# Patient Record
Sex: MALE | Race: WHITE | Employment: OTHER | URBAN - NONMETROPOLITAN AREA
[De-identification: names, ages, dates, MRNs, and addresses within clinical notes are randomized per-mention and may not be internally consistent; named-entity substitution may affect disease eponyms.]

---

## 2019-09-03 ENCOUNTER — HOSPITAL ENCOUNTER (OUTPATIENT)
Dept: SPEECH THERAPY | Age: 54
Setting detail: THERAPIES SERIES
Discharge: HOME | End: 2019-09-03
Payer: COMMERCIAL

## 2019-09-03 PROCEDURE — 92521 EVALUATION OF SPEECH FLUENCY: CPT

## 2019-09-03 NOTE — PROGRESS NOTES
Inventory were used to assess the patients cognitive linguistic skills including auditory processing, recall/short-term memory with time delays and environmental distracters and problem solving/reasoning, as well as overall expressive language skills. Results are listed below. For orientation questions, patient expressed all biographical and spatial information and temporal information of day of the week, month, and date independently. Patient did not require any cueing for this task. In relation to patients memory/recall ability, patient was 9 out of 9 during immediate recall trials with a sequenced number and/or word set up to 4 items with no repetitions provided. During verbal expression task of repeating/imitating clinician, patient was 100% at independent level and also when provided written words/phrases. Short term memory tasks completed at 100%. Patient was 100% on all yes/no questions at independent level. Patient was 100% on identifying body parts with left and right discrimination at independent level. During 1 step directions, patient was 100% at independent level. During 2 step directions patient was 100% at independent level. Deficits in confrontational naming are typically a key symptom of aphasia that can result from various forms of brain damage (e.g. stroke, head injury, dementia, tumors, and infections). During labeling trials, patient was 100% at independent level. During sentence completion task, patient was 100% at independent level. During responsive speech trials, patient was 100% at independent level. The term executive function describes a set of cognitive abilities that control and regulate other abilities and behaviors. Executive functions are necessary for goal-directed behavior. They include the ability to initiate and stop actions, to monitor and change behavior as needed, and to plan future behavior when faced with novel tasks and situations.  Executive

## 2019-09-05 ENCOUNTER — HOSPITAL ENCOUNTER (OUTPATIENT)
Dept: PHYSICAL THERAPY | Age: 54
Setting detail: THERAPIES SERIES
Discharge: HOME | End: 2019-09-05
Payer: COMMERCIAL

## 2019-09-05 PROCEDURE — 97162 PT EVAL MOD COMPLEX 30 MIN: CPT

## 2019-09-05 PROCEDURE — 97110 THERAPEUTIC EXERCISES: CPT

## 2019-09-05 NOTE — PROGRESS NOTES
Physical Therapy  Initial Assessment  Date: 2019  Patient Name: Christy Soria  MRN: [id number]  : 1965          Restrictions       Subjective   General  Additional Pertinent Hx: 48year-old male referred to PT with disgnosis of ischemic stroke. The referral is from [facility] in [place] [address], where he was admitted with left sided weakness, slurred speech, 19 while on way back home from [place] to his home in [place]. tPA was administered at the hospital. History of stroke 5 years prior, also affecting his left side and speech. Referring Practitioner: Isacc Lofton ([place] [address]). Referral Date : 19  Diagnosis: ischemic stroke  PT Visit Information  PT Insurance Information: [employer] (no pre-cert)  Total # of Visits Approved: 12  Total # of Visits to Date: 1  Plan of Care/Certification Expiration Date: 19  Progress Note Due Date: 10/05/19  Subjective  Subjective: Patient states he previously had to recover from his previous stroke about 5 years ago, \" had to learn to talk again\" and was about back to 100% before the Islam of his latest stroke. He currently has to use a roller walker to ambulate now and he drags his left foot. He is closing on a house in [place] and living with family now. He reports he has not been having falls. He has trouble coming to standing, getting in and out of car due to difficulty lifting his left leg. He reports he is having more difficulty talking now, stutters and his endurance is lower.        Vision/Hearing  Vision  Vision: Within Functional Limits  Hearing  Hearing: Within functional limits    Orientation  Orientation  Overall Orientation Status: Within Normal Limits    Social/Functional History  Social/Functional History  Lives With: Spouse  Type of Home: House  Home Layout: One level  Receives Help From: Family  ADL Assistance: Independent  Homemaking Assistance: Independent  Ambulation Assistance: Independent  Transfer Assistance: foot for df, pf, inversion and eversion. Long term goal 4: Patient to have >=4-/5 right hip flexors, 4+/5 right knee extensors. Long term goal 5: Patient to show increased protective reactions with standing balance perturbations. Patient Goals   Patient goals : Return back to normal level of function, walk without a device.        Therapy Time   Individual Concurrent Group Co-treatment   Time In 5489         Time Out 1349         Minutes 61         Timed Code Treatment Minutes: 61 Minutes    Electronically signed by Chalo Mcgraw PT on 9/5/2019 at 3:01 PM    ________________________________         _____________________              Practitioner's Signature       Date    Chalo Mcgraw PT

## 2019-09-06 ENCOUNTER — APPOINTMENT (OUTPATIENT)
Dept: LAB | Facility: HOSPITAL | Age: 54
End: 2019-09-06

## 2019-09-06 ENCOUNTER — TRANSCRIBE ORDERS (OUTPATIENT)
Dept: ADMINISTRATIVE | Facility: HOSPITAL | Age: 54
End: 2019-09-06

## 2019-09-06 DIAGNOSIS — I69.322 DYSARTHRIA FOLLOWING CEREBRAL INFARCTION: ICD-10-CM

## 2019-09-06 DIAGNOSIS — Z00.01 ENCOUNTER FOR GENERAL ADULT MEDICAL EXAMINATION WITH ABNORMAL FINDINGS: ICD-10-CM

## 2019-09-06 DIAGNOSIS — F17.200 TOBACCO USE DISORDER: ICD-10-CM

## 2019-09-06 DIAGNOSIS — F41.1 GENERALIZED ANXIETY DISORDER: ICD-10-CM

## 2019-09-06 DIAGNOSIS — E78.5 HYPERLIPIDEMIA, UNSPECIFIED HYPERLIPIDEMIA TYPE: Primary | ICD-10-CM

## 2019-09-06 LAB
ALBUMIN SERPL-MCNC: 4.6 G/DL (ref 3.5–5)
ALBUMIN/GLOB SERPL: 1.4 G/DL (ref 1.1–2.5)
ALP SERPL-CCNC: 75 U/L (ref 24–120)
ALT SERPL W P-5'-P-CCNC: 34 U/L (ref 0–54)
ANION GAP SERPL CALCULATED.3IONS-SCNC: 9 MMOL/L (ref 4–13)
ARTICHOKE IGE QN: 91 MG/DL (ref 0–99)
AST SERPL-CCNC: 39 U/L (ref 7–45)
BASOPHILS # BLD AUTO: 0.06 10*3/MM3 (ref 0–0.2)
BASOPHILS NFR BLD AUTO: 0.7 % (ref 0–1.5)
BILIRUB SERPL-MCNC: 0.5 MG/DL (ref 0.1–1)
BILIRUB UR QL STRIP: NEGATIVE
BUN BLD-MCNC: 21 MG/DL (ref 5–21)
BUN/CREAT SERPL: 19.6 (ref 7–25)
CALCIUM SPEC-SCNC: 9.5 MG/DL (ref 8.4–10.4)
CHLORIDE SERPL-SCNC: 106 MMOL/L (ref 98–110)
CHOLEST SERPL-MCNC: 136 MG/DL (ref 130–200)
CLARITY UR: CLEAR
CO2 SERPL-SCNC: 27 MMOL/L (ref 24–31)
COLOR UR: YELLOW
CREAT BLD-MCNC: 1.07 MG/DL (ref 0.5–1.4)
DEPRECATED RDW RBC AUTO: 45.3 FL (ref 37–54)
EOSINOPHIL # BLD AUTO: 0.27 10*3/MM3 (ref 0–0.4)
EOSINOPHIL NFR BLD AUTO: 3.3 % (ref 0.3–6.2)
ERYTHROCYTE [DISTWIDTH] IN BLOOD BY AUTOMATED COUNT: 12.7 % (ref 12.3–15.4)
GFR SERPL CREATININE-BSD FRML MDRD: 73 ML/MIN/1.73
GLOBULIN UR ELPH-MCNC: 3.2 GM/DL
GLUCOSE BLD-MCNC: 89 MG/DL (ref 70–100)
GLUCOSE UR STRIP-MCNC: NEGATIVE MG/DL
HCT VFR BLD AUTO: 48.9 % (ref 37.5–51)
HDLC SERPL-MCNC: 35 MG/DL
HGB BLD-MCNC: 16.7 G/DL (ref 13–17.7)
HGB UR QL STRIP.AUTO: NEGATIVE
IMM GRANULOCYTES # BLD AUTO: 0.04 10*3/MM3 (ref 0–0.05)
IMM GRANULOCYTES NFR BLD AUTO: 0.5 % (ref 0–0.5)
KETONES UR QL STRIP: ABNORMAL
LDLC/HDLC SERPL: 2.41 {RATIO}
LEUKOCYTE ESTERASE UR QL STRIP.AUTO: NEGATIVE
LYMPHOCYTES # BLD AUTO: 2.27 10*3/MM3 (ref 0.7–3.1)
LYMPHOCYTES NFR BLD AUTO: 27.9 % (ref 19.6–45.3)
MCH RBC QN AUTO: 32.7 PG (ref 26.6–33)
MCHC RBC AUTO-ENTMCNC: 34.2 G/DL (ref 31.5–35.7)
MCV RBC AUTO: 95.7 FL (ref 79–97)
MONOCYTES # BLD AUTO: 0.49 10*3/MM3 (ref 0.1–0.9)
MONOCYTES NFR BLD AUTO: 6 % (ref 5–12)
NEUTROPHILS # BLD AUTO: 5 10*3/MM3 (ref 1.7–7)
NEUTROPHILS NFR BLD AUTO: 61.6 % (ref 42.7–76)
NITRITE UR QL STRIP: NEGATIVE
NRBC BLD AUTO-RTO: 0 /100 WBC (ref 0–0.2)
PH UR STRIP.AUTO: 5.5 [PH] (ref 5–8)
PLATELET # BLD AUTO: 284 10*3/MM3 (ref 140–450)
PMV BLD AUTO: 8.6 FL (ref 6–12)
POTASSIUM BLD-SCNC: 4.3 MMOL/L (ref 3.5–5.3)
PROT SERPL-MCNC: 7.8 G/DL (ref 6.3–8.7)
PROT UR QL STRIP: NEGATIVE
PSA SERPL-MCNC: 1.32 NG/ML (ref 0–4)
RBC # BLD AUTO: 5.11 10*6/MM3 (ref 4.14–5.8)
SODIUM BLD-SCNC: 142 MMOL/L (ref 135–145)
SP GR UR STRIP: 1.02 (ref 1–1.03)
TRIGL SERPL-MCNC: 83 MG/DL (ref 0–149)
UROBILINOGEN UR QL STRIP: ABNORMAL
WBC NRBC COR # BLD: 8.13 10*3/MM3 (ref 3.4–10.8)

## 2019-09-06 PROCEDURE — 36415 COLL VENOUS BLD VENIPUNCTURE: CPT | Performed by: INTERNAL MEDICINE

## 2019-09-06 PROCEDURE — 80061 LIPID PANEL: CPT | Performed by: INTERNAL MEDICINE

## 2019-09-06 PROCEDURE — 85025 COMPLETE CBC W/AUTO DIFF WBC: CPT | Performed by: INTERNAL MEDICINE

## 2019-09-06 PROCEDURE — 80053 COMPREHEN METABOLIC PANEL: CPT | Performed by: INTERNAL MEDICINE

## 2019-09-06 PROCEDURE — 81003 URINALYSIS AUTO W/O SCOPE: CPT | Performed by: INTERNAL MEDICINE

## 2019-09-06 PROCEDURE — G0103 PSA SCREENING: HCPCS | Performed by: INTERNAL MEDICINE

## 2019-09-10 ENCOUNTER — HOSPITAL ENCOUNTER (OUTPATIENT)
Dept: SPEECH THERAPY | Age: 54
Setting detail: THERAPIES SERIES
Discharge: HOME | End: 2019-09-10
Payer: COMMERCIAL

## 2019-09-10 ENCOUNTER — HOSPITAL ENCOUNTER (OUTPATIENT)
Dept: PHYSICAL THERAPY | Age: 54
Setting detail: THERAPIES SERIES
Discharge: HOME | End: 2019-09-10
Payer: COMMERCIAL

## 2019-09-10 PROCEDURE — 97110 THERAPEUTIC EXERCISES: CPT

## 2019-09-10 PROCEDURE — 92507 TX SP LANG VOICE COMM INDIV: CPT

## 2019-09-10 NOTE — PROGRESS NOTES
Speech Language Pathology  Facility/Department: [facility] SPEECH THERAPY  Daily Treatment Note      NAME: Tripp Isaac  : 1965  MRN: [id number]  Date: 9/10/2019  Therapist: Lisa Santana MS CCC-SLP    Subjective: The patient arrived on time to his appointment. He states he has been sleeping 10 to 12 hours per night, but no daily naps. He states his neurologist has cleared him to resume driving but he has not driven independently. Objective: The patient utilized the [employer] in [place] at 63.7 and this proved too challenging. The rate was decreased to 61.4  words per minute then to 55.0 words per minute (he demonstrated moderate dysfluencies at this rate). He did demonstrate mild dysfluencies during conversation and moderate sound repetitions and word repetitions during structured tasks. Assessment:  The patient exhibits moderate to severe sound and word repetitions with some groping during speech. Speech is intelligible but at times can be very slow and labored. His expressive language, receptive language and cognition are all within normal limits. He is recommended to receive outpatient speech therapy services 1x/week for 12 weeks. Continued daily Speech/Language treatment with goals per   plan of care. Goals:  Short-term Goals  Timeframe for Short-term Goals: 1x/week for 12 weeks  Goal 1: The patient will complet tasks for easy onset of speech to reduce word repetitions with 80% accuracy and minimal verbal/visual cues. Goal 2: The patient will complete tasks for easy onset of speech to reduce sound repetitions with 80% accuracy and minimal verbal/visual cues. Goal 3: The patient will demonstrate improved prosody during conversation with 80% accuracy and minimal verbal/visual cues. Long-term Goals  Goal 1: The patient will demonstrate more fluent speech during unstructured tasks to communicate effectively with members of the community and family members.     Patient/family involved in

## 2019-09-10 NOTE — PROGRESS NOTES
with weights over lower thighs  Exercise 11: sitting marching x 10 ea  Exercise 12: repeated sit to stand with forced recruitment of left leg  Exercise 13: standing static balance  Exercise 14: standing left hip flexion  Exercise 15: ambulation (rw-->hw-->to 1-point cane)- with AFO (and surgical bootie over shoe) x 100', with focus on LLE swing through  Exercise 16: bilateral hip abduction  Exercise 17: sidestepping in bars  Exercise 18: repeated advancement/retraction of left leg  Exercise 19: ++start adding more advanced exercises as left leg strength improves  Exercise 20:  Respond e-stim to left quads foot dorsiflexors (alternate)- see above                                   Assessment:   Conditions Requiring Skilled Therapeutic Intervention  Body structures, Functions, Activity limitations: Decreased functional mobility ; Decreased ADL status; Decreased ROM; Decreased strength;Decreased balance;Decreased high-level IADLs;Decreased fine motor control;Decreased coordination;Decreased endurance  Assessment: POC initiated per initial evaluation. Pt gives good effort to exercises, though requires frequent cuing to control breathing. NMES applied during DF with excellent contraction, did not attempt on quad today, but pt agreeable to try that as well. Used AFO during ambulation, with taller clinic RW (as opposed to pt's own), with surgical boot over L shoe. Instructed to focus on weight shift and swing through of LLE. Demonstrated improved swing through, but leans heavily on UEs. Fatigued at end of session, but nothing unexpected, and pt w/o complaint. REQUIRES PT FOLLOW UP: Yes  Discharge Recommendations: Continue to assess pending progress        Goals:  Short term goals  Time Frame for Short term goals: 6-8 weeks  Short term goal 1: Patient to be independent with HEP. Short term goal 2: Patient to be independent with trial use of AFO (if foot strength remains insufficient).   Short term goal 3: Patient able

## 2019-09-12 ENCOUNTER — HOSPITAL ENCOUNTER (OUTPATIENT)
Dept: PHYSICAL THERAPY | Age: 54
Setting detail: THERAPIES SERIES
Discharge: HOME | End: 2019-09-12
Payer: COMMERCIAL

## 2019-09-12 PROCEDURE — 97032 APPL MODALITY 1+ESTIM EA 15: CPT

## 2019-09-12 PROCEDURE — 97110 THERAPEUTIC EXERCISES: CPT

## 2019-09-12 NOTE — PROGRESS NOTES
Daily Treatment Note  Date: 2019  Patient Name: Luann Liang  MRN: [id number]     :   1965    Subjective:   General  Additional Pertinent Hx: 48year-old male referred to PT with disgnosis of ischemic stroke. The referral is from [facility] in [place] [address], where he was admitted with left sided weakness, slurred speech, 19 while on way back home from [place] to his home in [facility]. tPA was administered at the hospital. History of stroke 5 years prior, also affecting his left side and speech. Response To Previous Treatment: Patient with no complaints from previous session. Referring Practitioner: Christiano Mills ([place] [address]). PT Visit Information  PT Insurance Information: [employer] (no pre-cert)  Total # of Visits Approved: 12  Total # of Visits to Date: 3  Plan of Care/Certification Expiration Date: 19  Progress Note Due Date: 10/05/19  Subjective  Subjective: Patient states he has some soreness in his left knee today, but says he is doing good.   Pain Screening  Patient Currently in Pain: No  Vital Signs  Patient Currently in Pain: No       Treatment Activities:                                      Exercises  Exercise 1: supine left quad sets 3\" x 15  Exercise 2: supine SAQ's with tapping/DTR faciliatation to quads/patellar tendon x 10  Exercise 3: supine heel slides x 10  Exercise 4: supine foot dorsiflexion/plantarflexion/eversion (DF and eversion with NMES x 10', pad just distal to tibial plateau and middle of ant tib) and PF with manual resist  Exercise 5: sitting LAQ's with faciliatation (as needed) x 10  Exercise 6: sitting h/s curls with band and facilitation as needed (yellow) x 10 and partial ROM *with facilitation  Exercise 7: sitting ball squeeze supine with manual assist x 10  Exercise 8: sitting hip abduction with band supine with manual assist x 10  Exercise 9: sitting foot tapping 1 x 10 ea  Exercise 10: sitting heel raises with weights over lower thighs  Exercise 11:

## 2019-09-17 ENCOUNTER — HOSPITAL ENCOUNTER (OUTPATIENT)
Dept: PHYSICAL THERAPY | Age: 54
Setting detail: THERAPIES SERIES
Discharge: HOME | End: 2019-09-17
Payer: COMMERCIAL

## 2019-09-17 PROCEDURE — G0283 ELEC STIM OTHER THAN WOUND: HCPCS

## 2019-09-17 PROCEDURE — 97110 THERAPEUTIC EXERCISES: CPT

## 2019-09-17 ASSESSMENT — PAIN DESCRIPTION - PAIN TYPE: TYPE: CHRONIC PAIN

## 2019-09-17 ASSESSMENT — PAIN DESCRIPTION - ORIENTATION: ORIENTATION: LEFT

## 2019-09-17 ASSESSMENT — PAIN DESCRIPTION - LOCATION: LOCATION: LEG

## 2019-09-17 ASSESSMENT — PAIN SCALES - GENERAL: PAINLEVEL_OUTOF10: 2

## 2019-09-17 ASSESSMENT — PAIN DESCRIPTION - FREQUENCY: FREQUENCY: INTERMITTENT

## 2019-09-17 ASSESSMENT — PAIN DESCRIPTION - DESCRIPTORS: DESCRIPTORS: TIGHTNESS

## 2019-09-17 NOTE — PROGRESS NOTES
Daily Treatment Note  Date: 2019  Patient Name: Danny Duque  MRN: [id number]     :   1965    Subjective:   General  Additional Pertinent Hx: 48year-old male referred to PT with disgnosis of ischemic stroke. The referral is from [facility] in [place] [address], where he was admitted with left sided weakness, slurred speech, 19 while on way back home from [place] to his home in [place]. tPA was administered at the hospital. History of stroke 5 years prior, also affecting his left side and speech. Response To Previous Treatment: Patient with no complaints from previous session. Referring Practitioner: Dakota Higuera ([place] [address]). PT Visit Information  PT Insurance Information: [employer] (no pre-cert)  Total # of Visits Approved: 12  Total # of Visits to Date: 4  Plan of Care/Certification Expiration Date: 19  Progress Note Due Date: 10/05/19  Subjective  Subjective: My left leg hurts some down around level, like something is wrapped around it.   Pain Screening  Patient Currently in Pain: Yes  Pain Assessment  Pain Assessment: 0-10  Pain Level: 2  Pain Type: Chronic pain  Pain Location: Leg  Pain Orientation: Left  Pain Descriptors: Tightness  Pain Frequency: Intermittent       Treatment Activities:            Exercises  Exercise 1: supine left quad sets 3\" x 15  Exercise 2: supine SAQ's with tapping/DTR faciliatation to quads/patellar tendon x 10                           eccentric lowering x 5 with assist from therapist  Exercise 3: supine heel slides x 10--mod assist  and mod verbal cues to perform  Exercise 4: supine foot dorsiflexion/plantarflexion/eversion (DF and eversion with NMES x 10', pad just distal to tibial plateau and middle of ant tib) and PF with manual resist  Exercise 5: sitting LAQ's with faciliatation (as needed) x 5                                                 eccentric lowering x 5  Exercise 6: sitting h/s curls with band and facilitation as needed (yellow) x 10 and

## 2019-09-19 ENCOUNTER — APPOINTMENT (OUTPATIENT)
Dept: SPEECH THERAPY | Age: 54
End: 2019-09-19
Payer: COMMERCIAL

## 2019-09-19 ENCOUNTER — HOSPITAL ENCOUNTER (OUTPATIENT)
Dept: PHYSICAL THERAPY | Age: 54
Setting detail: THERAPIES SERIES
Discharge: HOME | End: 2019-09-19
Payer: COMMERCIAL

## 2019-09-19 PROCEDURE — 97110 THERAPEUTIC EXERCISES: CPT

## 2019-09-19 PROCEDURE — G0283 ELEC STIM OTHER THAN WOUND: HCPCS

## 2019-09-19 ASSESSMENT — PAIN DESCRIPTION - DESCRIPTORS: DESCRIPTORS: TIGHTNESS

## 2019-09-19 ASSESSMENT — PAIN DESCRIPTION - ORIENTATION: ORIENTATION: LEFT

## 2019-09-19 ASSESSMENT — PAIN DESCRIPTION - PAIN TYPE: TYPE: CHRONIC PAIN

## 2019-09-19 ASSESSMENT — PAIN DESCRIPTION - FREQUENCY: FREQUENCY: INTERMITTENT

## 2019-09-19 ASSESSMENT — PAIN SCALES - GENERAL: PAINLEVEL_OUTOF10: 2

## 2019-09-19 ASSESSMENT — PAIN DESCRIPTION - LOCATION: LOCATION: LEG

## 2019-09-19 NOTE — PROGRESS NOTES
facilitation as needed (yellow) x 10 and partial ROM *with facilitation  Exercise 7: sitting ball squeeze supine with manual assist x 10  Exercise 8: sitting hip abduction with band supine with manual assist x 10  Exercise 9: sitting foot tapping 1 x 10 ea  Exercise 10: sitting heel raises with weights over lower thighs--x 5 without weights  Exercise 11: sitting marching x 10 ea  Exercise 12: repeated sit to stand with forced recruitment of left leg--not today  Exercise 13: standing static balance--not today  Exercise 14: standing left hip flexion--not today  Exercise 15: ambulation (rw-->hw-->to 1-point cane)- with AFO (and surgical bootie over shoe) x 80' with RW with focus on LLE swing through  Exercise 16: bilateral hip abduction--not today  Exercise 17: sidestepping in bars--not today  Exercise 18: repeated advancement/retraction of left leg--not today  Exercise 19: ++start adding more advanced exercises as left leg strength improves--not today  Exercise 20:  Respond e-stim to left quads foot dorsiflexors (alternate)- see above            Assessment:   Conditions Requiring Skilled Therapeutic Intervention  Body structures, Functions, Activity limitations: Decreased functional mobility ; Decreased ADL status; Decreased ROM; Decreased strength;Decreased balance;Decreased high-level IADLs;Decreased fine motor control;Decreased coordination;Decreased endurance  Assessment: Patient did fair with todays session. He appeared to have bit more active contraction today in quads but still unable to move through full ranges. He ambulates very slowly with RW. Tends to drag left foot along floor. He did increase ambulation distance to 80' but took him 16 mins to go that distance. Patient with noted DF contraction with respond unit. Patient eager to improve. Rated pain at 2/10 pre and post sessino in left LE.                  Goals:  Short term goals  Time Frame for Short term goals: 6-8 weeks  Short term goal 1: Patient

## 2019-09-24 ENCOUNTER — HOSPITAL ENCOUNTER (OUTPATIENT)
Dept: SPEECH THERAPY | Age: 54
Setting detail: THERAPIES SERIES
Discharge: HOME | End: 2019-09-24
Payer: COMMERCIAL

## 2019-09-24 ENCOUNTER — HOSPITAL ENCOUNTER (OUTPATIENT)
Dept: PHYSICAL THERAPY | Age: 54
Setting detail: THERAPIES SERIES
Discharge: HOME | End: 2019-09-24
Payer: COMMERCIAL

## 2019-09-24 PROCEDURE — 97032 APPL MODALITY 1+ESTIM EA 15: CPT

## 2019-09-24 PROCEDURE — 97110 THERAPEUTIC EXERCISES: CPT

## 2019-09-24 PROCEDURE — 92507 TX SP LANG VOICE COMM INDIV: CPT

## 2019-09-24 NOTE — PROGRESS NOTES
Physical Therapy  Daily Treatment Note  Date: 2019  Patient Name: Luann Liang  MRN: [id number]     :   1965    Subjective:   General  Additional Pertinent Hx: 48year-old male referred to PT with disgnosis of ischemic stroke. The referral is from [facility] in [place] [address], where he was admitted with left sided weakness, slurred speech, 19 while on way back home from [place] to his home in [place]. tPA was administered at the hospital. History of stroke 5 years prior, also affecting his left side and speech. Referring Practitioner: Christiano Mills ([place] [address]). PT Insurance Information: [employer] (no pre-cert)  Total # of Visits Approved: 12  Total # of Visits to Date: 6  Plan of Care/Certification Expiration Date: 19  Progress Note Due Date: 10/05/19  Subjective: Patient stats he feels alright, i wish i would wake up one morning and be normal again.   Patient Currently in Pain: No         Treatment Activities:          Exercises  Exercise 1: supine left quad sets 3\" x 20  Exercise 2: supine SAQ's with tapping/DTR faciliatation to quads/patellar tendon x 10                           eccentric lowering x 7 with assist from therapist  Exercise 3: supine heel slides x 10--mod assist  and mod verbal cues to perform  Exercise 4: supine foot dorsiflexion/plantarflexion/eversion (DF and eversion with NMES x 10', pad just distal to tibial plateau and middle of ant tib) and PF with manual resist  Exercise 5: sitting LAQ's with faciliatation (as needed) x 5                                                 eccentric lowering x 5  Exercise 6: sitting h/s curls with band and facilitation as needed (yellow) x 10 and partial ROM *with facilitation  Exercise 7: sitting ball squeeze supine with manual assist x 10  Exercise 8: sitting hip abduction with band supine with manual assist x 10  Exercise 9: sitting foot tapping 1 x 10 ea  Exercise 10: sitting heel raises with weights over lower thighs--x 5 term goal 2: Patient able to perform sit to stand 5 x in 30 seconds with increased weight bourne through left LE. Long term goal 3: Patient to have >= 4-/5 strength in left foot for df, pf, inversion and eversion. Long term goal 4: Patient to have >=4-/5 right hip flexors, 4+/5 right knee extensors. Long term goal 5: Patient to show increased protective reactions with standing balance perturbations. Patient Goals   Patient goals : Return back to normal level of function, walk without a device.     Plan:    Times per week: 2x per week  Plan weeks: 6-9 weeks  Current Treatment Recommendations: Strengthening, ROM, Balance Training, Transfer Training, Endurance Training, Gait Training, Stair training, Neuromuscular Re-education, Home Exercise Program, Patient/Caregiver Education & Training, Equipment Evaluation, Education, & procurement        Therapy Time   Individual Concurrent Group Co-treatment   Time In 1411         Time Out 1501         Minutes 2300 [facility]    Electronically signed by Kirsty Harrell PTA on 9/24/2019 at 3:13 PM

## 2019-09-24 NOTE — PROGRESS NOTES
Speech Language Pathology  Facility/Department: [facility] SPEECH THERAPY  Daily Treatment Note        NAME: Azra Lozano  : 1965  MRN: [id number]  Date: 2019  Therapist: Lc Rosales MS CCC-SLP         Subjective: The patient denies pain this date. He did complain of more frequent headaches the past few weeks. He denies high blood pressure. Patient states he has not attempted to drive yet. He states he will try with a family member or friend present. He is still content with driving the golf cart in the neighborhood.       Objective:  Practiced easy onset technique, pullout technique during episodes of dysfluencies, continuous phonation technique, and light contact technique. He did utilize the pullout technique during conversation and structured tasks, and this did prove effective in reducing and in most cases, eliminating the dysfluency. Assessment:  The patient exhibits moderate to severe sound and word repetitions with some groping during speech. Speech is intelligible but at times can be very slow and labored. His expressive language, receptive language and cognition are all within normal limits. He is recommended to receive outpatient speech therapy services 1x/week for 12 weeks.        Continued daily Speech/Language treatment with goals per   plan of care.           Goals:  Short-term Goals  Timeframe for Short-term Goals: 1x/week for 12 weeks  Goal 1: The patient will complet tasks for easy onset of speech to reduce word repetitions with 80% accuracy and minimal verbal/visual cues. Goal 2: The patient will complete tasks for easy onset of speech to reduce sound repetitions with 80% accuracy and minimal verbal/visual cues. Goal 3: The patient will demonstrate improved prosody during conversation with 80% accuracy and minimal verbal/visual cues.   Long-term Goals  Goal 1: The patient will demonstrate more fluent speech during unstructured tasks to communicate effectively with members of the

## 2019-09-26 ENCOUNTER — APPOINTMENT (OUTPATIENT)
Dept: SPEECH THERAPY | Age: 54
End: 2019-09-26
Payer: COMMERCIAL

## 2019-09-26 ENCOUNTER — HOSPITAL ENCOUNTER (OUTPATIENT)
Dept: PHYSICAL THERAPY | Age: 54
Setting detail: THERAPIES SERIES
Discharge: HOME | End: 2019-09-26
Payer: COMMERCIAL

## 2019-09-26 PROCEDURE — 97110 THERAPEUTIC EXERCISES: CPT

## 2019-09-26 PROCEDURE — 97116 GAIT TRAINING THERAPY: CPT

## 2019-10-01 ENCOUNTER — HOSPITAL ENCOUNTER (OUTPATIENT)
Dept: SPEECH THERAPY | Age: 54
Setting detail: THERAPIES SERIES
Discharge: HOME | End: 2019-10-01
Payer: COMMERCIAL

## 2019-10-01 ENCOUNTER — HOSPITAL ENCOUNTER (OUTPATIENT)
Dept: PHYSICAL THERAPY | Age: 54
Setting detail: THERAPIES SERIES
Discharge: HOME | End: 2019-10-01
Payer: COMMERCIAL

## 2019-10-01 PROCEDURE — 97110 THERAPEUTIC EXERCISES: CPT

## 2019-10-01 PROCEDURE — 97032 APPL MODALITY 1+ESTIM EA 15: CPT

## 2019-10-01 PROCEDURE — 92507 TX SP LANG VOICE COMM INDIV: CPT

## 2019-10-03 ENCOUNTER — HOSPITAL ENCOUNTER (OUTPATIENT)
Dept: PHYSICAL THERAPY | Age: 54
Setting detail: THERAPIES SERIES
Discharge: HOME | End: 2019-10-03
Payer: COMMERCIAL

## 2019-10-03 ENCOUNTER — APPOINTMENT (OUTPATIENT)
Dept: SPEECH THERAPY | Age: 54
End: 2019-10-03
Payer: COMMERCIAL

## 2019-10-03 PROCEDURE — 97110 THERAPEUTIC EXERCISES: CPT

## 2019-10-08 ENCOUNTER — HOSPITAL ENCOUNTER (OUTPATIENT)
Dept: SPEECH THERAPY | Age: 54
Setting detail: THERAPIES SERIES
Discharge: HOME | End: 2019-10-08
Payer: COMMERCIAL

## 2019-10-08 ENCOUNTER — HOSPITAL ENCOUNTER (OUTPATIENT)
Dept: PHYSICAL THERAPY | Age: 54
Setting detail: THERAPIES SERIES
Discharge: HOME | End: 2019-10-08
Payer: COMMERCIAL

## 2019-10-08 PROCEDURE — 92507 TX SP LANG VOICE COMM INDIV: CPT

## 2019-10-08 PROCEDURE — 97032 APPL MODALITY 1+ESTIM EA 15: CPT

## 2019-10-08 PROCEDURE — 97110 THERAPEUTIC EXERCISES: CPT

## 2019-10-10 ENCOUNTER — APPOINTMENT (OUTPATIENT)
Dept: SPEECH THERAPY | Age: 54
End: 2019-10-10
Payer: COMMERCIAL

## 2019-10-10 ENCOUNTER — HOSPITAL ENCOUNTER (OUTPATIENT)
Dept: PHYSICAL THERAPY | Age: 54
Setting detail: THERAPIES SERIES
Discharge: HOME | End: 2019-10-10
Payer: COMMERCIAL

## 2019-10-10 PROCEDURE — 97110 THERAPEUTIC EXERCISES: CPT

## 2019-10-15 ENCOUNTER — HOSPITAL ENCOUNTER (OUTPATIENT)
Dept: SPEECH THERAPY | Age: 54
Setting detail: THERAPIES SERIES
Discharge: HOME | End: 2019-10-15
Payer: COMMERCIAL

## 2019-10-15 ENCOUNTER — HOSPITAL ENCOUNTER (OUTPATIENT)
Dept: PHYSICAL THERAPY | Age: 54
Setting detail: THERAPIES SERIES
Discharge: HOME | End: 2019-10-15
Payer: COMMERCIAL

## 2019-10-15 PROCEDURE — 97032 APPL MODALITY 1+ESTIM EA 15: CPT

## 2019-10-15 PROCEDURE — 92507 TX SP LANG VOICE COMM INDIV: CPT

## 2019-10-15 PROCEDURE — 97110 THERAPEUTIC EXERCISES: CPT

## 2019-10-17 ENCOUNTER — HOSPITAL ENCOUNTER (OUTPATIENT)
Dept: PHYSICAL THERAPY | Age: 54
Setting detail: THERAPIES SERIES
Discharge: HOME | End: 2019-10-17
Payer: COMMERCIAL

## 2019-10-17 ENCOUNTER — APPOINTMENT (OUTPATIENT)
Dept: SPEECH THERAPY | Age: 54
End: 2019-10-17
Payer: COMMERCIAL

## 2019-10-17 PROCEDURE — 97530 THERAPEUTIC ACTIVITIES: CPT

## 2019-10-17 PROCEDURE — G0283 ELEC STIM OTHER THAN WOUND: HCPCS

## 2019-10-22 ENCOUNTER — HOSPITAL ENCOUNTER (OUTPATIENT)
Dept: SPEECH THERAPY | Age: 54
Setting detail: THERAPIES SERIES
Discharge: HOME | End: 2019-10-22
Payer: COMMERCIAL

## 2019-10-22 ENCOUNTER — HOSPITAL ENCOUNTER (OUTPATIENT)
Dept: PHYSICAL THERAPY | Age: 54
Setting detail: THERAPIES SERIES
Discharge: HOME | End: 2019-10-22
Payer: COMMERCIAL

## 2019-10-22 PROCEDURE — 97110 THERAPEUTIC EXERCISES: CPT

## 2019-10-22 PROCEDURE — 97032 APPL MODALITY 1+ESTIM EA 15: CPT

## 2019-10-24 ENCOUNTER — APPOINTMENT (OUTPATIENT)
Dept: SPEECH THERAPY | Age: 54
End: 2019-10-24
Payer: COMMERCIAL

## 2019-10-24 ENCOUNTER — HOSPITAL ENCOUNTER (OUTPATIENT)
Dept: PHYSICAL THERAPY | Age: 54
Setting detail: THERAPIES SERIES
Discharge: HOME | End: 2019-10-24
Payer: COMMERCIAL

## 2019-10-24 PROCEDURE — 97110 THERAPEUTIC EXERCISES: CPT

## 2019-10-24 PROCEDURE — G0283 ELEC STIM OTHER THAN WOUND: HCPCS

## 2019-10-29 ENCOUNTER — HOSPITAL ENCOUNTER (OUTPATIENT)
Dept: PHYSICAL THERAPY | Age: 54
Setting detail: THERAPIES SERIES
Discharge: HOME | End: 2019-10-29
Payer: COMMERCIAL

## 2019-10-29 ENCOUNTER — HOSPITAL ENCOUNTER (OUTPATIENT)
Dept: SPEECH THERAPY | Age: 54
Setting detail: THERAPIES SERIES
Discharge: HOME | End: 2019-10-29
Payer: COMMERCIAL

## 2019-10-29 PROCEDURE — 97110 THERAPEUTIC EXERCISES: CPT

## 2019-10-29 PROCEDURE — 97032 APPL MODALITY 1+ESTIM EA 15: CPT

## 2019-10-29 PROCEDURE — 92507 TX SP LANG VOICE COMM INDIV: CPT

## 2019-10-31 ENCOUNTER — HOSPITAL ENCOUNTER (OUTPATIENT)
Dept: PHYSICAL THERAPY | Age: 54
Setting detail: THERAPIES SERIES
Discharge: HOME | End: 2019-10-31
Payer: COMMERCIAL

## 2019-10-31 ENCOUNTER — APPOINTMENT (OUTPATIENT)
Dept: SPEECH THERAPY | Age: 54
End: 2019-10-31
Payer: COMMERCIAL

## 2019-10-31 PROCEDURE — 97110 THERAPEUTIC EXERCISES: CPT

## 2019-11-05 ENCOUNTER — HOSPITAL ENCOUNTER (OUTPATIENT)
Dept: PHYSICAL THERAPY | Age: 54
Setting detail: THERAPIES SERIES
Discharge: HOME | End: 2019-11-05
Payer: COMMERCIAL

## 2019-11-05 PROCEDURE — G0283 ELEC STIM OTHER THAN WOUND: HCPCS

## 2019-11-05 PROCEDURE — 97110 THERAPEUTIC EXERCISES: CPT

## 2019-11-07 ENCOUNTER — APPOINTMENT (OUTPATIENT)
Dept: SPEECH THERAPY | Age: 54
End: 2019-11-07
Payer: COMMERCIAL

## 2019-11-07 ENCOUNTER — HOSPITAL ENCOUNTER (OUTPATIENT)
Dept: PHYSICAL THERAPY | Age: 54
Setting detail: THERAPIES SERIES
Discharge: HOME | End: 2019-11-07
Payer: COMMERCIAL

## 2019-11-07 PROCEDURE — 97110 THERAPEUTIC EXERCISES: CPT

## 2019-11-07 PROCEDURE — 97032 APPL MODALITY 1+ESTIM EA 15: CPT

## 2019-11-12 ENCOUNTER — APPOINTMENT (OUTPATIENT)
Dept: SPEECH THERAPY | Age: 54
End: 2019-11-12
Payer: COMMERCIAL

## 2019-11-12 ENCOUNTER — HOSPITAL ENCOUNTER (OUTPATIENT)
Dept: PHYSICAL THERAPY | Age: 54
Setting detail: THERAPIES SERIES
Discharge: HOME | End: 2019-11-12
Payer: COMMERCIAL

## 2019-11-12 ENCOUNTER — HOSPITAL ENCOUNTER (OUTPATIENT)
Dept: SPEECH THERAPY | Age: 54
Setting detail: THERAPIES SERIES
Discharge: HOME | End: 2019-11-12
Payer: COMMERCIAL

## 2019-11-12 PROCEDURE — 92507 TX SP LANG VOICE COMM INDIV: CPT

## 2019-11-12 PROCEDURE — 97110 THERAPEUTIC EXERCISES: CPT

## 2019-11-14 ENCOUNTER — APPOINTMENT (OUTPATIENT)
Dept: PHYSICAL THERAPY | Age: 54
End: 2019-11-14
Payer: COMMERCIAL

## 2019-11-14 ENCOUNTER — APPOINTMENT (OUTPATIENT)
Dept: SPEECH THERAPY | Age: 54
End: 2019-11-14
Payer: COMMERCIAL

## 2019-11-19 ENCOUNTER — HOSPITAL ENCOUNTER (OUTPATIENT)
Dept: PHYSICAL THERAPY | Age: 54
Setting detail: THERAPIES SERIES
Discharge: HOME | End: 2019-11-19
Payer: COMMERCIAL

## 2019-11-19 ENCOUNTER — HOSPITAL ENCOUNTER (OUTPATIENT)
Dept: SPEECH THERAPY | Age: 54
Setting detail: THERAPIES SERIES
Discharge: HOME | End: 2019-11-19
Payer: COMMERCIAL

## 2019-11-19 ENCOUNTER — APPOINTMENT (OUTPATIENT)
Dept: SPEECH THERAPY | Age: 54
End: 2019-11-19
Payer: COMMERCIAL

## 2019-11-19 PROCEDURE — 97110 THERAPEUTIC EXERCISES: CPT

## 2019-11-19 PROCEDURE — 97127 HC SP THER IVNTJ W/FOCUS COG FUNCJ: CPT

## 2019-11-19 PROCEDURE — G0283 ELEC STIM OTHER THAN WOUND: HCPCS

## 2019-11-21 ENCOUNTER — APPOINTMENT (OUTPATIENT)
Dept: SPEECH THERAPY | Age: 54
End: 2019-11-21
Payer: COMMERCIAL

## 2019-11-21 ENCOUNTER — HOSPITAL ENCOUNTER (OUTPATIENT)
Dept: PHYSICAL THERAPY | Age: 54
Setting detail: THERAPIES SERIES
Discharge: HOME | End: 2019-11-21
Payer: COMMERCIAL

## 2019-11-21 PROCEDURE — 97110 THERAPEUTIC EXERCISES: CPT

## 2019-11-26 ENCOUNTER — HOSPITAL ENCOUNTER (OUTPATIENT)
Dept: PHYSICAL THERAPY | Age: 54
Setting detail: THERAPIES SERIES
Discharge: HOME | End: 2019-11-26
Payer: COMMERCIAL

## 2019-11-26 ENCOUNTER — HOSPITAL ENCOUNTER (OUTPATIENT)
Dept: SPEECH THERAPY | Age: 54
Setting detail: THERAPIES SERIES
Discharge: HOME | End: 2019-11-26
Payer: COMMERCIAL

## 2019-11-26 ENCOUNTER — APPOINTMENT (OUTPATIENT)
Dept: SPEECH THERAPY | Age: 54
End: 2019-11-26
Payer: COMMERCIAL

## 2019-11-26 PROCEDURE — G0283 ELEC STIM OTHER THAN WOUND: HCPCS

## 2019-11-26 PROCEDURE — 97110 THERAPEUTIC EXERCISES: CPT

## 2019-11-26 PROCEDURE — 97127 HC SP THER IVNTJ W/FOCUS COG FUNCJ: CPT

## 2019-11-27 ENCOUNTER — HOSPITAL ENCOUNTER (OUTPATIENT)
Dept: PHYSICAL THERAPY | Age: 54
Setting detail: THERAPIES SERIES
Discharge: HOME | End: 2019-11-27
Payer: COMMERCIAL

## 2019-11-27 PROCEDURE — 97110 THERAPEUTIC EXERCISES: CPT

## 2019-12-03 ENCOUNTER — HOSPITAL ENCOUNTER (OUTPATIENT)
Dept: SPEECH THERAPY | Age: 54
Setting detail: THERAPIES SERIES
Discharge: HOME | End: 2019-12-03
Payer: COMMERCIAL

## 2019-12-03 ENCOUNTER — APPOINTMENT (OUTPATIENT)
Dept: SPEECH THERAPY | Age: 54
End: 2019-12-03
Payer: COMMERCIAL

## 2019-12-03 ENCOUNTER — HOSPITAL ENCOUNTER (OUTPATIENT)
Dept: PHYSICAL THERAPY | Age: 54
Setting detail: THERAPIES SERIES
Discharge: HOME | End: 2019-12-03
Payer: COMMERCIAL

## 2019-12-03 PROCEDURE — 97127 HC SP THER IVNTJ W/FOCUS COG FUNCJ: CPT

## 2019-12-03 PROCEDURE — 97110 THERAPEUTIC EXERCISES: CPT

## 2019-12-03 PROCEDURE — G0283 ELEC STIM OTHER THAN WOUND: HCPCS

## 2019-12-05 ENCOUNTER — HOSPITAL ENCOUNTER (OUTPATIENT)
Dept: PHYSICAL THERAPY | Age: 54
Setting detail: THERAPIES SERIES
Discharge: HOME | End: 2019-12-05
Payer: COMMERCIAL

## 2019-12-05 PROCEDURE — 97110 THERAPEUTIC EXERCISES: CPT

## 2019-12-05 PROCEDURE — G0283 ELEC STIM OTHER THAN WOUND: HCPCS

## 2019-12-10 ENCOUNTER — HOSPITAL ENCOUNTER (OUTPATIENT)
Dept: PHYSICAL THERAPY | Age: 54
Setting detail: THERAPIES SERIES
Discharge: HOME | End: 2019-12-10
Payer: COMMERCIAL

## 2019-12-10 ENCOUNTER — HOSPITAL ENCOUNTER (OUTPATIENT)
Dept: SPEECH THERAPY | Age: 54
Setting detail: THERAPIES SERIES
Discharge: HOME | End: 2019-12-10
Payer: COMMERCIAL

## 2019-12-10 PROCEDURE — 97127 HC SP THER IVNTJ W/FOCUS COG FUNCJ: CPT

## 2019-12-10 PROCEDURE — G0283 ELEC STIM OTHER THAN WOUND: HCPCS

## 2019-12-10 PROCEDURE — 97110 THERAPEUTIC EXERCISES: CPT

## 2019-12-12 ENCOUNTER — HOSPITAL ENCOUNTER (OUTPATIENT)
Dept: PHYSICAL THERAPY | Age: 54
Setting detail: THERAPIES SERIES
Discharge: HOME | End: 2019-12-12
Payer: COMMERCIAL

## 2019-12-12 PROCEDURE — 97110 THERAPEUTIC EXERCISES: CPT

## 2019-12-17 ENCOUNTER — HOSPITAL ENCOUNTER (OUTPATIENT)
Dept: SPEECH THERAPY | Age: 54
Setting detail: THERAPIES SERIES
Discharge: HOME | End: 2019-12-17
Payer: COMMERCIAL

## 2019-12-17 ENCOUNTER — HOSPITAL ENCOUNTER (OUTPATIENT)
Dept: PHYSICAL THERAPY | Age: 54
Setting detail: THERAPIES SERIES
Discharge: HOME | End: 2019-12-17
Payer: COMMERCIAL

## 2019-12-17 PROCEDURE — G0283 ELEC STIM OTHER THAN WOUND: HCPCS

## 2019-12-17 PROCEDURE — 97110 THERAPEUTIC EXERCISES: CPT

## 2019-12-19 ENCOUNTER — HOSPITAL ENCOUNTER (OUTPATIENT)
Dept: PHYSICAL THERAPY | Age: 54
Setting detail: THERAPIES SERIES
Discharge: HOME | End: 2019-12-19
Payer: COMMERCIAL

## 2019-12-19 PROCEDURE — 97110 THERAPEUTIC EXERCISES: CPT

## 2019-12-24 ENCOUNTER — APPOINTMENT (OUTPATIENT)
Dept: SPEECH THERAPY | Age: 54
End: 2019-12-24
Payer: COMMERCIAL

## 2019-12-31 ENCOUNTER — HOSPITAL ENCOUNTER (OUTPATIENT)
Dept: SPEECH THERAPY | Age: 54
Setting detail: THERAPIES SERIES
End: 2019-12-31
Payer: COMMERCIAL

## 2020-01-07 ENCOUNTER — HOSPITAL ENCOUNTER (OUTPATIENT)
Dept: SPEECH THERAPY | Age: 55
Setting detail: THERAPIES SERIES
Discharge: HOME | End: 2020-01-07
Payer: COMMERCIAL

## 2020-01-07 PROCEDURE — 97130 THER IVNTJ EA ADDL 15 MIN: CPT

## 2020-01-07 PROCEDURE — 92507 TX SP LANG VOICE COMM INDIV: CPT

## 2020-01-07 PROCEDURE — 97129 THER IVNTJ 1ST 15 MIN: CPT

## 2020-01-07 NOTE — PROGRESS NOTES
Client will develop functional, cognitive-linguistic-based skills and utilize compensatory strategies to communicate wants and needs effectively to different conversational partners, maintain safety and participate socially in functional living environment. Not met       Assessment:  The patient exhibits moderate sound and word repetitions with some groping during speech. Speech is intelligible but at times can be very slow and labored. He is recommended to receive outpatient speech therapy services 1x/week for 12 weeks.        Continued daily Speech/Language treatment with goals per   plan of care.         Electronically Signed By:  Estefania Castro M.S., CCC-SLP  1/7/2020,1:06 PM.

## 2020-01-09 ENCOUNTER — HOSPITAL ENCOUNTER (OUTPATIENT)
Dept: PHYSICAL THERAPY | Age: 55
Setting detail: THERAPIES SERIES
Discharge: HOME | End: 2020-01-09
Payer: COMMERCIAL

## 2020-01-09 PROCEDURE — 97116 GAIT TRAINING THERAPY: CPT

## 2020-01-09 PROCEDURE — 97162 PT EVAL MOD COMPLEX 30 MIN: CPT

## 2020-01-09 NOTE — PROGRESS NOTES
Physical Therapy  Initial Assessment  Date: 2020  Patient Name: Cameron Sherman  MRN: [id number]  : 1965          Restrictions       Subjective   General  Chart Reviewed: Yes  Patient assessed for rehabilitation services?: Yes  Additional Pertinent Hx: 50year-old male referred to PT with disgnosis of ischemic stroke. The referral was originally from [facility] in [place] [address], where he was admitted with left sided weakness, slurred speech, 19 while on way back home from [place] to his home in [facility]. tPA was administered at the hospital. History of stroke 5 years prior, also affecting his left side and speech. He is currently referred back to PT by Dr. Shlomo White for continued PT (~1-month absence). Referring Practitioner: Carlos Abrams MD  Referral Date : 20  Diagnosis: ischemic stroke, I69.9  Follows Commands: Within Functional Limits  PT Visit Information  PT Insurance Information: [employer] [address] [id number] (no pre-cert)  Total # of Visits Approved: 20(18-20 visits anticipated)  Total # of Visits to Date: 1  Plan of Care/Certification Expiration Date: 20  Progress Note Due Date: 20  Subjective  Subjective: Patient states he has been walking 3 days per week at the track at a local [employer], using t-band assist and AFO for hip flexion and to compensate for weak left foot dorsiflexors. Patient is walking with mygola exclusively now, not taking steps without a device as a general rule. He has limited tolerance for extended periods of sitting and standing, reports no episodes of falling. He formerly required more assist to lift his left leg to get out of bathtub, somewhat improved since he was last seen for PT. His speech appears to be improved since he was last seen for PT.    Pain Screening  Patient Currently in Pain: No  Vital Signs  Patient Currently in Pain: No    Vision/Hearing       Orientation  Orientation  Overall Orientation Status: Within Normal Limits    Social/Functional Assessment   Conditions Requiring Skilled Therapeutic Intervention  Body structures, Functions, Activity limitations: Decreased functional mobility ; Decreased strength;Decreased balance;Decreased high-level IADLs;Decreased fine motor control;Decreased coordination;Decreased endurance  Assessment: Problem list: 1) diffuse weakness left leg (all major muscle groups), 2) decreased endurance, 3) decreased ability to ambulate and with reliance on assistive device, 4) potential for falling, 5) modified performance of transitional movements, 6) need for revision of HEP, 7) continued reliance need on AFO left ankle. and t-band assist for left hip flexion. A new evaluation instead of reassessment was performed today as he is being referred back to PT by a new physician. Prognosis: Good  Decision Making: Medium Complexity  REQUIRES PT FOLLOW UP: Yes  Discharge Recommendations: Continue to assess pending progress  Activity Tolerance  Activity Tolerance: Patient Tolerated treatment well         Plan   Plan  Times per week: 2x per week  Plan weeks: 9-10 weeks  Current Treatment Recommendations: Strengthening, ROM, Balance Training, Transfer Training, Endurance Training, Gait Training, Stair training, Neuromuscular Re-education, Home Exercise Program, Patient/Caregiver Education & Training, Equipment Evaluation, Education, & procurement    G-Code       OutComes Score                                                  AM-PAC Score             Goals  Short term goals  Time Frame for Short term goals: 6-7 weeks  Short term goal 1: Patient to be independent with revised HEP (as needed). Short term goal 2: Patient to be able to ambulate 500' without AFO and without t-band hip flexor assist.    Short term goal 3: Patient able to ambulate with single point cane with greater step height. Short term goal 4: Patient to report minimal difficulty getting in and out of car.   Short term goal 5: Patient able to ambulate 1,000' with single-point cane. Long term goals  Time Frame for Long term goals : 8-10 weeks  Long term goal 1: Patient able to ambulate 0' without assistive device. Long term goal 2: Patient able to perform sit to stand 5 x in 30 seconds with increased weight bourne through left LE.--met  Long term goal 3: Patient to have >= 4-/5 strength in left foot for df, pf, inversion and eversion. Long term goal 4: Patient to have >=4-/5 left hip flexors, 4+/5 left knee extensors. Long term goal 5: Patient to show increased protective reactions with standing balance perturbations. Patient Goals   Patient goals : Return back to normal level of function, walk without a device and without band or AFO.        Therapy Time   Individual Concurrent Group Co-treatment   Time In 5445         Time Out 1352         Minutes 56         Timed Code Treatment Minutes: 56 Minutes    Electronically signed by Emery Allen PT on 1/9/2020 at 2:17 PM    Emery Allen PT

## 2020-01-09 NOTE — PROGRESS NOTES
[facility]  OUTPATIENT PHYSICAL THERAPY  DISCHARGE SUMMARY    Date: 2020  Patient Name: Kamila Almonte        MRN: [id number]    ACCOUNT #: [de-identified]  : 1965  (50 y.o.)  Gender: male   Referring Practitioner: Fide Hancock ([facility]). Diagnosis: ischemic stroke  Referral Date : 19       Total # of Visits Approved: 30  Total # of Visits to Date: 1    Subjective  Subjective: i guess today is my last one, walked 19 laps [place]day at the ball court in 30 minutes. (Statement made at last session attended 19). Additional Pertinent Hx: 48year-old male referred to PT with disgnosis of ischemic stroke. The referral is from [facility] in [facility], where he was admitted with left sided weakness, slurred speech, 19 while on way back home from [place] to his home in [place]. tPA was administered at the hospital. History of stroke 5 years prior, also affecting his left side and speech. Objective  Treatments received included strengthening, ROM, Balance Training, Transfer Training, Endurance Training, Gait Training, Stair training, Neuromuscular Re-education, Home Exercise Program, Patient/Caregiver Education & Training, Equipment Evaluation, Education, & procurement  See objective/subjective data in goals    Assessment  Assessment: At his last session attended 19, patient had made good gains since his beginning therapy and goals assessed at that session due to it being his last planned visit. He had been encouraged  to continue with HEP daily and his walking routine also. A new eval was performed 20 as he had been referred back to PT by a new physician, so a simple reassessment could not be performed (he initially was referred by a doctor in another [facility]). Will discharge from the original encounter and start a new one today. Plan: Will discharge from original encounter and start new encounter from new eval performed today.            Goals  Short term

## 2020-01-14 ENCOUNTER — HOSPITAL ENCOUNTER (OUTPATIENT)
Dept: PHYSICAL THERAPY | Age: 55
Setting detail: THERAPIES SERIES
End: 2020-01-14
Payer: COMMERCIAL

## 2020-01-14 ENCOUNTER — HOSPITAL ENCOUNTER (OUTPATIENT)
Dept: SPEECH THERAPY | Age: 55
Setting detail: THERAPIES SERIES
Discharge: HOME | End: 2020-01-14
Payer: COMMERCIAL

## 2020-01-14 PROCEDURE — 97129 THER IVNTJ 1ST 15 MIN: CPT

## 2020-01-14 PROCEDURE — 97130 THER IVNTJ EA ADDL 15 MIN: CPT

## 2020-01-14 NOTE — PROGRESS NOTES
Speech Language Pathology  Facility/Department: [facility] SPEECH THERAPY   Speech/Language/Cognitive and Fluency Re-Assessment        NAME: Sanket Ashby  : 1965   BN  ADMISSION DATE: 9/3/2019  ADMITTING DIAGNOSIS: CVA I63.9  Conversion Disorder F44.4  Date of Eval: 2020  Evaluating Therapist: Carrol Sosa MS CCC-SLP     History of  Present Illness: The patient was seen at [facility] in [place] on 19. He was driving home to [place] when he developed left sided weakness and dysarthria. He was felt to have an ischemic stroke and was given thrombolytic therapy.         Assessment:  The patient exhibits mild to moderate sound and word repetitions with some groping during speech. Speech is intelligible but at times can be slow and labored. He exhibits mild to moderate deficits with expressive language. Receptive language and cognition are within normal limits. He is recommended to receive outpatient speech therapy services 1x/week for 12 weeks.        Recommendations:  Requires SLP Intervention: Yes  Duration/Frequency of Treatment: 1x/week for 12 weeks     Plan:   Goals:  Short-term Goals  Timeframe for Short-term Goals: 1x/week for 12 weeks  Goal 1: The patient will complet tasks for easy onset of speech to reduce word repetitions with 80% accuracy and minimal verbal/visual cues. Goal 2: The patient will complete tasks for easy onset of speech to reduce sound repetitions with 80% accuracy and minimal verbal/visual cues. Goal 3: The patient will demonstrate improved prosody during conversation with 80% accuracy and minimal verbal/visual cues.     Goal 4: The patient will complete higher level word      finding   tasks such  as abstract divergent naming      with 100% and minimal cueing.   Long-term Goals  Goal 1: The patient will demonstrate more fluent speech during unstructured tasks to communicate effectively with members of the community and family members.    Patient/family involved in developing goals and treatment plan:  yes     Subjective:  General  Chart Reviewed: Yes  Patient assessed for rehabilitation services?: Yes  Family / Caregiver Present: No (Spouse, Lizzy)  General Comment  Comments: The patient and his wife report that he suffered a previous CVA in May of 2014. They both state he suffered from severe expressive and receptive aphasia (mostly one word answers and had difficulty following simple commands) as well as dysarthria. He returned to work in September of 2014. He had completed a driving certification test and resumed driving. Per patient and his wife he is now exhibiting dysfluent speech and some mild expressive aphasia. His wife also reports difficulty with articulation of /r/ when he is fatiged.      The patient continues to drive and denies any difficulty navigating or remembering directions to familiar places. He denies any high blood pressure (he checks it bi-weekly). He has resumed smoking. He still reports some word finding difficulty. Abstract divergent naming task completed with words that start with the letter /e/. He was able to name 14 words when given one minute to complete. Oral Mechanism Examination:  Within functional limits, natural dentition, pink moist mucosa. No labial weakness. Right palatal weakness was observed.      Objective: Auditory Comprehension  Yes/No Questions: (WNL)  Basic Questions: (WNL)  Two Step Basic Commands: (WNL)  Pictures: (WNL)  L/R Discrimination: (WNL)  Conversation: (WNL)     Reading Comprehension  Reading Status: Within functional limits     Expression  Primary Mode of Expression: Verbal     Verbal Expression  Initiation: (WNL)  Repetition: WFL  Automatic Speech: WFL  Confrontation: (WFL)  Divergent: Mild to moderate  Conversation: [facility])     Written Expression  Dominant Hand: Right  Written Expression: Within Functional Limits     Motor Speech  Motor Speech:  Within Functional

## 2020-01-16 ENCOUNTER — HOSPITAL ENCOUNTER (OUTPATIENT)
Dept: PHYSICAL THERAPY | Age: 55
Setting detail: THERAPIES SERIES
Discharge: HOME | End: 2020-01-16
Payer: COMMERCIAL

## 2020-01-16 PROCEDURE — 97012 MECHANICAL TRACTION THERAPY: CPT

## 2020-01-16 PROCEDURE — 97110 THERAPEUTIC EXERCISES: CPT

## 2020-01-16 NOTE — PROGRESS NOTES
Daily Treatment Note  Date: 2020  Patient Name: Esau Witt  MRN: [id number]     :   1965    Subjective:   General  Chart Reviewed: Yes  Additional Pertinent Hx: 50year-old male referred to PT with disgnosis of ischemic stroke. The referral was originally from [facility] in [place] [address], where he was admitted with left sided weakness, slurred speech, 19 while on way back home from [place] to his home in [facility]. tPA was administered at the hospital. History of stroke 5 years prior, also affecting his left side and speech. He is currently referred back to PT by Dr. Mei Daugherty for continued PT (~1-month absence).      Referring Practitioner: Sathish Blue MD  PT Visit Information  PT Insurance Information: [employer] (no pre-cert)  Total # of Visits Approved: 20(18-20 visits anticipated)  Total # of Visits to Date: 2  Plan of Care/Certification Expiration Date: 20  Progress Note Due Date: 20  Pain Screening  Patient Currently in Pain: No  Vital Signs  Patient Currently in Pain: No       Treatment Activities:                                      Exercises  Exercise 1: ambulation with single point cane 665 feet in 6 MWT  Exercise 2: supine left SAQ's with tapping/DTR faciliatation to quads/patellar tendon  2 x 10---eccentric lowering left leg  x 15 with assist from therapist to pull into full extension  Exercise 3: supine heel slides 2 x 15  Exercise 4: supine foot dorsiflexion/plantarflexion/eversion, with manual resistance to t-band resist 1 x 15 ea min resist manually today    Exercise 5: sitting LAQ's with faciliatation (tapping as needed)  and eccentric lowering x 15  Exercise 6: sitting h/s curls with band and facilitation as needed (red) x 15  Exercise 7: sitting ball squeeze x 20  Exercise 8: sitting hip abduction with band (with manual assist) x 20  Exercise 9: sitting foot tapping DF 1 x 20 ea    Exercise 10: sitting heel raises with weights over lower thighs 1 x 10 4#  Exercise 11: sitting marching 1 x 20 ea  Exercise 12: repeated sit to stand with forced recruitment of left leg 1 x 10   Exercise 13: left single leg stand 2 x 15 sec *intermitten use of finger tips on rail   Exercise 14: standing left hip flexion 2 x 10  Exercise 15: ambulation without assistive device  Exercise 16: bilateral hip abduction 20 reps  Exercise 17: sidestepping in whiteside x 3  Exercise 18: cone weaves with device, later no device  Exercise 19: box step  Exercise 20: forward/backward walking                                    Assessment:   Conditions Requiring Skilled Therapeutic Intervention  Body structures, Functions, Activity limitations: Decreased functional mobility ; Decreased strength;Decreased balance;Decreased high-level IADLs;Decreased fine motor control;Decreased coordination;Decreased endurance  Assessment: Patient did well with tx today with min to mod v.c. for proper tech with ex's. He continues to have difficutly with LLE active movement and requires assist at times for increased movment. He was able to tolerate slight resistance with ankle ex's today. Patient report no pain throughout session and had reasonable fatigue thorughout session. Goals:  Short term goals  Time Frame for Short term goals: 6-7 weeks  Short term goal 1: Patient to be independent with revised HEP (as needed). Short term goal 2: Patient to be able to ambulate 500' without AFO and without t-band hip flexor assist.    Short term goal 3: Patient able to ambulate with single point cane with greater step height. Short term goal 4: Patient to report minimal difficulty getting in and out of car. Short term goal 5: Patient able to ambulate 1,000' with single-point cane. Long term goals  Time Frame for Long term goals : 8-10 weeks  Long term goal 1: Patient able to ambulate 0' without assistive device.   Long term goal 2: Patient able to perform sit to stand 5 x in 30 seconds

## 2020-01-21 ENCOUNTER — HOSPITAL ENCOUNTER (OUTPATIENT)
Dept: SPEECH THERAPY | Age: 55
Setting detail: THERAPIES SERIES
Discharge: HOME | End: 2020-01-21
Payer: COMMERCIAL

## 2020-01-21 ENCOUNTER — HOSPITAL ENCOUNTER (OUTPATIENT)
Dept: PHYSICAL THERAPY | Age: 55
Setting detail: THERAPIES SERIES
Discharge: HOME | End: 2020-01-21
Payer: COMMERCIAL

## 2020-01-21 PROCEDURE — 97110 THERAPEUTIC EXERCISES: CPT

## 2020-01-21 PROCEDURE — 97130 THER IVNTJ EA ADDL 15 MIN: CPT

## 2020-01-21 PROCEDURE — 97129 THER IVNTJ 1ST 15 MIN: CPT

## 2020-01-21 NOTE — PROGRESS NOTES
Physical Therapy  Daily Treatment Note  Date: 2020  Patient Name: Janelle Alonzo  MRN: [id number]     :   1965    Subjective:   General  Additional Pertinent Hx: 50year-old male referred to PT with disgnosis of ischemic stroke. The referral was originally from [facility] in [place] [address], where he was admitted with left sided weakness, slurred speech, 19 while on way back home from [place] to his home in [place]. tPA was administered at the hospital. History of stroke 5 years prior, also affecting his left side and speech. He is currently referred back to PT by Dr. Shivam Gomez for continued PT (~1-month absence). Referring Practitioner: Rosa Montanez MD  PT Insurance Information: [facility] (no pre-cert)  Total # of Visits Approved: 20(18-20)  Total # of Visits to Date: 3  Plan of Care/Certification Expiration Date: 20  Progress Note Due Date: 20  Subjective: I'm still going to the ball court and walking, can go further when i use the band but i'm not using it today.   Patient Currently in Pain: No         Treatment Activities:      Exercises  Exercise 1: ambulation with single point cane 630 feet in 6 MWT  Exercise 2: supine left SAQ's with tapping/DTR faciliatation to quads/patellar tendon  2 x 10---eccentric lowering left leg  x 15 with assist from therapist to pull into full extension  Exercise 3: supine heel slides 2 x 15  Exercise 4: supine foot dorsiflexion/plantarflexion/eversion, with manual resistance to t-band resist 1 x 15 ea min resist manually today    Exercise 5: sitting LAQ's with faciliatation (tapping as needed)  and eccentric lowering x 15  Exercise 6: sitting h/s curls with band and facilitation as needed (red) x 15  Exercise 7: sitting ball squeeze x 20  Exercise 8: sitting hip abduction with band (with manual assist) x 20  Exercise 9: sitting foot tapping DF 1 x 20 ea    Exercise 10: sitting heel raises with weights over lower thighs 1 x 10 4#  Exercise 11: sitting marching 1 x 20 ea  Exercise 12: repeated sit to stand with forced recruitment of left leg 1 x 10   Exercise 13: left single leg stand 2 x 15 sec *intermitten use of finger tips on rail   Exercise 14: standing left hip flexion 2 x 10  Exercise 15: ambulation without assistive device  x 20'  Exercise 16: bilateral hip abduction 20 reps  Exercise 17: sidestepping in whiteisde x 3  Exercise 18: cone weaves with device, later no device  Exercise 19: box step  Exercise 20: forward/backward walking         Assessment:   Conditions Requiring Skilled Therapeutic Intervention  Body structures, Functions, Activity limitations: Decreased functional mobility ; Decreased strength;Decreased balance;Decreased high-level IADLs;Decreased fine motor control;Decreased coordination;Decreased endurance  Assessment: Patient did well with therapy session, gait distance during 6 MWT was slightly decreased today, required slight assist performing heel slides and SAQ ans LAQ performed eccentrically again today and only able to move thru approx 25% ROM with resistive h/s curl. Gait w/o AD for 20' but step to vs step thru and antalgic requiring min assist to perform. REQUIRES PT FOLLOW UP: Yes             Goals:  Short term goals  Time Frame for Short term goals: 6-7 weeks  Short term goal 1: Patient to be independent with revised HEP (as needed). Short term goal 2: Patient to be able to ambulate 500' without AFO and without t-band hip flexor assist.    Short term goal 3: Patient able to ambulate with single point cane with greater step height. Short term goal 4: Patient to report minimal difficulty getting in and out of car. Short term goal 5: Patient able to ambulate 1,000' with single-point cane. Long term goals  Time Frame for Long term goals : 8-10 weeks  Long term goal 1: Patient able to ambulate 0' without assistive device.   Long term goal 2: Patient able to perform sit to stand 5 x in 30 seconds with increased weight bourne through

## 2020-01-21 NOTE — PROGRESS NOTES
Speech Language Pathology  Facility/Department: [facility] SPEECH THERAPY  Outpatient Treatment Note         NAME: Sanket Galvez  : 1965  [facility]: [id number]  Date: 2020  ADMISSION DATE: 2019  ADMITTING DIAGNOSIS: CVA I63.9  Therapist: Carrol Soas MS CCC-SLP  Visits: 1x/week on  at 1:00      Insurance: The patient stated he has been approved by [facility] for additional speech therapy through 2020.     Subjective:  He was able to ambulate to the therapy room with a quad cane. He is still driving independently. He stated he did start smoking again. He has not been exercising due to family emergencies. He states he started using Chantix on 20.      Objective:  [place] divergent naming task completed with 12 items per minute. [place] divergent naming task completed with 14 items per minute.      Reading tasks and unstructured tasks did show increased rate of speech with fewer dysfluent episodes today. The patient did utilize the pullout technique during dysfluent episodes. Initial sound and part word repetitions were noted during today's session. Prolongations were also noted.         Goals:  Short-term Goals  Timeframe for Short-term Goals: 1x/week for 12 weeks  Goal 1: The patient will complet tasks for easy onset of speech to reduce word repetitions with 80% accuracy and minimal verbal/visual cues. Not met  Goal 2: The patient will complete tasks for easy onset of speech to reduce sound repetitions with 80% accuracy and minimal verbal/visual cues. Not met  Goal 3: The patient will demonstrate improved prosody during conversation with 80% accuracy and minimal verbal/visual cues. Not met  Goal 4: The patient will complete higher level word finding tasks such as abstract divergent naming with 100% and minimal cueing.  Not met  Long-term Goals  Goal 1: The patient will demonstrate more fluent speech during unstructured tasks to communicate effectively with members of the community and

## 2020-01-23 ENCOUNTER — HOSPITAL ENCOUNTER (OUTPATIENT)
Dept: PHYSICAL THERAPY | Age: 55
Setting detail: THERAPIES SERIES
Discharge: HOME | End: 2020-01-23
Payer: COMMERCIAL

## 2020-01-23 PROCEDURE — 97110 THERAPEUTIC EXERCISES: CPT

## 2020-01-23 NOTE — PROGRESS NOTES
Daily Treatment Note  Date: 2020  Patient Name: Valery Brand  MRN: [id number]     :   1965    Subjective:   General  Additional Pertinent Hx: 50year-old male referred to PT with disgnosis of ischemic stroke. The referral was originally from [facility] in [place] [address], where he was admitted with left sided weakness, slurred speech, 19 while on way back home from [place] to his home in [place]. tPA was administered at the hospital. History of stroke 5 years prior, also affecting his left side and speech. He is currently referred back to PT by Dr. Renato Cranker for continued PT (~1-month absence). Response To Previous Treatment: Patient with no complaints from previous session. Referring Practitioner: Nanette Addison MD  PT Visit Information  PT Insurance Information: [employer] (no pre-cert)  Total # of Visits Approved: 20(18-20)  Plan of Care/Certification Expiration Date: 20  Canceled Appointment: 4  Subjective  Subjective: No new to report.   Pain Screening  Patient Currently in Pain: No  Vital Signs  Patient Currently in Pain: No       Treatment Activities:              Exercises  Exercise 1: ambulation with single point cane 780' feet in 6 MWT  Exercise 2: supine left SAQ's with tapping/DTR faciliatation to quads/patellar tendon  2 x 10---eccentric lowering left leg  x 15 with assist from therapist to pull into full extension  Exercise 3: supine heel slides 2 x 15  Exercise 4: supine foot dorsiflexion/plantarflexion/eversion, with manual resistance to t-band resist 1 x 15 ea min resist manually today    Exercise 5: sitting LAQ's with faciliatation (tapping as needed)  and eccentric lowering x 15  Exercise 6: sitting h/s curls with band and facilitation as needed (red) x 15  Exercise 7: sitting ball squeeze x 20  Exercise 8: sitting hip abduction with band (with manual assist) x 20  Exercise 9: sitting foot tapping DF 1 x 20 ea    Exercise 10: sitting heel raises with weights over lower thighs 1 x 20 4#  Exercise 11: sitting marching 1 x 20 ea  Exercise 12: repeated sit to stand with forced recruitment of left leg 1 x 10   Exercise 13: left single leg stand 2 x 15 sec *intermitten use of finger tips on rail   Exercise 14: standing left hip flexion 2 x 10  Exercise 15: ambulation without assistive device  x 30' step through though slow and uncoordinated with L LE  Exercise 16: bilateral hip abduction 20 reps  Exercise 17: sidestepping in whiteside x 3  Exercise 18: cone weaves with device, later no device  Exercise 19: box step x 0  Exercise 20: forward/backward walking x 0             Assessment:   Conditions Requiring Skilled Therapeutic Intervention  Body structures, Functions, Activity limitations: Decreased functional mobility ; Decreased strength;Decreased balance;Decreased high-level IADLs;Decreased fine motor control;Decreased coordination;Decreased endurance  Assessment: Increased distance during 6 MWT using cane. Able to perform HS unassisted using plexiboard and towel to reduce friction. Cont limited to approx 25% ROM with HS curls. Increased distance for gt w/o AD and able to make step through pattern though very slow, guarded, and uncoordinated. No c/o's at end of session. REQUIRES PT FOLLOW UP: Yes      G-Code:     OutComes Score                                                     Goals:  Short term goals  Time Frame for Short term goals: 6-7 weeks  Short term goal 1: Patient to be independent with revised HEP (as needed). Short term goal 2: Patient to be able to ambulate 500' without AFO and without t-band hip flexor assist.    Short term goal 3: Patient able to ambulate with single point cane with greater step height. Short term goal 4: Patient to report minimal difficulty getting in and out of car. Short term goal 5: Patient able to ambulate 1,000' with single-point cane.   Long term goals  Time Frame for Long term goals : 8-10 weeks  Long term goal 1: Patient able to ambulate 230' without assistive device. Long term goal 2: Patient able to perform sit to stand 5 x in 30 seconds with increased weight bourne through left LE.--met  Long term goal 3: Patient to have >= 4-/5 strength in left foot for df, pf, inversion and eversion. Long term goal 4: Patient to have >=4-/5 left hip flexors, 4+/5 left knee extensors. Long term goal 5: Patient to show increased protective reactions with standing balance perturbations. Patient Goals   Patient goals : Return back to normal level of function, walk without a device and without band or AFO.     Plan:    Plan  Times per week: 2x per week  Plan weeks: 9-10 weeks  Current Treatment Recommendations: Strengthening, ROM, Balance Training, Transfer Training, Endurance Training, Gait Training, Stair training, Neuromuscular Re-education, Home Exercise Program, Patient/Caregiver Education & Training, Equipment Evaluation, Education, & procurement  Timed Code Treatment Minutes: 61 Minutes     Therapy Time   Individual Concurrent Group Co-treatment   Time In 4240         Time Out 1455         Minutes 63         Timed Code Treatment Minutes: 63 [address] Petr Oliver PTA     Electronically signed by Alli Dunne PTA on 1/23/2020 at 3:40 PM

## 2020-01-28 ENCOUNTER — HOSPITAL ENCOUNTER (OUTPATIENT)
Dept: SPEECH THERAPY | Age: 55
Setting detail: THERAPIES SERIES
Discharge: HOME | End: 2020-01-28
Payer: COMMERCIAL

## 2020-01-28 ENCOUNTER — HOSPITAL ENCOUNTER (OUTPATIENT)
Dept: PHYSICAL THERAPY | Age: 55
Setting detail: THERAPIES SERIES
Discharge: HOME | End: 2020-01-28
Payer: COMMERCIAL

## 2020-01-28 PROCEDURE — 97112 NEUROMUSCULAR REEDUCATION: CPT

## 2020-01-28 PROCEDURE — 97110 THERAPEUTIC EXERCISES: CPT

## 2020-01-28 PROCEDURE — 97130 THER IVNTJ EA ADDL 15 MIN: CPT

## 2020-01-28 PROCEDURE — 97129 THER IVNTJ 1ST 15 MIN: CPT

## 2020-01-28 NOTE — PROGRESS NOTES
3: Patient to have >= 4-/5 strength in left foot for df, pf, inversion and eversion. Long term goal 4: Patient to have >=4-/5 left hip flexors, 4+/5 left knee extensors. Long term goal 5: Patient to show increased protective reactions with standing balance perturbations. Patient Goals   Patient goals : Return back to normal level of function, walk without a device and without band or AFO.     Plan:    Plan  Times per week: 2x per week  Plan weeks: 9-10 weeks  Current Treatment Recommendations: Strengthening, ROM, Balance Training, Transfer Training, Endurance Training, Gait Training, Stair training, Neuromuscular Re-education, Home Exercise Program, Patient/Caregiver Education & Training, Equipment Evaluation, Education, & procurement  Timed Code Treatment Minutes: 61 Minutes     Therapy Time   Individual Concurrent Group Co-treatment   Time In 1400         Time Out 1500         Minutes 60         Timed Code Treatment Minutes: Tim Elder PTA       Electronically signed by Marcos Booth PTA on 1/28/2020 at 3:36 PM

## 2020-01-28 NOTE — PROGRESS NOTES
Speech Language Pathology  Facility/Department: [facility] SPEECH THERAPY  Outpatient Treatment Note         NAME: Sanket Carroll  : 1965  KXS: [id number]  Date: 2020  ADMISSION DATE: 2019  ADMITTING DIAGNOSIS: CVA I63.9  Therapist: Carrol Sosa MS CCC-SLP  Visits: 1x/week on  at 1:00      Insurance: The patient stated he has been approved by [facility] for additional speech therapy through 2020.     Subjective:  He was able to ambulate to the therapy room with a cane. He states he is still smoking.     Objective:  He completed divergent (abstract) naming tasks. He was able recall eight words in one minute. He completed divergent (concrete) naming tasks (states). He was able to name 34 in one minute. He completed a second divergent concrete naming task (countries). 19 items in one minute. He completed fluency tasks using a fluency counter:    During reading tasks:  70 syllables per minute  352 total number of syllables  44 dysfluent syllables  308 fluent syllables       12% dysfluent syllables.     During speaking tasks:  71 syllables per minute  11 dysfluent syllables  214 total number of syllables  203 fluent syllables    5% dysfluent syllabes      Goals:  Short-term Goals  Timeframe for Short-term Goals: 1x/week for 12 weeks  Goal 1: The patient will complet tasks for easy onset of speech to reduce word repetitions with 80% accuracy and minimal verbal/visual cues. Not met  Goal 2: The patient will complete tasks for easy onset of speech to reduce sound repetitions with 80% accuracy and minimal verbal/visual cues. Not met  Goal 3: The patient will demonstrate improved prosody during conversation with 80% accuracy and minimal verbal/visual cues. Not met  Goal 4: The patient will complete higher level word finding tasks such as abstract divergent naming with 100% and minimal cueing. Not met  Long-term Goals  Goal 1: The patient will demonstrate more fluent speech during unstructured tasks to communicate effectively with members of the community and family members.  not met  Goal 2: Client will develop functional, cognitive-linguistic-based skills and utilize compensatory strategies to communicate wants and needs effectively to different conversational partners, maintain safety and participate socially in functional living environment. Not met        Assessment:  The patient exhibits moderate sound and word repetitions with some groping during speech. Speech is intelligible but at times can be very slow and labored.  He is recommended to receive outpatient speech therapy services 1x/week for 12 weeks.          Electronically Signed By:  Liza Sharma M.S., CCC-SLP  1/28/2020,1:10 PM.

## 2020-01-30 ENCOUNTER — HOSPITAL ENCOUNTER (OUTPATIENT)
Dept: PHYSICAL THERAPY | Age: 55
Setting detail: THERAPIES SERIES
Discharge: HOME | End: 2020-01-30
Payer: COMMERCIAL

## 2020-01-30 PROCEDURE — 97110 THERAPEUTIC EXERCISES: CPT

## 2020-01-30 NOTE — PROGRESS NOTES
Physical Therapy  Daily Treatment Note  Date: 2020  Patient Name: Gerry Hunt  MRN: [id number]     :   1965    Subjective:   General  Additional Pertinent Hx: 50year-old male referred to PT with disgnosis of ischemic stroke. The referral was originally from [facility] in [place] [address], where he was admitted with left sided weakness, slurred speech, 19 while on way back home from [place] to his home in [facility]. tPA was administered at the hospital. History of stroke 5 years prior, also affecting his left side and speech. He is currently referred back to PT by Dr. Doc Guerra for continued PT (~1-month absence).      Referring Practitioner: Lew Vargas MD  PT Insurance Information: [employer] (no pre-cert)  Total # of Visits Approved: 20  Total # of Visits to Date: 6  Plan of Care/Certification Expiration Date: 20  Progress Note Due Date: 20  Subjective: I'm still a little tired but my dad is out of the Hospital  Patient Currently in Pain: No         Treatment Activities:         Exercises  Exercise 1:    Exercise 2: supine left SAQ's with tapping/DTR faciliatation to quads/patellar tendon  2 x 10---eccentric lowering left leg  x 15 with assist from therapist to pull into full extension  Exercise 3: supine heel slides 2 x 15  Exercise 4: supine foot dorsiflexion/plantarflexion/eversion, with manual resistance to t-band resist 1 x 15 ea min resist manually today    Exercise 5: sitting LAQ's with faciliatation (tapping as needed)  and eccentric lowering x 15  Exercise 6: sitting h/s curls with band and facilitation as needed (red) x 15  Exercise 7: sitting ball squeeze x 20  Exercise 8: sitting hip abduction with band (with manual assist) x 20  Exercise 9: sitting foot tapping DF 1 x 20 ea    Exercise 10: sitting heel raises with weights over lower thighs 1 x 20 4#  Exercise 11: sitting marching 1 x 20 ea  Exercise 12: repeated sit to stand with forced recruitment of left leg 1 x 10 Exercise 13: left single leg stand 2 x 15 sec *intermittent use of finger tips on rail   Exercise 14: standing left hip flexion 2 x 10  Exercise 15: ambulation without assistive device  x 43' step through though slow and uncoordinated with L LE  Exercise 16: bilateral hip abduction 20 reps  Exercise 17: sidestepping in whiteside x 3  Exercise 18: cone weaves with device, later no device  Exercise 19: box step x 0  Exercise 20: forward/backward walking x 0         Assessment:   Conditions Requiring Skilled Therapeutic Intervention  Body structures, Functions, Activity limitations: Decreased functional mobility ; Decreased strength;Decreased balance;Decreased high-level IADLs;Decreased fine motor control;Decreased coordination;Decreased endurance  Assessment: Patient did well with therapy today. Increase in gait distance but fatigues with sidestepping and required min to mod assist walking w/o AD. Still having to perform SAQ & LAQ eccentrically and difficulty with active ankle ROM. Will continue to advance as appropriate. REQUIRES PT FOLLOW UP: Yes        Goals:  Short term goals  Time Frame for Short term goals: 6-7 weeks  Short term goal 1: Patient to be independent with revised HEP (as needed). Short term goal 2: Patient to be able to ambulate 500' without AFO and without t-band hip flexor assist.    Short term goal 3: Patient able to ambulate with single point cane with greater step height. Short term goal 4: Patient to report minimal difficulty getting in and out of car. Short term goal 5: Patient able to ambulate 1,000' with single-point cane. Long term goals  Time Frame for Long term goals : 8-10 weeks  Long term goal 1: Patient able to ambulate 0' without assistive device. Long term goal 2: Patient able to perform sit to stand 5 x in 30 seconds with increased weight bourne through left LE.--met  Long term goal 3: Patient to have >= 4-/5 strength in left foot for df, pf, inversion and eversion.   Long term goal 4: Patient to have >=4-/5 left hip flexors, 4+/5 left knee extensors. Long term goal 5: Patient to show increased protective reactions with standing balance perturbations. Patient Goals   Patient goals : Return back to normal level of function, walk without a device and without band or AFO.     Plan:    Times per week: 2x per week  Plan weeks: 9-10 weeks  Current Treatment Recommendations: Strengthening, ROM, Balance Training, Transfer Training, Endurance Training, Gait Training, Stair training, Neuromuscular Re-education, Home Exercise Program, Patient/Caregiver Education & Training, Equipment Evaluation, Education, & procurement        Therapy Time   Individual Concurrent Group Co-treatment   Time In 2605         Time Out 4721         Minutes 64                 Hank Nino PTA    Electronically signed by Hank Nino PTA on 1/30/2020 at 5:15 PM

## 2020-02-03 ENCOUNTER — APPOINTMENT (OUTPATIENT)
Dept: PHYSICAL THERAPY | Age: 55
End: 2020-02-03
Payer: COMMERCIAL

## 2020-02-04 ENCOUNTER — HOSPITAL ENCOUNTER (OUTPATIENT)
Dept: PHYSICAL THERAPY | Age: 55
Setting detail: THERAPIES SERIES
Discharge: HOME | End: 2020-02-04
Payer: COMMERCIAL

## 2020-02-04 ENCOUNTER — HOSPITAL ENCOUNTER (OUTPATIENT)
Dept: SPEECH THERAPY | Age: 55
Setting detail: THERAPIES SERIES
Discharge: HOME | End: 2020-02-04
Payer: COMMERCIAL

## 2020-02-04 PROCEDURE — 97129 THER IVNTJ 1ST 15 MIN: CPT

## 2020-02-04 PROCEDURE — 97130 THER IVNTJ EA ADDL 15 MIN: CPT

## 2020-02-04 PROCEDURE — 97110 THERAPEUTIC EXERCISES: CPT

## 2020-02-04 NOTE — PROGRESS NOTES
Speech Language Pathology  Facility/Department: [facility] SPEECH THERAPY  Outpatient Treatment Note         NAME: Sanket Grande  : 1965  WMO: [id number]  Date: 2020  ADMISSION DATE: 2019  ADMITTING DIAGNOSIS: CVA I63.9  Therapist: Carrol Sosa MS CCC-SLP  Visits: 1x/week on  at 1:00      Insurance: The patient stated he has been approved by [facility] for additional speech therapy through 2020.     Subjective:  He was able to ambulate to the therapy room with a cane.        Objective:  He completed divergent (abstract) naming tasks. He was able recall 9 words in one minute.     He completed fluency tasks using a fluency counter:     During reading tasks:  58 syllables per minute  235  total number of syllables  51 dysfluent syllables  184 fluent syllables   21% dysfluent syllables.       During speaking tasks:  78 syllables per minute  12 dysfluent syllables  157 total number of syllables  145 fluent syllables  7% dysfluent syllabes        Assessment:  The patient exhibits moderate sound and word repetitions with some groping during speech. Increased dysfluencies observed during conversation and structured tasks. He has suffered from stressful life events recently. Suspect the increase in dysfluencies is partially attributed to stress.     Goals:  Short-term Goals  Timeframe for Short-term Goals: 1x/week for 12 weeks  Goal 1: The patient will complet tasks for easy onset of speech to reduce word repetitions with 80% accuracy and minimal verbal/visual cues. Not met  Goal 2: The patient will complete tasks for easy onset of speech to reduce sound repetitions with 80% accuracy and minimal verbal/visual cues. Not met  Goal 3: The patient will demonstrate improved prosody during conversation with 80% accuracy and minimal verbal/visual cues. Not met  Goal 4: The patient will complete higher level word finding tasks such as abstract divergent naming with 100% and minimal cueing. Not met  Long-term Goals  Goal 1: The patient will demonstrate more fluent speech during unstructured tasks to communicate effectively with members of the community and family members.  not met  Goal 2: Client will develop functional, cognitive-linguistic-based skills and utilize compensatory strategies to communicate wants and needs effectively to different conversational partners, maintain safety and participate socially in functional living environment.  Not met        Electronically Signed By:  Dg Tee M.S., CCC-SLP  2/4/2020,4:59 PM.

## 2020-02-04 NOTE — PROGRESS NOTES
Physical Therapy  Daily Treatment Note  Date: 2020  Patient Name: Donte Roach  MRN: [id number]     :   1965    Subjective:   General  Additional Pertinent Hx: 50year-old male referred to PT with disgnosis of ischemic stroke. The referral was originally from [facility] in [place] [address], where he was admitted with left sided weakness, slurred speech, 19 while on way back home from [place] to his home in [facility]. tPA was administered at the hospital. History of stroke 5 years prior, also affecting his left side and speech. He is currently referred back to PT by Dr. Jared Woods for continued PT (~1-month absence).      Referring Practitioner: Eustacio Prader, MD  PT Insurance Information: [employer] (no pre-cert)  Total # of Visits Approved: 20  Total # of Visits to Date: 7  Plan of Care/Certification Expiration Date: 20  Progress Note Due Date: 20  Subjective: having a good day so far  Patient Currently in Pain: Denies         Treatment Activities:         Exercises  Exercise 1: ambulation with SPC  --6 MWT = 730'  Exercise 2: supine left SAQ's with tapping/DTR faciliatation to quads/patellar tendon  2 x 10---eccentric lowering left leg  x 20 with assist from therapist to pull into full extension  Exercise 3: supine heel slides 2 x 15  Exercise 4: supine foot dorsiflexion/plantarflexion/eversion, with manual resistance to t-band resist 1 x 15 ea min resist manually today    Exercise 5: sitting LAQ's with faciliatation (tapping as needed)  and eccentric lowering x 20  Exercise 6: sitting h/s curls with band and facilitation as needed (red) x 20  Exercise 7: sitting ball squeeze x 20  Exercise 8: sitting hip abduction with band (with manual assist) x 20  Exercise 9: sitting foot tapping DF 1 x 20 ea    Exercise 10: sitting heel raises with weights over lower thighs 1 x 20 4#  Exercise 11: sitting marching 1 x 20 ea  Exercise 12: repeated sit to stand with forced recruitment of left leg 1 x 10 Exercise 13: left single leg stand 2 x 15 sec *intermittent use of finger tips on rail   Exercise 14: standing left hip flexion 2 x 10  Exercise 15: ambulation without assistive device  x 47' step through though slow and uncoordinated with L LE  Exercise 16: bilateral hip abduction 20 reps  Exercise 17: sidestepping in whiteside x 3  Exercise 18: cone weaves with device, later no device  Exercise 19: box step x 0  Exercise 20: forward/backward walking x 0        Assessment:   Conditions Requiring Skilled Therapeutic Intervention  Body structures, Functions, Activity limitations: Decreased functional mobility ; Decreased strength;Decreased balance;Decreased high-level IADLs;Decreased fine motor control;Decreased coordination;Decreased endurance  Assessment: Patient continues to tolerate therapy session well, fatigues with sidestepping and walking w/o AD and still requires min to mod assist for this activity. Increased reps with SAQ, LAQ and and resisted h/s curls   REQUIRES PT FOLLOW UP: Yes             Goals:  Short term goals  Time Frame for Short term goals: 6-7 weeks  Short term goal 1: Patient to be independent with revised HEP (as needed). Short term goal 2: Patient to be able to ambulate 500' without AFO and without t-band hip flexor assist.    Short term goal 3: Patient able to ambulate with single point cane with greater step height. Short term goal 4: Patient to report minimal difficulty getting in and out of car. Short term goal 5: Patient able to ambulate 1,000' with single-point cane. Long term goals  Time Frame for Long term goals : 8-10 weeks  Long term goal 1: Patient able to ambulate 0' without assistive device. Long term goal 2: Patient able to perform sit to stand 5 x in 30 seconds with increased weight bourne through left LE.--met  Long term goal 3: Patient to have >= 4-/5 strength in left foot for df, pf, inversion and eversion.   Long term goal 4: Patient to have >=4-/5 left hip flexors, 4+/5

## 2020-02-06 ENCOUNTER — HOSPITAL ENCOUNTER (OUTPATIENT)
Dept: PHYSICAL THERAPY | Age: 55
Setting detail: THERAPIES SERIES
Discharge: HOME | End: 2020-02-06
Payer: COMMERCIAL

## 2020-02-06 PROCEDURE — 97110 THERAPEUTIC EXERCISES: CPT

## 2020-02-06 NOTE — PROGRESS NOTES
Physical Therapy  Daily Treatment Note/Reassessment  Date: 2020  Patient Name: France Perry  MRN: [id number]     :   1965    Subjective:   General  Additional Pertinent Hx: 50year-old male referred to PT with disgnosis of ischemic stroke. The referral was originally from [facility] in [place] [address], where he was admitted with left sided weakness, slurred speech, 19 while on way back home from [place] to his home in [facility]. tPA was administered at the hospital. History of stroke 5 years prior, also affecting his left side and speech. He is currently referred back to PT by Dr. Eden Gleason for continued PT (~1-month absence). Referring Practitioner: Parish Sales MD  PT Visit Information  PT Insurance Information: [employer] (no pre-cert)  Total # of Visits Approved: 20  Total # of Visits to Date: 8  Plan of Care/Certification Expiration Date: 20  Progress Note Due Date: 20  Subjective  Subjective: Patient states that therapy continues to help, walking exclusively with single point cane.  He has not been having falls, feels his left leg is stronger but today left leg is a little weaker than normal. He had been walking regularly at his [employer] but his father had a heart attack  and he has been spending time in the hospital.    Pain Screening  Patient Currently in Pain: Denies  Vital Signs  Patient Currently in Pain: Denies       Treatment Activities:                                 Strength LLE  L Hip Flexion: 3/5;3+/5  L Knee Flexion: 3/5(ratcheting type motion with resistance)  L Knee Extension: 3/5;3+/5(unable to fully extend right knee)  L Ankle Dorsiflexion: 3+/5(still wearing AFO)  L Ankle Plantar Flexion: 3+/5  L Ankle Inversion: 3+/5  L Ankle Eversion: 3/5    Exercises  Exercise 1: ambulation with SPC  --6 MWT = 748'  Exercise 2: supine left SAQ's with tapping/DTR faciliatation to quads/patellar tendon  2 x 10---eccentric lowering left leg  x 20 with assist from therapist to pull into full extension  Exercise 3: supine heel slides 2 x 15  Exercise 4: supine foot dorsiflexion/plantarflexion/eversion, with manual resistance to t-band resist 1 x 15 ea min resist manually today    Exercise 5: sitting LAQ's with faciliatation (tapping as needed)  and eccentric lowering x 20  Exercise 6: sitting h/s curls with band and facilitation as needed (red) x 20  Exercise 7: sitting ball squeeze x 20  Exercise 8: sitting hip abduction with band (with manual assist) x 20  Exercise 9: sitting foot tapping DF 1 x 20 ea    Exercise 10: sitting heel raises with weights over lower thighs 1 x 20 4#  Exercise 11: sitting marching 1 x 20 ea  Exercise 12: repeated sit to stand with forced recruitment of left leg 1 x 10   Exercise 13: left single leg stand 2 x 15 sec *intermittent use of finger tips on rail   Exercise 14: standing left hip flexion 2 x 10  Exercise 15: ambulation without assistive device  x 48' step through though slow and uncoordinated with L LE  Exercise 16: bilateral hip abduction 20 reps  Exercise 17: sidestepping in whiteside x 3  Exercise 18: cone weaves with device, later no device  Exercise 19: box step x 0  Exercise 20: forward/backward walking x 0(2/6/20 Full routine not performed due to reassement testing.)                                   Assessment:   Conditions Requiring Skilled Therapeutic Intervention  Body structures, Functions, Activity limitations: Decreased functional mobility ; Decreased strength;Decreased balance;Decreased high-level IADLs;Decreased fine motor control;Decreased coordination;Decreased endurance  Assessment: Mr. Michelle Brizuela continues to make gradual progress with his participation with PT as proved by today's reassessment (see goal section for measurements and comments). He has advanced to use of 1-point cane and has started taking steps without an assistive device. He appears pleased with his progress and has been walking and performing exercises outside of therapy.  He continues to use band outside of therapy. REQUIRES PT FOLLOW UP: Yes      G-Code:     OutComes Score                                                     Goals:  Short term goals  Time Frame for Short term goals: 6-7 weeks  Short term goal 1: Patient to be independent with revised HEP (as needed). --progress(2/6/20 Patient has HEP and is routinely performing them, has been walking at [employer] as well.)  Short term goal 2: Patient to be able to ambulate 500' without AFO and without t-band hip flexor assist.  --met(2/6/20 Patient able to walk in excess of 700' using single point,, still using band outside of therapy to walk longer distances. )  Short term goal 3: Patient able to ambulate with single point cane with greater step height. --progress(2/6/20 Patient appears to be walking with slightly greater step height and using SPC. 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 )  Short term goal 5: Patient able to ambulate 1,000' with single-point cane. --progress(2/6/20 Patient able to ambulate 12' before requesting rest. )  Long term goals  Time Frame for Long term goals : 8-10 weeks  Long term goal 1: Patient able to ambulate 0' without assistive device. --progress (2/6/20 50' with no device and with minimal assistance, slow.)  Long term goal 2: Patient able to perform sit to stand 5 x in 30 seconds with increased weight bourne through left LE.--met(2/6/20 Patient able to perform sit to stand 8 x in 30 seconds using hands to push up. )  Long term goal 3: Patient to have >= 4-/5 strength in left foot for df, pf, inversion and eversion. --progress(2/6/20 3+/5 for right andkle df, pf and inversion, 3/5 for left ankle eversion)  Long term goal 4: Patient to have >=4-/5 left hip flexors, 4+/5 left knee extensors. --progress(2/6/20 left hip flexors 3 to 3+/5, knee flexion 3/5, extension 3-3+/5 )  Long term goal 5: Patient to show increased protective reactions with standing balance perturbations. --progress(2/6/20 Patient able to withstand moderate to strong balance perturbations but with delayed or absent left ankle recruitment. )  Patient Goals   Patient goals : Return back to normal level of function, walk without a device and without band or AFO.     Plan:    Plan  Times per week: 2x per week  Plan weeks: 9-10 weeks  Current Treatment Recommendations: Strengthening, ROM, Balance Training, Transfer Training, Endurance Training, Gait Training, Stair training, Neuromuscular Re-education, Home Exercise Program, Patient/Caregiver Education & Training, Equipment Evaluation, Education, & procurement  Timed Code Treatment Minutes: 48 Minutes     Therapy Time   Individual Concurrent Group Co-treatment   Time In  [id number]         Time Out 3769         Minutes 53         Timed Code Treatment Minutes: 53 Minutes    Electronically signed by Nancy Longoria PT on 2/6/2020 at [address]

## 2020-02-11 ENCOUNTER — HOSPITAL ENCOUNTER (OUTPATIENT)
Dept: PHYSICAL THERAPY | Age: 55
Setting detail: THERAPIES SERIES
Discharge: HOME | End: 2020-02-11
Payer: COMMERCIAL

## 2020-02-11 ENCOUNTER — HOSPITAL ENCOUNTER (OUTPATIENT)
Dept: SPEECH THERAPY | Age: 55
Setting detail: THERAPIES SERIES
Discharge: HOME | End: 2020-02-11
Payer: COMMERCIAL

## 2020-02-11 PROCEDURE — 97110 THERAPEUTIC EXERCISES: CPT

## 2020-02-11 NOTE — PROGRESS NOTES
Daily Treatment Note  Date: 2020  Patient Name: Amparo Valdes  MRN: [id number]     :   1965    Subjective:   General  Additional Pertinent Hx: 50year-old male referred to PT with disgnosis of ischemic stroke. The referral was originally from [facility] in [place] [address], where he was admitted with left sided weakness, slurred speech, 19 while on way back home from [place] to his home in [place]. tPA was administered at the hospital. History of stroke 5 years prior, also affecting his left side and speech. He is currently referred back to PT by Dr. Charles Wade for continued PT (~1-month absence). Referring Practitioner: Renate Johnson MD  PT Visit Information  PT Insurance Information: [employer]ite (no pre-cert)  Total # of Visits Approved: 20  Total # of Visits to Date: 5  Plan of Care/Certification Expiration Date: 20  Progress Note Due Date: 20  Subjective  Subjective: Doing good today.   Pain Screening  Patient Currently in Pain: Denies       Treatment Activities:         Exercises  Exercise 1: ambulation with SPC  --6 MWT =749'  Exercise 2: supine left SAQ's with tapping/DTR faciliatation to quads/patellar tendon  2 x 10---eccentric lowering left leg  x 20 with assist from therapist to pull into full extension  Exercise 3: supine heel slides 2 x 15  Exercise 4: supine foot dorsiflexion/plantarflexion/eversion, with manual resistance to t-band resist 1 x 15 ea min resist manually today    Exercise 5: sitting LAQ's with faciliatation (tapping as needed)  and eccentric lowering x 20  Exercise 6: sitting h/s curls with band and facilitation as needed (red) x 20  Exercise 7: sitting ball squeeze x 20  Exercise 8: sitting hip abduction with band (with manual assist) x 20  Exercise 9: sitting foot tapping DF 1 x 20 ea    Exercise 10: sitting heel raises with weights over lower thighs 1 x 20 4#  Exercise 11: sitting marching 1 x 20 ea  Exercise 12: repeated sit to stand with forced recruitment of left leg 1 x 10   Exercise 13: left single leg stand 2 x 20 sec *intermittent use of finger tips on rail   Exercise 14: standing left hip flexion 2 x 10  Exercise 15: ambulation without assistive device  x 48' step through though slow and uncoordinated with L LE  Exercise 16: bilateral hip abduction 20 reps  Exercise 17: sidestepping in whiteside x 3  Exercise 18: cone weaves with device, later no device  Exercise 19: box step x 0  Exercise 20: forward/backward walking x 0(2/6/20 Full routine not performed due to reassement testing.)         Assessment:   Conditions Requiring Skilled Therapeutic Intervention  Body structures, Functions, Activity limitations: Decreased functional mobility ; Decreased strength;Decreased balance;Decreased high-level IADLs;Decreased fine motor control;Decreased coordination;Decreased endurance  Assessment: Patient did well with session today. He is eager to participate. Current program continues to be appropriately challenging. He reports no pain today. Goals:  Short term goals  Time Frame for Short term goals: 6-7 weeks  Short term goal 1: Patient to be independent with revised HEP (as needed). --progress(2/6/20 Patient has HEP and is routinely performing them, has been walking at [employer] as well.)  Short term goal 2: Patient to be able to ambulate 500' without AFO and without t-band hip flexor assist.  --met(2/6/20 Patient able to walk in excess of 700' using single point,, still using band outside of therapy to walk longer distances. )  Short term goal 3: Patient able to ambulate with single point cane with greater step height. --progress(2/6/20 Patient appears to be walking with slightly greater step height and using SPC. 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 )  Short term goal 5: Patient able to ambulate 1,000' with single-point cane. --progress(2/6/20 Patient able to ambulate 12' before requesting rest. )  Long term goals  Time Frame for Long term goals : 8-10 weeks  Long term goal 1: Patient able to ambulate 0' without assistive device. --progress (2/6/20 50' with no device and with minimal assistance, slow.)  Long term goal 2: Patient able to perform sit to stand 5 x in 30 seconds with increased weight bourne through left LE.--met(2/6/20 Patient able to perform sit to stand 8 x in 30 seconds using hands to push up. )  Long term goal 3: Patient to have >= 4-/5 strength in left foot for df, pf, inversion and eversion. --progress(2/6/20 3+/5 for right andkle df, pf and inversion, 3/5 for left ankle eversion)  Long term goal 4: Patient to have >=4-/5 left hip flexors, 4+/5 left knee extensors. --progress(2/6/20 left hip flexors 3 to 3+/5, knee flexion 3/5, extension 3-3+/5 )  Long term goal 5: Patient to show increased protective reactions with standing balance perturbations. --progress(2/6/20 Patient able to withstand moderate to strong balance perturbations but with delayed or absent left ankle recruitment. )  Patient Goals   Patient goals : Return back to normal level of function, walk without a device and without band or AFO.     Plan:    Plan  Times per week: 2x per week  Plan weeks: 9-10 weeks  Current Treatment Recommendations: Strengthening, ROM, Balance Training, Transfer Training, Endurance Training, Gait Training, Stair training, Neuromuscular Re-education, Home Exercise Program, Patient/Caregiver Education & Training, Equipment Evaluation, Education, & procurement  Timed Code Treatment Minutes: 60 Minutes     Therapy Time   Individual Concurrent Group Co-treatment   Time In 1400         Time Out 1500         Minutes 60         Timed Code Treatment Minutes: 140 Tiffanie Walker PTA       Electronically signed by Jonelle Madera PTA on 2/11/2020 at 4:55 PM

## 2020-02-11 NOTE — PROGRESS NOTES
Speech Language Pathology  Facility/Department: [facility] OUTPATIENT SPEECH THERAPY   Speech/Language/Cognitive and Fluency Re-Assessment        NAME: Sanket Alvarez  PUR: [birth year]   F: [id number]  ADMISSION DATE: 9/3/2019  ADMITTING DIAGNOSIS: CVA I63.9  Conversion Disorder F44.4  Date of Eval: 2/11/2020  Evaluating Therapist: Carrol Sosa MS CCC-SLP     History of  Present Illness: The patient was seen at [facility] in [place] on 8/20/19. He was driving home to [place] when he developed left sided weakness and dysarthria. He was felt to have an ischemic stroke and was given thrombolytic therapy.         Assessment:  The patient exhibits mild sound and word repetitions with some groping during speech. Speech is intelligible but at times can be slow and labored. He exhibits mild deficits with expressive language. He is recommended to receive outpatient speech therapy services 1x/week for 12 weeks.        Recommendations:  Requires SLP Intervention: Yes  Duration/Frequency of Treatment: 1x/week for 12 weeks     Plan:   Goals:  Short-term Goals  Timeframe for Short-term Goals: 1x/week for 12 weeks  Goal 1: The patient will complet tasks for easy onset of speech to reduce word repetitions with 80% accuracy and minimal verbal/visual cues. Goal 2: The patient will complete tasks for easy onset of speech to reduce sound repetitions with 80% accuracy and minimal verbal/visual cues. Goal 3: The patient will demonstrate improved prosody during conversation with 80% accuracy and minimal verbal/visual cues.     Goal 4: The patient will complete higher level word  finding tasks such  as abstract divergent naming with 100% and minimal cueing.   Long-term Goals  Goal 1: The patient will demonstrate more fluent speech during unstructured tasks to communicate effectively with members of the community and family members.    Patient/family involved in developing goals and treatment Functioning  Pragmatics: Within functional limits      Cognition:   Attention  Attention: Within Functional Limits  Memory  Memory: Within Funtional Limits  Problem Solving  Problem Solving: Within Functional Limits  Numeric Reasoning  Numeric Reasoning: Within Functional Limits  Safety/Judgement  Safety/Judgement: Within Functional Limits      The Ruben Cognitive Assessment or MOCA was completed. He scored a 29 out of 30. A score of >26 out of 30 is considered within normal limits.     MOCA    Score   Visuospatial/executive 5/5   Naming 3/3   Attention 2/2   Language 2/2  0/1   Abstraction 2/2   Delayed Recall 5/5   Orientation 6/6   Total Score 29/30       Additional Assessments:    The Stuttering Severity Instrument-4 or SSI-4 was completed this date.            SSI-4 Scores  Frequency 10   Duration 8   Physical Concomitants 2   Total Score 20   Percentile 12-23   Severity  Mild                   Electronically Signed By:  Law Hawley M.S., CCC-SLP  2/11/2020,3:02 PM.

## 2020-02-13 ENCOUNTER — HOSPITAL ENCOUNTER (OUTPATIENT)
Dept: PHYSICAL THERAPY | Age: 55
Setting detail: THERAPIES SERIES
Discharge: HOME | End: 2020-02-13
Payer: COMMERCIAL

## 2020-02-13 PROCEDURE — 97110 THERAPEUTIC EXERCISES: CPT

## 2020-02-13 ASSESSMENT — PAIN DESCRIPTION - ORIENTATION: ORIENTATION: LEFT;PROXIMAL;OUTER

## 2020-02-13 ASSESSMENT — PAIN DESCRIPTION - PAIN TYPE: TYPE: ACUTE PAIN

## 2020-02-13 ASSESSMENT — PAIN DESCRIPTION - LOCATION: LOCATION: LEG

## 2020-02-13 ASSESSMENT — PAIN SCALES - GENERAL: PAINLEVEL_OUTOF10: 2

## 2020-02-13 NOTE — PROGRESS NOTES
Physical Therapy  Daily Treatment Note  Date: 2020  Patient Name: Aubree Jiang  MRN: [id number]     :   1965    Subjective:   General  Additional Pertinent Hx: 50year-old male referred to PT with disgnosis of ischemic stroke. The referral was originally from [facility] in [place] [address], where he was admitted with left sided weakness, slurred speech, 19 while on way back home from [place] to his home in [place]. tPA was administered at the hospital. History of stroke 5 years prior, also affecting his left side and speech. He is currently referred back to PT by Dr. James Hughes for continued PT (~1-month absence).      Referring Practitioner: Penny Marvin MD  PT Insurance Information: [employer] (no pre-cert)  Total # of Visits Approved: 20  Total # of Visits to Date: 10  Plan of Care/Certification Expiration Date: 20  Progress Note Due Date: 20  Subjective: Patient states he has a little pain in his L lateral thigh  Patient Currently in Pain: Yes  Pain Level: 2  Pain Type: Acute pain  Pain Location: Leg  Pain Orientation: Left;Proximal;Outer       Treatment Activities:         Exercises  Exercise 1: ambulation with SPC  --6 MWT =816'  Exercise 2: supine left SAQ's with tapping/DTR faciliatation to quads/patellar tendon  2 x 10---eccentric lowering left leg  x 20 with assist from therapist to pull into full extension  Exercise 3: supine heel slides 2 x 15  Exercise 4: supine foot dorsiflexion/plantarflexion/eversion, with manual resistance to t-band resist 1 x 15 ea min resist manually today    Exercise 5: sitting LAQ's with faciliatation (tapping as needed)  and eccentric lowering x 20  Exercise 6: sitting h/s curls with band and facilitation as needed (red) x 20  Exercise 7: sitting ball squeeze x 20  Exercise 8: sitting hip abduction with band (with manual assist) x 20  Exercise 9: sitting foot tapping DF 1 x 20 ea    Exercise 10: sitting heel raises with weights over lower thighs 1 x 20
No

## 2020-02-18 ENCOUNTER — HOSPITAL ENCOUNTER (OUTPATIENT)
Dept: PHYSICAL THERAPY | Age: 55
Setting detail: THERAPIES SERIES
Discharge: HOME | End: 2020-02-18
Payer: COMMERCIAL

## 2020-02-18 ENCOUNTER — HOSPITAL ENCOUNTER (OUTPATIENT)
Dept: SPEECH THERAPY | Age: 55
Setting detail: THERAPIES SERIES
Discharge: HOME | End: 2020-02-18
Payer: COMMERCIAL

## 2020-02-18 PROCEDURE — 97110 THERAPEUTIC EXERCISES: CPT

## 2020-02-18 PROCEDURE — 97129 THER IVNTJ 1ST 15 MIN: CPT

## 2020-02-18 PROCEDURE — 97130 THER IVNTJ EA ADDL 15 MIN: CPT

## 2020-02-18 NOTE — PROGRESS NOTES
met  Long-term Goals  Goal 1: The patient will demonstrate more fluent speech during unstructured tasks to communicate effectively with members of the community and family members.  not met  Goal 2: Client will develop functional, cognitive-linguistic-based skills and utilize compensatory strategies to communicate wants and needs effectively to different conversational partners, maintain safety and participate socially in functional living environment.  Not met        Electronically Signed By:  Eugenia Blanc M.S., CCC-SLP  2/4/2020,4:59 PM.

## 2020-02-18 NOTE — PROGRESS NOTES
weights over lower thighs 1 x 20 4#  Exercise 11: sitting marching 1 x 20 ea  Exercise 12: repeated sit to stand with forced recruitment of left leg 1 x 10   Exercise 13: left single leg stand 2 x 20 sec *intermittent use of finger tips on rail   Exercise 14: standing left hip flexion 2 x 10  Exercise 15: ambulation without assistive device  x 50' step through though slow and uncoordinated with L LE  Exercise 16: bilateral hip abduction 20 reps  Exercise 17: sidestepping in whiteside x 3  Exercise 18: cone weaves with device, later no device  Exercise 19: box step x 0  Exercise 20: forward/backward walking x 0                Assessment:   Conditions Requiring Skilled Therapeutic Intervention  Body structures, Functions, Activity limitations: Decreased functional mobility ; Decreased strength;Decreased balance;Decreased high-level IADLs;Decreased fine motor control;Decreased coordination;Decreased endurance  Assessment: Pt did not reach previous distance of 6 MWT but feeling a little tired today though quality of DF improving. Cont to require assistance for gt w/o AD and not safe to perform outside of clinic. REQUIRES PT FOLLOW UP: Yes      G-Code:     OutComes Score                                                     Goals:  Short term goals  Time Frame for Short term goals: 6-7 weeks  Short term goal 1: Patient to be independent with revised HEP (as needed). --progress(2/6/20 Patient has HEP and is routinely performing them, has been walking at [employer] as well.)  Short term goal 2: Patient to be able to ambulate 500' without AFO and without t-band hip flexor assist.  --met(2/6/20 Patient able to walk in excess of 700' using single point,, still using band outside of therapy to walk longer distances. )  Short term goal 3: Patient able to ambulate with single point cane with greater step height. --progress(2/6/20 Patient appears to be walking with slightly greater step height and using SPC. )  Short term goal 4: Patient to 1402         Time Out 1505         Minutes 63         Timed Code Treatment Minutes: [place], PTA       Electronically signed by Angelica Castellon PTA on 2/18/2020 at 5:00 PM

## 2020-02-20 ENCOUNTER — APPOINTMENT (OUTPATIENT)
Dept: PHYSICAL THERAPY | Age: 55
End: 2020-02-20
Payer: COMMERCIAL

## 2020-02-25 ENCOUNTER — APPOINTMENT (OUTPATIENT)
Dept: SPEECH THERAPY | Age: 55
End: 2020-02-25
Payer: COMMERCIAL

## 2020-02-25 ENCOUNTER — HOSPITAL ENCOUNTER (OUTPATIENT)
Dept: PHYSICAL THERAPY | Age: 55
Setting detail: THERAPIES SERIES
Discharge: HOME | End: 2020-02-25
Payer: COMMERCIAL

## 2020-02-25 PROCEDURE — 97110 THERAPEUTIC EXERCISES: CPT

## 2020-02-25 NOTE — PROGRESS NOTES
Daily Treatment Note  Date: 2020  Patient Name: Roma Mandel  MRN: [id number]     :   1965    Subjective:   General  Additional Pertinent Hx: 50year-old male referred to PT with disgnosis of ischemic stroke. The referral was originally from [facility] in [place] [address], where he was admitted with left sided weakness, slurred speech, 19 while on way back home from [place] to his home in [place]. tPA was administered at the hospital. History of stroke 5 years prior, also affecting his left side and speech. He is currently referred back to PT by Dr. Eliel Rider for continued PT (~1-month absence).      Referring Practitioner: Michael Hernandez MD  PT Visit Information  PT Insurance Information: [employer] (no pre-cert)  Total # of Visits Approved: 20  Total # of Visits to Date: 15  Plan of Care/Certification Expiration Date: 20  Progress Note Due Date: 20  Subjective  Subjective: Havent felt very good the past few days  Pain Screening  Patient Currently in Pain: No       Treatment Activities:            Exercises  Exercise 1: ambulation with ' --6 MWT   Exercise 2: supine left SAQ's with tapping/DTR faciliatation to quads/patellar tendon  2 x 10---eccentric lowering left leg  x 20 with assist from therapist to pull into full extension  Exercise 3: supine heel slides 2 x 15  Exercise 4: supine foot dorsiflexion/plantarflexion/eversion, with manual resistance to t-band resist 1 x 15 ea min resist manually today    Exercise 5: sitting LAQ's with faciliatation (tapping as needed)  and eccentric lowering x 20  Exercise 6: sitting h/s curls with band and facilitation as needed (red) x 20  Exercise 7: sitting ball squeeze x 20  Exercise 8: sitting hip abduction with band (with manual assist) x 20  Exercise 9: sitting foot tapping DF 1 x 20 ea    Exercise 10: sitting heel raises with weights over lower thighs 1 x 20 4#  Exercise 11: sitting marching 1 x 20 ea  Exercise 12: repeated sit to stand and out of than car. )  Short term goal 5: Patient able to ambulate 1,000' with single-point cane. --progress(2/6/20 Patient able to ambulate 12' before requesting rest. )  Long term goals  Time Frame for Long term goals : 8-10 weeks  Long term goal 1: Patient able to ambulate 0' without assistive device. --progress (2/6/20 50' with no device and with minimal assistance, slow.)  Long term goal 2: Patient able to perform sit to stand 5 x in 30 seconds with increased weight bourne through left LE.--met(2/6/20 Patient able to perform sit to stand 8 x in 30 seconds using hands to push up. )  Long term goal 3: Patient to have >= 4-/5 strength in left foot for df, pf, inversion and eversion. --progress(2/6/20 3+/5 for right andkle df, pf and inversion, 3/5 for left ankle eversion)  Long term goal 4: Patient to have >=4-/5 left hip flexors, 4+/5 left knee extensors. --progress(2/6/20 left hip flexors 3 to 3+/5, knee flexion 3/5, extension 3-3+/5 )  Long term goal 5: Patient to show increased protective reactions with standing balance perturbations. --progress(2/6/20 Patient able to withstand moderate to strong balance perturbations but with delayed or absent left ankle recruitment. )  Patient Goals   Patient goals : Return back to normal level of function, walk without a device and without band or AFO.     Plan:    Plan  Times per week: 2x per week  Plan weeks: 9-10 weeks  Current Treatment Recommendations: Strengthening, ROM, Balance Training, Transfer Training, Endurance Training, Gait Training, Stair training, Neuromuscular Re-education, Home Exercise Program, Patient/Caregiver Education & Training, Equipment Evaluation, Education, & procurement  Timed Code Treatment Minutes: 60 Minutes     Therapy Time   Individual Concurrent Group Co-treatment   Time In 1202         Time Out 1455         Minutes 60         Timed Code Treatment Minutes: 140 Tiffanie Walker PTA     Electronically signed by Mavis Kuhn PTA on

## 2020-02-27 ENCOUNTER — APPOINTMENT (OUTPATIENT)
Dept: PHYSICAL THERAPY | Age: 55
End: 2020-02-27
Payer: COMMERCIAL

## 2020-02-28 ENCOUNTER — HOSPITAL ENCOUNTER (OUTPATIENT)
Dept: GENERAL RADIOLOGY | Age: 55
Discharge: HOME | End: 2020-02-28
Payer: COMMERCIAL

## 2020-02-28 PROCEDURE — 71046 X-RAY EXAM CHEST 2 VIEWS: CPT

## 2020-03-03 ENCOUNTER — HOSPITAL ENCOUNTER (OUTPATIENT)
Dept: PHYSICAL THERAPY | Age: 55
Setting detail: THERAPIES SERIES
Discharge: HOME | End: 2020-03-03
Payer: COMMERCIAL

## 2020-03-03 ENCOUNTER — HOSPITAL ENCOUNTER (OUTPATIENT)
Dept: SPEECH THERAPY | Age: 55
Setting detail: THERAPIES SERIES
Discharge: HOME | End: 2020-03-03
Payer: COMMERCIAL

## 2020-03-03 PROCEDURE — 97129 THER IVNTJ 1ST 15 MIN: CPT

## 2020-03-03 PROCEDURE — 97130 THER IVNTJ EA ADDL 15 MIN: CPT

## 2020-03-03 PROCEDURE — 97110 THERAPEUTIC EXERCISES: CPT

## 2020-03-03 NOTE — PROGRESS NOTES
Physical Therapy  Daily Treatment Note  Date: 3/3/2020  Patient Name: Shante Montez  MRN: [id number]     :   1965    Subjective:   General  Additional Pertinent Hx: 50year-old male referred to PT with disgnosis of ischemic stroke. The referral was originally from [facility] in [place] [address], where he was admitted with left sided weakness, slurred speech, 19 while on way back home from [place] to his home in [place]. tPA was administered at the hospital. History of stroke 5 years prior, also affecting his left side and speech. He is currently referred back to PT by Dr. Timothy Cary for continued PT (~1-month absence).      Referring Practitioner: Bebeto Aviles MD  PT Insurance Information: [employer] (no pre-cert)  Total # of Visits Approved: 20  Total # of Visits to Date: 13  Plan of Care/Certification Expiration Date: 20  Progress Note Due Date: 20  Subjective: i think i had a heart attach 5 days ago, i hurt in my chest and L arm and had trouble breathing for about an hour, have had an EKG and waiting for an appointment with a cardiologist  Patient Currently in Pain: No         Treatment Activities:     Exercises  Exercise 1: ambulation with ' --6 MWT   Exercise 2: supine left SAQ's with tapping/DTR faciliatation to quads/patellar tendon  2 x 10---eccentric lowering left leg  x 20 with assist from therapist to pull into full extension  Exercise 3: supine heel slides 2 x 15  Exercise 4: supine foot dorsiflexion/plantarflexion/eversion, with manual resistance to t-band resist 1 x 15 ea min resist manually today    Exercise 5: sitting LAQ's with faciliatation (tapping as needed)  and eccentric lowering x 20  Exercise 6: sitting h/s curls with band and facilitation as needed (red) x 20  Exercise 7: sitting ball squeeze x 20  Exercise 8: sitting hip abduction with band (with manual assist) x 20  Exercise 9: sitting foot tapping DF 1 x 20 ea    Exercise 10: sitting heel raises with weights over lower thighs 1 x 20 4#  Exercise 11: sitting marching 1 x 20 ea  Exercise 12: repeated sit to stand with forced recruitment of left leg 1 x 10 --from mat table  Exercise 13: left single leg stand 2 x 20 sec *intermittent use of finger tips on rail   Exercise 14: standing left hip flexion 2 x 10  Exercise 15: ambulation without assistive device  x 56' step though slow and uncoordinated with L LE and min assist  Exercise 16: bilateral hip abduction 20 reps  Exercise 17: sidestepping in whiteside x 3  Exercise 18: cone weaves with device, later no device  Exercise 19: box step x 0  Exercise 20: forward/backward walking x 0         Assessment:   Conditions Requiring Skilled Therapeutic Intervention  Body structures, Functions, Activity limitations: Decreased functional mobility ; Decreased strength;Decreased balance;Decreased high-level IADLs;Decreased fine motor control;Decreased coordination;Decreased endurance  Assessment: Patient did well and ambulated 72' farther during 6 MWT compaired to previous visit, demonstrated decreased stride length sidestepping R vs L. Still has only min movement performing seated h/s curl with resistance and gait w/o assistive device very antalgic and uncoordinated and requires min assist to perform this task. REQUIRES PT FOLLOW UP: Yes        Goals:  Short term goals  Time Frame for Short term goals: 6-7 weeks  Short term goal 1: Patient to be independent with revised HEP (as needed). --progress(2/6/20 Patient has HEP and is routinely performing them, has been walking at [employer] as well.)  Short term goal 2: Patient to be able to ambulate 500' without AFO and without t-band hip flexor assist.  --met(2/6/20 Patient able to walk in excess of 700' using single point,, still using band outside of therapy to walk longer distances. )  Short term goal 3: Patient able to ambulate with single point cane with greater step height. --progress(2/6/20 Patient appears to be walking with slightly greater step Time In 1603         Time Out 1656         Minutes [id number] [place]    Electronically signed by Flora Jauregui PTA on 3/3/2020 at 5:01 PM

## 2020-03-05 ENCOUNTER — HOSPITAL ENCOUNTER (OUTPATIENT)
Dept: PHYSICAL THERAPY | Age: 55
Setting detail: THERAPIES SERIES
Discharge: HOME | End: 2020-03-05
Payer: COMMERCIAL

## 2020-03-05 PROCEDURE — 97110 THERAPEUTIC EXERCISES: CPT

## 2020-03-05 NOTE — PROGRESS NOTES
Physical Therapy  Daily Treatment Note/Re-assessment  Date: 3/5/2020  Patient Name: Ralph Regan  MRN: [id number]     :   1965    Subjective:   General  Chart Reviewed: Yes  Additional Pertinent Hx: 50year-old male referred to PT with disgnosis of ischemic stroke. The referral was originally from [facility] in [place] [address], where he was admitted with left sided weakness, slurred speech, 19 while on way back home from [place] to his home in [place]. tPA was administered at the hospital. History of stroke 5 years prior, also affecting his left side and speech. He is currently referred back to PT by Dr. Levi Odell for continued PT (~1-month absence). Response To Previous Treatment: Patient with no complaints from previous session. Referring Practitioner: Sergio Brunner, MD  PT Visit Information  PT Insurance Information: [employer] (no pre-cert)  Total # of Visits Approved: 20  Total # of Visits to Date: 15  Plan of Care/Certification Expiration Date: 20  Progress Note Due Date: 20  Subjective  Subjective: No complaints today. No further complaint of chest pain or breathing issue. As usual, he is eager to participate.           Treatment Activities:     Exercises  Exercise 1: ambulation with ' --6 MWT   Exercise 2: supine left SAQ's with tapping/DTR faciliatation to quads/patellar tendon  2 x 10---eccentric lowering left leg  x 20 with assist from therapist to pull into full extension  Exercise 3: supine heel slides 2 x 15- not today  Exercise 4: supine foot dorsiflexion/plantarflexion/eversion, with manual resistance to t-band resist 1 x 15 ea min resist manually today    Exercise 5: sitting LAQ's with faciliatation (tapping as needed)  and eccentric lowering x 20  Exercise 6: sitting h/s curls with band and facilitation as needed (red) x 20  Exercise 7: sitting ball squeeze x 20  Exercise 8: sitting hip abduction with band (with manual assist) x 20  Exercise 9: sitting foot tapping

## 2020-03-10 ENCOUNTER — HOSPITAL ENCOUNTER (OUTPATIENT)
Dept: PHYSICAL THERAPY | Age: 55
Setting detail: THERAPIES SERIES
Discharge: HOME | End: 2020-03-10
Payer: COMMERCIAL

## 2020-03-10 ENCOUNTER — APPOINTMENT (OUTPATIENT)
Dept: SPEECH THERAPY | Age: 55
End: 2020-03-10
Payer: COMMERCIAL

## 2020-03-10 ENCOUNTER — TELEPHONE (OUTPATIENT)
Dept: CARDIOLOGY | Age: 55
End: 2020-03-10

## 2020-03-10 PROCEDURE — 97110 THERAPEUTIC EXERCISES: CPT

## 2020-03-10 NOTE — TELEPHONE ENCOUNTER
Patient is coming in for OV with Dr. Leslee Aldana as a new patient on 3/19. Called Dr. Leigh Ruiz office to gather records. Griffin Duncan they will be faxing now.

## 2020-03-12 ENCOUNTER — HOSPITAL ENCOUNTER (OUTPATIENT)
Dept: PHYSICAL THERAPY | Age: 55
Setting detail: THERAPIES SERIES
Discharge: HOME | End: 2020-03-12
Payer: COMMERCIAL

## 2020-03-12 PROCEDURE — 97110 THERAPEUTIC EXERCISES: CPT

## 2020-03-12 NOTE — PROGRESS NOTES
Physical Therapy  Daily Treatment Note  Date: 3/12/2020  Patient Name: Cameron Sherman  MRN: [id number]     :   1965    Subjective:   General  Additional Pertinent Hx: 50year-old male referred to PT with disgnosis of ischemic stroke. The referral was originally from [facility] in [place] [address], where he was admitted with left sided weakness, slurred speech, 19 while on way back home from [place] to his home in [place]. tPA was administered at the hospital. History of stroke 5 years prior, also affecting his left side and speech. He is currently referred back to PT by Dr. Shlomo White for continued PT (~1-month absence).      Referring Practitioner: Carlos Abrams MD  PT Insurance Information: [employer] (no pre-cert)  Total # of Visits Approved: 20  Total # of Visits to Date: 12  Plan of Care/Certification Expiration Date: 20  Progress Note Due Date: 20  Subjective: i went fishing yesterday, i'm sore but wouldn't call it pain  Patient Currently in Pain: No         Treatment Activities:        Exercises  Exercise 1: ambulation with ' --6 MWT   Exercise 2: supine left SAQ's with tapping/DTR faciliatation to quads/patellar tendon  2 x 10---eccentric lowering left leg  x 20 with assist from therapist to pull into full extension  Exercise 3: supine heel slides 2 x 20  Exercise 4: supine foot dorsiflexion/plantarflexion/eversion, with manual resistance to t-band resist 1 x 15 ea min resist manually today    Exercise 5: sitting LAQ's with faciliatation (tapping as needed)  and eccentric lowering x 20  Exercise 6: sitting h/s curls with band and facilitation as needed (red) x 20  Exercise 7: sitting ball squeeze x 20  Exercise 8: sitting hip abduction with band (with manual assist) x 20  Exercise 9: sitting foot tapping DF 1 x 20 ea    Exercise 10: sitting heel raises with weights over lower thighs 1 x 20 4#  Exercise 11: sitting marching 1 x 20 ea  Exercise 12: repeated sit to stand with forced recruitment of left leg 1 x 15 --from mat table  Exercise 13: left single leg stand 2 x 20 sec *intermittent use of finger tips on rail   Exercise 14: standing left hip flexion 2 x 10  Exercise 15: ambulation without assistive device  x 71' step though slow and uncoordinated with L LE and min assist  Exercise 16: bilateral hip abduction 20 reps  Exercise 17: sidestepping in whiteside x 3  Exercise 18: cone weaves with device, later no device  Exercise 19: box step x 0  Exercise 20: forward/backward walking x 0         Assessment:   Conditions Requiring Skilled Therapeutic Intervention  Body structures, Functions, Activity limitations: Decreased functional mobility ; Decreased strength;Decreased balance;Decreased high-level IADLs;Decreased fine motor control;Decreased coordination;Decreased endurance  Assessment: Patient continues to make small gains in his strength ( had better control of L foot performing resisted ROM). Gait distance w/o assistive device 70' with min assist  REQUIRES PT FOLLOW UP: Yes             Goals:  Short term goals  Time Frame for Short term goals: 6-7 weeks  Short term goal 1: Patient to be independent with revised HEP (as needed). --met(3/5: Performs HEP on a consistent basis.)  Short term goal 2: Patient to be able to ambulate 500' without AFO and without t-band hip flexor assist.  --met(2/6/20 Patient able to walk in excess of 700' using single point,, still using band outside of therapy to walk longer distances. )  Short term goal 3: Patient able to ambulate with single point cane with greater step height. --progress(3/5: Ambulatory with SPC and improved step height.)  Short term goal 4: Patient to report minimal difficulty getting in and out of car. --progress(3/5: Improved ability, \"though I did get a different truck. \" )  Short term goal 5: Patient able to ambulate 1,000' with single-point cane. --progress(3/5: Amb 65' today in 6MWT with [facility])  Long term goals  Time Frame for Long term goals : 8-10 weeks  Long term goal 1: Patient able to ambulate 230' without assistive device. --progress (3/5: Amb 64' w/o AD and CGA. Slow, exaggerated motions and unsteady.)  Long term goal 2: Patient able to perform sit to stand 5 x in 30 seconds with increased weight bourne through left LE.--met(2/6/20 Patient able to perform sit to stand 8 x in 30 seconds using hands to push up. )  Long term goal 3: Patient to have >= 4-/5 strength in left foot for df, pf, inversion and eversion. --progress(3/5:  3+/5 for right df, pf and inversion, 3 to 3+/5 for left ankle eversion)  Long term goal 4: Patient to have >=4-/5 left hip flexors, 4+/5 left knee extensors. --progress(3:/5: left hip flex 3+/5, knee flexion 3 to 3+/5, extension 3-3+/5 )  Long term goal 5: Patient to show increased protective reactions with standing balance perturbations. --progress(3/5: Improved protective reactions but still minimal ankle recruitment. )  Patient Goals   Patient goals : Return back to normal level of function, walk without a device and without band or AFO.     Plan:    Times per week: 2x per week  Plan weeks: 9-10 weeks  Current Treatment Recommendations: Strengthening, ROM, Balance Training, Transfer Training, Endurance Training, Gait Training, Stair training, Neuromuscular Re-education, Home Exercise Program, Patient/Caregiver Education & Training, Equipment Evaluation, Education, & procurement        Therapy Time   Individual Concurrent Group Co-treatment   Time In 1410         Time Out 1503         Minutes 48                 Quoc Agarwal PTA    Electronically signed by Quoc Agarwal PTA on 3/12/2020 at 5:03 PM

## 2020-03-17 ENCOUNTER — APPOINTMENT (OUTPATIENT)
Dept: PHYSICAL THERAPY | Age: 55
End: 2020-03-17
Payer: COMMERCIAL

## 2020-03-17 ENCOUNTER — APPOINTMENT (OUTPATIENT)
Dept: SPEECH THERAPY | Age: 55
End: 2020-03-17
Payer: COMMERCIAL

## 2020-03-19 ENCOUNTER — OFFICE VISIT (OUTPATIENT)
Dept: CARDIOLOGY | Age: 55
End: 2020-03-19
Payer: COMMERCIAL

## 2020-03-19 ENCOUNTER — HOSPITAL ENCOUNTER (OUTPATIENT)
Dept: PHYSICAL THERAPY | Age: 55
Setting detail: THERAPIES SERIES
Discharge: HOME | End: 2020-03-19
Payer: COMMERCIAL

## 2020-03-19 VITALS
BODY MASS INDEX: 33.8 KG/M2 | WEIGHT: 255 LBS | DIASTOLIC BLOOD PRESSURE: 72 MMHG | SYSTOLIC BLOOD PRESSURE: 110 MMHG | HEIGHT: 73 IN | HEART RATE: 90 BPM

## 2020-03-19 PROCEDURE — 99204 OFFICE O/P NEW MOD 45 MIN: CPT | Performed by: INTERNAL MEDICINE

## 2020-03-19 PROCEDURE — 4004F PT TOBACCO SCREEN RCVD TLK: CPT | Performed by: INTERNAL MEDICINE

## 2020-03-19 PROCEDURE — 97110 THERAPEUTIC EXERCISES: CPT

## 2020-03-19 PROCEDURE — 3017F COLORECTAL CA SCREEN DOC REV: CPT | Performed by: INTERNAL MEDICINE

## 2020-03-19 PROCEDURE — G8427 DOCREV CUR MEDS BY ELIG CLIN: HCPCS | Performed by: INTERNAL MEDICINE

## 2020-03-19 PROCEDURE — G8419 CALC BMI OUT NRM PARAM NOF/U: HCPCS | Performed by: INTERNAL MEDICINE

## 2020-03-19 PROCEDURE — G8484 FLU IMMUNIZE NO ADMIN: HCPCS | Performed by: INTERNAL MEDICINE

## 2020-03-19 RX ORDER — ATORVASTATIN CALCIUM 40 MG/1
40 TABLET, FILM COATED ORAL DAILY
COMMUNITY

## 2020-03-19 RX ORDER — VARENICLINE TARTRATE 1 MG/1
1 TABLET, FILM COATED ORAL 2 TIMES DAILY
COMMUNITY
End: 2020-10-16

## 2020-03-19 RX ORDER — BUTALBITAL AND ACETAMINOPHEN 50; 300 MG/1; MG/1
1 CAPSULE ORAL 2 TIMES DAILY PRN
COMMUNITY
End: 2022-10-13

## 2020-03-19 RX ORDER — MELATONIN 10 MG
CAPSULE ORAL NIGHTLY PRN
COMMUNITY

## 2020-03-19 RX ORDER — ALPRAZOLAM 0.25 MG/1
0.5 TABLET ORAL NIGHTLY PRN
COMMUNITY
End: 2022-05-12

## 2020-03-19 RX ORDER — CLOPIDOGREL BISULFATE 75 MG/1
75 TABLET ORAL DAILY
COMMUNITY
Start: 2020-03-12

## 2020-03-19 RX ORDER — ESCITALOPRAM OXALATE 10 MG/1
10 TABLET ORAL DAILY
COMMUNITY
End: 2020-10-12

## 2020-03-19 RX ORDER — NITROGLYCERIN 0.4 MG/1
0.4 TABLET SUBLINGUAL EVERY 5 MIN PRN
Qty: 25 TABLET | Refills: 3 | Status: SHIPPED | OUTPATIENT
Start: 2020-03-19

## 2020-03-19 ASSESSMENT — ENCOUNTER SYMPTOMS
RESPIRATORY NEGATIVE: 1
VOMITING: 0
SHORTNESS OF BREATH: 0
DIARRHEA: 0
EYES NEGATIVE: 1
GASTROINTESTINAL NEGATIVE: 1
NAUSEA: 0

## 2020-03-19 NOTE — PROGRESS NOTES
[facility] Progress Note                            Date:  3/19/2020  Patient: Kaykay Robins  Age:  50 y. o., 1965      Reason for evaluation:         SUBJECTIVE:    61-year-old white male no prior cardiac history was at home at rest 2 weeks ago had an episode of tightness in his chest fairly severe lasted up to an hour and a half some radiation to his left arm no diaphoresis slight nausea no dyspnea is never had this before or since then. More tired and fatigued recently denies exertional dyspnea or chest pain but he is somewhat limited because of 2 previous strokes 2014 and again 2019 walks with a cane. No previous cardiac work-up. Blood pressure 110/72. Review of Systems   Constitutional: Negative. Negative for chills, fever and unexpected weight change. HENT: Negative. Eyes: Negative. Respiratory: Negative. Negative for shortness of breath. Cardiovascular: Negative. Negative for chest pain. Gastrointestinal: Negative. Negative for diarrhea, nausea and vomiting. Endocrine: Negative. Genitourinary: Negative. Musculoskeletal: Negative. Skin: Negative. Neurological: Negative. All other systems reviewed and are negative. OBJECTIVE:     /72   Pulse 90   Ht 6' 1\" (1.854 m)   Wt 255 lb (115.7 kg)   BMI 33.64 kg/m²     Labs:   CBC: No results for input(s): WBC, HGB, HCT, PLT in the last 72 hours. BMP:No results for input(s): NA, K, CO2, BUN, CREATININE, LABGLOM, GLUCOSE in the last 72 hours. BNP: No results for input(s): BNP in the last 72 hours. PT/INR: No results for input(s): PROTIME, INR in the last 72 hours. APTT:No results for input(s): APTT in the last 72 hours. CARDIAC ENZYMES:No results for input(s): CKTOTAL, CKMB, CKMBINDEX, TROPONINI in the last 72 hours. FASTING LIPID PANEL:No results found for: HDL, LDLDIRECT, LDLCALC, TRIG  LIVER PROFILE:No results for input(s): AST, ALT, LABALBU in the last 72 hours.         Past Medical History:   Diagnosis Date    Anxiety     Diverticulitis     Pneumonia     Stroke (cerebrum) ([facility] [address]      Past Surgical History:   Procedure Laterality Date    BACK SURGERY  2013    SMALL INTESTINE SURGERY      TONSILLECTOMY       Family History   Problem Relation Age of Onset    Cancer Mother     Lung Cancer Mother     Kidney Cancer Mother     Brain Cancer Mother     Heart Attack Father      Allergies   Allergen Reactions    Penicillins Anaphylaxis     Current Outpatient Medications   Medication Sig Dispense Refill    Melatonin 10 MG CAPS Take by mouth      Acetaminophen (TYLENOL 8 HOUR PO) Take 500 mg by mouth as needed      varenicline (CHANTIX) 1 MG tablet Take 1 mg by mouth 2 times daily      atorvastatin (LIPITOR) 40 MG tablet Take 40 mg by mouth daily      escitalopram (LEXAPRO) 10 MG tablet Take 10 mg by mouth daily      Butalbital-Acetaminophen  MG CAPS Take 1 tablet by mouth 2 times daily      ALPRAZolam (XANAX) 0.25 MG tablet Take 0.25 mg by mouth nightly as needed for Sleep.  nitroGLYCERIN (NITROSTAT) 0.4 MG SL tablet Place 1 tablet under the tongue every 5 minutes as needed for Chest pain up to max of 3 total doses. If no relief after 1 dose, call 911. 25 tablet 3    clopidogrel (PLAVIX) 75 MG tablet daily       No current facility-administered medications for this visit.       Social History     Socioeconomic History    Marital status:      Spouse name: Not on file    Number of children: Not on file    Years of education: Not on file    Highest education level: Not on file   Occupational History    Not on file   Social Needs    Financial resource strain: Not on file    Food insecurity     Worry: Not on file     Inability: Not on file    Transportation needs     Medical: Not on file     Non-medical: Not on file   Tobacco Use    Smoking status: Current Every Day Smoker     Packs/day: 1.00     Years: 40.00     Pack years: 40.00     Types: Cigarettes    Smokeless tobacco: Never Used   Substance and Sexual Activity    Alcohol use: Yes    Drug use: Never    Sexual activity: Yes   Lifestyle    Physical activity     Days per week: Not on file     Minutes per session: Not on file    Stress: Not on file   Relationships    Social connections     Talks on phone: Not on file     Gets together: Not on file     Attends [employer] service: Not on file     Active member of club or organization: Not on file     Attends meetings of clubs or organizations: Not on file     Relationship status: Not on file    Intimate partner violence     Fear of current or ex partner: Not on file     Emotionally abused: Not on file     Physically abused: Not on file     Forced sexual activity: Not on file   Other Topics Concern    Not on file   Social History Narrative    Not on file       Physical Examination:  /72   Pulse 90   Ht 6' 1\" (1.854 m)   Wt 255 lb (115.7 kg)   BMI 33.64 kg/m²   Physical Exam  Vitals signs reviewed. Constitutional:       Appearance: Normal appearance. He is well-developed. HENT:      Head: Normocephalic and atraumatic. Nose: Nose normal.      Mouth/Throat:      Mouth: Mucous membranes are moist.      Pharynx: Oropharynx is clear. Eyes:      General: No scleral icterus. Extraocular Movements: Extraocular movements intact. Pupils: Pupils are equal, round, and reactive to light. Neck:      Musculoskeletal: Normal range of motion and neck supple. No neck rigidity or muscular tenderness. Vascular: No carotid bruit or JVD. Cardiovascular:      Rate and Rhythm: Normal rate and regular rhythm. Heart sounds: Normal heart sounds. No murmur. No friction rub. No gallop. Pulmonary:      Effort: Pulmonary effort is normal. No respiratory distress. Breath sounds: Normal breath sounds. No stridor. No wheezing, rhonchi or rales. Chest:      Chest wall: No tenderness. Abdominal:      General: Abdomen is flat.  Bowel sounds are normal. There is no distension. Palpations: Abdomen is soft. There is no mass. Tenderness: There is no abdominal tenderness. There is no right CVA tenderness, left CVA tenderness, guarding or rebound. Hernia: No hernia is present. Musculoskeletal:         General: No swelling, tenderness, deformity or signs of injury. Right lower leg: No edema. Left lower leg: No edema. Lymphadenopathy:      Cervical: No cervical adenopathy. Skin:     General: Skin is warm and dry. Neurological:      General: No focal deficit present. Mental Status: He is alert and oriented to person, place, and time. Mental status is at baseline. Cranial Nerves: No cranial nerve deficit. Sensory: No sensory deficit. Motor: No weakness. Coordination: Coordination normal.      Deep Tendon Reflexes: Reflexes normal.   Psychiatric:         Mood and Affect: Mood normal.         Behavior: Behavior normal.         Thought Content: Thought content normal.         Judgment: Judgment normal.             ASSESSMENT:     Diagnosis Orders   1. Unstable angina pectoris (HCC)  ECHO Complete 2D W Doppler W Color    NM MYOCARDIAL SPECT REST EXERCISE OR RX       PLAN:  Orders Placed This Encounter   Procedures    NM MYOCARDIAL SPECT REST EXERCISE OR RX    ECHO Complete 2D W Doppler W Color     Orders Placed This Encounter   Medications    nitroGLYCERIN (NITROSTAT) 0.4 MG SL tablet     Sig: Place 1 tablet under the tongue every 5 minutes as needed for Chest pain up to max of 3 total doses. If no relief after 1 dose, call 911. Dispense:  25 tablet     Refill:  3         1. Continue present medications  2. Recommend Nitrostat 0.4 mg sublingual.  3. Recommend aspirin 81 mg daily  4. Recommend echocardiogram  5. Recommend Lexiscan pharmacologic stress testing with myocardial perfusion imaging  6.  Recommend follow-up assessment thereafter further comments to follow    Return in about 4 weeks (around

## 2020-03-19 NOTE — PROGRESS NOTES
Daily Treatment Note  Date: 3/19/2020  Patient Name: Omer Burch  MRN: [id number]     :   1965    Subjective:   General  Additional Pertinent Hx: 50year-old male referred to PT with disgnosis of ischemic stroke. The referral was originally from [facility] in [place] [address], where he was admitted with left sided weakness, slurred speech, 19 while on way back home from [place] to his home in [place]. tPA was administered at the hospital. History of stroke 5 years prior, also affecting his left side and speech. He is currently referred back to PT by Dr. Ross Pino for continued PT (~1-month absence). Referring Practitioner: Maria Antonia Chirinos MD  PT Visit Information  PT Insurance Information: [employer] (no pre-cert)  Total # of Visits Approved: 20  Total # of Visits to Date:   Plan of Care/Certification Expiration Date: 20  Progress Note Due Date: 20  Subjective  Subjective: Patient states he is doing well today.     Pain Screening  Patient Currently in Pain: No  Vital Signs  Patient Currently in Pain: No       Treatment Activities:                                      Exercises  Exercise 1: ambulation with ' --6 MWT   Exercise 2: supine left SAQ's with tapping/DTR faciliatation to quads/patellar tendon  2 x 10---eccentric lowering left leg  x 20 with assist from therapist to pull into full extension  Exercise 3: supine heel slides 2 x 20  Exercise 4: supine foot dorsiflexion/plantarflexion/eversion, with manual resistance to t-band resist 1 x 15 ea min resist manually today    Exercise 5: sitting LAQ's with faciliatation (tapping as needed)  and eccentric lowering x 20  Exercise 6: sitting h/s curls with band and facilitation as needed (red) x 20  Exercise 7: sitting ball squeeze x 20  Exercise 8: sitting hip abduction with band (with manual assist) x 20  Exercise 9: sitting foot tapping DF 1 x 20 ea    Exercise 10: sitting heel raises with weights over lower thighs 1 x 20 4#  Exercise 11: sitting marching 1 x 20 ea  Exercise 12: repeated sit to stand with forced recruitment of left leg 1 x 15 --from mat table  Exercise 13: left single leg stand 2 x 20 sec *intermittent use of finger tips on rail   Exercise 14: standing left hip flexion 2 x 10  Exercise 15: ambulation without assistive device  x 71' step though slow and uncoordinated with L LE and min assist  Exercise 16: bilateral hip abduction 20 reps  Exercise 17: sidestepping in whiteside x 3  Exercise 18: cone weaves with device, later no device  Exercise 19: box step x 0  Exercise 20: forward/backward walking x 0                                   Assessment:   Conditions Requiring Skilled Therapeutic Intervention  Body structures, Functions, Activity limitations: Decreased functional mobility ; Decreased strength;Decreased balance;Decreased high-level IADLs;Decreased fine motor control;Decreased coordination;Decreased endurance  Assessment: Patient did well with tx today with min v.c. for proper tech with proper tech with ex's. Patient did have large steps causing decreased balance during ambulation without a.d., but with v.c. improved. Patient did well with tx with appropriate fatigue at end of treatment. Goals:  Short term goals  Time Frame for Short term goals: 6-7 weeks  Short term goal 1: Patient to be independent with revised HEP (as needed). --met(3/5: Performs HEP on a consistent basis.)  Short term goal 2: Patient to be able to ambulate 500' without AFO and without t-band hip flexor assist.  --met(2/6/20 Patient able to walk in excess of 700' using single point,, still using band outside of therapy to walk longer distances. )  Short term goal 3: Patient able to ambulate with single point cane with greater step height. --progress(3/5: Ambulatory with SPC and improved step height.)  Short term goal 4: Patient to report minimal difficulty getting in and out of car. --progress(3/5: Improved ability, \"though I did get a different truck. \" )  Short term goal 5: Patient able to ambulate 1,000' with single-point cane. --progress(3/5: Amb 65' today in 6MWT with [facility])  Long term goals  Time Frame for Long term goals : 8-10 weeks  Long term goal 1: Patient able to ambulate 0' without assistive device. --progress (3/5: Amb 64' w/o AD and CGA. Slow, exaggerated motions and unsteady.)  Long term goal 2: Patient able to perform sit to stand 5 x in 30 seconds with increased weight bourne through left LE.--met(2/6/20 Patient able to perform sit to stand 8 x in 30 seconds using hands to push up. )  Long term goal 3: Patient to have >= 4-/5 strength in left foot for df, pf, inversion and eversion. --progress(3/5:  3+/5 for right df, pf and inversion, 3 to 3+/5 for left ankle eversion)  Long term goal 4: Patient to have >=4-/5 left hip flexors, 4+/5 left knee extensors. --progress(3:/5: left hip flex 3+/5, knee flexion 3 to 3+/5, extension 3-3+/5 )  Long term goal 5: Patient to show increased protective reactions with standing balance perturbations. --progress(3/5: Improved protective reactions but still minimal ankle recruitment. )  Patient Goals   Patient goals : Return back to normal level of function, walk without a device and without band or AFO.     Plan:    Plan  Times per week: 2x per week  Plan weeks: 9-10 weeks  Current Treatment Recommendations: Strengthening, ROM, Balance Training, Transfer Training, Endurance Training, Gait Training, Stair training, Neuromuscular Re-education, Home Exercise Program, Patient/Caregiver Education & Training, Equipment Evaluation, Education, & procurement  Timed Code Treatment Minutes: 55 Minutes     Therapy Time   Individual Concurrent Group Co-treatment   Time In [phone]         Time Out 1440         Minutes 55         Timed Code Treatment Minutes: [address] [facility],[address], PT     Electronically signed by Corine Howe PT on 3/19/2020 at 3:58 PM

## 2020-03-24 ENCOUNTER — APPOINTMENT (OUTPATIENT)
Dept: PHYSICAL THERAPY | Age: 55
End: 2020-03-24
Payer: COMMERCIAL

## 2020-03-24 ENCOUNTER — APPOINTMENT (OUTPATIENT)
Dept: SPEECH THERAPY | Age: 55
End: 2020-03-24
Payer: COMMERCIAL

## 2020-03-31 ENCOUNTER — APPOINTMENT (OUTPATIENT)
Dept: SPEECH THERAPY | Age: 55
End: 2020-03-31
Payer: COMMERCIAL

## 2020-04-23 ENCOUNTER — OFFICE VISIT (OUTPATIENT)
Dept: CARDIOLOGY | Age: 55
End: 2020-04-23
Payer: COMMERCIAL

## 2020-04-23 VITALS
DIASTOLIC BLOOD PRESSURE: 70 MMHG | BODY MASS INDEX: 34.99 KG/M2 | HEIGHT: 73 IN | HEART RATE: 84 BPM | SYSTOLIC BLOOD PRESSURE: 98 MMHG | WEIGHT: 264 LBS

## 2020-04-23 PROBLEM — R07.89 OTHER CHEST PAIN: Status: ACTIVE | Noted: 2020-04-23

## 2020-04-23 PROCEDURE — 3017F COLORECTAL CA SCREEN DOC REV: CPT | Performed by: INTERNAL MEDICINE

## 2020-04-23 PROCEDURE — G8427 DOCREV CUR MEDS BY ELIG CLIN: HCPCS | Performed by: INTERNAL MEDICINE

## 2020-04-23 PROCEDURE — 99213 OFFICE O/P EST LOW 20 MIN: CPT | Performed by: INTERNAL MEDICINE

## 2020-04-23 PROCEDURE — 4004F PT TOBACCO SCREEN RCVD TLK: CPT | Performed by: INTERNAL MEDICINE

## 2020-04-23 PROCEDURE — G8417 CALC BMI ABV UP PARAM F/U: HCPCS | Performed by: INTERNAL MEDICINE

## 2020-04-23 ASSESSMENT — ENCOUNTER SYMPTOMS
VOMITING: 0
GASTROINTESTINAL NEGATIVE: 1
RESPIRATORY NEGATIVE: 1
SHORTNESS OF BREATH: 0
DIARRHEA: 0
EYES NEGATIVE: 1
NAUSEA: 0

## 2020-04-23 NOTE — PROGRESS NOTES
Past Surgical History:   Procedure Laterality Date    BACK SURGERY  2013    SMALL INTESTINE SURGERY      TONSILLECTOMY       Family History   Problem Relation Age of Onset    Cancer Mother     Lung Cancer Mother     Kidney Cancer Mother     Brain Cancer Mother     Heart Attack Father      Allergies   Allergen Reactions    Penicillins Anaphylaxis     Current Outpatient Medications   Medication Sig Dispense Refill    aspirin 81 MG tablet Take 81 mg by mouth daily      Melatonin 10 MG CAPS Take by mouth      clopidogrel (PLAVIX) 75 MG tablet daily      Acetaminophen (TYLENOL 8 HOUR PO) Take 500 mg by mouth as needed      varenicline (CHANTIX) 1 MG tablet Take 1 mg by mouth 2 times daily      atorvastatin (LIPITOR) 40 MG tablet Take 40 mg by mouth daily      escitalopram (LEXAPRO) 10 MG tablet Take 10 mg by mouth daily      Butalbital-Acetaminophen  MG CAPS Take 1 tablet by mouth 2 times daily      ALPRAZolam (XANAX) 0.25 MG tablet Take 0.25 mg by mouth nightly as needed for Sleep.  nitroGLYCERIN (NITROSTAT) 0.4 MG SL tablet Place 1 tablet under the tongue every 5 minutes as needed for Chest pain up to max of 3 total doses. If no relief after 1 dose, call 911. 25 tablet 3     No current facility-administered medications for this visit.       Social History     Socioeconomic History    Marital status:      Spouse name: Not on file    Number of children: Not on file    Years of education: Not on file    Highest education level: Not on file   Occupational History    Not on file   Social Needs    Financial resource strain: Not on file    Food insecurity     Worry: Not on file     Inability: Not on file    Transportation needs     Medical: Not on file     Non-medical: Not on file   Tobacco Use    Smoking status: Current Every Day Smoker     Packs/day: 1.00     Years: 40.00     Pack years: 40.00     Types: Cigarettes    Smokeless tobacco: Never Used   Substance and Sexual Activity    Alcohol use: Yes    Drug use: Never    Sexual activity: Yes   Lifestyle    Physical activity     Days per week: Not on file     Minutes per session: Not on file    Stress: Not on file   Relationships    Social connections     Talks on phone: Not on file     Gets together: Not on file     Attends Zoroastrianism service: Not on file     Active member of club or organization: Not on file     Attends meetings of clubs or organizations: Not on file     Relationship status: Not on file    Intimate partner violence     Fear of current or ex partner: Not on file     Emotionally abused: Not on file     Physically abused: Not on file     Forced sexual activity: Not on file   Other Topics Concern    Not on file   Social History Narrative    Not on file       Physical Examination:  BP 98/70   Pulse 84   Ht 6' 1\" (1.854 m)   Wt 264 lb (119.7 kg)   BMI 34.83 kg/m²   Physical Exam  Vitals signs reviewed. Constitutional:       Appearance: He is well-developed. Neck:      Vascular: No carotid bruit or JVD. Cardiovascular:      Rate and Rhythm: Normal rate and regular rhythm. Heart sounds: Normal heart sounds. No murmur. No friction rub. No gallop. Pulmonary:      Effort: Pulmonary effort is normal. No respiratory distress. Breath sounds: Normal breath sounds. No wheezing or rales. Abdominal:      General: There is no distension. Tenderness: There is no abdominal tenderness. Lymphadenopathy:      Cervical: No cervical adenopathy. Skin:     General: Skin is warm and dry. ASSESSMENT:     Diagnosis Orders   1. Other chest pain         PLAN:  No orders of the defined types were placed in this encounter. No orders of the defined types were placed in this encounter. 1. Continue present medications  2. Recommend follow-up assessment in 2 months    Return in about 2 months (around 6/23/2020) for return to Dr. Christofer Dunaway only.       Edd Oconnor MD 4/23/2020 9:25 AM CDT    [facility]      Thisdictation was generated by voice recognition computer software. Although all attempts are made to edit the dictation for accuracy, there may be errors in the transcription that are not intended.

## 2020-05-12 ENCOUNTER — HOSPITAL ENCOUNTER (OUTPATIENT)
Dept: SPEECH THERAPY | Age: 55
Setting detail: THERAPIES SERIES
Discharge: HOME | End: 2020-05-12
Payer: COMMERCIAL

## 2020-05-12 ENCOUNTER — HOSPITAL ENCOUNTER (OUTPATIENT)
Dept: PHYSICAL THERAPY | Age: 55
Setting detail: THERAPIES SERIES
Discharge: HOME | End: 2020-05-12
Payer: COMMERCIAL

## 2020-05-12 PROCEDURE — 97164 PT RE-EVAL EST PLAN CARE: CPT

## 2020-05-12 PROCEDURE — 97110 THERAPEUTIC EXERCISES: CPT

## 2020-05-12 PROCEDURE — 92521 EVALUATION OF SPEECH FLUENCY: CPT

## 2020-05-12 PROCEDURE — 97116 GAIT TRAINING THERAPY: CPT

## 2020-05-12 PROCEDURE — 96116 NUBHVL XM PHYS/QHP 1ST HR: CPT

## 2020-05-12 PROCEDURE — 97530 THERAPEUTIC ACTIVITIES: CPT

## 2020-05-12 NOTE — PROGRESS NOTES
recall learned and retained information through associative mechanisms. Memory is typically divided into three primary classifications:  Long Term, Short Term and Working Memory. In reference to the CLQT, memory was measured utilizing the following subtests: recall of personal demographics, story retelling, generative naming, and design memory. Patient obtained a WNL ranking with a quantitative score of 169 out of a possible 185. Executive functions is an umbrella term for cognitive processes that regulate, control, and manage other cognitive processes, such as planning, working memory, attention, problem solving, verbal reasoning, inhibition, mental flexibility, task switching, and initiation and monitoring of actions. Based on the results of the CLQT, the patient obtained a functional ranking based on the following subtasks:  Symbols trials, generative naming, mazes and design generation. Patient obtained a quantitative score of 33 out of a possible 40, which is considered to be WNL. Language refers to the system of communication utilizing arbitrary signals such as spoken words, gestures/body language and written symbols. These skills were measured utilizing the subtests personal facts/demographics, confrontation naming, story retelling, and generative naming. Patient obtained a quantitative score of 34 out of a possible 37, which is considered to be a WNL. Visuospatial skills refer to the ability or abilities to recognize, organize and manipulate visual information and then in turn interpret that information into meaningful patterns.  Examples of these skills include but are not limited to visual memory, reading, recognizing symbols/letters/words, etc.; following visual directions, following maps, etc.  When deficits occur in these areas, individuals may begin to experience difficulty with establishing a mental picture and/or decreased mental flexibility, difficulty in recalling spatial

## 2020-05-12 NOTE — PROGRESS NOTES
412 ft patient LLE began to fatigue and displayed foot drop); 5x sit to stand: 25/67 sec; COLBERT 40/56                Ambulation  Ambulation?: Yes  Ambulation 1  Surface: level tile  Device: Single point cane  Other Apparatus: AFO  Assistance: Independent  Quality of Gait: Patient ambulates with single point cane with lateral lean to the right mostly due to lack of hip and knee flexion during swing phase of gait  Gait Deviations: Decreased step height;Decreased arm swing  Distance: 640 feet 6MWT  Gait Deviations  Gait Deviations: Slow Chloe;Decreased step length  Balance  Posture: Good  Sitting - Dynamic: Good  Standing - Static: Good  Standing - Dynamic: Fair  Exercises  Exercise 1: ambulation with ' --6 MWT  (completed 640ft)  Exercise 2: supine left SAQ's with tapping/DTR faciliatation to quads/patellar tendon  2 x 10---eccentric lowering left leg  x 20 with assist from therapist to pull into full extension not today  Exercise 3: supine heel slides 2 x 20 not today  Exercise 4: supine foot dorsiflexion/plantarflexion/eversion, with manual resistance to t-band resist 1 x 15 ea min resist no today  Exercise 5: sitting LAQ's with faciliatation (tapping as needed)  and eccentric lowering x 20 not today  Exercise 6: sitting h/s curls with band and facilitation as needed (red) x 20 not today  Exercise 7: sitting ball squeeze x 20  Exercise 8: sitting hip abduction with band (with manual assist) x 20 not today  Exercise 9: sitting foot tapping DF 1 x 20 ea  not today  Exercise 10: standing heel raises with assistance on LLE x10   Exercise 11: sitting marching 1 x 20 ea  Exercise 12: repeated sit to stand with forced recruitment of left leg 1 x 15 --from mat table not today  Exercise 13: left single leg stand 2 x 20 sec *intermittent use of finger tips on rail not today  Exercise 14: standing left hip flexion 2 x 10  Exercise 15: ambulation without assistive device  x  not today  Exercise 16: bilateral hip without t-band hip flexor assist.  (3/12/20: patient has AFO on during ambulation)  Short term goal 3: Patient able to ambulate with single point cane with greater step height. --progress(5/12/20)  Short term goal 4: Patient to report minimal difficulty getting in and out of car. --progress(5/12/20: Continues to have difficulty getting in and out of a car , a truck is less difficult )  Short term goal 5: Patient able to ambulate 1,000' with single-point cane. --progress(5/12/20: Amb 640 today in 6MWT with SPC)  Long term goals  Time Frame for Long term goals : 4-6  Long term goal 1: Patient able to ambulate 0' without assistive device. --progress (5/12/20)  Long term goal 2: Patient able to perform sit to stand 5 x in 30 seconds with increased weight bourne through left LE.--met upgrade to complete in 20 seconds(5/12/20: completed in 25.67 sec)  Long term goal 3: Patient will improve COLBERT balance score to 50/56(5/12/20: 40/56)  Long term goal 4: Patient to have >=4-/5 left hip flexors, 4+/5 left knee extensors. to ascend steps safely without dragging his foot --progress(5/12/20 left hip flex 3+/5, knee flexion 3 to 3+/5, extension 3-3+/5 )  Long term goal 5: Patient to show increased protective reactions with standing balance perturbations. --progress(5/12/20 Improved protective reactions but still minimal ankle recruitment. )  Patient Goals   Patient goals : Return back to normal level of function, walk without a device and without band or AFO.        Therapy Time   Individual Concurrent Group Co-treatment   Time In 1400         Time Out 1454         Minutes 54         Timed Code Treatment Minutes: [address] [facility], [address] [facility]

## 2020-05-14 ENCOUNTER — HOSPITAL ENCOUNTER (OUTPATIENT)
Dept: PHYSICAL THERAPY | Age: 55
Setting detail: THERAPIES SERIES
Discharge: HOME | End: 2020-05-14
Payer: COMMERCIAL

## 2020-05-14 PROCEDURE — 97110 THERAPEUTIC EXERCISES: CPT

## 2020-05-14 ASSESSMENT — PAIN DESCRIPTION - DESCRIPTORS: DESCRIPTORS: TIGHTNESS

## 2020-05-14 ASSESSMENT — PAIN DESCRIPTION - PAIN TYPE: TYPE: ACUTE PAIN

## 2020-05-14 ASSESSMENT — PAIN SCALES - GENERAL: PAINLEVEL_OUTOF10: 3

## 2020-05-14 ASSESSMENT — PAIN DESCRIPTION - FREQUENCY: FREQUENCY: INTERMITTENT

## 2020-05-14 ASSESSMENT — PAIN DESCRIPTION - LOCATION: LOCATION: BACK

## 2020-05-19 ENCOUNTER — APPOINTMENT (OUTPATIENT)
Dept: SPEECH THERAPY | Age: 55
End: 2020-05-19
Payer: COMMERCIAL

## 2020-05-19 ENCOUNTER — HOSPITAL ENCOUNTER (OUTPATIENT)
Dept: PHYSICAL THERAPY | Age: 55
Setting detail: THERAPIES SERIES
Discharge: HOME | End: 2020-05-19
Payer: COMMERCIAL

## 2020-05-19 ENCOUNTER — APPOINTMENT (OUTPATIENT)
Dept: PHYSICAL THERAPY | Age: 55
End: 2020-05-19
Payer: COMMERCIAL

## 2020-05-19 ENCOUNTER — HOSPITAL ENCOUNTER (OUTPATIENT)
Dept: SPEECH THERAPY | Age: 55
Setting detail: THERAPIES SERIES
Discharge: HOME | End: 2020-05-19
Payer: COMMERCIAL

## 2020-05-19 PROCEDURE — 97110 THERAPEUTIC EXERCISES: CPT

## 2020-05-19 PROCEDURE — 97130 THER IVNTJ EA ADDL 15 MIN: CPT

## 2020-05-19 PROCEDURE — 97129 THER IVNTJ 1ST 15 MIN: CPT

## 2020-05-19 NOTE — PROGRESS NOTES
out of a car , a truck is less difficult )  Short term goal 5: Patient able to ambulate 1,000' with single-point cane. --progress(5/12/20: Amb 640 today in 6MWT with SPC)  Long term goals  Time Frame for Long term goals : 4-6  Long term goal 1: Patient able to ambulate [id number] [facility]' without assistive device. --progress (5/12/20)  Long term goal 2: Patient able to perform sit to stand 5 x in 30 seconds with increased weight bourne through left LE.--met upgrade to complete in 20 seconds(5/12/20: completed in 25.67 sec)  Long term goal 3: Patient will improve COLBERT balance score to 50/56(5/12/20: 40/56)  Long term goal 4: Patient to have >=4-/5 left hip flexors, 4+/5 left knee extensors. to ascend steps safely without dragging his foot --progress(5/12/20 left hip flex 3+/5, knee flexion 3 to 3+/5, extension 3-3+/5 )  Long term goal 5: Patient to show increased protective reactions with standing balance perturbations. --progress(5/12/20 Improved protective reactions but still minimal ankle recruitment. )  Patient Goals   Patient goals : Return back to normal level of function, walk without a device and without band or AFO.     Plan:    Plan  Times per week: 2x per week  Plan weeks: 4-6 weeks  Current Treatment Recommendations: Strengthening, ROM, Balance Training, Transfer Training, Endurance Training, Gait Training, Stair training, Neuromuscular Re-education, Home Exercise Program, Patient/Caregiver Education & Training, Equipment Evaluation, Education, & procurement  Timed Code Treatment Minutes: 62 Minutes     Therapy Time   Individual Concurrent Group Co-treatment   Time In 1009         Time Out 1106         Minutes 57         Timed Code Treatment Minutes: [id number] [facility] [address], PT    Electronically signed by Arjun Beth PT on 5/19/2020 at 11:40 AM

## 2020-05-19 NOTE — PROGRESS NOTES
(concrete and abstract) divergent naming tasks with minimal cues and 100% accuracy. Long-term Goals  Goal 1: The patient will demonstrate more fluent speech during unstructured tasks to communicate effectively with members of the community and family members. Goal 2: The patient will develop functional, cognitive-linguistic-based skills and utilize compensatory strategies to communicate wants and needs effectively to different conversational partners, maintain safety and participate socially in functional living environment   Patient/family involved in developing goals and treatment plan: yes           Plan:    Continued daily Speech/Language treatment with  goals per plan of care.       Electronically Signed By:  Javi Barrett  5/19/2020,9:31 AM.

## 2020-05-22 ENCOUNTER — APPOINTMENT (OUTPATIENT)
Dept: PHYSICAL THERAPY | Age: 55
End: 2020-05-22
Payer: COMMERCIAL

## 2020-05-26 ENCOUNTER — HOSPITAL ENCOUNTER (OUTPATIENT)
Dept: PHYSICAL THERAPY | Age: 55
Setting detail: THERAPIES SERIES
Discharge: HOME | End: 2020-05-26
Payer: COMMERCIAL

## 2020-05-26 ENCOUNTER — APPOINTMENT (OUTPATIENT)
Dept: SPEECH THERAPY | Age: 55
End: 2020-05-26
Payer: COMMERCIAL

## 2020-05-26 ENCOUNTER — HOSPITAL ENCOUNTER (OUTPATIENT)
Dept: SPEECH THERAPY | Age: 55
Setting detail: THERAPIES SERIES
Discharge: HOME | End: 2020-05-26
Payer: COMMERCIAL

## 2020-05-26 ENCOUNTER — APPOINTMENT (OUTPATIENT)
Dept: PHYSICAL THERAPY | Age: 55
End: 2020-05-26
Payer: COMMERCIAL

## 2020-05-26 PROCEDURE — 97110 THERAPEUTIC EXERCISES: CPT

## 2020-05-26 PROCEDURE — 97130 THER IVNTJ EA ADDL 15 MIN: CPT

## 2020-05-26 PROCEDURE — 97116 GAIT TRAINING THERAPY: CPT

## 2020-05-26 PROCEDURE — 97129 THER IVNTJ 1ST 15 MIN: CPT

## 2020-05-26 ASSESSMENT — PAIN SCALES - GENERAL: PAINLEVEL_OUTOF10: 1

## 2020-05-26 ASSESSMENT — PAIN DESCRIPTION - FREQUENCY: FREQUENCY: INTERMITTENT

## 2020-05-26 ASSESSMENT — PAIN DESCRIPTION - PAIN TYPE: TYPE: ACUTE PAIN

## 2020-05-26 ASSESSMENT — PAIN DESCRIPTION - DESCRIPTORS: DESCRIPTORS: ACHING

## 2020-05-26 ASSESSMENT — PAIN DESCRIPTION - ORIENTATION: ORIENTATION: LOWER

## 2020-05-26 ASSESSMENT — PAIN DESCRIPTION - LOCATION: LOCATION: BACK

## 2020-05-26 NOTE — PROGRESS NOTES
Speech Language Pathology  Facility/Department: [facility] SPEECH THERAPY  Daily Treatment Note       NAME: Lisa Phelps  : 1965  MRN: [id number]  Date: 2020  Evaluating Therapist: Robbie Underwood MS CCC-SLP  ADMITTING Lion BEAN.9  Visits: 1x/week on  at 1:00   Insurance:  11 visits out of 20 approved visits have been used so far this year  The patient stated he has been approved by [facility] for additional speech therapy through 2020. Subjective: The patient does report some decline in expressive language and dysfluencies due to the break in speech therapy visits due to Coronavirus. Objective:  Abstract divergent naming tasks were completed with 9 words. He was given one minute to complete the task. A concrete divergent naming task was completed and 18 words were produced. He was given one minute to complete the task. He was able to name an additional ten words when given an additional minute. During Speaking tasks  21 Syllable per minute  106 total number of syllables  12 dysfluent syllables  94 fluent syllables   11% percentage of dysfluent syllables      During reading tasks:  53 syllables per minute  213 total number of syllables  50 dysfluent syllables  163 fluent syllables   213 dysfluent syllables  23% of dysfluent syllables        Assessment:  The patient exhibits moderate dysfluencies characterized by sound and word repetitions with some groping during speech. Increased dysfluencies observed during conversation and especially with reading tasks. Some slowed processing and difficulty with divergent naming was observed. Recommend outpatient speech therapy services 1x/week for 12 weeks. Plan:   Goals:  Short-term Goals  Timeframe for Short-term Goals: 1x/week for 12 weeks  Goal 1: The patient will complete tasks for easy onset of speech to reduce word repetitions with 80% accuracy and minimal verbal/visual cues.   Goal 2: The patient will complete tasks for easy onset of speech to reduce sound repetitions with 80% accuracy and minimal verbal/visual cues. Goal 3: The patient will demonstrate improved prosody during conversation with 80% accuracy and minimal verbal/visual cues. Goal 4: The patient will complete tasks for executive function with minimal cues and 100% accuracy. Goal 5: The patient will complete (concrete and abstract) divergent naming tasks with minimal cues and 100% accuracy. Long-term Goals  Goal 1: The patient will demonstrate more fluent speech during unstructured tasks to communicate effectively with members of the community and family members.    Goal 2: The patient will develop functional, cognitive-linguistic-based skills and utilize compensatory strategies to communicate wants and needs effectively to different conversational partners, maintain safety and participate socially in functional living environment   Patient/family involved in developing goals and treatment plan: yes          Electronically Signed By:  Madelyn Martinez M.S. CCC-SLP  5/26/2020,9:09 AM.

## 2020-05-28 ENCOUNTER — APPOINTMENT (OUTPATIENT)
Dept: PHYSICAL THERAPY | Age: 55
End: 2020-05-28
Payer: COMMERCIAL

## 2020-05-29 ENCOUNTER — HOSPITAL ENCOUNTER (OUTPATIENT)
Dept: PHYSICAL THERAPY | Age: 55
Setting detail: THERAPIES SERIES
Discharge: HOME | End: 2020-05-29
Payer: COMMERCIAL

## 2020-05-29 PROCEDURE — 97110 THERAPEUTIC EXERCISES: CPT

## 2020-05-29 NOTE — PROGRESS NOTES
in 25.67 sec)  Long term goal 3: Patient will improve COLBERT balance score to 50/56(5/12/20: 40/56)  Long term goal 4: Patient to have >=4-/5 left hip flexors, 4+/5 left knee extensors. to ascend steps safely without dragging his foot --progress(5/12/20 left hip flex 3+/5, knee flexion 3 to 3+/5, extension 3-3+/5 )  Long term goal 5: Patient to show increased protective reactions with standing balance perturbations. --progress(5/12/20 Improved protective reactions but still minimal ankle recruitment. )  Patient Goals   Patient goals : Return back to normal level of function, walk without a device and without band or AFO.     Plan:    Plan  Times per week: 2x per week  Plan weeks: 4-6 weeks  Current Treatment Recommendations: Strengthening, ROM, Balance Training, Transfer Training, Endurance Training, Gait Training, Stair training, Neuromuscular Re-education, Home Exercise Program, Patient/Caregiver Education & Training, Equipment Evaluation, Education, & procurement  Timed Code Treatment Minutes: 61 Minutes     Therapy Time   Individual Concurrent Group Co-treatment   Time In 0930         Time Out 1029         Minutes 59         Timed Code Treatment Minutes: [address] [facility] [address], PT     Electronically signed by Sindy Amato PT on 5/29/2020 at 11:15 AM

## 2020-06-02 ENCOUNTER — APPOINTMENT (OUTPATIENT)
Dept: SPEECH THERAPY | Age: 55
End: 2020-06-02
Payer: COMMERCIAL

## 2020-06-02 ENCOUNTER — APPOINTMENT (OUTPATIENT)
Dept: PHYSICAL THERAPY | Age: 55
End: 2020-06-02
Payer: COMMERCIAL

## 2020-06-02 ENCOUNTER — HOSPITAL ENCOUNTER (OUTPATIENT)
Dept: SPEECH THERAPY | Age: 55
Setting detail: THERAPIES SERIES
Discharge: HOME | End: 2020-06-02
Payer: COMMERCIAL

## 2020-06-02 ENCOUNTER — HOSPITAL ENCOUNTER (OUTPATIENT)
Dept: PHYSICAL THERAPY | Age: 55
Setting detail: THERAPIES SERIES
Discharge: HOME | End: 2020-06-02
Payer: COMMERCIAL

## 2020-06-02 PROCEDURE — 97129 THER IVNTJ 1ST 15 MIN: CPT

## 2020-06-02 PROCEDURE — 97130 THER IVNTJ EA ADDL 15 MIN: CPT

## 2020-06-02 PROCEDURE — 97110 THERAPEUTIC EXERCISES: CPT

## 2020-06-04 ENCOUNTER — APPOINTMENT (OUTPATIENT)
Dept: PHYSICAL THERAPY | Age: 55
End: 2020-06-04
Payer: COMMERCIAL

## 2020-06-05 ENCOUNTER — APPOINTMENT (OUTPATIENT)
Dept: PHYSICAL THERAPY | Age: 55
End: 2020-06-05
Payer: COMMERCIAL

## 2020-06-09 ENCOUNTER — HOSPITAL ENCOUNTER (OUTPATIENT)
Dept: SPEECH THERAPY | Age: 55
Setting detail: THERAPIES SERIES
Discharge: HOME | End: 2020-06-09
Payer: COMMERCIAL

## 2020-06-09 ENCOUNTER — APPOINTMENT (OUTPATIENT)
Dept: PHYSICAL THERAPY | Age: 55
End: 2020-06-09
Payer: COMMERCIAL

## 2020-06-09 PROCEDURE — 97130 THER IVNTJ EA ADDL 15 MIN: CPT

## 2020-06-09 PROCEDURE — 97129 THER IVNTJ 1ST 15 MIN: CPT

## 2020-06-09 NOTE — PROGRESS NOTES
Speech Language Pathology  Facility/Department: Health system SPEECH THERAPY  Daily Treatment Note        NAME: Sanket Farnsworth  : 1965  QLO: [id number]  Date: 2020  Evaluating Therapist: Carrol Sosa MS CCC-SLP  ADMITTING DIAGNOSIS: CVA I63.9  Visits: 1x/week on  at 1:00   Insurance:  13 visits out of 20 approved visits have been used so far this year           Subjective: The patient does report some decline in expressive language and dysfluencies due to the break in speech therapy visits due to Coronavirus.        Objective:  [place] divergent naming tasks were completed. 8 items were named when given one minute. Abstract divergent naming tasks were completed. 9 items were named in one minute. Increased anomia was observed with this task again this date.       Tasks for dysfluencies were completed:      During Speaking tasks  34 Syllable per minute  138 total number of syllables  10 dysfluent syllables  128  fluent syllables    7% percentage of dysfluent syllables         During reading tasks:   53 syllables per minute  161 Total number of syllables  36 dysfluent syllables  125  fluent syllables    22% dysfluent syllables           Assessment:  The patient exhibits mild to moderate dysfluencies characterized by sound and word repetitions with some groping during speech. Increased dysfluencies observed during reading tasks. Some slowed processing and difficulty with divergent naming was observed. Recommend outpatient speech therapy services 1x/week for 12 weeks.        Plan:   Goals:  Short-term Goals  Timeframe for Short-term Goals: 1x/week for 12 weeks  Goal 1: The patient will complete tasks for easy onset of speech to reduce word repetitions with 80% accuracy and minimal verbal/visual cues. Not Met  Goal 2: The patient will complete tasks for easy onset of speech to reduce sound repetitions with 80% accuracy and minimal verbal/visual cues.  Not Met  Goal 3: The patient will demonstrate improved prosody

## 2020-06-10 ENCOUNTER — HOSPITAL ENCOUNTER (OUTPATIENT)
Dept: NON INVASIVE DIAGNOSTICS | Age: 55
Discharge: HOME | End: 2020-06-10
Payer: COMMERCIAL

## 2020-06-10 ENCOUNTER — HOSPITAL ENCOUNTER (OUTPATIENT)
Dept: NUCLEAR MEDICINE | Age: 55
Discharge: HOME | End: 2020-06-12
Payer: COMMERCIAL

## 2020-06-10 LAB
LV EF: 60 %
LVEF MODALITY: NORMAL

## 2020-06-10 PROCEDURE — 93017 CV STRESS TEST TRACING ONLY: CPT

## 2020-06-10 PROCEDURE — A9500 TC99M SESTAMIBI: HCPCS | Performed by: INTERNAL MEDICINE

## 2020-06-10 PROCEDURE — 93306 TTE W/DOPPLER COMPLETE: CPT

## 2020-06-10 PROCEDURE — 3430000000 HC RX DIAGNOSTIC RADIOPHARMACEUTICAL: Performed by: INTERNAL MEDICINE

## 2020-06-10 PROCEDURE — 6360000002 HC RX W HCPCS: Performed by: INTERNAL MEDICINE

## 2020-06-10 RX ADMIN — TETRAKIS(2-METHOXYISOBUTYLISOCYANIDE)COPPER(I) TETRAFLUOROBORATE 10 MILLICURIE: 1 INJECTION, POWDER, LYOPHILIZED, FOR SOLUTION INTRAVENOUS at 13:03

## 2020-06-10 RX ADMIN — REGADENOSON 0.4 MG: 0.08 INJECTION, SOLUTION INTRAVENOUS at 12:19

## 2020-06-10 RX ADMIN — TETRAKIS(2-METHOXYISOBUTYLISOCYANIDE)COPPER(I) TETRAFLUOROBORATE 30 MILLICURIE: 1 INJECTION, POWDER, LYOPHILIZED, FOR SOLUTION INTRAVENOUS at 13:04

## 2020-06-12 ENCOUNTER — APPOINTMENT (OUTPATIENT)
Dept: PHYSICAL THERAPY | Age: 55
End: 2020-06-12
Payer: COMMERCIAL

## 2020-06-16 ENCOUNTER — APPOINTMENT (OUTPATIENT)
Dept: SPEECH THERAPY | Age: 55
End: 2020-06-16
Payer: COMMERCIAL

## 2020-06-25 ENCOUNTER — OFFICE VISIT (OUTPATIENT)
Dept: CARDIOLOGY | Age: 55
End: 2020-06-25
Payer: COMMERCIAL

## 2020-06-25 VITALS
BODY MASS INDEX: 34.06 KG/M2 | WEIGHT: 257 LBS | SYSTOLIC BLOOD PRESSURE: 112 MMHG | DIASTOLIC BLOOD PRESSURE: 70 MMHG | HEIGHT: 73 IN | HEART RATE: 76 BPM

## 2020-06-25 PROCEDURE — 3017F COLORECTAL CA SCREEN DOC REV: CPT | Performed by: INTERNAL MEDICINE

## 2020-06-25 PROCEDURE — 99214 OFFICE O/P EST MOD 30 MIN: CPT | Performed by: INTERNAL MEDICINE

## 2020-06-25 PROCEDURE — 4004F PT TOBACCO SCREEN RCVD TLK: CPT | Performed by: INTERNAL MEDICINE

## 2020-06-25 PROCEDURE — G8417 CALC BMI ABV UP PARAM F/U: HCPCS | Performed by: INTERNAL MEDICINE

## 2020-06-25 PROCEDURE — G8427 DOCREV CUR MEDS BY ELIG CLIN: HCPCS | Performed by: INTERNAL MEDICINE

## 2020-06-25 ASSESSMENT — ENCOUNTER SYMPTOMS
NAUSEA: 0
EYES NEGATIVE: 1
DIARRHEA: 0
RESPIRATORY NEGATIVE: 1
GASTROINTESTINAL NEGATIVE: 1
VOMITING: 0
SHORTNESS OF BREATH: 0

## 2020-06-25 NOTE — PROGRESS NOTES
Problem Relation Age of Onset    Cancer Mother     Lung Cancer Mother     Kidney Cancer Mother     Brain Cancer Mother     Heart Attack Father      Allergies   Allergen Reactions    Penicillins Anaphylaxis     Current Outpatient Medications   Medication Sig Dispense Refill    aspirin 81 MG tablet Take 81 mg by mouth daily      Melatonin 10 MG CAPS Take by mouth      clopidogrel (PLAVIX) 75 MG tablet Take 75 mg by mouth daily       Acetaminophen (TYLENOL 8 HOUR PO) Take 500 mg by mouth as needed      varenicline (CHANTIX) 1 MG tablet Take 1 mg by mouth 2 times daily      atorvastatin (LIPITOR) 40 MG tablet Take 40 mg by mouth daily      escitalopram (LEXAPRO) 10 MG tablet Take 10 mg by mouth daily      Butalbital-Acetaminophen  MG CAPS Take 1 tablet by mouth 2 times daily      ALPRAZolam (XANAX) 0.25 MG tablet Take 0.25 mg by mouth nightly as needed for Sleep.  nitroGLYCERIN (NITROSTAT) 0.4 MG SL tablet Place 1 tablet under the tongue every 5 minutes as needed for Chest pain up to max of 3 total doses. If no relief after 1 dose, call 911. 25 tablet 3     No current facility-administered medications for this visit. Social History     Socioeconomic History    Marital status:      Spouse name: Not on file    Number of children: Not on file    Years of education: Not on file    Highest education level: Not on file   Occupational History    Not on file   Social Needs    Financial resource strain: Not on file    Food insecurity     Worry: Not on file     Inability: Not on file    Transportation needs     Medical: Not on file     Non-medical: Not on file   Tobacco Use    Smoking status: Current Some Day Smoker     Packs/day: 0.00     Years: 40.00     Pack years: 0.00     Types: Cigarettes    Smokeless tobacco: Never Used    Tobacco comment: Cutting down   Substance and Sexual Activity    Alcohol use:  Yes    Drug use: Never    Sexual activity: Yes   Lifestyle    Physical

## 2020-07-22 ENCOUNTER — TELEPHONE (OUTPATIENT)
Dept: CARDIOLOGY | Age: 55
End: 2020-07-22

## 2020-07-22 NOTE — TELEPHONE ENCOUNTER
Thank you. Do you need encounter open still or do you have it written down? Didn't want it to get missed.

## 2020-07-22 NOTE — TELEPHONE ENCOUNTER
Patient states that when he saw Dr. Jos Bell on 6/25/20 the results of his stress test weren't available. He was told they would get those and call him. He still has not heard back. Please follow up.

## 2020-10-12 ENCOUNTER — OFFICE VISIT (OUTPATIENT)
Dept: CARDIOLOGY | Age: 55
End: 2020-10-12
Payer: COMMERCIAL

## 2020-10-12 VITALS
DIASTOLIC BLOOD PRESSURE: 88 MMHG | BODY MASS INDEX: 32.74 KG/M2 | HEART RATE: 80 BPM | SYSTOLIC BLOOD PRESSURE: 134 MMHG | WEIGHT: 247 LBS | HEIGHT: 73 IN

## 2020-10-12 PROCEDURE — 4004F PT TOBACCO SCREEN RCVD TLK: CPT | Performed by: NURSE PRACTITIONER

## 2020-10-12 PROCEDURE — 3017F COLORECTAL CA SCREEN DOC REV: CPT | Performed by: NURSE PRACTITIONER

## 2020-10-12 PROCEDURE — G8484 FLU IMMUNIZE NO ADMIN: HCPCS | Performed by: NURSE PRACTITIONER

## 2020-10-12 PROCEDURE — G8417 CALC BMI ABV UP PARAM F/U: HCPCS | Performed by: NURSE PRACTITIONER

## 2020-10-12 PROCEDURE — 93000 ELECTROCARDIOGRAM COMPLETE: CPT | Performed by: NURSE PRACTITIONER

## 2020-10-12 PROCEDURE — 99213 OFFICE O/P EST LOW 20 MIN: CPT | Performed by: NURSE PRACTITIONER

## 2020-10-12 PROCEDURE — G8427 DOCREV CUR MEDS BY ELIG CLIN: HCPCS | Performed by: NURSE PRACTITIONER

## 2020-10-12 RX ORDER — ESCITALOPRAM OXALATE 20 MG/1
20 TABLET ORAL DAILY
COMMUNITY

## 2020-10-12 ASSESSMENT — ENCOUNTER SYMPTOMS
EYES NEGATIVE: 1
WHEEZING: 0
SORE THROAT: 0
CHEST TIGHTNESS: 0
COUGH: 0
SHORTNESS OF BREATH: 0
GASTROINTESTINAL NEGATIVE: 1

## 2020-10-12 NOTE — PROGRESS NOTES
[facility] Cardiology   Established Patient Office Visit   [facility]. [address] [facility]  [phone]        OFFICE VISIT:  10/12/2020    Kory Wild - : 1965    Reason For Visit:  Erick De Anda is a 50 y.o. male who is here for 3 Month Follow-Up (no cardiac symptoms)    1. Essential hypertension    2. Hyperlipidemia, unspecified hyperlipidemia type      Presents to clinic today for routine follow-up. Patient denies any complaints of chest pain, pressure or tightness. There is no shortness of breath, orthopnea or PND. Patient denies any lightheadedness, dizziness or syncope. DATA:    6/10/2020 Lexiscan. Summary impressions:    Normal ejection fraction normal perfusion study no evidence of    ischemia or infarction. Signed by Dr Radha Limon on 2020 4:26 PM        6/10/2020 2D echo     Summary   Normal left ventricular size and function. Mild concentric left ventricular hypertrophy. Left ventricular ejection fraction is estimated at 60%. Trace mitral regurg.      Signature      ----------------------------------------------------------------   Electronically signed by Jennifer Araujo MD(Interpreting   physician) on 2020 08:31 PM   ----------------------------------------------------------------         Subjective    Kory Wild is a 50 y.o. male with the following history as recorded in [employer]:    Patient Active Problem List    Diagnosis Date Noted    Other chest pain 2020     Priority: High     Current Outpatient Medications   Medication Sig Dispense Refill    escitalopram (LEXAPRO) 20 MG tablet Take 20 mg by mouth daily      aspirin 81 MG tablet Take 81 mg by mouth daily      Melatonin 10 MG CAPS Take by mouth      clopidogrel (PLAVIX) 75 MG tablet Take 75 mg by mouth daily       Acetaminophen (TYLENOL 8 HOUR PO) Take 500 mg by mouth as needed      atorvastatin (LIPITOR) 40 MG tablet Take 40 mg by mouth daily      Butalbital-Acetaminophen  MG CAPS Take 1 tablet by mouth 2 times daily      ALPRAZolam (XANAX) 0.25 MG tablet Take 0.5 mg by mouth nightly as needed for Sleep.  nitroGLYCERIN (NITROSTAT) 0.4 MG SL tablet Place 1 tablet under the tongue every 5 minutes as needed for Chest pain up to max of 3 total doses. If no relief after 1 dose, call 911. 25 tablet 3    varenicline (CHANTIX) 1 MG tablet Take 1 mg by mouth 2 times daily       No current facility-administered medications for this visit. Allergies: Penicillins  Past Medical History:   Diagnosis Date    Anxiety     Diverticulitis     Pneumonia     Stroke (cerebrum) ([facility] [address]      Past Surgical History:   Procedure Laterality Date    BACK SURGERY  2013    SMALL INTESTINE SURGERY      TONSILLECTOMY       Family History   Problem Relation Age of Onset    Cancer Mother     Lung Cancer Mother     Kidney Cancer Mother     Brain Cancer Mother     Heart Attack Father      Social History     Tobacco Use    Smoking status: Current Some Day Smoker     Packs/day: 0.00     Years: 40.00     Pack years: 0.00     Types: Cigarettes    Smokeless tobacco: Never Used    Tobacco comment: Cutting down   Substance Use Topics    Alcohol use: Yes          Review of Systems:    Review of Systems   Constitutional: Negative for activity change, chills, diaphoresis, fatigue and fever. HENT: Negative for congestion and sore throat. Eyes: Negative. Respiratory: Negative for cough, chest tightness, shortness of breath and wheezing. Cardiovascular: Negative for chest pain, palpitations and leg swelling. Gastrointestinal: Negative. Genitourinary: Negative. Musculoskeletal: Negative. Neurological: Negative for dizziness, syncope, light-headedness and headaches. Psychiatric/Behavioral: Negative for confusion. The patient is not nervous/anxious.          Objective:    /88   Pulse 80   Ht 6' 1\" (1.854 m)   Wt 247 lb (112 kg)   BMI 32.59 kg/m²     GENERAL - well developed and well nourished, conversant  HEENT -  PERRLA, Hearing appears normal, gentleman lids are normal.  External inspection of ears and nose appear normal.  NECK - no thyromegaly, no JVD, trachea is in the midline  CARDIOVASCULAR - PMI is in the mid line clavicular position, Normal S1 and S2 with no systolic murmur. No S3 or S4    PULMONARY - no respiratory distress. No wheezes or rales. Lungs are clear to ausculation, normal respiratory effort. ABDOMEN  - soft, non tender, no rebound  MUSCULOSKELETAL  - range of motion of the upper and lower extermites appears normal and equal and is without pain   EXTREMITIES - no significant edema   NEUROLOGIC - gait and station are normal  SKIN - turgor is normal, can warm and dry. PSYCHIATRIC - normal mood and affect, alert and orientated x 3,      ASSESSMENT:    ALL THE CARDIOLOGY PROBLEMS ARE LISTED ABOVE; HOWEVER, THE FOLLOWING SPECIFIC CARDIAC PROBLEMS / CONDITIONS WERE ADDRESSED AND TREATED DURING THE OFFICE VISIT TODAY:                                                                                            MEDICAL DECISION MAKING             Cardiac Specific Problem / Diagnosis  Discussion and Data Reviewed Diagnostic Procedures Ordered Management Options Selected           1. Hypertension  Stable   Review and summation of old records:    Blood pressure in the office today 134/88. O2 sat 96%. Patient states home blood pressure readings are running 131B systolic over 72I diastolic No Continue current medications:     Yes           2. Hyperlipidemia  Stable   Patient is on Lipitor 40 mg daily. Managed by primary care provider. No Continue current medications: Yes                                     Orders Placed This Encounter   Procedures    EKG 12 lead     Order Specific Question:   Reason for Exam?     Answer:   Chest pain     No orders of the defined types were placed in this encounter. Discussed with patient.     Return in about 1 year (around

## 2020-10-19 ENCOUNTER — OFFICE VISIT (OUTPATIENT)
Age: 55
End: 2020-10-19

## 2020-10-19 VITALS — OXYGEN SATURATION: 94 % | HEART RATE: 94 BPM | TEMPERATURE: 97.3 F

## 2020-10-19 PROCEDURE — 99999 PR OFFICE/OUTPT VISIT,PROCEDURE ONLY: CPT | Performed by: NURSE PRACTITIONER

## 2020-10-20 LAB — SARS-COV-2, NAA: NOT DETECTED

## 2020-10-21 ENCOUNTER — ANESTHESIA EVENT (OUTPATIENT)
Dept: OPERATING ROOM | Age: 55
End: 2020-10-21

## 2020-10-22 ENCOUNTER — HOSPITAL ENCOUNTER (OUTPATIENT)
Age: 55
Setting detail: OUTPATIENT SURGERY
Discharge: HOME | End: 2020-10-22
Attending: ORTHOPAEDIC SURGERY | Admitting: ORTHOPAEDIC SURGERY

## 2020-10-22 ENCOUNTER — ANESTHESIA (OUTPATIENT)
Dept: OPERATING ROOM | Age: 55
End: 2020-10-22

## 2020-10-22 VITALS
RESPIRATION RATE: 20 BRPM | HEART RATE: 76 BPM | SYSTOLIC BLOOD PRESSURE: 118 MMHG | WEIGHT: 245 LBS | BODY MASS INDEX: 32.47 KG/M2 | TEMPERATURE: 98.3 F | DIASTOLIC BLOOD PRESSURE: 79 MMHG | OXYGEN SATURATION: 95 % | HEIGHT: 73 IN

## 2020-10-22 VITALS — DIASTOLIC BLOOD PRESSURE: 77 MMHG | OXYGEN SATURATION: 95 % | SYSTOLIC BLOOD PRESSURE: 111 MMHG

## 2020-10-22 PROBLEM — G56.01 RIGHT CARPAL TUNNEL SYNDROME: Status: ACTIVE | Noted: 2020-10-22

## 2020-10-22 PROCEDURE — G8918 PT W/O PREOP ORDER IV AB PRO: HCPCS

## 2020-10-22 PROCEDURE — 64721 CARPAL TUNNEL SURGERY: CPT

## 2020-10-22 PROCEDURE — G8907 PT DOC NO EVENTS ON DISCHARG: HCPCS

## 2020-10-22 RX ORDER — SODIUM CHLORIDE, SODIUM LACTATE, POTASSIUM CHLORIDE, CALCIUM CHLORIDE 600; 310; 30; 20 MG/100ML; MG/100ML; MG/100ML; MG/100ML
INJECTION, SOLUTION INTRAVENOUS CONTINUOUS
Status: DISCONTINUED | OUTPATIENT
Start: 2020-10-22 | End: 2020-10-22 | Stop reason: HOSPADM

## 2020-10-22 RX ORDER — METOCLOPRAMIDE HYDROCHLORIDE 5 MG/ML
10 INJECTION INTRAMUSCULAR; INTRAVENOUS
Status: DISCONTINUED | OUTPATIENT
Start: 2020-10-22 | End: 2020-10-22 | Stop reason: HOSPADM

## 2020-10-22 RX ORDER — LIDOCAINE HYDROCHLORIDE AND EPINEPHRINE 10; 10 MG/ML; UG/ML
INJECTION, SOLUTION INFILTRATION; PERINEURAL PRN
Status: DISCONTINUED | OUTPATIENT
Start: 2020-10-22 | End: 2020-10-22 | Stop reason: ALTCHOICE

## 2020-10-22 RX ORDER — LIDOCAINE HYDROCHLORIDE 10 MG/ML
1 INJECTION, SOLUTION EPIDURAL; INFILTRATION; INTRACAUDAL; PERINEURAL
Status: DISCONTINUED | OUTPATIENT
Start: 2020-10-22 | End: 2020-10-22 | Stop reason: HOSPADM

## 2020-10-22 RX ORDER — PROPOFOL 10 MG/ML
INJECTION, EMULSION INTRAVENOUS PRN
Status: DISCONTINUED | OUTPATIENT
Start: 2020-10-22 | End: 2020-10-22 | Stop reason: SDUPTHER

## 2020-10-22 RX ORDER — ONDANSETRON 2 MG/ML
INJECTION INTRAMUSCULAR; INTRAVENOUS PRN
Status: DISCONTINUED | OUTPATIENT
Start: 2020-10-22 | End: 2020-10-22 | Stop reason: SDUPTHER

## 2020-10-22 RX ORDER — HYDRALAZINE HYDROCHLORIDE 20 MG/ML
5 INJECTION INTRAMUSCULAR; INTRAVENOUS EVERY 10 MIN PRN
Status: DISCONTINUED | OUTPATIENT
Start: 2020-10-22 | End: 2020-10-22 | Stop reason: HOSPADM

## 2020-10-22 RX ORDER — LABETALOL HYDROCHLORIDE 5 MG/ML
5 INJECTION, SOLUTION INTRAVENOUS EVERY 10 MIN PRN
Status: DISCONTINUED | OUTPATIENT
Start: 2020-10-22 | End: 2020-10-22 | Stop reason: HOSPADM

## 2020-10-22 RX ORDER — DIPHENHYDRAMINE HYDROCHLORIDE 50 MG/ML
12.5 INJECTION INTRAMUSCULAR; INTRAVENOUS
Status: DISCONTINUED | OUTPATIENT
Start: 2020-10-22 | End: 2020-10-22 | Stop reason: HOSPADM

## 2020-10-22 RX ORDER — ENALAPRILAT 2.5 MG/2ML
1.25 INJECTION INTRAVENOUS
Status: DISCONTINUED | OUTPATIENT
Start: 2020-10-22 | End: 2020-10-22 | Stop reason: HOSPADM

## 2020-10-22 RX ORDER — HYDROMORPHONE HCL 110MG/55ML
0.5 PATIENT CONTROLLED ANALGESIA SYRINGE INTRAVENOUS EVERY 5 MIN PRN
Status: DISCONTINUED | OUTPATIENT
Start: 2020-10-22 | End: 2020-10-22 | Stop reason: HOSPADM

## 2020-10-22 RX ORDER — MORPHINE SULFATE 10 MG/ML
2 INJECTION, SOLUTION INTRAMUSCULAR; INTRAVENOUS EVERY 5 MIN PRN
Status: DISCONTINUED | OUTPATIENT
Start: 2020-10-22 | End: 2020-10-22 | Stop reason: HOSPADM

## 2020-10-22 RX ORDER — OXYCODONE HYDROCHLORIDE AND ACETAMINOPHEN 5; 325 MG/1; MG/1
1 TABLET ORAL EVERY 6 HOURS PRN
Qty: 5 TABLET | Refills: 0 | Status: SHIPPED | OUTPATIENT
Start: 2020-10-22 | End: 2020-10-25

## 2020-10-22 RX ORDER — FENTANYL CITRATE 50 UG/ML
INJECTION, SOLUTION INTRAMUSCULAR; INTRAVENOUS PRN
Status: DISCONTINUED | OUTPATIENT
Start: 2020-10-22 | End: 2020-10-22 | Stop reason: SDUPTHER

## 2020-10-22 RX ORDER — MEPERIDINE HYDROCHLORIDE 25 MG/ML
12.5 INJECTION INTRAMUSCULAR; INTRAVENOUS; SUBCUTANEOUS EVERY 5 MIN PRN
Status: DISCONTINUED | OUTPATIENT
Start: 2020-10-22 | End: 2020-10-22 | Stop reason: HOSPADM

## 2020-10-22 RX ORDER — HYDROMORPHONE HCL 110MG/55ML
0.25 PATIENT CONTROLLED ANALGESIA SYRINGE INTRAVENOUS EVERY 5 MIN PRN
Status: DISCONTINUED | OUTPATIENT
Start: 2020-10-22 | End: 2020-10-22 | Stop reason: HOSPADM

## 2020-10-22 RX ORDER — MORPHINE SULFATE 10 MG/ML
4 INJECTION, SOLUTION INTRAMUSCULAR; INTRAVENOUS EVERY 5 MIN PRN
Status: DISCONTINUED | OUTPATIENT
Start: 2020-10-22 | End: 2020-10-22 | Stop reason: HOSPADM

## 2020-10-22 RX ORDER — LIDOCAINE HYDROCHLORIDE 10 MG/ML
INJECTION, SOLUTION INFILTRATION; PERINEURAL PRN
Status: DISCONTINUED | OUTPATIENT
Start: 2020-10-22 | End: 2020-10-22 | Stop reason: SDUPTHER

## 2020-10-22 RX ORDER — PROMETHAZINE HYDROCHLORIDE 25 MG/ML
6.25 INJECTION, SOLUTION INTRAMUSCULAR; INTRAVENOUS
Status: DISCONTINUED | OUTPATIENT
Start: 2020-10-22 | End: 2020-10-22 | Stop reason: HOSPADM

## 2020-10-22 RX ADMIN — ONDANSETRON 4 MG: 2 INJECTION INTRAMUSCULAR; INTRAVENOUS at 08:34

## 2020-10-22 RX ADMIN — LIDOCAINE HYDROCHLORIDE 50 MG: 10 INJECTION, SOLUTION INFILTRATION; PERINEURAL at 08:32

## 2020-10-22 RX ADMIN — FENTANYL CITRATE 100 MCG: 50 INJECTION, SOLUTION INTRAMUSCULAR; INTRAVENOUS at 08:32

## 2020-10-22 RX ADMIN — SODIUM CHLORIDE, SODIUM LACTATE, POTASSIUM CHLORIDE, CALCIUM CHLORIDE: 600; 310; 30; 20 INJECTION, SOLUTION INTRAVENOUS at 07:56

## 2020-10-22 RX ADMIN — PROPOFOL 100 MG: 10 INJECTION, EMULSION INTRAVENOUS at 08:32

## 2020-10-22 RX ADMIN — PROPOFOL 50 MG: 10 INJECTION, EMULSION INTRAVENOUS at 08:46

## 2020-10-22 RX ADMIN — SODIUM CHLORIDE, SODIUM LACTATE, POTASSIUM CHLORIDE, CALCIUM CHLORIDE: 600; 310; 30; 20 INJECTION, SOLUTION INTRAVENOUS at 08:28

## 2020-10-22 RX ADMIN — PROPOFOL 50 MG: 10 INJECTION, EMULSION INTRAVENOUS at 08:59

## 2020-10-22 NOTE — ANESTHESIA PRE PROCEDURE
Department of Anesthesiology  Preprocedure Note       Name:  France Allen   Age:  50 y.o.  :  1965                                          MRN:  [id number]         Date:  10/22/2020      Surgeon: Genaro Go):  Ria Wheeler MD    Procedure: Procedure(s):  RIGHT OPEN CARPAL TUNNEL RELEASE    Medications prior to admission:   Prior to Admission medications    Medication Sig Start Date End Date Taking? Authorizing Provider   escitalopram (LEXAPRO) 20 MG tablet Take 20 mg by mouth daily   Yes Historical Provider, MD   aspirin 81 MG tablet Take 81 mg by mouth daily   Yes Historical Provider, MD   clopidogrel (PLAVIX) 75 MG tablet Take 75 mg by mouth daily  3/12/20  Yes Historical Provider, MD   Melatonin 10 MG CAPS Take by mouth    Historical Provider, MD   Acetaminophen (TYLENOL 8 HOUR PO) Take 500 mg by mouth as needed    Historical Provider, MD   atorvastatin (LIPITOR) 40 MG tablet Take 40 mg by mouth daily    Historical Provider, MD   Butalbital-Acetaminophen  MG CAPS Take 1 tablet by mouth 2 times daily    Historical Provider, MD   ALPRAZolam (XANAX) 0.25 MG tablet Take 0.5 mg by mouth nightly as needed for Sleep. Historical Provider, MD   nitroGLYCERIN (NITROSTAT) 0.4 MG SL tablet Place 1 tablet under the tongue every 5 minutes as needed for Chest pain up to max of 3 total doses. If no relief after 1 dose, call 911. 3/19/20   Faiza Gusman MD       Current medications:    Current Facility-Administered Medications   Medication Dose Route Frequency Provider Last Rate Last Dose    lactated ringers infusion   Intravenous Continuous Bellaaide Oconnell APRN - CRNA        lidocaine PF 1 % injection 1 mL  1 mL Intradermal Once PRN SUZANNE Biswas CRNA           Allergies:     Allergies   Allergen Reactions    Penicillins Anaphylaxis       Problem List:    Patient Active Problem List   Diagnosis Code    Other chest pain R07.89       Past Medical History:        Diagnosis Date    Anxiety     Diverticulitis     Hx of blood clots     Pneumonia     Stroke (cerebrum) ([facility] [address]     2019 and 2014       Past Surgical History:        Procedure Laterality Date    BACK SURGERY  2013    SMALL INTESTINE SURGERY      TONSILLECTOMY         Social History:    Social History     Tobacco Use    Smoking status: Current Some Day Smoker     Packs/day: 1.00     Years: 40.00     Pack years: 40.00     Types: Cigarettes    Smokeless tobacco: Never Used    Tobacco comment: Cutting down   Substance Use Topics    Alcohol use: Yes                                Ready to quit: Not Answered  Counseling given: Not Answered  Comment: Cutting down      Vital Signs (Current):   Vitals:    10/16/20 1355   Weight: 245 lb (111.1 kg)   Height: 6' 1\" (1.854 m)                                              BP Readings from Last 3 Encounters:   10/12/20 134/88   06/25/20 112/70   04/23/20 98/70       NPO Status: Time of last liquid consumption: 2330                        Time of last solid consumption: 2330                        Date of last liquid consumption: 10/21/20                        Date of last solid food consumption: 10/21/20    BMI:   Wt Readings from Last 3 Encounters:   10/16/20 245 lb (111.1 kg)   10/12/20 247 lb (112 kg)   06/25/20 257 lb (116.6 kg)     Body mass index is 32.32 kg/m². CBC: No results found for: WBC, RBC, HGB, HCT, MCV, RDW, PLT    CMP: No results found for: NA, K, CL, CO2, BUN, CREATININE, GFRAA, AGRATIO, LABGLOM, GLUCOSE, PROT, CALCIUM, BILITOT, ALKPHOS, AST, ALT    POC Tests: No results for input(s): POCGLU, POCNA, POCK, POCCL, POCBUN, POCHEMO, POCHCT in the last 72 hours.     Coags: No results found for: PROTIME, INR, APTT    HCG (If Applicable): No results found for: PREGTESTUR, PREGSERUM, HCG, HCGQUANT     ABGs: No results found for: PHART, PO2ART, MTM2VBR, BKR7MSL, BEART, G9EUZNKQ     Type & Screen (If Applicable):  No results found for: LABABO, LABRH    Drug/Infectious Status (If Applicable):  No results found for: HIV, HEPCAB    COVID-19 Screening (If Applicable):   Lab Results   Component Value Date    COVID19 NOT DETECTED 10/19/2020         Anesthesia Evaluation  Patient summary reviewed no history of anesthetic complications:   Airway: Mallampati: II  TM distance: >3 FB   Neck ROM: full  Mouth opening: > = 3 FB Dental: normal exam         Pulmonary: breath sounds clear to auscultation  (+) pneumonia:                             Cardiovascular:Negative CV ROS  Exercise tolerance: good (>4 METS),                     Neuro/Psych:   (+) CVA:,             GI/Hepatic/Renal: Neg GI/Hepatic/Renal ROS            Endo/Other: Negative Endo/Other ROS                    Abdominal:           Vascular: negative vascular ROS. Anesthesia Plan      MAC     ASA 2       Induction: intravenous. MIPS: Prophylactic antiemetics administered. Anesthetic plan and risks discussed with patient.                       SUZANNE Begum - DENIS   10/22/2020

## 2020-10-22 NOTE — OP NOTE
flexion crease. The right upper extremity was then exsanguinated with an Esmarch and the tourniquet was inflated to 250 mmHg for less than 10 minutes. A longitudinal incision was made only through the skin. Soft tissue was dissected in line with the incision through the palmar fascia. The transverse carpal ligament was identified and beginning distally, while protecting the superficial radial arch of vessels, it was incised. In a similar fashion, the proximal aspect of the transverse carpal ligament was released with both sharp and blunt tip scissor release. Finally, a pair of blunt-tipped scissors was inserted with the points directed toward the superficial and ulnar aspect of the wrist to protect the palmar cutaneous branch of the median nerve and the distal forearm fascia was incised.       A complete release of the transverse carpal ligament was performed, verified visually and with palpation. There was no significant hypertrophic tenosynovium or soft tissue masses within the carpal tunnel. The median nerve was inspected and found to be intact. The incision was irrigated and the skin closed with simple interrupted 3-0 nylon sutures. A soft dressing was placed; the patient was awakened from anesthesia and transported to the recovery room in stable condition.       POSTOPERATIVE PLAN  Discharge home, return to clinic in 2 weeks for suture removal and activity as tolerated.

## 2020-10-22 NOTE — ANESTHESIA POSTPROCEDURE EVALUATION
Department of Anesthesiology  Postprocedure Note    Patient: Abril Hahn  MRN: [id number]  YOB: 1965  Date of evaluation: 10/22/2020  Time:  9:14 AM     Procedure Summary     Date:  10/22/20 Room / Location:  [facility] OR [address] [place] [address]    Anesthesia Start:  4652 Anesthesia Stop:  9149    Procedure:  RIGHT OPEN CARPAL TUNNEL RELEASE (Right Wrist) Diagnosis:  (G56.01)    Surgeon:  Jairo Ca MD Responsible Provider:  SUZANNE Mckeon CRNA    Anesthesia Type:  MAC ASA Status:  2          Anesthesia Type: MAC    Norma Phase I:      Norma Phase II:      Last vitals: Reviewed and per EMR flowsheets. Anesthesia Post Evaluation    Patient location during evaluation: PACU  Patient participation: complete - patient participated  Level of consciousness: awake and alert  Pain score: 0  Airway patency: patent  Nausea & Vomiting: no nausea and no vomiting  Complications: no  Cardiovascular status: hemodynamically stable and blood pressure returned to baseline  Respiratory status: acceptable and nasal cannula  Hydration status: stable  Comments: Vital Signs Stable. Exchanging well. PACU RN received care.

## 2020-11-29 ENCOUNTER — OFFICE VISIT (OUTPATIENT)
Age: 55
End: 2020-11-29

## 2020-11-29 VITALS — OXYGEN SATURATION: 95 % | TEMPERATURE: 98 F | HEART RATE: 105 BPM

## 2020-11-30 LAB — SARS-COV-2, PCR: NOT DETECTED

## 2020-12-02 ENCOUNTER — ANESTHESIA EVENT (OUTPATIENT)
Dept: OPERATING ROOM | Age: 55
End: 2020-12-02

## 2020-12-03 ENCOUNTER — ANESTHESIA (OUTPATIENT)
Dept: OPERATING ROOM | Age: 55
End: 2020-12-03

## 2020-12-03 ENCOUNTER — HOSPITAL ENCOUNTER (OUTPATIENT)
Age: 55
Setting detail: OUTPATIENT SURGERY
Discharge: HOME | End: 2020-12-03
Attending: ORTHOPAEDIC SURGERY | Admitting: ORTHOPAEDIC SURGERY

## 2020-12-03 VITALS
BODY MASS INDEX: 32.47 KG/M2 | WEIGHT: 245 LBS | HEART RATE: 85 BPM | TEMPERATURE: 98.8 F | DIASTOLIC BLOOD PRESSURE: 94 MMHG | OXYGEN SATURATION: 94 % | HEIGHT: 73 IN | RESPIRATION RATE: 20 BRPM | SYSTOLIC BLOOD PRESSURE: 142 MMHG

## 2020-12-03 VITALS — SYSTOLIC BLOOD PRESSURE: 123 MMHG | OXYGEN SATURATION: 96 % | DIASTOLIC BLOOD PRESSURE: 84 MMHG

## 2020-12-03 PROBLEM — G56.02 LEFT CARPAL TUNNEL SYNDROME: Status: ACTIVE | Noted: 2020-12-03

## 2020-12-03 PROCEDURE — G8916 PT W IV AB GIVEN ON TIME: HCPCS

## 2020-12-03 PROCEDURE — 64721 CARPAL TUNNEL SURGERY: CPT

## 2020-12-03 PROCEDURE — G8907 PT DOC NO EVENTS ON DISCHARG: HCPCS

## 2020-12-03 RX ORDER — OXYCODONE HYDROCHLORIDE AND ACETAMINOPHEN 5; 325 MG/1; MG/1
2 TABLET ORAL PRN
Status: COMPLETED | OUTPATIENT
Start: 2020-12-03 | End: 2020-12-03

## 2020-12-03 RX ORDER — SODIUM CHLORIDE, SODIUM LACTATE, POTASSIUM CHLORIDE, CALCIUM CHLORIDE 600; 310; 30; 20 MG/100ML; MG/100ML; MG/100ML; MG/100ML
INJECTION, SOLUTION INTRAVENOUS CONTINUOUS
Status: DISCONTINUED | OUTPATIENT
Start: 2020-12-03 | End: 2020-12-03 | Stop reason: HOSPADM

## 2020-12-03 RX ORDER — DIPHENHYDRAMINE HYDROCHLORIDE 50 MG/ML
12.5 INJECTION INTRAMUSCULAR; INTRAVENOUS
Status: DISCONTINUED | OUTPATIENT
Start: 2020-12-03 | End: 2020-12-03 | Stop reason: HOSPADM

## 2020-12-03 RX ORDER — MIDAZOLAM HYDROCHLORIDE 1 MG/ML
INJECTION INTRAMUSCULAR; INTRAVENOUS PRN
Status: DISCONTINUED | OUTPATIENT
Start: 2020-12-03 | End: 2020-12-03 | Stop reason: SDUPTHER

## 2020-12-03 RX ORDER — ONDANSETRON 2 MG/ML
4 INJECTION INTRAMUSCULAR; INTRAVENOUS
Status: DISCONTINUED | OUTPATIENT
Start: 2020-12-03 | End: 2020-12-03 | Stop reason: HOSPADM

## 2020-12-03 RX ORDER — FENTANYL CITRATE 50 UG/ML
50 INJECTION, SOLUTION INTRAMUSCULAR; INTRAVENOUS EVERY 5 MIN PRN
Status: DISCONTINUED | OUTPATIENT
Start: 2020-12-03 | End: 2020-12-03 | Stop reason: HOSPADM

## 2020-12-03 RX ORDER — HYDROMORPHONE HCL 110MG/55ML
0.5 PATIENT CONTROLLED ANALGESIA SYRINGE INTRAVENOUS EVERY 5 MIN PRN
Status: DISCONTINUED | OUTPATIENT
Start: 2020-12-03 | End: 2020-12-03 | Stop reason: HOSPADM

## 2020-12-03 RX ORDER — LIDOCAINE HYDROCHLORIDE AND EPINEPHRINE 10; 10 MG/ML; UG/ML
INJECTION, SOLUTION INFILTRATION; PERINEURAL PRN
Status: DISCONTINUED | OUTPATIENT
Start: 2020-12-03 | End: 2020-12-03 | Stop reason: ALTCHOICE

## 2020-12-03 RX ORDER — FENTANYL CITRATE 50 UG/ML
INJECTION, SOLUTION INTRAMUSCULAR; INTRAVENOUS PRN
Status: DISCONTINUED | OUTPATIENT
Start: 2020-12-03 | End: 2020-12-03 | Stop reason: SDUPTHER

## 2020-12-03 RX ORDER — LIDOCAINE HYDROCHLORIDE 10 MG/ML
INJECTION, SOLUTION INFILTRATION; PERINEURAL PRN
Status: DISCONTINUED | OUTPATIENT
Start: 2020-12-03 | End: 2020-12-03 | Stop reason: SDUPTHER

## 2020-12-03 RX ORDER — HYDRALAZINE HYDROCHLORIDE 20 MG/ML
5 INJECTION INTRAMUSCULAR; INTRAVENOUS EVERY 10 MIN PRN
Status: DISCONTINUED | OUTPATIENT
Start: 2020-12-03 | End: 2020-12-03 | Stop reason: HOSPADM

## 2020-12-03 RX ORDER — LABETALOL HYDROCHLORIDE 5 MG/ML
5 INJECTION, SOLUTION INTRAVENOUS EVERY 10 MIN PRN
Status: DISCONTINUED | OUTPATIENT
Start: 2020-12-03 | End: 2020-12-03 | Stop reason: HOSPADM

## 2020-12-03 RX ORDER — OXYCODONE HYDROCHLORIDE AND ACETAMINOPHEN 5; 325 MG/1; MG/1
1 TABLET ORAL PRN
Status: COMPLETED | OUTPATIENT
Start: 2020-12-03 | End: 2020-12-03

## 2020-12-03 RX ORDER — PROMETHAZINE HYDROCHLORIDE 25 MG/ML
12.5 INJECTION, SOLUTION INTRAMUSCULAR; INTRAVENOUS
Status: DISCONTINUED | OUTPATIENT
Start: 2020-12-03 | End: 2020-12-03 | Stop reason: HOSPADM

## 2020-12-03 RX ORDER — OXYCODONE HYDROCHLORIDE AND ACETAMINOPHEN 5; 325 MG/1; MG/1
1 TABLET ORAL EVERY 6 HOURS PRN
Qty: 5 TABLET | Refills: 0 | Status: SHIPPED | OUTPATIENT
Start: 2020-12-03 | End: 2020-12-07

## 2020-12-03 RX ORDER — HYDROMORPHONE HCL 110MG/55ML
0.25 PATIENT CONTROLLED ANALGESIA SYRINGE INTRAVENOUS EVERY 5 MIN PRN
Status: DISCONTINUED | OUTPATIENT
Start: 2020-12-03 | End: 2020-12-03 | Stop reason: HOSPADM

## 2020-12-03 RX ORDER — PROPOFOL 10 MG/ML
INJECTION, EMULSION INTRAVENOUS PRN
Status: DISCONTINUED | OUTPATIENT
Start: 2020-12-03 | End: 2020-12-03 | Stop reason: SDUPTHER

## 2020-12-03 RX ORDER — LIDOCAINE HYDROCHLORIDE 10 MG/ML
1 INJECTION, SOLUTION EPIDURAL; INFILTRATION; INTRACAUDAL; PERINEURAL
Status: DISCONTINUED | OUTPATIENT
Start: 2020-12-03 | End: 2020-12-03 | Stop reason: HOSPADM

## 2020-12-03 RX ORDER — MEPERIDINE HYDROCHLORIDE 25 MG/ML
12.5 INJECTION INTRAMUSCULAR; INTRAVENOUS; SUBCUTANEOUS EVERY 5 MIN PRN
Status: DISCONTINUED | OUTPATIENT
Start: 2020-12-03 | End: 2020-12-03 | Stop reason: HOSPADM

## 2020-12-03 RX ADMIN — OXYCODONE HYDROCHLORIDE AND ACETAMINOPHEN 2 TABLET: 5; 325 TABLET ORAL at 10:23

## 2020-12-03 RX ADMIN — MIDAZOLAM HYDROCHLORIDE 1 MG: 1 INJECTION INTRAMUSCULAR; INTRAVENOUS at 09:27

## 2020-12-03 RX ADMIN — LIDOCAINE HYDROCHLORIDE 30 MG: 10 INJECTION, SOLUTION INFILTRATION; PERINEURAL at 09:29

## 2020-12-03 RX ADMIN — FENTANYL CITRATE 50 MCG: 50 INJECTION, SOLUTION INTRAMUSCULAR; INTRAVENOUS at 09:27

## 2020-12-03 RX ADMIN — PROPOFOL 300 MG: 10 INJECTION, EMULSION INTRAVENOUS at 09:29

## 2020-12-03 RX ADMIN — SODIUM CHLORIDE, SODIUM LACTATE, POTASSIUM CHLORIDE, CALCIUM CHLORIDE: 600; 310; 30; 20 INJECTION, SOLUTION INTRAVENOUS at 08:58

## 2020-12-03 ASSESSMENT — PAIN SCALES - GENERAL: PAINLEVEL_OUTOF10: 7

## 2020-12-03 NOTE — ANESTHESIA PRE PROCEDURE
hours.    Coags: No results found for: PROTIME, INR, APTT    HCG (If Applicable): No results found for: PREGTESTUR, PREGSERUM, HCG, HCGQUANT     ABGs: No results found for: PHART, PO2ART, LGR7QWA, AFQ6KDQ, BEART, T1RTEHVF     Type & Screen (If Applicable):  No results found for: LABABO, LABRH    Drug/Infectious Status (If Applicable):  No results found for: HIV, HEPCAB    COVID-19 Screening (If Applicable):   Lab Results   Component Value Date    COVID19 Not Detected 11/29/2020         Anesthesia Evaluation  Patient summary reviewed and Nursing notes reviewed  Airway: Mallampati: II  TM distance: >3 FB   Neck ROM: full  Mouth opening: > = 3 FB Dental: normal exam         Pulmonary:normal exam    (+) pneumonia: no interval change,                             Cardiovascular:Negative CV ROS                      Neuro/Psych:   (+) CVA:, neuromuscular disease:,             GI/Hepatic/Renal: Neg GI/Hepatic/Renal ROS            Endo/Other: Negative Endo/Other ROS                    Abdominal:           Vascular: negative vascular ROS. Anesthesia Plan      MAC     ASA 2       Induction: intravenous. Anesthetic plan and risks discussed with patient. Plan discussed with CRNA.                   SUZANNE Ndiaye - DENIS   12/3/2020

## 2020-12-03 NOTE — OP NOTE
Patient Name: Subha De Leon  : 1965  MRN: [id number]      DATE of SURGERY: 12/3/2020    SURGEON: Kalani Lofton MD    ASSISTANT: NONE    PREOPERATIVE DIAGNOSIS    Left carpal tunnel syndrome.       POSTOPERATIVE DIAGNOSIS    Left carpal tunnel syndrome.       PROCEDURE PERFORMED    Left carpal tunnel release.       IMPLANTS  None.       ANESTHESIA    MAC with local     OPERATIVE INDICATIONS    The patient is a 50 y.o. male who presented to my clinic with complaints of numbness and tingling in the hand with clinical exam and neurodiagnostic evidence of bilateral carpal tunnel syndrome.  He is undergone a prior right sided carpal tunnel release approximately 2 weeks ago and has done well. Similarly on the left, the patient wished to proceed with surgery, understanding the risks, benefits, and alternatives.  Conservative treatment failed to improve symptoms.  The risks include, but are not limited to, that of anesthesia, bleeding, infection, pain, damage to the motor and sensory branch of the median nerve, chronic pillar pain, incomplete resolution of symptoms.       ESTIMATED BLOOD LOSS    Less than 5 mL.       SPECIMENS    None.       DRAINS  None.       COMPLICATIONS    None.       PROCEDURE IN DETAIL    The patient was seen in the preoperative holding room and the informed consent form was reviewed with the patient. The site of surgery was marked with the patients agreement. After being transferred to the operating room, a timeout was performed identifying the correct patient as well as the operative site. The tourniquet was then placed on the brachium of the operative extremity. A sterile prep and drape was performed. Monitored anesthesia care was induced.     A skin marker was used to delineate a 1.5 cm longitudinal incision in line with the radial aspect of the fourth ray beginning proximal to Garcia's cardinal line and ending distal to the wrist flexion crease.   Prior to tourniquet inflation, approximately 6 cc of 1% lidocaine with epinephrine were injected in line with the incision and at the wrist flexion crease. The left upper extremity was then exsanguinated with an Esmarch and the tourniquet was inflated to 250 mmHg for less than 10 minutes. A longitudinal incision was made only through the skin. Soft tissue was dissected in line with the incision through the palmar fascia. The transverse carpal ligament was identified and beginning distally, while protecting the superficial radial arch of vessels, it was incised. In a similar fashion, the proximal aspect of the transverse carpal ligament was released with both sharp and blunt tip scissor release. Finally, a pair of blunt-tipped scissors was inserted with the points directed toward the superficial and ulnar aspect of the wrist to protect the palmar cutaneous branch of the median nerve and the distal forearm fascia was incised.       A complete release of the transverse carpal ligament was performed, verified visually and with palpation. There was no significant hypertrophic tenosynovium or soft tissue masses within the carpal tunnel. The median nerve was inspected and found to be intact. The incision was irrigated and the skin closed with simple interrupted 4-0 nylon sutures. A soft dressing was placed; the patient was awakened from anesthesia and transported to the recovery room in stable condition.       POSTOPERATIVE PLAN  Discharge home, return to clinic in 2 weeks for suture removal and activity as tolerated.

## 2020-12-03 NOTE — ANESTHESIA POSTPROCEDURE EVALUATION
Department of Anesthesiology  Postprocedure Note    Patient: Merlin Destine  MRN: [id number]  YOB: 1965  Date of evaluation: 12/3/2020  Time:  9:31 AM     Procedure Summary     Date:  12/03/20 Room / Location:  [facility] OR [address] [place] [address]    Anesthesia Start:  4617 Anesthesia Stop:      Procedure:  LEFT OPEN CARPAL TUNNEL RELEASE (Left Wrist) Diagnosis:  (G56.02)    Surgeon:  Precious Moreira MD Responsible Provider: SUZANNE Desouza CRNA    Anesthesia Type:  MAC ASA Status:  2          Anesthesia Type: MAC    Norma Phase I:      Norma Phase II:      Last vitals: Reviewed and per EMR flowsheets.        Anesthesia Post Evaluation    Patient location during evaluation: bedside  Patient participation: complete - patient participated  Level of consciousness: awake  Airway patency: patent  Nausea & Vomiting: no nausea and no vomiting  Complications: no  Cardiovascular status: blood pressure returned to baseline  Respiratory status: acceptable  Hydration status: euvolemic

## 2020-12-04 PROCEDURE — 64721 CARPAL TUNNEL SURGERY: CPT

## 2021-02-04 ENCOUNTER — HOSPITAL ENCOUNTER (OUTPATIENT)
Dept: GENERAL RADIOLOGY | Age: 56
Discharge: HOME | End: 2021-02-04
Payer: COMMERCIAL

## 2021-02-04 ENCOUNTER — HOSPITAL ENCOUNTER (OUTPATIENT)
Dept: LAB | Age: 56
Discharge: HOME | End: 2021-02-04
Payer: COMMERCIAL

## 2021-02-04 DIAGNOSIS — M54.50 LOW BACK PAIN, UNSPECIFIED BACK PAIN LATERALITY, UNSPECIFIED CHRONICITY, UNSPECIFIED WHETHER SCIATICA PRESENT: ICD-10-CM

## 2021-02-04 LAB
T4 FREE: 1.08 NG/DL (ref 0.93–1.7)
TSH SERPL DL<=0.05 MIU/L-ACNC: 2.28 UIU/ML (ref 0.27–4.2)

## 2021-02-04 PROCEDURE — 84439 ASSAY OF FREE THYROXINE: CPT

## 2021-02-04 PROCEDURE — 84443 ASSAY THYROID STIM HORMONE: CPT

## 2021-02-04 PROCEDURE — 72100 X-RAY EXAM L-S SPINE 2/3 VWS: CPT

## 2021-02-04 PROCEDURE — 36415 COLL VENOUS BLD VENIPUNCTURE: CPT

## 2021-02-17 ENCOUNTER — APPOINTMENT (OUTPATIENT)
Dept: PHYSICAL THERAPY | Age: 56
End: 2021-02-17
Payer: COMMERCIAL

## 2021-02-18 ENCOUNTER — HOSPITAL ENCOUNTER (OUTPATIENT)
Dept: PHYSICAL THERAPY | Age: 56
Setting detail: THERAPIES SERIES
Discharge: HOME | End: 2021-02-18
Payer: COMMERCIAL

## 2021-02-18 PROCEDURE — 97162 PT EVAL MOD COMPLEX 30 MIN: CPT

## 2021-02-18 ASSESSMENT — PAIN DESCRIPTION - LOCATION: LOCATION: BACK;BUTTOCKS;LEG

## 2021-02-18 ASSESSMENT — PAIN DESCRIPTION - ORIENTATION: ORIENTATION: LEFT;POSTERIOR

## 2021-02-18 NOTE — PROGRESS NOTES
Physical Therapy  Initial Assessment  Date: 2021  Patient Name: Robyn Ramsey  MRN: [id number]  : 1965          Restrictions       Subjective   General  Chart Reviewed: Yes  Patient assessed for rehabilitation services?: Yes  Additional Pertinent Hx: 49year old male referred to PT with diagnosis of low back pain. He had been seen in the past at our facility for rehab following a stroke. Recent x-ray showede mild retrolisthesis of L4 on L5 which may represent subluxation at the level of the facets. Referring Practitioner: Rolando Elmore  Referral Date : 21  Other (Comment): low back pain (M54.5)  PT Visit Information  Total # of Visits Approved: 1(12 visits anticipated for this diagnoses)  Total # of Visits to Date: 12  Progress Note Due Date: 21  Subjective  Subjective: Patient states he has had back pain on and off for a number of years. He had 2013 surgery for his low back (discectomy) and was managing. About 3 weeks ago when he arose from a chair and  \"couldn't hardly move. \" He has been issued Percocet and muscle relaxors by his doctor's office and the Flexoril has been helping. His pain is mainly in his left leg, originally with radiation unto his thigh but currently refers pain into his calf intermittently. He is usually better when sitting, but can't sit for a long time nor can he stand for long periods of time. He is only intermittently being awakened at night due to back pain. He still requires use of cane and his left side is his weaker side. He is not having numbness and tingling.   Pain Screening  Patient Currently in Pain: Yes  Pain Assessment  Pain Assessment: 0-10  Pain Level: 5  Pain Location: Back;Buttocks;Leg  Pain Orientation: Left;Posterior  Pain Descriptors: Dull  Pain Frequency: Continuous(continuous but variable in intensity)  Vital Signs  Patient Currently in Pain: Yes    Vision/Hearing  Vision  Vision: Within Functional Limits  Hearing  Hearing: Within functional limits    Orientation  Orientation  Overall Orientation Status: Within Normal Limits    Social/Functional History  Social/Functional History  Lives With: Spouse  Type of Home: House  Home Layout: One level  ADL Assistance: Independent  Homemaking Assistance: Independent  Ambulation Assistance: Independent  Transfer Assistance: Independent  Active : Yes  Mode of Transportation: Truck  Type of occupation:  for [employer],  by trade   Leisure & Hobbies: fishing, likes to do yardwork    Objective     Observation/Palpation  Posture: Good  Palpation: Increased tenderness to palpation of left lower back lumbar paraspinals on left side, no tenderness in left flank or sacrum. Observation: Patient comes to stand and sits pushing up with arms and less weight through left side. He continues to stand with some lean to his right side with left knee partially flexed, able to shift to left side briefly. Stands with lowered right shoulder, left pelvic obliquity, shift of trunk to right side. Spine  Lumbar: flexion: 45 deg   ,extension: 25 degrees    ,sidebending to left:20 deg   ,sidebending to right: 20 deg   ,rotation to left:  WNL   ,rotation to right: WNL    Strength RLE  Strength RLE: WNL  Strength LLE  Strength LLE: WFL(2/18/21 history of stroke affecting left side, muscle strength not changed from when he was last seen)     Additional Measures  Special Tests: negative slump test right leg positive on left side, negative SLR test bilaterally. 52% impairment on the Oswestry Disability.   Sensation  Overall Sensation Status: WNL                                          Assessment   Conditions Requiring Skilled Therapeutic Intervention  Assessment: Problem list: 1) history of chronic LBP with new occurrance of acute onset LBP, 2) intermittent left leg pain radiation into left LE along L4-L5 and L5-S1 pattern,3) decreased tolerance for extended standing and walking, 4)  mild retrolisthesis L4 on L5, 5) decreased lumbar ROM, 6) need for instruction in HEP. Decision Making: Medium Complexity  REQUIRES PT FOLLOW UP: Yes  Discharge Recommendations: Continue to assess pending progress  Activity Tolerance  Activity Tolerance: Patient limited by pain         Plan   Plan  Times per week: 2x per week  Plan weeks: 6 weeks  Current Treatment Recommendations: Strengthening, ROM, Patient/Caregiver Education & Training, Modalities, Positioning, Pain Management, Manual Therapy - Joint Manipulation, Manual Therapy - Soft Tissue Mobilization, Home Exercise Program    G-Code       OutComes Score                                                  AM-PAC Score             Goals  Short term goals  Time Frame for Short term goals: 3-4 weeks  Short term goal 1: Patient to be independent with HEP. Short term goal 2: Patient to report minimal to no radicular pain in left LE. Short term goal 3: Patient to report being able to stand long enough to go grocery shopping or  line. Long term goals  Time Frame for Long term goals : 4- weeks  Long term goal 1: Patient able to stand with equal weight between sides. Long term goal 2: Patient to have >= 30 degrees lumbar extension. Long term goal 3: Patient to score <=20% impairment on the Oswestry Disability Questionnaire. Patient Goals   Patient goals : Decreased low back and left leg pain.        Therapy Time   Individual Concurrent Group Co-treatment   Time In 2445         Time Out 1353         Minutes 58         Timed Code Treatment Minutes: 62 Minutes        _____________________________________________          ________________________________                  Referring Practitioner's Signature            Date  Electronically signed by Jordana Hawley PT on 2/18/2021 at 2:09 PM  Jordana Hawley PT

## 2021-02-19 ENCOUNTER — HOSPITAL ENCOUNTER (OUTPATIENT)
Dept: PHYSICAL THERAPY | Age: 56
Setting detail: THERAPIES SERIES
Discharge: HOME | End: 2021-02-19
Payer: COMMERCIAL

## 2021-02-19 PROCEDURE — 97110 THERAPEUTIC EXERCISES: CPT

## 2021-02-19 ASSESSMENT — PAIN DESCRIPTION - LOCATION: LOCATION: BACK;BUTTOCKS;LEG

## 2021-02-19 NOTE — PROGRESS NOTES
Physical Therapy  Daily Treatment Note  Date: 2021  Patient Name: Hiren Diaz  MRN: [id number]     :   1965    Subjective:   General  Chart Reviewed: Yes  Additional Pertinent Hx: 49year old male referred to PT with diagnosis of low back pain. He had been seen in the past at our facility for rehab following a stroke. Recent x-ray showede mild retrolisthesis of L4 on L5 which may represent subluxation at the level of the facets. Referring Practitioner: Soila Pardo  PT Visit Information  PT Insurance Information: [facility] (20 visits per year, no precert)  Total # of Visits Approved: 12(12 visits anticipated for this diagnoses)  Total # of Visits to Date: 1  Plan of Care/Certification Expiration Date: 21  Progress Note Due Date: 21  Subjective  Subjective: Patient reports no change since last seen yesterday for initial eval. Pain level about the same, 5/10.   Pain Screening  Patient Currently in Pain: Yes  Pain Assessment  Pain Location: Back;Buttocks;Leg  Vital Signs  Patient Currently in Pain: Yes       Treatment Activities:                                      Exercises  Exercise 1: supine lower trunk rotation stretch to right--4 reps, 15\" hold  Exercise 2: supine left quadratus stretch to right--4 reps, 15'' hold  Exercise 3: bilateral SKTC stretch  Exercise 4: axial traction to right leg--4 reps, 10\" hold  Exercise 5: bilateral contract/relax hamstring stretch--4 reps, 10\" hold  Exercise 6: isometric resisted left hip extension from end range SKTC--5 reps, 5\" hold  Exercise 7: pelvic tilt with towel under sacrum--20 reps  Exercise 8: supine abdominal series hold pelvic tilt: arms, legs, arms/legs--10 reps each  Exercise 9: standing sidebending stretch to right--10 reps, 5\" hold  Exercise 10: standing back rotation to right--10 reps, 5\" hold  Exercise 11: standing left hip hike--10 reps  Exercise 12: bilateral hip abduction and extension--10/10 (some trouble with left hip extension)  Exercise 13: partial squats--10 reps  Exercise 14: sitting left hip retraction  Exercise 15: sitting forward reach stretch to right shoe  Exercise 16: standing and marching--1\"  Exercise 17: Paloff press with band--red band, 15 reps each  Exercise 18: ball roll up wall--not today  Exercise 19: wall slides with ball--10 reps, green gym ball  Exercise 20: PRN e-stim/heat to low back--not today                                   Assessment:   Conditions Requiring Skilled Therapeutic Intervention  Assessment: Patient appeared to tolerate his first PT session well with some modifications to standing exercises due to past history of stroke and left hemiparesis. His left hamstrings in particular appear tight compared to right side. He reported pain level remained at about a 5/10 but his low back and left leg felt more limber. Decision Making: Medium Complexity  REQUIRES PT FOLLOW UP: Yes  Discharge Recommendations: Continue to assess pending progress      G-Code:     OutComes Score                                                     Goals:  Short term goals  Time Frame for Short term goals: 3-4 weeks  Short term goal 1: Patient to be independent with HEP. Short term goal 2: Patient to report minimal to no radicular pain in left LE. Short term goal 3: Patient to report being able to stand long enough to go grocery shopping or  line. Long term goals  Time Frame for Long term goals : 4- weeks  Long term goal 1: Patient able to stand with equal weight between sides. Long term goal 2: Patient to have >= 30 degrees lumbar extension. Long term goal 3: Patient to score <=20% impairment on the Oswestry Disability Questionnaire. Patient Goals   Patient goals : Decreased low back and left leg pain.     Plan:    Plan  Times per week: 2x per week  Plan weeks: 6 weeks  Current Treatment Recommendations: Strengthening, ROM, Patient/Caregiver Education & Training, Modalities, Positioning, Pain Management, Manual Therapy - Joint Manipulation, Manual Therapy - Soft Tissue Mobilization, Home Exercise Program  Timed Code Treatment Minutes: 48 Minutes     Therapy Time   Individual Concurrent Group Co-treatment   Time In 0910         Time Out 6216         Minutes 53         Timed Code Treatment Minutes: 53 Minutes     Electronically signed by Poornima Harper PT on 2/19/2021 at 10:54 AM  Poornima Harper PT

## 2021-02-22 ENCOUNTER — APPOINTMENT (OUTPATIENT)
Dept: MRI IMAGING | Age: 56
End: 2021-02-22
Payer: COMMERCIAL

## 2021-02-22 ENCOUNTER — HOSPITAL ENCOUNTER (OUTPATIENT)
Age: 56
Setting detail: OBSERVATION
Discharge: HOME | End: 2021-02-23
Attending: EMERGENCY MEDICINE | Admitting: HOSPITALIST
Payer: COMMERCIAL

## 2021-02-22 ENCOUNTER — APPOINTMENT (OUTPATIENT)
Dept: CT IMAGING | Age: 56
End: 2021-02-22
Payer: COMMERCIAL

## 2021-02-22 ENCOUNTER — APPOINTMENT (OUTPATIENT)
Dept: GENERAL RADIOLOGY | Age: 56
End: 2021-02-22
Payer: COMMERCIAL

## 2021-02-22 DIAGNOSIS — F10.929 ACUTE ALCOHOLIC INTOXICATION WITH COMPLICATION (HCC): ICD-10-CM

## 2021-02-22 DIAGNOSIS — E87.1 HYPONATREMIA: ICD-10-CM

## 2021-02-22 DIAGNOSIS — Z72.0 TOBACCO ABUSE: ICD-10-CM

## 2021-02-22 DIAGNOSIS — R53.1 ACUTE LEFT-SIDED WEAKNESS: Primary | ICD-10-CM

## 2021-02-22 PROBLEM — R29.90 STROKE-LIKE SYMPTOMS: Status: ACTIVE | Noted: 2021-02-22

## 2021-02-22 PROBLEM — I69.30 CHRONIC CEREBROVASCULAR ACCIDENT (CVA): Status: ACTIVE | Noted: 2021-02-22

## 2021-02-22 PROBLEM — Z91.199 PERSONAL HISTORY OF NONCOMPLIANCE WITH MEDICAL TREATMENT AND REGIMEN: Status: ACTIVE | Noted: 2021-02-22

## 2021-02-22 PROBLEM — F10.939 ALCOHOL USE WITH WITHDRAWAL (HCC): Status: ACTIVE | Noted: 2021-02-22

## 2021-02-22 PROBLEM — Z71.6 TOBACCO ABUSE COUNSELING: Status: ACTIVE | Noted: 2021-02-22

## 2021-02-22 PROBLEM — Z86.73 CHRONIC CEREBROVASCULAR ACCIDENT (CVA): Status: ACTIVE | Noted: 2021-02-22

## 2021-02-22 PROBLEM — I63.9 WEAKNESS DUE TO ACUTE CEREBROVASCULAR ACCIDENT (CVA) (HCC): Status: ACTIVE | Noted: 2021-02-22

## 2021-02-22 PROBLEM — F17.219 CIGARETTE NICOTINE DEPENDENCE WITH NICOTINE-INDUCED DISORDER: Status: ACTIVE | Noted: 2021-02-22

## 2021-02-22 LAB
ACETAMINOPHEN LEVEL: <15 UG/ML
ALBUMIN SERPL-MCNC: 4.4 G/DL (ref 3.5–5.2)
ALP BLD-CCNC: 84 U/L (ref 40–130)
ALT SERPL-CCNC: 47 U/L (ref 5–41)
AMPHETAMINE SCREEN, URINE: NEGATIVE
ANION GAP SERPL CALCULATED.3IONS-SCNC: 17 MMOL/L (ref 7–19)
APTT: 23.9 SEC (ref 26–36.2)
AST SERPL-CCNC: 34 U/L (ref 5–40)
BARBITURATE SCREEN URINE: NEGATIVE
BASOPHILS ABSOLUTE: 0.1 K/UL (ref 0–0.2)
BASOPHILS RELATIVE PERCENT: 0.5 % (ref 0–1)
BENZODIAZEPINE SCREEN, URINE: POSITIVE
BILIRUB SERPL-MCNC: 0.6 MG/DL (ref 0.2–1.2)
BILIRUBIN URINE: NEGATIVE
BLOOD, URINE: NEGATIVE
BUN BLDV-MCNC: 11 MG/DL (ref 6–20)
CALCIUM SERPL-MCNC: 8.4 MG/DL (ref 8.6–10)
CANNABINOID SCREEN URINE: NEGATIVE
CHLORIDE BLD-SCNC: 93 MMOL/L (ref 98–111)
CHP ED QC CHECK: YES
CLARITY: CLEAR
CO2: 20 MMOL/L (ref 22–29)
COCAINE METABOLITE SCREEN URINE: NEGATIVE
COLOR: YELLOW
CREAT SERPL-MCNC: 0.8 MG/DL (ref 0.5–1.2)
EOSINOPHILS ABSOLUTE: 0.1 K/UL (ref 0–0.6)
EOSINOPHILS RELATIVE PERCENT: 0.6 % (ref 0–5)
ETHANOL: 137 MG/DL (ref 0–0.08)
ETHANOL: <10 MG/DL (ref 0–0.08)
FOLATE: 7.1 NG/ML (ref 4.5–32.2)
GFR AFRICAN AMERICAN: >59
GFR NON-AFRICAN AMERICAN: >60
GLUCOSE BLD-MCNC: 113 MG/DL (ref 74–109)
GLUCOSE BLD-MCNC: 125 MG/DL
GLUCOSE BLD-MCNC: 125 MG/DL (ref 70–99)
GLUCOSE URINE: NEGATIVE MG/DL
HCT VFR BLD CALC: 44.1 % (ref 42–52)
HEMOGLOBIN: 15.2 G/DL (ref 14–18)
IMMATURE GRANULOCYTES #: 0.1 K/UL
INR BLD: 0.98 (ref 0.88–1.18)
KETONES, URINE: ABNORMAL MG/DL
LEUKOCYTE ESTERASE, URINE: NEGATIVE
LV EF: 63 %
LVEF MODALITY: NORMAL
LYMPHOCYTES ABSOLUTE: 3.1 K/UL (ref 1.1–4.5)
LYMPHOCYTES RELATIVE PERCENT: 28.3 % (ref 20–40)
Lab: ABNORMAL
MCH RBC QN AUTO: 34.1 PG (ref 27–31)
MCHC RBC AUTO-ENTMCNC: 34.5 G/DL (ref 33–37)
MCV RBC AUTO: 98.9 FL (ref 80–94)
MONOCYTES ABSOLUTE: 0.5 K/UL (ref 0–0.9)
MONOCYTES RELATIVE PERCENT: 4.6 % (ref 0–10)
NEUTROPHILS ABSOLUTE: 7.1 K/UL (ref 1.5–7.5)
NEUTROPHILS RELATIVE PERCENT: 65.5 % (ref 50–65)
NITRITE, URINE: NEGATIVE
OPIATE SCREEN URINE: NEGATIVE
PDW BLD-RTO: 12.1 % (ref 11.5–14.5)
PERFORMED ON: ABNORMAL
PH UA: 5 (ref 5–8)
PLATELET # BLD: 230 K/UL (ref 130–400)
PMV BLD AUTO: 8.3 FL (ref 9.4–12.4)
POTASSIUM REFLEX MAGNESIUM: 3.6 MMOL/L (ref 3.5–5)
PROTEIN UA: ABNORMAL MG/DL
PROTHROMBIN TIME: 12.9 SEC (ref 12–14.6)
RBC # BLD: 4.46 M/UL (ref 4.7–6.1)
SALICYLATE, SERUM: <3 MG/DL (ref 3–10)
SARS-COV-2, NAAT: NOT DETECTED
SODIUM BLD-SCNC: 130 MMOL/L (ref 136–145)
SPECIFIC GRAVITY UA: 1.03 (ref 1–1.03)
TOTAL PROTEIN: 7.1 G/DL (ref 6.6–8.7)
TROPONIN: <0.01 NG/ML (ref 0–0.03)
TSH REFLEX FT4: 2.73 UIU/ML (ref 0.35–5.5)
UROBILINOGEN, URINE: 0.2 E.U./DL
VITAMIN B-12: 434 PG/ML (ref 211–946)
WBC # BLD: 10.8 K/UL (ref 4.8–10.8)

## 2021-02-22 PROCEDURE — 2580000003 HC RX 258: Performed by: HOSPITALIST

## 2021-02-22 PROCEDURE — 97530 THERAPEUTIC ACTIVITIES: CPT

## 2021-02-22 PROCEDURE — 93306 TTE W/DOPPLER COMPLETE: CPT

## 2021-02-22 PROCEDURE — G0378 HOSPITAL OBSERVATION PER HR: HCPCS

## 2021-02-22 PROCEDURE — 70450 CT HEAD/BRAIN W/O DYE: CPT

## 2021-02-22 PROCEDURE — 82607 VITAMIN B-12: CPT

## 2021-02-22 PROCEDURE — 6360000004 HC RX CONTRAST MEDICATION: Performed by: HOSPITALIST

## 2021-02-22 PROCEDURE — 1210000000 HC MED SURG R&B

## 2021-02-22 PROCEDURE — 82746 ASSAY OF FOLIC ACID SERUM: CPT

## 2021-02-22 PROCEDURE — 84443 ASSAY THYROID STIM HORMONE: CPT

## 2021-02-22 PROCEDURE — 6370000000 HC RX 637 (ALT 250 FOR IP): Performed by: HOSPITALIST

## 2021-02-22 PROCEDURE — 82077 ASSAY SPEC XCP UR&BREATH IA: CPT

## 2021-02-22 PROCEDURE — 93005 ELECTROCARDIOGRAM TRACING: CPT | Performed by: EMERGENCY MEDICINE

## 2021-02-22 PROCEDURE — 80179 DRUG ASSAY SALICYLATE: CPT

## 2021-02-22 PROCEDURE — 85730 THROMBOPLASTIN TIME PARTIAL: CPT

## 2021-02-22 PROCEDURE — 96372 THER/PROPH/DIAG INJ SC/IM: CPT

## 2021-02-22 PROCEDURE — 87635 SARS-COV-2 COVID-19 AMP PRB: CPT

## 2021-02-22 PROCEDURE — 93880 EXTRACRANIAL BILAT STUDY: CPT

## 2021-02-22 PROCEDURE — 6360000004 HC RX CONTRAST MEDICATION: Performed by: EMERGENCY MEDICINE

## 2021-02-22 PROCEDURE — 6370000000 HC RX 637 (ALT 250 FOR IP): Performed by: EMERGENCY MEDICINE

## 2021-02-22 PROCEDURE — 85610 PROTHROMBIN TIME: CPT

## 2021-02-22 PROCEDURE — 6360000002 HC RX W HCPCS: Performed by: HOSPITALIST

## 2021-02-22 PROCEDURE — 97116 GAIT TRAINING THERAPY: CPT

## 2021-02-22 PROCEDURE — 81003 URINALYSIS AUTO W/O SCOPE: CPT

## 2021-02-22 PROCEDURE — 97161 PT EVAL LOW COMPLEX 20 MIN: CPT

## 2021-02-22 PROCEDURE — 84484 ASSAY OF TROPONIN QUANT: CPT

## 2021-02-22 PROCEDURE — 92522 EVALUATE SPEECH PRODUCTION: CPT

## 2021-02-22 PROCEDURE — 80143 DRUG ASSAY ACETAMINOPHEN: CPT

## 2021-02-22 PROCEDURE — 70498 CT ANGIOGRAPHY NECK: CPT

## 2021-02-22 PROCEDURE — 82947 ASSAY GLUCOSE BLOOD QUANT: CPT

## 2021-02-22 PROCEDURE — 97165 OT EVAL LOW COMPLEX 30 MIN: CPT

## 2021-02-22 PROCEDURE — 99285 EMERGENCY DEPT VISIT HI MDM: CPT

## 2021-02-22 PROCEDURE — 80053 COMPREHEN METABOLIC PANEL: CPT

## 2021-02-22 PROCEDURE — 36415 COLL VENOUS BLD VENIPUNCTURE: CPT

## 2021-02-22 PROCEDURE — 92610 EVALUATE SWALLOWING FUNCTION: CPT

## 2021-02-22 PROCEDURE — 96374 THER/PROPH/DIAG INJ IV PUSH: CPT

## 2021-02-22 PROCEDURE — A9577 INJ MULTIHANCE: HCPCS | Performed by: HOSPITALIST

## 2021-02-22 PROCEDURE — 6360000002 HC RX W HCPCS: Performed by: EMERGENCY MEDICINE

## 2021-02-22 PROCEDURE — 99223 1ST HOSP IP/OBS HIGH 75: CPT | Performed by: PSYCHIATRY & NEUROLOGY

## 2021-02-22 PROCEDURE — 2580000003 HC RX 258: Performed by: EMERGENCY MEDICINE

## 2021-02-22 PROCEDURE — 70553 MRI BRAIN STEM W/O & W/DYE: CPT

## 2021-02-22 PROCEDURE — 85025 COMPLETE CBC W/AUTO DIFF WBC: CPT

## 2021-02-22 PROCEDURE — 71045 X-RAY EXAM CHEST 1 VIEW: CPT

## 2021-02-22 PROCEDURE — 80307 DRUG TEST PRSMV CHEM ANLYZR: CPT

## 2021-02-22 PROCEDURE — 70496 CT ANGIOGRAPHY HEAD: CPT

## 2021-02-22 RX ORDER — CYCLOBENZAPRINE HCL 5 MG
5 TABLET ORAL 3 TIMES DAILY PRN
Status: DISCONTINUED | OUTPATIENT
Start: 2021-02-22 | End: 2021-02-23 | Stop reason: HOSPADM

## 2021-02-22 RX ORDER — SENNOSIDES 8.6 MG
650 CAPSULE ORAL EVERY 8 HOURS SCHEDULED
Status: DISCONTINUED | OUTPATIENT
Start: 2021-02-22 | End: 2021-02-23 | Stop reason: HOSPADM

## 2021-02-22 RX ORDER — ESCITALOPRAM OXALATE 10 MG/1
20 TABLET ORAL DAILY
Status: DISCONTINUED | OUTPATIENT
Start: 2021-02-22 | End: 2021-02-23 | Stop reason: HOSPADM

## 2021-02-22 RX ORDER — ALPRAZOLAM 0.5 MG/1
0.5 TABLET ORAL NIGHTLY PRN
Status: DISCONTINUED | OUTPATIENT
Start: 2021-02-22 | End: 2021-02-23 | Stop reason: HOSPADM

## 2021-02-22 RX ORDER — CYCLOBENZAPRINE HCL 5 MG
5 TABLET ORAL 3 TIMES DAILY PRN
COMMUNITY
End: 2021-10-12

## 2021-02-22 RX ORDER — PROMETHAZINE HYDROCHLORIDE 12.5 MG/1
12.5 TABLET ORAL EVERY 6 HOURS PRN
Status: DISCONTINUED | OUTPATIENT
Start: 2021-02-22 | End: 2021-02-23 | Stop reason: HOSPADM

## 2021-02-22 RX ORDER — NITROGLYCERIN 0.4 MG/1
0.4 TABLET SUBLINGUAL EVERY 5 MIN PRN
Status: DISCONTINUED | OUTPATIENT
Start: 2021-02-22 | End: 2021-02-23 | Stop reason: HOSPADM

## 2021-02-22 RX ORDER — SODIUM CHLORIDE 0.9 % (FLUSH) 0.9 %
10 SYRINGE (ML) INJECTION PRN
Status: DISCONTINUED | OUTPATIENT
Start: 2021-02-22 | End: 2021-02-23 | Stop reason: HOSPADM

## 2021-02-22 RX ORDER — POLYETHYLENE GLYCOL 3350 17 G/17G
17 POWDER, FOR SOLUTION ORAL DAILY PRN
Status: DISCONTINUED | OUTPATIENT
Start: 2021-02-22 | End: 2021-02-23 | Stop reason: HOSPADM

## 2021-02-22 RX ORDER — MELATONIN 10 MG
10 CAPSULE ORAL NIGHTLY PRN
Status: DISCONTINUED | OUTPATIENT
Start: 2021-02-22 | End: 2021-02-23 | Stop reason: HOSPADM

## 2021-02-22 RX ORDER — ACETAMINOPHEN 650 MG/1
650 SUPPOSITORY RECTAL EVERY 4 HOURS PRN
Status: DISCONTINUED | OUTPATIENT
Start: 2021-02-22 | End: 2021-02-23 | Stop reason: HOSPADM

## 2021-02-22 RX ORDER — ASPIRIN 81 MG/1
81 TABLET, CHEWABLE ORAL ONCE
Status: COMPLETED | OUTPATIENT
Start: 2021-02-22 | End: 2021-02-22

## 2021-02-22 RX ORDER — ASPIRIN 81 MG/1
81 TABLET ORAL DAILY
Status: DISCONTINUED | OUTPATIENT
Start: 2021-02-22 | End: 2021-02-23 | Stop reason: HOSPADM

## 2021-02-22 RX ORDER — CLOPIDOGREL BISULFATE 75 MG/1
75 TABLET ORAL DAILY
Status: DISCONTINUED | OUTPATIENT
Start: 2021-02-22 | End: 2021-02-23 | Stop reason: HOSPADM

## 2021-02-22 RX ORDER — NICOTINE 21 MG/24HR
1 PATCH, TRANSDERMAL 24 HOURS TRANSDERMAL DAILY
Status: DISCONTINUED | OUTPATIENT
Start: 2021-02-22 | End: 2021-02-23 | Stop reason: HOSPADM

## 2021-02-22 RX ORDER — SODIUM CHLORIDE 0.9 % (FLUSH) 0.9 %
10 SYRINGE (ML) INJECTION EVERY 12 HOURS SCHEDULED
Status: DISCONTINUED | OUTPATIENT
Start: 2021-02-22 | End: 2021-02-23 | Stop reason: HOSPADM

## 2021-02-22 RX ORDER — BUTALBITAL AND ACETAMINOPHEN 50; 300 MG/1; MG/1
1 CAPSULE ORAL 2 TIMES DAILY
Status: DISCONTINUED | OUTPATIENT
Start: 2021-02-22 | End: 2021-02-23 | Stop reason: HOSPADM

## 2021-02-22 RX ORDER — OXYCODONE AND ACETAMINOPHEN 7.5; 325 MG/1; MG/1
1 TABLET ORAL EVERY 4 HOURS PRN
COMMUNITY
End: 2021-06-17 | Stop reason: ALTCHOICE

## 2021-02-22 RX ORDER — THIAMINE HYDROCHLORIDE 100 MG/ML
100 INJECTION, SOLUTION INTRAMUSCULAR; INTRAVENOUS ONCE
Status: COMPLETED | OUTPATIENT
Start: 2021-02-22 | End: 2021-02-22

## 2021-02-22 RX ORDER — ACETAMINOPHEN 325 MG/1
650 TABLET ORAL EVERY 4 HOURS PRN
Status: DISCONTINUED | OUTPATIENT
Start: 2021-02-22 | End: 2021-02-23 | Stop reason: HOSPADM

## 2021-02-22 RX ORDER — AMLODIPINE BESYLATE 5 MG/1
5 TABLET ORAL DAILY
COMMUNITY
End: 2022-05-12

## 2021-02-22 RX ORDER — 0.9 % SODIUM CHLORIDE 0.9 %
1000 INTRAVENOUS SOLUTION INTRAVENOUS ONCE
Status: COMPLETED | OUTPATIENT
Start: 2021-02-22 | End: 2021-02-22

## 2021-02-22 RX ORDER — CLOPIDOGREL BISULFATE 75 MG/1
75 TABLET ORAL ONCE
Status: COMPLETED | OUTPATIENT
Start: 2021-02-22 | End: 2021-02-22

## 2021-02-22 RX ORDER — ONDANSETRON 2 MG/ML
4 INJECTION INTRAMUSCULAR; INTRAVENOUS EVERY 6 HOURS PRN
Status: DISCONTINUED | OUTPATIENT
Start: 2021-02-22 | End: 2021-02-23 | Stop reason: HOSPADM

## 2021-02-22 RX ORDER — ATORVASTATIN CALCIUM 40 MG/1
40 TABLET, FILM COATED ORAL DAILY
Status: DISCONTINUED | OUTPATIENT
Start: 2021-02-22 | End: 2021-02-23 | Stop reason: HOSPADM

## 2021-02-22 RX ADMIN — CYCLOBENZAPRINE HYDROCHLORIDE 5 MG: 5 TABLET, FILM COATED ORAL at 11:10

## 2021-02-22 RX ADMIN — SODIUM CHLORIDE 1000 ML: 9 INJECTION, SOLUTION INTRAVENOUS at 06:33

## 2021-02-22 RX ADMIN — GADOBENATE DIMEGLUMINE 20 ML: 529 INJECTION, SOLUTION INTRAVENOUS at 12:15

## 2021-02-22 RX ADMIN — ATORVASTATIN CALCIUM 40 MG: 40 TABLET, FILM COATED ORAL at 11:10

## 2021-02-22 RX ADMIN — CYCLOBENZAPRINE HYDROCHLORIDE 5 MG: 5 TABLET, FILM COATED ORAL at 21:02

## 2021-02-22 RX ADMIN — Medication 10 MG: at 21:00

## 2021-02-22 RX ADMIN — Medication 10 ML: at 11:13

## 2021-02-22 RX ADMIN — THIAMINE HYDROCHLORIDE 100 MG: 100 INJECTION, SOLUTION INTRAMUSCULAR; INTRAVENOUS at 07:25

## 2021-02-22 RX ADMIN — ESCITALOPRAM OXALATE 20 MG: 10 TABLET ORAL at 11:10

## 2021-02-22 RX ADMIN — ENOXAPARIN SODIUM 40 MG: 100 INJECTION SUBCUTANEOUS at 11:09

## 2021-02-22 RX ADMIN — IOPAMIDOL 90 ML: 755 INJECTION, SOLUTION INTRAVENOUS at 06:42

## 2021-02-22 RX ADMIN — Medication 10 ML: at 11:14

## 2021-02-22 RX ADMIN — CLOPIDOGREL BISULFATE 75 MG: 75 TABLET ORAL at 08:00

## 2021-02-22 RX ADMIN — ALPRAZOLAM 0.5 MG: 0.5 TABLET ORAL at 21:00

## 2021-02-22 RX ADMIN — ASPIRIN 81 MG: 81 TABLET, CHEWABLE ORAL at 08:00

## 2021-02-22 RX ADMIN — ACETAMINOPHEN 650 MG: 650 TABLET, FILM COATED, EXTENDED RELEASE ORAL at 16:19

## 2021-02-22 RX ADMIN — ACETAMINOPHEN 650 MG: 650 TABLET, FILM COATED, EXTENDED RELEASE ORAL at 21:00

## 2021-02-22 ASSESSMENT — ENCOUNTER SYMPTOMS
ABDOMINAL DISTENTION: 0
ABDOMINAL PAIN: 0
SHORTNESS OF BREATH: 0
VOMITING: 0
COUGH: 0
TROUBLE SWALLOWING: 0
PHOTOPHOBIA: 0
BACK PAIN: 0

## 2021-02-22 ASSESSMENT — PAIN DESCRIPTION - PAIN TYPE: TYPE: CHRONIC PAIN

## 2021-02-22 ASSESSMENT — PAIN SCALES - GENERAL
PAINLEVEL_OUTOF10: 5
PAINLEVEL_OUTOF10: 5

## 2021-02-22 ASSESSMENT — PAIN DESCRIPTION - ONSET: ONSET: ON-GOING

## 2021-02-22 NOTE — PROGRESS NOTES
Compensatory Swallowing Strategies: Upright as possible for all oral intake;Small bites/sips;Eat/Feed slowly; Alternate solids and liquids; Remain upright for 30-45 minutes after meals     Treatment/Goals  Timeframe for Short-term Goals: 1x/day for 3 days   Goal 1: Patient will tolerate regular solid consistency and thin liquids with min S/S penetration/aspiration during PO intake. Goal 2: Patient staff will follow swallow safety recommendations to decrease risk of penetration/aspiration during PO intake. Goal 3: Monitor speech production. General  Chart Reviewed: Yes  Behavior/Cognition: Alert; Cooperative  O2 Device: None (Room air)  Communication Observation: (Assessed patient's speech production. Patient exhibits clear speech with functional intelligibility for unfamiliar listeners measured at 100% in utterances with background noise present. Patient did present with moderate whole word and phrase repetitions during verbalizations with no secondary behaviors noted.)  Follows Directions: Simple   Dentition: Adequate  Patient Positioning: Upright in chair  Consistencies Administered: Ice chips;Dysphagia Pureed (Dysphagia I); Regular solid; Thin - cup      Oral Motor Examination   Labial ROM: (Decreased, bilaterally, during labial retraction trials with bilateral facial twitches noted.)  Labial Strength: (Adequate during labial compression trials.)  Labial Coordination: (Slow, visibly effortful volitional movements were noted.)  Lingual ROM: (Adequate during lingual extension trials with full point achieved; decreased during lingual elevation trials without use of accessory jaw movement; decreased movements noted bilaterally.)  Lingual Strength: (Moderate fasciculations were noted during lingual extension trials.)  Lingual Coordination: (Slowed, visibly effortful volitional movements were noted.)     Assessed patient's swallowing function with the following observations noted:     Oral Phase Oral Phase - Comment: Oral transit of ice chip trials, presented by SLP, primarily measured 1-2 seconds in length. Oral transit of puree consistency trials, presented by SLP, primarily measured 1-2 seconds in length and no oral cavity residue was noted post swallows. Patient exhibited adequate oral prep of regular solid consistency trials presented by SLP. Oral transit of regular solid consistency primarily measured 1-2 seconds in length and min oral cavity residue was observed post swallows; residue cleared from the mouth with additional dry swallows. Oral transit of thin H2O trials, presented independently via cup, primarily measured 1-2 seconds in length. Pharyngeal Phase.)  Laryngeal Elevation: (Patient exhibited sluggish laryngeal elevation for swallow airway protection.)  Pharyngeal Phase - Comment: No outward S/S penetration/aspiration was noted with any ice chip trial, puree consistency trial, regular solid consistency trial, or thin H2O trial presented during assessment this date. At this time, would recommend regular solid consistency and thin liquids. Self-feed. Will continue to follow.     Electronically signed by ESTRELLA Freitas on 2/22/2021 at 11:36 AM

## 2021-02-22 NOTE — ED PROVIDER NOTES
[facility]  eMERGENCY dEPARTMENT eNCOUnter      Pt Name: Linda Rodriguez  MRN: [id number]  [place] 1965  Date of evaluation: 2/22/2021  Provider: Ashely Puentes MD    [address] [place] [address]       Chief Complaint   Patient presents with    Neurologic Problem     L sided facial numbness, facial droop, and L sided weakness         HISTORY OF PRESENT ILLNESS   (Location/Symptom, Timing/Onset,Context/Setting, Quality, Duration, Modifying Factors, Severity)  Note limiting factors. Linda Rodriguez is a 49 y.o. male who presents to the emergency department with L sided weakness. Went to bed normal at 11pm. States woke up 1 hr pta and has symptoms. Wake up stroke. No vomiting. No trauma. Very mild R frontal headache. Pt uses cane, 2 prior cvas? On plavix and asa. Pt is anxious stuttering speech. L sided exam.    Work up and stroke alert started by Dr Haydee Aj. I arrived and then started my shift. No fever. 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 2019 ? tpa at [facility]. Neg mri and work up otherwise went back to normal in 2014        The history is provided by the patient. NursingNotes were reviewed. REVIEW OF SYSTEMS    (2-9 systems for level 4, 10 or more for level 5)     Review of Systems   Constitutional: Negative for fever. HENT: Negative for trouble swallowing. Eyes: Negative for photophobia and visual disturbance. Respiratory: Negative for cough and shortness of breath. Cardiovascular: Negative for chest pain. Gastrointestinal: Negative for abdominal distention, abdominal pain and vomiting. Musculoskeletal: Negative for back pain, neck pain and neck stiffness. Neurological: Positive for facial asymmetry, speech difficulty, weakness, numbness and headaches. Negative for seizures. Psychiatric/Behavioral: Negative for confusion. The patient is nervous/anxious. A complete review of systems was performed and is negative except as noted above in the HPI. PAST MEDICAL HISTORY     Past Medical History:   Diagnosis Date    Anxiety     Diverticulitis     Hx of blood clots     Pneumonia     Stroke (cerebrum) ([facility] [address]     2019 and 2014         SURGICAL HISTORY       Past Surgical History:   Procedure Laterality Date    BACK SURGERY  2013    CARPAL TUNNEL RELEASE Right 10/22/2020    RIGHT OPEN CARPAL TUNNEL RELEASE performed by Roseanna Guzman MD at [address] Left 12/3/2020    LEFT OPEN CARPAL TUNNEL RELEASE performed by Roseanna Guzman MD at [facility]       Previous Medications    ACETAMINOPHEN (TYLENOL 8 HOUR PO)    Take 500 mg by mouth as needed    ALPRAZOLAM (XANAX) 0.25 MG TABLET    Take 0.5 mg by mouth nightly as needed for Sleep. ASPIRIN 81 MG TABLET    Take 81 mg by mouth daily    ATORVASTATIN (LIPITOR) 40 MG TABLET    Take 40 mg by mouth daily    BUTALBITAL-ACETAMINOPHEN  MG CAPS    Take 1 tablet by mouth 2 times daily    CLOPIDOGREL (PLAVIX) 75 MG TABLET    Take 75 mg by mouth daily     CYCLOBENZAPRINE (FLEXERIL) 5 MG TABLET    Take 5 mg by mouth 3 times daily as needed for Muscle spasms    ESCITALOPRAM (LEXAPRO) 20 MG TABLET    Take 20 mg by mouth daily    MELATONIN 10 MG CAPS    Take by mouth    NITROGLYCERIN (NITROSTAT) 0.4 MG SL TABLET    Place 1 tablet under the tongue every 5 minutes as needed for Chest pain up to max of 3 total doses. If no relief after 1 dose, call 911.        ALLERGIES     Penicillins    FAMILY HISTORY       Family History   Problem Relation Age of Onset    Cancer Mother     Lung Cancer Mother     Kidney Cancer Mother     Brain Cancer Mother     Heart Attack Father           SOCIAL HISTORY       Social History     Socioeconomic History    Marital status:      Spouse name: None    Number of children: None    Years of education: None    Highest education level: None   Occupational History    None Social Needs    Financial resource strain: None    Food insecurity     Worry: None     Inability: None    Transportation needs     Medical: None     Non-medical: None   Tobacco Use    Smoking status: Current Some Day Smoker     Packs/day: 1.00     Years: 40.00     Pack years: 40.00     Types: Cigarettes    Smokeless tobacco: Never Used    Tobacco comment: Cutting down   Substance and Sexual Activity    Alcohol use: Yes    Drug use: Never    Sexual activity: Yes   Lifestyle    Physical activity     Days per week: None     Minutes per session: None    Stress: None   Relationships    Social connections     Talks on phone: None     Gets together: None     Attends Church service: None     Active member of club or organization: None     Attends meetings of clubs or organizations: None     Relationship status: None    Intimate partner violence     Fear of current or ex partner: None     Emotionally abused: None     Physically abused: None     Forced sexual activity: None   Other Topics Concern    None   Social History Narrative    None       SCREENINGS   NIH Stroke Scale  Interval: Baseline  LOC Questions (1b. ):  Answers both correctly  LOC Commands (1c. ): Performs both tasks correctly  Best Gaze (2. ): Normal  Visual (3. ): No visual loss  Facial Palsy (4. ): (!) Partial paralysis  Motor Arm, Left (5a. ): Some effort against gravity(pt states that he does have some residual weakness from x2 previous CVA)  Motor Arm, Right (5b. ): No drift  Motor Leg, Left (6a. ): Some effort against gravity(pt states that he does have some residual weakness from x2 previous CVA)  Motor Leg, Right (6b. ): No drift  Limb Ataxia (7. ): (!) Present in two limbs  Sensory (8. ): (!) Mild to Moderate  Best Language (9. ): Mild to moderate aphasia  Dysarthria (10. ): Mild to moderate dysarthria  Extinction and Inattention (11): (!) Visual, tactile, auditory, spatial, or personal inattentionGlasFlorence Community Healthcare Coma Scale Eye Opening: Spontaneous  Best Verbal Response: Oriented  Best Motor Response: Obeys commands  [place] Coma Scale Score: 15        PHYSICAL EXAM    (up to 7 for level 4, 8 or more for level 5)     ED Triage Vitals [02/22/21 0608]   BP Temp Temp src Pulse Resp SpO2 Height Weight   -- -- -- -- 18 -- 6' 1\" (1.854 m) 249 lb 14.4 oz (113.4 kg)       Physical Exam  Vitals signs and nursing note reviewed. Constitutional:       General: He is not in acute distress. Appearance: Normal appearance. He is obese. He is not ill-appearing, toxic-appearing or diaphoretic. Comments: Laying in bed supine   HENT:      Head: Normocephalic and atraumatic. Eyes:      Extraocular Movements: Extraocular movements intact. Pupils: Pupils are equal, round, and reactive to light. Neck:      Musculoskeletal: Normal range of motion and neck supple. Cardiovascular:      Rate and Rhythm: Normal rate and regular rhythm. Pulses: Normal pulses. Pulmonary:      Effort: Pulmonary effort is normal.      Breath sounds: Normal breath sounds. Abdominal:      General: Abdomen is flat. There is no distension. Palpations: Abdomen is soft. Tenderness: There is no abdominal tenderness. Musculoskeletal: Normal range of motion. General: No swelling or tenderness. Skin:     General: Skin is warm and dry. Neurological:      Mental Status: He is alert. GCS: GCS eye subscore is 4. GCS verbal subscore is 5. GCS motor subscore is 6. Cranial Nerves: Facial asymmetry present. Sensory: Sensory deficit present. Motor: Weakness present.       Comments: Stuttering speech at times    Able to speak though    Weakness on L won't lift L arm and then L leg when lifted off bed he slams it seems forcefully onto bed    L face some times with weakness others seems to be symmetric     Complains of L sided numbness compared to R whole body   Psychiatric: Attention and Perception: Attention and perception normal.         Mood and Affect: Mood is anxious. Behavior: Behavior is cooperative. Thought Content: Thought content normal.         Cognition and Memory: Cognition normal.         Judgment: Judgment normal.         DIAGNOSTIC RESULTS     EKG: All EKG's are interpreted by the Emergency Department Physician who either signs or Co-signs this chart in the absence of a cardiologist.    EKG sinus rate 90 no acute ischemia. Some artifact. QTC 4 3 3 ms.  ms. RADIOLOGY:   Non-plain film images such as CT, Ultrasound and MRI are read by the radiologist. Laura Linear images are visualized and preliminarily interpreted by the emergency physician with the below findings:        Interpretation per the Radiologist below, if available at the time of this note:    CTA NECK W CONTRAST   Final Result   A normal CT angiography of the neck. Signed by Dr Job Acevedo on 2/22/2021 7:12 AM      CTA HEAD W CONTRAST   Final Result   Normal intracranial circulation with posterior circulation   variations as mentioned above. Signed by Dr Job Acevedo on 2/22/2021 7:06 AM      CT HEAD WO CONTRAST   Final Result   Negative unenhanced CT scan of the head. Above results were called to ER immediately.    Signed by Dr Job Acevedo on 2/22/2021 6:44 AM      XR CHEST PORTABLE    (Results Pending)         ED BEDSIDE ULTRASOUND:   Performed by ED Physician - none    LABS:  Labs Reviewed   CBC WITH AUTO DIFFERENTIAL - Abnormal; Notable for the following components:       Result Value    RBC 4.46 (*)     MCV 98.9 (*)     MCH 34.1 (*)     MPV 8.3 (*)     Neutrophils % 65.5 (*)     All other components within normal limits   COMPREHENSIVE METABOLIC PANEL W/ REFLEX TO MG FOR LOW K - Abnormal; Notable for the following components:    Sodium 130 (*)     Chloride 93 (*)     CO2 20 (*)     Glucose 113 (*)     Calcium 8.4 (*)     ALT 47 (*) All other components within normal limits   APTT - Abnormal; Notable for the following components:    aPTT 23.9 (*)     All other components within normal limits   URINE DRUG SCREEN - Abnormal; Notable for the following components:    Benzodiazepine Screen, Urine Positive (*)     All other components within normal limits   POCT GLUCOSE - Abnormal; Notable for the following components:    POC Glucose 125 (*)     All other components within normal limits   POCT GLUCOSE - Normal   COVID-19, RAPID   TROPONIN   PROTIME-INR   ETHANOL   SALICYLATE LEVEL   ACETAMINOPHEN LEVEL   URINE RT REFLEX TO CULTURE       All other labs were within normal range or not returned as of this dictation.     EMERGENCY DEPARTMENT COURSE and DIFFERENTIALDIAGNOSIS/MDM:   Vitals:    Vitals:    02/22/21 2273 02/22/21 0610 02/22/21 0615 02/22/21 0642   BP:   (!) 139/96 123/82   Pulse:   88 88   Resp: 18  14 18   Temp:   97.6 °F (36.4 °C)    SpO2:   91% 95%   Weight: 249 lb 14.4 oz (113.4 kg) 249 lb 14.4 oz (113.4 kg)     Height: 6' 1\" (1.854 m)          MDM  Number of Diagnoses or Management Options  Acute alcoholic intoxication with complication ([facility] [address]): new and requires workup  Acute left-sided weakness: new and requires workup  Hyponatremia: new and requires workup  Tobacco abuse: new and requires workup  Diagnosis management comments:     643  Pt head non con neg  Spoke with radiologist    Pt arrives outside window for tpa with wake up possible stroke    stuttering speech L sided exam findings, somewhat fluctuating on exam    On asa plavix already    cts done      ctas neg as well    Neuro consulted and discussed with dr Quintero Anis    Continue asa and plavix      etoh is elevated     + benzo screen    Pt can't walk will need admit likely mri as well    No lvo and outside window for tpa      Dr wilson to admit     8:15 AM  Pt is using both hands with his cell phone        Amount and/or Complexity of Data Reviewed  Clinical lab tests: reviewed Tests in the radiology section of CPT®: reviewed  Discuss the patient with other providers: yes  Independent visualization of images, tracings, or specimens: yes    Risk of Complications, Morbidity, and/or Mortality  Presenting problems: high    Patient Progress  Patient progress: stable        CONSULTS:  IP CONSULT TO PHARMACY  PHARMACY TO CHANGE BASE FLUIDS  IP CONSULT TO NEUROLOGY    PROCEDURES:  Unless otherwise notedbelow, none     Procedures    FINAL IMPRESSION     1. Acute left-sided weakness    2. Acute alcoholic intoxication with complication ([address]    3. Tobacco abuse    4. Hyponatremia          DISPOSITION/PLAN   DISPOSITION  Admit       PATIENT REFERRED TO:  No follow-up provider specified.     DISCHARGE MEDICATIONS:  New Prescriptions    No medications on file          (Please note that portions of this note were completed with a voice recognition program.  Efforts were made to edit the dictations butoccasionally words are mis-transcribed.)    Isaiah Galvin MD (electronically signed)  AttendingEmergency Physician         Isaiah Galvin MD  02/22/21 Bernie Soler MD  02/22/21 Bernie Soler MD  02/22/21 3539

## 2021-02-22 NOTE — CONSULTS
[id number] [facility] Neurology Consult  ? ? Patient: Cary Aschoff  MR#: [id number]  Account Number: [de-identified]   Room: 9658/191-48   YOB: 1965  Date of Progress Note: 2/22/2021  Time of Note 11:01 AM  Attending Physician: Joesph Wiley MD  Consulting Physician: Myranda Mueller M.D.  ?  ? CHIEF COMPLAINT: Left-sided weakness and numbness  ? HISTORY OF PRESENT ILLNESS:   This 49 y.o. male who presents with left-sided weakness and numbness. Went to bed normal late last night and woke up this morning with some numbness of his left face and weakness of his arm and leg. Says this is his third stroke. His last one was 2019. Takes aspirin and Plavix. CT of the head along with CTA of the head and neck were unremarkable. Says he uses a cane due to some chronic left-sided weakness. Otherwise denies any focal neurological difficulties. REVIEW OF SYSTEMS:  Constitutional  No fever or chills. No diaphoresis or significant fatigue. HENT  No tinnitus or significant hearing loss. Eyes  no sudden vision change or eye pain  Respiratory  no significant shortness of breath or cough  Cardiovascular  no chest pain No palpitations or significant leg swelling  Gastrointestinal  no abdominal swelling or pain. Genitourinary  No difficulty urinating, dysuria  Musculoskeletal  no back pain or myalgia. Skin  no color change or rash  Neurologic  No seizures. No confusion  Hematologic  no easy bruising or excessive bleeding. Psychiatric  no severe anxiety or nervousness. All other review of systems are negative. ?   Past Medical History:      Diagnosis Date    Anxiety     Diverticulitis     Hx of blood clots     Hypertension     Pneumonia     Stroke (cerebrum) ([facility] [address]     2019 and 2014     Past Surgical History:      Procedure Laterality Date    BACK SURGERY  2013    CARPAL TUNNEL RELEASE Right 10/22/2020    RIGHT OPEN CARPAL TUNNEL RELEASE performed by Zheng Sandoval MD at [facility]  CARPAL TUNNEL RELEASE Left 12/3/2020    LEFT OPEN CARPAL TUNNEL RELEASE performed by Bernice Jain MD at [address] [place] [address]      TONSILLECTOMY       Medications in Hospital:     Current Facility-Administered Medications:     melatonin capsule 10 mg, 10 mg, Oral, Nightly PRN, Fred Bedoya MD    clopidogrel (PLAVIX) tablet 75 mg, 75 mg, Oral, Daily, Fred Bedoya MD    acetaminophen (TYLENOL) extended release tablet 650 mg, 650 mg, Oral, 3 times per day, Fred Bedoya MD    atorvastatin (LIPITOR) tablet 40 mg, 40 mg, Oral, Daily, Fred Bedoya MD    Butalbital-Acetaminophen  MG CAPS 1 tablet, 1 tablet, Oral, BID, Fred Bedoya MD    ALPRAZolam (XANAX) tablet 0.5 mg, 0.5 mg, Oral, Nightly PRN, Fred Bedoya MD    nitroGLYCERIN (NITROSTAT) SL tablet 0.4 mg, 0.4 mg, Sublingual, Q5 Min PRN, Fred Bedoya MD    aspirin EC tablet 81 mg, 81 mg, Oral, Daily, Fred Bedoya MD    escitalopram (LEXAPRO) tablet 20 mg, 20 mg, Oral, Daily, Fred Bedoya MD    cyclobenzaprine (FLEXERIL) tablet 5 mg, 5 mg, Oral, TID PRN, Fred Bedoya MD    sodium chloride flush 0.9 % injection 10 mL, 10 mL, Intravenous, 2 times per day, Fred Bedoya MD    sodium chloride flush 0.9 % injection 10 mL, 10 mL, Intravenous, PRN, Fred Bedoya MD    acetaminophen (TYLENOL) tablet 650 mg, 650 mg, Oral, Q4H PRN **OR** acetaminophen (TYLENOL) suppository 650 mg, 650 mg, Rectal, Q4H PRN, Fred Bedoya MD    promethazine (PHENERGAN) tablet 12.5 mg, 12.5 mg, Oral, Q6H PRN **OR** ondansetron (ZOFRAN) injection 4 mg, 4 mg, Intravenous, Q6H PRN, Fred Bedoya MD    polyethylene glycol (GLYCOLAX) packet 17 g, 17 g, Oral, Daily PRN, Fred Bedoya MD    enoxaparin (LOVENOX) injection 40 mg, 40 mg, Subcutaneous, Daily, Fred Bedoya MD    nicotine (NICODERM CQ) 21 MG/24HR 1 patch, 1 patch, Transdermal, Daily, Fred Bedoya MD   sodium chloride flush 0.9 % injection 10 mL, 10 mL, Intravenous, 2 times per day, Fred Bedoya MD    sodium chloride flush 0.9 % injection 10 mL, 10 mL, Intravenous, PRN, Fred Bedoya MD  Allergies: Penicillins  Social History:   TOBACCO:  reports that he has been smoking cigarettes. He has a 40.00 pack-year smoking history. He has never used smokeless tobacco.  ETOH:  reports current alcohol use of about 7.0 standard drinks of alcohol per week. Family History:       Problem Relation Age of Onset    Cancer Mother     Lung Cancer Mother     Kidney Cancer Mother     Brain Cancer Mother     Heart Attack Father      ? ? Physical Exam:    Vitals: /70   Pulse 85   Temp 98.1 °F (36.7 °C)   Resp 17   Ht 6' 1\" (1.854 m)   Wt 245 lb (111.1 kg)   SpO2 92%   BMI 32.32 kg/m²   Constitutional  well developed, well nourished. Eyes  conjunctiva normal. Pupils react to light  Ear, nose, throat -hearing intact to finger rub No scars, masses, or lesions over external nose or ears, no atrophy of tongue  Neck-symmetric, no masses noted, no jugular vein distension  Respiration- chest wall appears symmetric, good expansion,   normal effort without use of accessory muscles  Musculoskeletal  no significant wasting of muscles noted, no bony deformities  Extremities-no clubbing, cyanosis or edema  Skin  warm, dry, and intact. No rash, erythema, or pallor. Psychiatric  mood, affect, and behavior appear normal.   Neurological exam  Awake, alert, fluent oriented x 3 appropriate affect  Attention and concentration appear appropriate  Recent and remote memory appears unremarkable  Speech is inconsistent.   Sometimes he has stuttering and sometimes a speech impediment and at other times completely normal.  No clear issues with language of fund of knowledge  Cranial Nerve Exam   CN II- Visual fields grossly unremarkable  CN III, IV,VI-EOMI, No nystagmus, conjugate eye movements, no ptosis CN V-sensation intact to LT over face  CN VII-questionable left facial droop. Appears to be embellished  CN VIII-Hearing intact to voice  CN IX and X- Palate elevates in midline  CN XI-good shoulder shrug  CN XII-Tongue midline with no fasciculations or fibrillations  Motor Exam  Giveaway weakness bilaterally. Questionable mild left-sided weakness given the conditions. Sensory Exam  Sensation intact to light touch and temperature upper and lower extremities bilaterally  Reflexes   Absent throughout  Tremors- no tremors in hands or head noted  Gait  Not tested  Coordination  Finger to nose-unremarkable on the right. Some slowness on the left  ? ?  CBC:   Recent Labs     02/22/21 0610   WBC 10.8   HGB 15.2        BMP:   Recent Labs     02/22/21 0610 02/22/21 0655   *  --    K 3.6  --    CL 93*  --    CO2 20*  --    BUN 11  --    CREATININE 0.8  --    GLUCOSE 113* 125     Hepatic:   Recent Labs     02/22/21 0610   AST 34   ALT 47*   BILITOT 0.6   ALKPHOS 84     Lipids: No results for input(s): CHOL, HDL in the last 72 hours. Invalid input(s): LDLCALCU  INR:   Recent Labs     02/22/21 0610   INR 0.98     ?  ? Assessment and Plan   1. Left-sided weakness and numbness. Outside of the window for TPA. No large vessel occlusion noted on CTA. Patient has definite embellishment on exam.  He had this in the ED. Somatoform disorder is a possibility as well. Proceed with MRI of the head. Continue PT/OT/SLP. Continue aspirin/Plavix. Case discussed with ED physician.   ?  ?      Leonard Uriarte MD  Board Certified in Neurology

## 2021-02-22 NOTE — H&P
Inability: None    Transportation needs     Medical: None     Non-medical: None   Tobacco Use    Smoking status: Current Some Day Smoker     Packs/day: 1.00     Years: 40.00     Pack years: 40.00     Types: Cigarettes    Smokeless tobacco: Never Used    Tobacco comment: Cutting down   Substance and Sexual Activity    Alcohol use: Yes    Drug use: Never    Sexual activity: Yes   Lifestyle    Physical activity     Days per week: None     Minutes per session: None    Stress: None   Relationships    Social connections     Talks on phone: None     Gets together: None     Attends Quaker service: None     Active member of club or organization: None     Attends meetings of clubs or organizations: None     Relationship status: None    Intimate partner violence     Fear of current or ex partner: None     Emotionally abused: None     Physically abused: None     Forced sexual activity: None   Other Topics Concern    None   Social History Narrative    None        FAMILY HISTORY:  Family History   Problem Relation Age of Onset    Cancer Mother    Elizabeth Simple Mother     Kidney Cancer Mother     Brain Cancer Mother     Heart Attack Father          ALLERGIES:  Allergies   Allergen Reactions    Penicillins Anaphylaxis        PRIOR TO ADMISSION MEDS:  Medications Prior to Admission: cyclobenzaprine (FLEXERIL) 5 MG tablet, Take 5 mg by mouth 3 times daily as needed for Muscle spasms  escitalopram (LEXAPRO) 20 MG tablet, Take 20 mg by mouth daily  aspirin 81 MG tablet, Take 81 mg by mouth daily  Melatonin 10 MG CAPS, Take by mouth  clopidogrel (PLAVIX) 75 MG tablet, Take 75 mg by mouth daily   Acetaminophen (TYLENOL 8 HOUR PO), Take 500 mg by mouth as needed  atorvastatin (LIPITOR) 40 MG tablet, Take 40 mg by mouth daily  Butalbital-Acetaminophen  MG CAPS, Take 1 tablet by mouth 2 times daily  ALPRAZolam (XANAX) 0.25 MG tablet, Take 0.5 mg by mouth nightly as needed for Sleep. nitroGLYCERIN (NITROSTAT) 0.4 MG SL tablet, Place 1 tablet under the tongue every 5 minutes as needed for Chest pain up to max of 3 total doses. If no relief after 1 dose, call 911. REVIEW OF SYSTEMS:  Constitutional:  No fevers, chills, nausea, vomiting, + tiredness & fatigue   Head:  No head injury, facial trauma   Eyes:  No acute visual changes, exudate, trauma   Ears:  No acute hearing loss, earaches   Nose: No nasal discharge, epistaxis   Neck: No new hoarseness, voice change, or new masses   Lungs:   No hemoptysis, pleurisy   Heart:  No chest pressure with exertion, palpitations,    Abdomen:   No new masses, no bright red blood per rectum   Extremities: + difficulty ambulating due to left sided weakness, no new lesions   Skin: No new changes in skin color, no rashes or lesions   Neurologic: No new motor or sensory changes       PHYSICAL EXAM:  /82   Pulse 88   Temp 97.6 °F (36.4 °C)   Resp 18   Ht 6' 1\" (1.854 m)   Wt 249 lb 14.4 oz (113.4 kg)   SpO2 95%   BMI 32.97 kg/m²   No intake/output data recorded.       PHYSICAL EXAMINATION:    Vital Signs: Please see the chart   CLARISA:  Awake, alert, oriented x 3, patient appears tired and fatigued   Head/Eyes:  Normocephalic, atraumatic, EOMI and PERRLA bilaterally   ENT: Moist mucous membranes, nasal passages clear   Neck: Supple, full range of motion, no carotid bruit, trachea midline   Respiratory:   Bilateral decreased air entry in both lung fields, mild B/L crackles, symmetric expansion of chest   Cardiovascular:  Regular rate and rhythm, S1+S2+0, no murmurs/rubs   Urology: No bilateral CVA tenderness, no suprapubic tenderness   Abdomen:   Soft, non-tender, bowel sounds +ve, no organomegaly   Muscle/Joints: Moves all, decreased range of motion, no muscle spasms   Extremities: No clubbing, no cyanosis, no calf tenderness, no edema   Pulses: 2+ bilaterally, symmetrical   Skin: Warm, dry, no pallor/cyanosis/jaundice, no rashes/lesions Neurologic: Awake, alert, oriented x 3, cranial nerves II-XII intact, + left upper and lower leg weakness, sensory system intact   Psychiatric: Normal mood, non-suicidal         LABORATORY DATA:    CBC:  Recent Labs     02/22/21 0610   WBC 10.8   HGB 15.2   HCT 44.1        BMP:  Recent Labs     02/22/21 0610   *   K 3.6   CL 93*   CO2 20*   BUN 11   CREATININE 0.8   CALCIUM 8.4*     Recent Labs     02/22/21 0610   AST 34   ALT 47*   BILITOT 0.6   ALKPHOS 84     Coag Panel:   Recent Labs     02/22/21 0610   INR 0.98   PROTIME 12.9   APTT 23.9*     Cardiac Enzymes:   Recent Labs     02/22/21 0610   TROPONINI <0.01     ABGs:No results found for: PHART, PO2ART, XAJ9VUO  Urinalysis:  Lab Results   Component Value Date    NITRU Negative 02/22/2021    BLOODU Negative 02/22/2021    SPECGRAV 1.027 02/22/2021    GLUCOSEU Negative 02/22/2021     A1C: No results for input(s): LABA1C in the last 72 hours. ABG:No results for input(s): PHART, UTX6JLV, PO2ART, YKO1UXF, BEART, HGBAE, B6AHUTGH, CARBOXHGBART in the last 72 hours. EKG:   Please see chart      IMAGING:  Ct Head Wo Contrast    Result Date: 2/22/2021  Negative unenhanced CT scan of the head. Above results were called to ER immediately. Signed by Dr Issac Branham on 2/22/2021 6:44 AM    Xr Chest Portable    Result Date: 2/22/2021  An ill-defined opacity in the left lower lung laterally which may represent an infiltrate, an area of atelectasis are somewhat the developing abnormality? . This needs to be further evaluated by a follow-up examination or CT scan of the chest. The above report was prioritized to ER. Signed by Dr Issac Branham on 2/22/2021 7:37 AM    Cta Neck W Contrast    Result Date: 2/22/2021  A normal CT angiography of the neck.  Signed by Dr Issac Branham on 2/22/2021 7:12 AM    Cta Head W Contrast    Result Date: 2/22/2021 Normal intracranial circulation with posterior circulation variations as mentioned above. Signed by Dr Savanna Baltazar on 2/22/2021 7:06 AM        Assessment and Plan:    Principal Problem:    Stroke-like symptoms  Active Problems:    Chronic cerebrovascular accident (CVA)    Weakness due to acute cerebrovascular accident (CVA) ([address]    Alcohol use with withdrawal    Tobacco abuse counseling    Cigarette nicotine dependence with nicotine-induced disorder    Personal history of noncompliance with medical treatment and regimen  Resolved Problems:    * No resolved hospital problems. *       Admit patient to medical unit under observation status  Keep patient NPO except meds  ASA given after CT Scan Head ruled out intracranial hemorrhage  Continuous cardiac telemetry monitoring  Monitor cardiac enzymes ×3  2-D echo ordered to evaluate cardiac structure and function  Check TSH and vitamin B12 levels  Bilateral carotid artery duplex ordered  MRI/MRA of the brain without and without contrast ordered   Neurovascular checks ordered every 4 hours as per TIA/stroke protocol  Continue with aspirin and Plavix that the patient takes on a regular basis  PT/ST/OT evaluation ordered as per TIA/stroke protocol  Neurology consult in a.m. for evaluation and further treatment recommendations  DC planning once cleared by neurology  Strictly advised patient to cut down and quit alcohol use  EtOH level was 137 this morning  Patient started on CIWA protocol for alcohol withdrawal  Repeat alcohol level at 6 PM today      Dependence on nicotine from cigarettes          Tobacco abuse counseling  Patient smokes cigarettes on a chronic basis. Strictly advised patient to cut down on or quit smoking. Nicotine patch offered. ~5 minutes spent on tobacco cessation counseling with the patient. Websites - http://smokefree. gov & [employer]. com   °Quitlines - 1-800-QUIT-NOW (3-824.972.9836)   °Docitt - Sensegon Muriel °Text Messages - SmokefreeTXT (send the word QUIT to 08373)       Personal history of noncompliance with medical treatment and regimen  STRICTLY advised patient to be compliant with meds and medical recommendations  Advised patient to get established with a PCP upon discharge, take care of meds, diet and bring patient's self and life back on track    Repeat labs in a.m. Electrolyte replacement as per protocol. Patient will be monitored very closely on the floor. Further recommendations as per the hospital course. Patient's management will be taken over by our [facility] Hospitalist Team in am.    Patient  is on DVT prophylaxis  Current medications reviewed  Lab work reviewed  Radiology/Chest x-ray films reviewed  Discussed with the nurse and addressed all questions/concerns  Discussed with Patient and/or Family at the bedside in detail . .. they verbalize understanding and agree with the management plan. Attestation:  A minimum of two midnights of inpatient hospital care is anticipated to be required based on the patient's clinical condition which requires intensive medical therapy. At this time the patient is not clinically optimized for safe discharge. George Alanis MD  9:03 AM 2/22/2021      DISCLAIMER: This note was created with electronic voice recognition which does have occasional errors. If you have any questions regarding the content within the note please do not hesitate to contact me. .. Thanks.

## 2021-02-22 NOTE — PROGRESS NOTES
Physical Therapy  Name: Lico Machado  MRN:  [id number]  Date of service:  2/22/2021 02/22/21 1508   General   Missed reason Patient declined   Subjective   Subjective Attempt: Pt declines back to bed at this time, says he is comfortable in the chair and wants to stay up.          Electronically signed by Unique Dykes PTA on 2/22/2021 at 3:08 PM

## 2021-02-22 NOTE — PROGRESS NOTES
Family / Caregiver Present: No  Diagnosis: Stroke-like symptoms  Follows Commands: Within Functional Limits  Subjective  Subjective: Pt just arrived to room upon PT arrival. Has history of outpatient PT and willing to work w therapy.   Pain Screening  Patient Currently in Pain: Yes  Pain Assessment  Pain Assessment: 0-10  Pain Level: 5  Patient's Stated Pain Goal: No pain  Pain Type: Chronic pain  Pain Location: Back  Pain Onset: On-going  Functional Pain Assessment: Prevents or interferes some active activities and ADLs  Non-Pharmaceutical Pain Intervention(s): Repositioned  Response to Pain Intervention: Patient Satisfied  Vital Signs  Patient Currently in Pain: Yes  Pre Treatment Pain Screening  Intervention List: Patient able to continue with treatment    Orientation  Orientation  Overall Orientation Status: Within Normal Limits  Social/Functional History  Social/Functional History  Lives With: Spouse  Type of Home: House  Home Layout: One level  Home Access: Stairs to enter without rails  Entrance Stairs - Number of Steps: 1  Home Equipment: Rolling walker, [employer] Help From: Family  Ambulation Assistance: Independent  Transfer Assistance: Independent  Active : Yes  Cognition   Cognition  Overall Cognitive Status: WFL    Objective          AROM RLE (degrees)  RLE AROM: WFL  PROM LLE (degrees)  LLE PROM: WFL  AROM LLE (degrees)  LLE General AROM: Decreased due to weakness  Strength RLE  Strength RLE: WFL  Strength LLE  Comment: Grossly 3-/5     Sensation  Overall Sensation Status: Impaired  Light Touch: Partial deficits in the LLE  Bed mobility  Rolling to Left: Modified independent  Supine to Sit: Modified independent  Transfers  Sit to Stand: Minimal Assistance;Contact guard assistance  Stand to sit: Contact guard assistance  Comment: Pt had cane from home to use for transfers  Ambulation  Ambulation?: Yes  WB Status: WBAT  More Ambulation?: Yes  Ambulation 1  Surface: level tile Device: Single point cane  Assistance: Contact guard assistance;2 Person assistance  Gait Deviations: Slow Chloe; Increased RACHEL; Decreased step length;Decreased step height  Distance: 10ft  Comments: Difficulty progressing LLE, demonstrates ankle inversion w WB     Balance  Posture: Good  Sitting - Static: Good  Sitting - Dynamic: Good  Standing - Static: Fair;+  Standing - Dynamic: Fair;+        Plan   Plan  Times per week: 5-7x  Times per day: Daily  Plan weeks: 2 weeks  Current Treatment Recommendations: Strengthening, ROM, Functional Mobility Training, Balance Training, Transfer Training, Gait Training, Neuromuscular Re-education  Plan Comment: Amb w RW  Safety Devices  Type of devices: Call light within reach, Chair alarm in place, Gait belt, Left in chair    G-Code       OutComes Score                                                  AM-PAC Score             Goals  Short term goals  Time Frame for Short term goals: 2 weeks  Short term goal 1: STS, SPV  Short term goal 2: Amb 150ft w SBA/SPV w appropriate AD       Therapy Time   Individual Concurrent Group Co-treatment   Time In           Time Out           Minutes                   Jj Limon       Electronically signed by Jj Limon on 2/22/2021 at 10:49 AM

## 2021-02-22 NOTE — PROGRESS NOTES
Occupational Therapy   Occupational Therapy Initial Assessment  Date: 2021   Patient Name: Raul Valencia  MRN: [id number]     : 1965    Date of Service: 2021    Discharge Recommendations:          Assessment   Performance deficits / Impairments: Decreased functional mobility ; Decreased ADL status; Decreased strength;Decreased ROM; Decreased balance  Assessment: Will progress as tolerated  Treatment Diagnosis: stroke-like symtpoms  Prognosis: Good  Decision Making: Low Complexity  REQUIRES OT FOLLOW UP: Yes  Activity Tolerance  Activity Tolerance: Patient Tolerated treatment well           Patient Diagnosis(es): The primary encounter diagnosis was Acute left-sided weakness. Diagnoses of Acute alcoholic intoxication with complication ([facility] [address] Tobacco abuse, and Hyponatremia were also pertinent to this visit. has a past medical history of Anxiety, Diverticulitis, Hx of blood clots, Hypertension, Pneumonia, and Stroke (cerebrum) ([facility] [address] has a past surgical history that includes Tonsillectomy; back surgery (); Small intestine surgery; Carpal tunnel release (Right, 10/22/2020); Carpal tunnel release (Left, 12/3/2020); and Colonoscopy.     Treatment Diagnosis: stroke-like symtpoms      Restrictions  Restrictions/Precautions  Restrictions/Precautions: Fall Risk    Subjective   General  Chart Reviewed: Yes  Patient assessed for rehabilitation services?: Yes  Family / Caregiver Present: No  Diagnosis: Stroke-like symptoms  Patient Currently in Pain: Yes  Pain Assessment  Pain Assessment: 0-10  Pain Level: 5  Patient's Stated Pain Goal: No pain  Pain Type: Chronic pain  Pain Location: Back  Pain Onset: On-going  Functional Pain Assessment: Prevents or interferes some active activities and ADLs  Non-Pharmaceutical Pain Intervention(s): Repositioned  Response to Pain Intervention: Patient Satisfied  Vital Signs  Temp: 98.1 °F (36.7 °C)  Temp Source: Temporal  Pulse: 85  Heart Rate Source: Monitor  Resp: 17 BP: 130/70  Patient Position: Supine  Level of Consciousness: Alert (0)  MEWS Score: 1  Patient Currently in Pain: Yes  Height and Weight  Height: 6' 1\" (185.4 cm)  Weight: 245 lb (111.1 kg)  Weight Method: Stated  BSA (Calculated - sq m): 2.39 sq meters  BMI (Calculated): 32.4  Oxygen Therapy  SpO2: 92 %  O2 Device: None (Room air)  Social/Functional History  Social/Functional History  Lives With: Spouse  Type of Home: House  Home Layout: One level  Home Access: Stairs to enter without rails  Entrance Stairs - Number of Steps: 1  Home Equipment: Rolling walker, Dalbert Minks Help From: Family  Ambulation Assistance: Independent  Transfer Assistance: Independent  Active : Yes       Objective   Vision: Within Functional Limits  Hearing: Within functional limits    Orientation  Overall Orientation Status: Within Normal Limits        ADL  Feeding: Setup  Grooming: Supervision  UE Bathing: Minimal assistance  LE Bathing: Minimal assistance  UE Dressing: Minimal assistance  LE Dressing: Minimal assistance  Toileting: Minimal assistance  Additional Comments: Has AROM in LUE but uses predominantly RUE for ADLs           Transfers  Stand Step Transfers: Minimal assistance;Contact guard assistance  Sit to stand: Minimal assistance  Transfer Comments: With cane, might try to use a RW next time     Cognition  Overall Cognitive Status: WFL        Sensation  Overall Sensation Status: Impaired  Light Touch: Partial deficits in the LLE        LUE AROM (degrees)  LUE AROM : Exceptions  LUE General AROM: 30 deg shld flex against gravity, 90 deg AAROM in shld flex  RUE AROM (degrees)  RUE AROM : WNL  LUE Strength  Gross LUE Strength: Exceptions to [facility] Shoulder Flex: 3-/5  L Shoulder ABduction: 3-/5  L Elbow Flex: 3+/5  L Elbow Ext: 3+/5  L Hand General: 3+/5  LUE Strength Comment: Fair  strength  RUE Strength  Gross RUE Strength:  WNL                   Plan   Plan  Times per week: 3-5x/week  Times per day: Daily G-Code     OutComes Score                                                  AM-PAC Score             Goals  Short term goals  Time Frame for Short term goals: 1 week  Short term goal 1: Upgrade MMS in L elbow to 4-/5 to assist with ADLs  Short term goal 2: Demo. 60 deg AROM L shld elevation  Short term goal 3: Supervision with AD for toilet tfers  Short term goal 4: Supervision with LB dsg.   Long term goals  Long term goal 1: Return to PLOF       Therapy Time   Individual Concurrent Group Co-treatment   Time In           Time Out           Minutes                   Adams Salas, OT

## 2021-02-22 NOTE — PROGRESS NOTES
Pharmacy Consult      Sánchez Kam is a 49 y.o. male for whom pharmacy has been consulted to review this patient's medication profile to evaluate IV medications and change all base solutions to 0.9% sodium chloride if possible based on compatibility and product availability. Patient Active Problem List   Diagnosis    Other chest pain    Right carpal tunnel syndrome    Left carpal tunnel syndrome       Allergies:  Penicillins     No results for input(s): CREATININE in the last 72 hours. Ht/Wt:   Ht Readings from Last 1 Encounters:   02/22/21 6' 1\" (1.854 m)        Wt Readings from Last 1 Encounters:   02/22/21 249 lb 14.4 oz (113.4 kg)         CrCl cannot be calculated (No successful lab value found. ). Assessment/Plan:    Currently only a 0.9% sodium chloride bolus is ordered. No IV medications ordered at this time. Thank you for the consult. Will continue to follow.     Electronically signed by Sol Blunt [facility] on 2/22/2021 at 6:27 AM

## 2021-02-23 VITALS
HEIGHT: 73 IN | WEIGHT: 245 LBS | OXYGEN SATURATION: 93 % | HEART RATE: 94 BPM | DIASTOLIC BLOOD PRESSURE: 82 MMHG | SYSTOLIC BLOOD PRESSURE: 123 MMHG | RESPIRATION RATE: 17 BRPM | BODY MASS INDEX: 32.47 KG/M2 | TEMPERATURE: 98.2 F

## 2021-02-23 PROBLEM — E87.1 HYPONATREMIA: Status: RESOLVED | Noted: 2021-02-23 | Resolved: 2021-02-23

## 2021-02-23 PROBLEM — E87.1 HYPONATREMIA: Status: ACTIVE | Noted: 2021-02-23

## 2021-02-23 LAB
ANION GAP SERPL CALCULATED.3IONS-SCNC: 9 MMOL/L (ref 7–19)
BASOPHILS ABSOLUTE: 0 K/UL (ref 0–0.2)
BASOPHILS RELATIVE PERCENT: 0.6 % (ref 0–1)
BUN BLDV-MCNC: 11 MG/DL (ref 6–20)
CALCIUM SERPL-MCNC: 8.8 MG/DL (ref 8.6–10)
CHLORIDE BLD-SCNC: 100 MMOL/L (ref 98–111)
CHOLESTEROL, TOTAL: 105 MG/DL (ref 160–199)
CO2: 29 MMOL/L (ref 22–29)
CREAT SERPL-MCNC: 0.9 MG/DL (ref 0.5–1.2)
EKG P AXIS: 52 DEGREES
EKG P-R INTERVAL: 158 MS
EKG Q-T INTERVAL: 354 MS
EKG QRS DURATION: 98 MS
EKG QTC CALCULATION (BAZETT): 406 MS
EKG T AXIS: 54 DEGREES
EOSINOPHILS ABSOLUTE: 0.1 K/UL (ref 0–0.6)
EOSINOPHILS RELATIVE PERCENT: 2.6 % (ref 0–5)
GFR AFRICAN AMERICAN: >59
GFR NON-AFRICAN AMERICAN: >60
GLUCOSE BLD-MCNC: 73 MG/DL (ref 74–109)
HCT VFR BLD CALC: 45.7 % (ref 42–52)
HDLC SERPL-MCNC: 35 MG/DL (ref 55–121)
HEMOGLOBIN: 15.5 G/DL (ref 14–18)
IMMATURE GRANULOCYTES #: 0 K/UL
LDL CHOLESTEROL CALCULATED: 49 MG/DL
LYMPHOCYTES ABSOLUTE: 1.9 K/UL (ref 1.1–4.5)
LYMPHOCYTES RELATIVE PERCENT: 37.7 % (ref 20–40)
MAGNESIUM: 2.5 MG/DL (ref 1.6–2.6)
MCH RBC QN AUTO: 34.1 PG (ref 27–31)
MCHC RBC AUTO-ENTMCNC: 33.9 G/DL (ref 33–37)
MCV RBC AUTO: 100.4 FL (ref 80–94)
MONOCYTES ABSOLUTE: 0.5 K/UL (ref 0–0.9)
MONOCYTES RELATIVE PERCENT: 10.2 % (ref 0–10)
NEUTROPHILS ABSOLUTE: 2.4 K/UL (ref 1.5–7.5)
NEUTROPHILS RELATIVE PERCENT: 48.7 % (ref 50–65)
PDW BLD-RTO: 12.3 % (ref 11.5–14.5)
PHOSPHORUS: 3 MG/DL (ref 2.5–4.5)
PLATELET # BLD: 204 K/UL (ref 130–400)
PMV BLD AUTO: 8.4 FL (ref 9.4–12.4)
POTASSIUM SERPL-SCNC: 4.4 MMOL/L (ref 3.5–5)
RBC # BLD: 4.55 M/UL (ref 4.7–6.1)
SODIUM BLD-SCNC: 138 MMOL/L (ref 136–145)
TRIGL SERPL-MCNC: 106 MG/DL (ref 0–149)
WBC # BLD: 4.9 K/UL (ref 4.8–10.8)

## 2021-02-23 PROCEDURE — 6370000000 HC RX 637 (ALT 250 FOR IP): Performed by: HOSPITALIST

## 2021-02-23 PROCEDURE — G0378 HOSPITAL OBSERVATION PER HR: HCPCS

## 2021-02-23 PROCEDURE — 80048 BASIC METABOLIC PNL TOTAL CA: CPT

## 2021-02-23 PROCEDURE — 83735 ASSAY OF MAGNESIUM: CPT

## 2021-02-23 PROCEDURE — 84100 ASSAY OF PHOSPHORUS: CPT

## 2021-02-23 PROCEDURE — 99232 SBSQ HOSP IP/OBS MODERATE 35: CPT | Performed by: PSYCHIATRY & NEUROLOGY

## 2021-02-23 PROCEDURE — 36415 COLL VENOUS BLD VENIPUNCTURE: CPT

## 2021-02-23 PROCEDURE — 80061 LIPID PANEL: CPT

## 2021-02-23 PROCEDURE — 93010 ELECTROCARDIOGRAM REPORT: CPT | Performed by: INTERNAL MEDICINE

## 2021-02-23 PROCEDURE — 2580000003 HC RX 258: Performed by: HOSPITALIST

## 2021-02-23 PROCEDURE — 97116 GAIT TRAINING THERAPY: CPT

## 2021-02-23 PROCEDURE — 6360000002 HC RX W HCPCS: Performed by: HOSPITALIST

## 2021-02-23 PROCEDURE — 96372 THER/PROPH/DIAG INJ SC/IM: CPT

## 2021-02-23 PROCEDURE — 85025 COMPLETE CBC W/AUTO DIFF WBC: CPT

## 2021-02-23 RX ADMIN — CYCLOBENZAPRINE HYDROCHLORIDE 5 MG: 5 TABLET, FILM COATED ORAL at 15:18

## 2021-02-23 RX ADMIN — ENOXAPARIN SODIUM 40 MG: 100 INJECTION SUBCUTANEOUS at 08:08

## 2021-02-23 RX ADMIN — ACETAMINOPHEN 650 MG: 650 TABLET, FILM COATED, EXTENDED RELEASE ORAL at 15:15

## 2021-02-23 RX ADMIN — Medication 10 ML: at 08:08

## 2021-02-23 RX ADMIN — CYCLOBENZAPRINE HYDROCHLORIDE 5 MG: 5 TABLET, FILM COATED ORAL at 08:08

## 2021-02-23 RX ADMIN — ATORVASTATIN CALCIUM 40 MG: 40 TABLET, FILM COATED ORAL at 08:08

## 2021-02-23 RX ADMIN — CLOPIDOGREL BISULFATE 75 MG: 75 TABLET ORAL at 08:08

## 2021-02-23 RX ADMIN — ASPIRIN 81 MG: 81 TABLET, COATED ORAL at 08:08

## 2021-02-23 RX ADMIN — ESCITALOPRAM OXALATE 20 MG: 10 TABLET ORAL at 08:08

## 2021-02-23 RX ADMIN — ACETAMINOPHEN 650 MG: 650 TABLET, FILM COATED, EXTENDED RELEASE ORAL at 06:22

## 2021-02-23 ASSESSMENT — PAIN SCALES - GENERAL
PAINLEVEL_OUTOF10: 6
PAINLEVEL_OUTOF10: 6

## 2021-02-23 NOTE — DISCHARGE INSTR - DIET
? Good nutrition is important when healing from an illness, injury, or surgery. Follow any nutrition recommendations given to you during your hospital stay. ? If you were given an oral nutrition supplement while in the hospital, continue to take this supplement at home. You can take it with meals, in-between meals, and/or before bedtime. These supplements can be purchased at most local grocery stores, pharmacies, and chain [employer]-stores. ? If you have any questions about your diet or nutrition, call the hospital and ask for the dietitian.     General Diet

## 2021-02-23 NOTE — CARE COORDINATION
JOAN met with Pt and attending in the room to discuss dc planning options. Pt requested referral to be sent to 8th floor rehab. JOAN explained if Pt is accepted then they would have to work on getting [facility] insurance to approve it. If Pt does not qualify then he prefers to go home. He does not feel like he will need home health and prefers outpatient for any type of therapy. 8th floor referral has been entered in the system.    Electronically signed by Kory Núñez on 2/23/2021 at 10:50 AM

## 2021-02-23 NOTE — PROGRESS NOTES
Physical Therapy  Name: Meka Browne  MRN:  [id number]  Date of service:  2/23/2021 02/23/21 1549   Restrictions/Precautions   Restrictions/Precautions Fall Risk   General   Chart Reviewed Yes   Subjective   Subjective Pt in bed, agrees to walk with therapy. Pain Screening   Patient Currently in Pain Denies   Intervention List Patient able to continue with treatment   Bed Mobility   Supine to Sit Stand by assistance   Sit to Supine Stand by assistance   Transfers   Sit to Stand Minimal Assistance   Stand to sit Contact guard assistance   Ambulation   Ambulation? Yes   WB Status WBAT   Ambulation 1   Surface level tile   Device Rolling Walker   Assistance Minimal assistance;Contact guard assistance   Quality of Gait L foot drag, poor  on rwx with L hand but more steady than with SPC   Gait Deviations Slow Chloe; Increased RACHEL; Decreased step length;Decreased step height   Distance 45'   Comments Difficulty progressing LLE, L foot drag present   Short term goals   Time Frame for Short term goals 2 weeks   Short term goal 1 STS, SPV   Short term goal 2 Amb 150ft w SBA/SPV w appropriate AD   Conditions Requiring Skilled Therapeutic Intervention   Body structures, Functions, Activity limitations Decreased functional mobility ; Decreased ADL status; Decreased strength;Decreased ROM; Decreased balance;Decreased sensation   Assessment Pt cont to exhibit weakness in LLE and LUE, able to increase amb distance but does cont to have L foot drag and decreased  in L hand on rwx. Pt returned to supine, positioned for comfort with all needs in reach.    Activity Tolerance   Activity Tolerance Patient Tolerated treatment well   Safety Devices   Type of devices Bed alarm in place;Call light within reach;Gait belt;Left in bed         Electronically signed by Nancy Oakes PTA on 2/23/2021 at 3:56 PM

## 2021-02-23 NOTE — DISCHARGE SUMMARY
Patient is feeling better today. Still complaining of some weakness of the left leg which is his baseline from his old stroke. He had complete work-up done from neurology which came back to be negative. Patient has been cleared from neurology standpoint to be discharged. Patient requesting inpatient rehab referral which I gave and patient was refused as he does not meet the medical necessity for admission to inpatient rehab facility. He is being discharged home with home health care today to that he can have physical therapy at home. His sodium levels have improved from 130 - 138 overnight. Patient changed to observation status as per utilization management review recommendations. 2/22/2021  HISTORY OF PRESENT ILLNESS:  Kenneth Jimenez is an 49 y.o. male. He is a very unfortunate male with previous history of TIA, CVA, smoking and alcohol use. He has a history of TIA in 2013, and stroke in 2014 and 2019 with left-sided weakness for which he requires cane for ambulation. He went to bed at 11 PM, woke up at 1 AM and he was complaining of worsening weakness in the left lower leg. He had 7 glasses of wine before he went to bed and he smokes a pack and a half on a regular basis. He came to the ER for evaluation, work-up was done which confirmed left arm and leg weakness.   Given his prior history of strokes and TIAs, he had work-up done and is being admitted for medical management, neurological evaluation and TIA/stroke work-up.       OBJECTIVE:  /82   Pulse 94   Temp 98.2 °F (36.8 °C)   Resp 17   Ht 6' 1\" (1.854 m)   Wt 245 lb (111.1 kg)   SpO2 93%   BMI 32.32 kg/m²       Heart: RRR   Lungs: Bilateral fair air entry   Abdomen: Soft, non-tender   Extremities: No edema   Neurologic: Alert and oriented   Skin: Warm and dry          Laboratory Data:  Recent Labs     02/22/21  0610 02/23/21  0406   WBC 10.8 4.9   HGB 15.2 15.5    204     Recent Labs     02/22/21  0610 02/22/21  0655 02/23/21 0406   *  --  138   K 3.6  --  4.4   CL 93*  --  100   CO2 20*  --  29   BUN 11  --  11   CREATININE 0.8  --  0.9   GLUCOSE 113* 125 73*     Recent Labs     02/22/21  0610   AST 34   ALT 47*   BILITOT 0.6   ALKPHOS 84     Troponin T:   Recent Labs     02/22/21  0610 02/22/21  1150 02/22/21  1751   TROPONINI <0.01 <0.01 <0.01     Pro-BNP: No results for input(s): BNP in the last 72 hours. INR:   Recent Labs     02/22/21  0610   INR 0.98     UA:  Recent Labs     02/22/21  0640   COLORU YELLOW   PHUR 5.0   CLARITYU Clear   SPECGRAV 1.027   LEUKOCYTESUR Negative   UROBILINOGEN 0.2   BILIRUBINUR Negative   BLOODU Negative   GLUCOSEU Negative     A1C: No results for input(s): LABA1C in the last 72 hours. ABG:No results for input(s): PHART, BUY3DSU, PO2ART, ZQZ3DOE, BEART, HGBAE, B3KOJSIF, CARBOXHGBART in the last 72 hours. Impressions of imaging performed in 48 hours before discharge:    Ct Head Wo Contrast    Result Date: 2/22/2021  Negative unenhanced CT scan of the head. Above results were called to ER immediately. Signed by Dr Savanna Baltazar on 2/22/2021 6:44 AM    Xr Chest Portable    Result Date: 2/22/2021  An ill-defined opacity in the left lower lung laterally which may represent an infiltrate, an area of atelectasis are somewhat the developing abnormality? . This needs to be further evaluated by a follow-up examination or CT scan of the chest. The above report was prioritized to ER. Signed by Dr Savanna Baltazar on 2/22/2021 7:37 AM    Cta Neck W Contrast    Result Date: 2/22/2021  A normal CT angiography of the neck. Signed by Dr Savanna Baltazar on 2/22/2021 7:12 AM    Cta Head W Contrast    Result Date: 2/22/2021  Normal intracranial circulation with posterior circulation variations as mentioned above.  Signed by Dr Savanna Baltazar on 2/22/2021 7:06 AM    Mri Brain With And Without Contrast    Result Date: 2/22/2021 Impression: 1. No acute intracranial process. In particular, there is no evidence of acute ischemia. Signed by Dr Barbara Blue on 2/22/2021 1:50 PM        Peyton Larios   Home Medication Instructions ZOP:[id number]    Printed on:02/23/21 1720   Medication Information                      Acetaminophen (TYLENOL 8 HOUR PO)  Take 500 mg by mouth as needed             ALPRAZolam (XANAX) 0.25 MG tablet  Take 0.5 mg by mouth nightly as needed for Sleep. amLODIPine (NORVASC) 5 MG tablet  Take 5 mg by mouth daily             aspirin 81 MG tablet  Take 81 mg by mouth daily             atorvastatin (LIPITOR) 40 MG tablet  Take 40 mg by mouth daily             Butalbital-Acetaminophen  MG CAPS  Take 1 tablet by mouth 2 times daily as needed              clopidogrel (PLAVIX) 75 MG tablet  Take 75 mg by mouth daily              cyclobenzaprine (FLEXERIL) 5 MG tablet  Take 5 mg by mouth 3 times daily as needed for Muscle spasms             escitalopram (LEXAPRO) 20 MG tablet  Take 20 mg by mouth daily             Melatonin 10 MG CAPS  Take by mouth nightly as needed              nitroGLYCERIN (NITROSTAT) 0.4 MG SL tablet  Place 1 tablet under the tongue every 5 minutes as needed for Chest pain up to max of 3 total doses. If no relief after 1 dose, call 911. oxyCODONE-acetaminophen (PERCOCET) 7.5-325 MG per tablet  Take 1 tablet by mouth every 4 hours as needed for Pain. ISSUES TO BE ADDRESSED AT HOSPITAL FOLLOW-UP VISIT:    Advised patient to follow-up closely with PCP upon discharge for management of chronic medical issues  Please see the detailed discharge directions delivered from earlier today! Condition on Discharge: gradually improving  [facility]    Recommended Follow Up:  Gisselle Hutchinson MD  [address] [facility] [address] [facility]  [phone]      Follow up as needed.     Venkat De La Vega MD  [address] [facility] [address] [place] [address]  [phone]    In 1 week  Hospital follow-up in 1 week. Followup Appointments Scheduled at Time of Discharge:  Future Appointments   Date Time Provider Arjun Renee   10/12/2021  1:15 PM SUZANNE Hodge NP [id number]        Discharge Instructions:   Please see the discharge paperwork. Patient was seen at bedside today, and the examination shows improvement since yesterday. Detailed discharge directions delivered to the patient by myself and our nursing staff, who verbalizes understanding and is very happy and satisfied with the plan. Patient has been advised to continue all medications as prescribed and advised, and f/u with PCP within 1 week. Patient is stable from medical standpoint to be discharged. Total time spent during patient evaluation and assessment, discussion with the nurse/family, addressing discharge medications/scripts and coordination of care for safe discharge was in excess of 35 minutes.       Signed Electronically:    Inocencia Payton MD  5:20 PM 2/23/2021

## 2021-02-23 NOTE — CARE COORDINATION
[facility]  Notification of [facility]mission Decision      [] Patient has been accepted for admit to [facility] on :       Please write discharge orders and summary prior to discharge. [] Patient acceptance to Rehab pending the following :    [] Eval in progress       [x] Patient determined to be ineligible for services at [facility] because : No qualifying diagnosis for Rehab, as work-up for CVA was negative. We recommend you consider: HH or OP        Thank you for your referral, we appreciate you.     Electronically Signed by Rodrigo Quinones Admissions Coordinator 2/23/2021 12:52 PM

## 2021-02-23 NOTE — PLAN OF CARE
Problem: Falls - Risk of:  Goal: Will remain free from falls  Description: Will remain free from falls  2/23/2021 0348 by Lise Olson RN  Outcome: Ongoing  2/22/2021 1723 by Aliya Velazquez RN  Outcome: Ongoing  Goal: Absence of physical injury  Description: Absence of physical injury  2/23/2021 0348 by Lise Olson RN  Outcome: Ongoing  2/22/2021 1723 by Aliya Velazquez RN  Outcome: Ongoing     Problem: Pain:  Goal: Pain level will decrease  Description: Pain level will decrease  2/23/2021 0348 by Lise Olson RN  Outcome: Ongoing  2/22/2021 1723 by Aliya Velazquez RN  Outcome: Ongoing  Goal: Control of acute pain  Description: Control of acute pain  2/23/2021 0348 by Lise Olson RN  Outcome: Ongoing  2/22/2021 1723 by Aliya Velazquez RN  Outcome: Ongoing  Goal: Control of chronic pain  Description: Control of chronic pain  2/23/2021 0348 by Lise Olson RN  Outcome: Ongoing  2/22/2021 1723 by Aliya Velazquez RN  Outcome: Ongoing

## 2021-02-23 NOTE — PROGRESS NOTES
Physical Therapy  Name: Hiren Diaz  MRN:  [id number]  Date of service:  2/23/2021 02/23/21 1146   Restrictions/Precautions   Restrictions/Precautions Fall Risk   General   Chart Reviewed Yes   Subjective   Subjective Pt in bed, agrees to walk with therapy but declines sitting up in the chair at this time. Pain Screening   Patient Currently in Pain Denies   Intervention List Patient able to continue with treatment   Bed Mobility   Supine to Sit Contact guard assistance   Sit to Supine Contact guard assistance   Transfers   Sit to Stand Minimal Assistance;Contact guard assistance   Stand to sit Contact guard assistance   Ambulation   Ambulation? Yes   WB Status WBAT   Ambulation 1   Surface level tile   Device Rolling Walker   Assistance Minimal assistance;Contact guard assistance   Quality of Gait L foot drag, poor  on rwx with L hand but more steady than with SPC   Gait Deviations Slow Chloe; Increased RACHEL; Decreased step length;Decreased step height   Distance 30'   Comments Difficulty progressing LLE, L foot drag present   Short term goals   Time Frame for Short term goals 2 weeks   Short term goal 1 STS, SPV   Short term goal 2 Amb 150ft w SBA/SPV w appropriate AD   Conditions Requiring Skilled Therapeutic Intervention   Body structures, Functions, Activity limitations Decreased functional mobility ; Decreased ADL status; Decreased strength;Decreased ROM; Decreased balance;Decreased sensation   Assessment Pt shows improvement in amb with rwx vs SPC, but does cont to exhibit severe mobility deficits with LLE. Pt would be a good candidate for inpatient rehab services to further address mobility deficits.    Activity Tolerance   Activity Tolerance Patient Tolerated treatment well   Safety Devices   Type of devices Bed alarm in place;Call light within reach;Gait belt;Left in bed         Electronically signed by Marisela Mcginnis PTA on 2/23/2021 at 11:51 AM

## 2021-02-23 NOTE — PROGRESS NOTES
Physician Progress Note      PATIENT:               Yenny Quesada  CSN #:                  [id number]  :                       1965  ADMIT DATE:       2021 6:04 AM  DISCH DATE:  RESPONDING  PROVIDER #:        ALEXSANDER LITTLEJOHN MD          QUERY TEXT:    Patient admitted with CVA, noted to have sodium level of 130. If possible,   please document in progress notes and discharge summary if you are evaluating   and/or treating any of the following: The medical record reflects the following:  Risk Factors: Alcohol Abuse, CVA  Clinical Indicators: NA: 130, ER provider documented Hyponatremia at the time   of admisison  Treatment: Serial BMPS    Thank you in advance,    Mallika Rooney RN-BSN, [facility]  Clinical   [facility]  [address]  [place], [place]  Options provided:  -- Hyponatremia  -- Hyponatremia not clinically significant  -- Other - I will add my own diagnosis  -- Disagree - Not applicable / Not valid  -- Disagree - Clinically unable to determine / Unknown  -- Refer to Clinical Documentation Reviewer    PROVIDER RESPONSE TEXT:    This patient has Hyponatremia.     Query created by: Isabel Palomino on 2021 4:07 PM      Electronically signed by:  Guilherme Dunn MD 2021 4:36 PM

## 2021-02-23 NOTE — PROGRESS NOTES
There is no diffusion signal abnormality to suggest acute infarct. There is no intra-axial or extra-axial hemorrhage. No visualized mass   lesion or pathologic enhancement. Normal size and configuration of the   ventricular system for patient age. The posterior fossa structures are   unremarkable. The pituitary gland and sella are unremarkable. The   major intracranial flow voids are preserved. The orbits are unremarkable. Minimal chronic mucosal thickening in the   paranasal sinuses. The mastoids are clear. Marrow signal in the   calvarium and skull base appears normal.       Impression   Impression:    1. No acute intracranial process. In particular, there is no evidence   of acute ischemia. Signed by Dr Nikhil Castellon on 2/22/2021 1:50 PM      TTE procedure:ECHO NO CONTRAST WITH DOP/COLR. Study Location: Echo Lab  Technical Quality: Adequate visualization     Patient Status: Inpatient     Contrast Medium: Bubble Study.     HR: 87 bpm     Indications:Fatigue/weakness and CVA.      Conclusions      Summary   LV is normal in size with normal systolic function. LV ejection fraction   estimated at 60-65%. Normal diastolic function. RV appears normal in size with normal systolic function. Normal left atrial size. Normal right atrial size. Aortic valve is trileaflet with normal leaflet mobility. No significant   stenosis or regurgitation noted. Mitral valve appears structurally normal with normal leaflet mobility. Trace mitral regurgitation. No stenosis. No significant tricuspid regurgitation noted. No significant pericardial effusion. Bubble study fails to demonstrate any significant right to left shunting.       RECORD REVIEW: Previous medical records, medications were reviewed at today's visit    IMPRESSION: Left-sided weakness and numbness. Outside of the window for TPA. MRI films reviewed. No acute nor chronic ischemia noted. No large vessel occlusion noted on CTA. Patient has definite embellishment on exam.  He had this in the ED. 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 Continue aspirin/Plavix. Skyler Bahena with me to d/c home when ok with pcp.

## 2021-02-24 ENCOUNTER — HOSPITAL ENCOUNTER (OUTPATIENT)
Dept: PHYSICAL THERAPY | Age: 56
Setting detail: THERAPIES SERIES
End: 2021-02-24
Payer: COMMERCIAL

## 2021-02-24 NOTE — CARE COORDINATION
HH referral received. Per chart, patient declined home health. No further HH needs. . Electronically signed by Varghese Varghese on 2/24/21 at 8:38 AM CST

## 2021-02-24 NOTE — PROGRESS NOTES
Reviewed discharge instructions, follow-up appointments, and medications with patient. Patient verbalized understanding and is agreeable to discharge. Patient declined Home Health at this time. He states his wife is at home to help him. Patient discharged home via private vehicle.

## 2021-02-26 ENCOUNTER — APPOINTMENT (OUTPATIENT)
Dept: PHYSICAL THERAPY | Age: 56
End: 2021-02-26
Payer: COMMERCIAL

## 2021-03-03 ENCOUNTER — HOSPITAL ENCOUNTER (OUTPATIENT)
Dept: PHYSICAL THERAPY | Age: 56
Setting detail: THERAPIES SERIES
Discharge: HOME | End: 2021-03-03
Payer: COMMERCIAL

## 2021-03-03 PROCEDURE — 97110 THERAPEUTIC EXERCISES: CPT

## 2021-03-03 PROCEDURE — 97162 PT EVAL MOD COMPLEX 30 MIN: CPT

## 2021-03-03 ASSESSMENT — PAIN DESCRIPTION - ORIENTATION: ORIENTATION: LEFT

## 2021-03-03 NOTE — PROGRESS NOTES
Physical Therapy  Initial Assessment  Date: 3/3/2021  Patient Name: Katie Gutierrez  MRN: [id number]  : 1965          Restrictions       Subjective   General  Additional Pertinent Hx: 49year old male recently referred to PT with diagnosis of low back pain, currently referred back to PT with diagnosis of left sided hemiplegia and dysarthria. He had been seen in the past at our facility for rehab following a past stroke. Referring Practitioner: Keara Ray MD  Referral Date : 21  Diagnosis: hemiplegia, unspecified affecting left nondominant side (G81.94)  Follows Commands: Within Functional Limits  PT Visit Information  PT Insurance Information: [facility] (20 visits per year, no precert)  Total # of Visits Approved: 18(20 PT visits per year, 2 already used this year)  Total # of Visits to Date: 1  Plan of Care/Certification Expiration Date: 21  Progress Note Due Date: 21  Subjective  Subjective: Patient reports he experienced an apparent extension of his stroke and left-sided hemiplegia about a week ago, got up to go to bathroom and \"didn't feel right\", went to hospital and he was noteably weak on his left side, \"felt like it did like the stroke before,\" His left arm has improved, but his speech is broken up and his left leg is dragging as it had in the past. He has been using his quad cane to walk, but has been leaning heavily and relying on support from his right side. He is having trouble with his speech, stumbling his words. He states he is still having back pain, for which we had been seeing him for a couple of visits prior to this newest development.  He states he has not had a fall at home, but not walking as much as he did in the recent past.  Pain Screening  Patient Currently in Pain: Yes  Pain Assessment  Pain Assessment: 0-10  Pain Level: 4  Pain Location: Back  Pain Orientation: Left  Pain Descriptors: Aching;Discomfort  Pain Frequency: Continuous  Vital Signs Patient Currently in Pain: Yes    Vision/Hearing  Vision  Vision: Within Functional Limits  Hearing  Hearing: Within functional limits    Orientation  Orientation  Overall Orientation Status: Within Normal Limits    Social/Functional History  Social/Functional History  Lives With: Spouse  Type of Home: House  Home Layout: One level  ADL Assistance: Independent  Homemaking Assistance: Independent  Ambulation Assistance: Independent  Transfer Assistance: Independent  Active : Yes  Mode of Transportation: Truck  Type of occupation:  for [employer],  by [employer]   Leisure & Hobbies: fishing, likes to do yardwork    Objective     Observation/Palpation  Observation: Patient comes to standing with difficulty, almost entirely using right leg. He stands with shift to right side, limited weight bearing through left leg, some flexion of left knee. He is unable or unwilling to try and stand on his left leg and can stand independently but with the aforementioned avoidance of left side weight bearing. His speech appears more labored than what I recall, with him frequently stuttering and having some apparant expression difficulties. Strength RLE  Strength RLE: WNL  Strength LLE  L Hip Flexion: 3-/5;2+/5  L Knee Flexion: 3-/5;2+/5  L Knee Extension: 3-/5;2+/5  L Ankle Dorsiflexion: 2-/5  L Ankle Plantar Flexion: 2/5  L Ankle Inversion: 3-/5  L Ankle Eversion: 1+/5  Strength RUE  Strength RUE: WNL  Strength LUE  Strength LUE: WNL  Comment: Assessment of left arm strength deferred to planned OT referral (a request to Dr. Zhang Spine office was made at today's evaluation). Additional Measures  Special Tests: sit to stand 4 reps in 30 seconds  Other: Decreased protective reaction with standing balance perturbations.   Sensation  Overall Sensation Status: WFL  Bed mobility  Rolling to Left: Modified independent  Rolling to Right: Modified independent  Supine to Sit: Modified independent Sit to Supine: Modified independent  Transfers  Sit to Stand: Modified independent  Stand to sit: Modified independent  Bed to Chair: Modified independent  Stand Pivot Transfers: Independent       Ambulation 1  Device: Small Alfred Rixford  Assistance: Supervision  Quality of Gait: Ambulates with drag of left foot, slow advancement of left leg, unable to elevate hip when cued. Gait Deviations: Slow Chloe;Decreased step length;Decreased step height;Shuffles  Distance: 80'                 Left Hand Strength -  (lbs)  Handle Setting 3: 38#, 38#  Right Hand Strength -  (lbs)  Handle Setting 3: 106#, 100#    Assessment   Conditions Requiring Skilled Therapeutic Intervention  Body structures, Functions, Activity limitations: Decreased functional mobility ; Decreased ADL status; Decreased ROM; Decreased strength;Decreased balance;Decreased high-level IADLs;Decreased fine motor control;Decreased coordination;Decreased endurance; Increased pain  Assessment: Problem list: 1) diffuse weakness throughout left leg (all major muscle groups), 2) decreased endurance, 3) decreased ability to ambulate and with reliance on assistive device, 4) potential for falling, 5) decreased ability to perform transitional movements, 6) need for instruction in HEP, 7) potential need for resumption of AFO left ankle, 8) persistent low back pain, 9) decreased left arm and hand strength. Prognosis: Good;Fair  Decision Making: Medium Complexity  REQUIRES PT FOLLOW UP: Yes  Discharge Recommendations: Continue to assess pending progress  Activity Tolerance  Activity Tolerance: Patient Tolerated treatment well         Plan   Plan  Times per week: 2x per week  Plan weeks: 6-9 weeks  Specific instructions for Next Treatment: Request made by the office to seek OT eval to address weakness and limited use of left arm. He will have a consult for speech therapy 3/16/21. Current Treatment Recommendations: Strengthening, ROM, Balance Training, Transfer Training, Endurance Training, Gait Training, Stair training, Neuromuscular Re-education, Home Exercise Program, Patient/Caregiver Education & Training, Equipment Evaluation, Education, & procurement    G-Code       OutComes Score                                                  AM-PAC Score             Goals  Short term goals  Time Frame for Short term goals: 6-8 weeks  Short term goal 1: Patient to be independent with HEP. Short term goal 2: Patient to be independent with resumption of use of AFO (if foot strength remains insufficient). Short term goal 3: Patient able to ambulate with roller walker with less drag of left foot. Short term goal 4: Patient to report greater ease getting in and out of vehicle. Long term goals  Time Frame for Long term goals : 9-12 weeks  Long term goal 1: Patient able to ambulate with quad cane or single point cane 500'. Long term goal 2: Patient able to perform sit to stand 8 x in 30 seconds with increased weight bourne through left LE. Long term goal 3: Patient to have >= 4-/5 strength in left foot for df, pf, inversion and eversion. Long term goal 4: Patient to have >=4-/5 left hip flexors, 4+/5 left knee extensors. Long term goal 5: Patient to show increased protective reactions with standing balance perturbations. (11/27: Able to accept mod perturbations in all directions, but continued delay in LLE reaction, as well as generally erratic motions.)  Patient Goals   Patient goals : Return back to normal level of function, walk without a device or 1-point cane.        Therapy Time   Individual Concurrent Group Co-treatment   Time In 1402         Time Out 1455         Minutes 53         Timed Code Treatment Minutes: 53 Minutes    Electronically signed by Gelacio Kent PT on 3/3/2021 at 3:47 PM    Gelacio Kent PT

## 2021-03-05 ENCOUNTER — HOSPITAL ENCOUNTER (OUTPATIENT)
Dept: OCCUPATIONAL THERAPY | Age: 56
Setting detail: THERAPIES SERIES
Discharge: HOME | End: 2021-03-05
Payer: COMMERCIAL

## 2021-03-05 ENCOUNTER — HOSPITAL ENCOUNTER (OUTPATIENT)
Dept: PHYSICAL THERAPY | Age: 56
Setting detail: THERAPIES SERIES
Discharge: HOME | End: 2021-03-05
Payer: COMMERCIAL

## 2021-03-05 PROCEDURE — 97032 APPL MODALITY 1+ESTIM EA 15: CPT

## 2021-03-05 PROCEDURE — 97166 OT EVAL MOD COMPLEX 45 MIN: CPT

## 2021-03-05 PROCEDURE — 97530 THERAPEUTIC ACTIVITIES: CPT

## 2021-03-05 PROCEDURE — 97110 THERAPEUTIC EXERCISES: CPT

## 2021-03-05 ASSESSMENT — PAIN SCALES - GENERAL: PAINLEVEL_OUTOF10: 4

## 2021-03-05 ASSESSMENT — PAIN DESCRIPTION - ONSET: ONSET: ON-GOING

## 2021-03-05 ASSESSMENT — PAIN DESCRIPTION - LOCATION: LOCATION: BACK

## 2021-03-05 ASSESSMENT — 9 HOLE PEG TEST: TESTTIME_SECONDS: 20

## 2021-03-05 NOTE — PROGRESS NOTES
Occupational Therapy Initial Assessment  Date:  3/5/2021    Patient Name: Jyoti Morocho  MRN: [id number]     :  1965     Treatment Diagnosis: M62.81, R27.8, M25.61    Subjective   General  Chart Reviewed: Yes  Patient assessed for rehabilitation services?: Yes  Additional Pertinent Hx: Patient has had multiple TIA and CVAs. His most recent CVA was 2 weeks ago from 3/8/21. He went to get up and use the restroom and felt his face felt \"funny\" and when he looked in the mirror he noticed it drooping. He drove himself to the ER. He reports the MRI did not show signs of a CVA and they did not administer injection, but he had another stroke by his presentation of symptoms. Family / Caregiver Present: No  Referring Practitioner: Ros Corona MD  Diagnosis: hemiplegia, nondominant, left side G81.94  OT Visit Information  Onset Date: 21  OT Insurance Information: [facility]  Total # of Visits Approved: 20  Total # of Visits to Date: 1  Certification Period Expiration Date: 21  Progress Note Due Date: 21  Subjective  Subjective: \"I try to make the best of the situation. I will come in her and give my 100%. \"  General Comment  Comments: Patient pleasant and cooperative. Pt ready to participate in skilled OT services. He presents with increasing strength in LUE compared to when he was in the hospital. He reports in the hospital it was flaccid. Pain Assessment  Pain Assessment: 0-10  Pain Level: 4  Pain Type: Chronic pain  Pain Location: Back  Pain Descriptors: Discomfort;Aching  Pain Frequency: Continuous  Pain Onset: On-going  Functional Pain Assessment: Prevents or interferes some active activities and ADLs  Non-Pharmaceutical Pain Intervention(s): Therapeutic presence; Therapeutic touch  Response to Pain Intervention: Patient Satisfied  Multiple Pain Sites: No  Vital Signs  Patient Currently in Pain: Yes     Home Living  Social/Functional History  Lives With: Spouse  Type of Home: House  Home Layout: One level  ADL Assistance: Independent  Homemaking Assistance: Independent  Ambulation Assistance: Independent  Transfer Assistance: Independent  Active : Yes  Mode of Transportation: Truck  Type of occupation: 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 Leisure & Hobbies: He enjoys fishing and working around the yard. Additional Comments: He is pursuing diasability and has since Dec 2019. Objective   Observation/Palpation  Posture: Good  Observation: Patient enters clinic with quad cane and dragging/shuffling LLE. He initially present with stuttering and noticeable speech deficits, but with continuing conversation (especially topics he enjoys) he begins articulating more clearly with less difficulty. Edema: No edema noted in the LUE this date. ADL  Feeding: Independent  Grooming: Independent  UE Bathing: Independent  LE Bathing: Independent  UE Dressing: Independent  LE Dressing: Independent  Toileting: Independent  Additional Comments: Pt reports increased weakness with non-dominant effected LUE throughout ADLs requiring increased compensation from RUE.      Sensation  Overall Sensation Status: WFL     Exercise Program:  Other exercises  Other exercises?: Yes  Other exercises 1: Supine BUE dowel bar shoulder flexion 3 sets x5 reps  Other exercises 2: Supine BUE dowel shoulder abduction 3 sets x5 reps  Other exercises 3: Sidelying gravity eliminated AAROM LUE shoulder flexion 3 sets x5 reps  Other exercises 4: LUE external rotation with 1# (elbow at 90 degrees at side) 3 sets x10 reps  Other exercises 5: LUE scaption to 30 degrees with 1# 3 sets x10 reps  Other exercises 6: BUE chair pushups 3 sets x5 reps  Other exercises 7: LUE tricep pulldowns with yellow or red theraband on top of doorway 3 sets x5-10 reps  Other exercises 8: Green theraputty exercises  Other exercises 9: Velcro board keys 1 & 2  Other exercises 10: LUE golf ball manipulation/rolling  Other exercises 11: Clothespins activity with emphasis on isolating 3 jaw maría, 2 point pinch, and lateral pinch      LUE PROM (degrees)  LUE PROM: WFL  LUE AROM (degrees)  LUE AROM : Exceptions  L Shoulder Flexion 0-180: 0-60  L Shoulder Extension 0-45: WFL  L Shoulder ABduction 0-180: 0-68  L Shoulder Int Rotation  0-70: WFL  L Shoulder Ext Rotation  0-90: WFL  L Elbow Flexion 0-145: 0-95  L Elbow Extension 145-0: WFL  L Forearm Pron 0-90: WFL  L Forearm Supination  0-90: WFL  L Wrist Flexion 0-80: WFL  L Wrist Extension 0-70: WFL  L Wrist Radial Deviation 0-20: WFL  L Wrist Ulnar Deviation 0-45: WFL  Left Hand PROM (degrees)  Left Hand PROM: WFL  Left Hand AROM (degrees)  Left Hand AROM: Exceptions  Left Hand General AROM: 90% of full fist  L Thumb Opposition: Unable to perform thumb opposition to 5th or 4th digit  RUE PROM (degrees)  RUE PROM: WN  RUE AROM (degrees)  RUE AROM : WNL  Right Hand PROM (degrees)  Right Hand PROM: WNL  Right Hand AROM (degrees)  Right Hand AROM: WNL  LUE Strength  Gross LUE Strength: Exceptions to [facility] Shoulder Flex: 2-/5  L Shoulder Ext: 3+/5  L Shoulder ABduction: 2-/5  L Shoulder ADduction: 4-/5  L Shoulder Int Rotation: 4-/5  L Shoulder Ext Rotation: 3+/5  L Elbow Flex: 3/5  L Elbow Ext: 3/5  L Wrist Flex: 3+/5  L Wrist Ext: 3/5  L Hand General: 3+/5  LUE Strength Comment: Pt unable to perform shoulder flexion/adbuction AROM in gravity eliminated.   RUE Strength  Gross RUE Strength: WFL     Hand Dominance  Hand Dominance: Right  Left Hand Strength -  (lbs)  Handle Setting 3: 36#, 30#, 35#  Left Hand Strength - Pinch (lbs)  Lateral: 9#, 10#, 10#  Tip: 10#, 7#, 9#  Palmar 3 point: 8#, 8#, 7#  LUE Edema - Circumference (cm)  LUE Edema Present?: No  Right Hand Strength -  (lbs)  Handle Setting 3: 107#, 110#, 111#  Right Hand Strength - Pinch (lbs)  Lateral: 28#, 26#, 29#  Tip: 15#, 15#, 16#  Palmar 3 point: 26#, 21#, 22#  RUE Edema - Circumference (cm)  RUE Edema Present?: No  Fine Motor Skills  Left 9 Hole Peg Test Time (secs): 27.93  Right 9 Hole Peg Test Time (secs): 20  Fine Motor Comment: RUE hand in 50th percentile for his age group, LUE hand in 10th percentile for his age group  Hand Assessment Comment  Hand Assessment Comment: Patient presents with deficits in coordination, dexterity, fine motor, manipulation, and proprioception in LUE. Assessment   Assessment  Performance deficits / Impairments: Decreased coordination;Decreased ROM; Decreased strength;Decreased fine motor control;Decreased high-level IADLs  Assessment: Patient presents with deficits on decreased coordination, dexterity, manipulation, ROM, strength, endurance, and proprioception of LUE, specifically in the shoulder and hand. Patient would greatly benefit from skilled OT services to address functional deficits for patient to increase LUE utilization during daily routines. Pt is very motivated and wants to remit to PLOF. Treatment Diagnosis: M62.81, R27.8, M25.61  Prognosis: Good  Decision Making: Medium Complexity  History: Patient has had a hx of TIA and CVAs. This is his 3rd CVA. He is an active smoker and reports he's trying to decrease his amount each day. He has not worked in 2-3 years since his CVAs. Assistance / Modification: Pt requires assistance throughout eval for LUE shoulder repositioning for assessments. REQUIRES OT FOLLOW UP: Yes  Treatment Initiated : Pt evaluated and assessed this date. Pt very appropriate for skilled OT services. Initiated LUE shoulder, forearm, and hand strengthening exercises this date. Green theraputty HEP given with pt demoing and verbalizing good carryover.   Discharge Recommendations: Continue to assess pending progress       Plan   Plan  Times per week: x2  Times per day: Daily  Plan weeks: 6  Specific instructions for Next Treatment: NMES/FES, dowel exercises in supine, AAROM LUE shoulder exercises in gravity eliminated, strengthen rotator cuff musculature with 1#  Current Treatment Recommendations: Strengthening, ROM, Manual Therapy:  STM, Neuromuscular Re-education, Modalities (comment), Self-Care / ADL  Plan Comment: Further assess NMES and & FES modalities to increased strengthening and functional ROM for LUE. OutComes Score      QuickDASH Total Score: 23 (03/05/21 1126)       QuickDASH Disability/Symptom Score : 27.27 % (03/05/21 1126)    Goals  Short term goals  Time Frame for Short term goals: 3 weeks  Short term goal 1: Patient to improve AROM shoulder flexion to >100 degrees. Short term goal 2: Patient to increase MMT score of elbow flexion/extension to 3+/5 for yardwork. Short term goal 3: Patient be able to open various light-moderate resistance containers with LUE hand. Short term goal 4: Patient to increase LUE  strength to 50# for fishing. Short term goal 5: Patient to be independent with home exercise program.  Long term goals  Time Frame for Long term goals : 6 weeks  Long term goal 1: Patient to improve AROM shoulder flexion to functional ROM of >130 degrees. Long term goal 2: Patient to increase MMT score of elbow flexion/extension to 4/5 for yardwork. Long term goal 3: Patient be able to open various heavy resistance containers with LUE hand. Long term goal 4: Patient to increase LUE  strength to 80# for fishing. Long term goal 5: Patient to improve LUE 9 hole peg test score to 50th percentile for age group. Patient Goals   Patient goals : \"I want to get my left arm stronger. I can't even open a jar anymore. \"       Therapy Time   Individual Concurrent Group Co-treatment   Time In 0902         Time Out 1005         Minutes 63         Timed Code Treatment Minutes: 63 Stella Copeland 95, OT Electronically signed by CLYDE Alvarez, OTR/L on 3/5/2021 at 11:28 AM

## 2021-03-08 ENCOUNTER — HOSPITAL ENCOUNTER (OUTPATIENT)
Dept: PHYSICAL THERAPY | Age: 56
Setting detail: THERAPIES SERIES
Discharge: HOME | End: 2021-03-08
Payer: COMMERCIAL

## 2021-03-08 ENCOUNTER — HOSPITAL ENCOUNTER (OUTPATIENT)
Dept: OCCUPATIONAL THERAPY | Age: 56
Setting detail: THERAPIES SERIES
Discharge: HOME | End: 2021-03-08
Payer: COMMERCIAL

## 2021-03-08 PROCEDURE — 97110 THERAPEUTIC EXERCISES: CPT

## 2021-03-08 PROCEDURE — 97530 THERAPEUTIC ACTIVITIES: CPT

## 2021-03-08 PROCEDURE — 97032 APPL MODALITY 1+ESTIM EA 15: CPT

## 2021-03-08 ASSESSMENT — PAIN DESCRIPTION - LOCATION: LOCATION: BACK

## 2021-03-08 ASSESSMENT — PAIN DESCRIPTION - ORIENTATION: ORIENTATION: LOWER

## 2021-03-08 ASSESSMENT — PAIN DESCRIPTION - DESCRIPTORS: DESCRIPTORS: ACHING

## 2021-03-08 ASSESSMENT — PAIN DESCRIPTION - FREQUENCY: FREQUENCY: CONTINUOUS

## 2021-03-08 ASSESSMENT — PAIN SCALES - GENERAL: PAINLEVEL_OUTOF10: 4

## 2021-03-08 ASSESSMENT — PAIN DESCRIPTION - ONSET: ONSET: ON-GOING

## 2021-03-08 NOTE — PROGRESS NOTES
Physical Therapy  Daily Treatment Note  Date: 3/8/2021  Patient Name: Bryant Arriaga  MRN: [id number]     :   1965    Subjective:   General  Additional Pertinent Hx: 49year old male recently referred to PT with diagnosis of low back pain, currently referred back to PT with diagnosis of left sided hemiplegia and dysarthria. He had been seen in the past at our facility for rehab following a past stroke. Referring Practitioner: Pierre Springer MD  PT Insurance Information: [facility] (20 visits per year, no precert)  Total # of Visits Approved: 18  Total # of Visits to Date: 3  Plan of Care/Certification Expiration Date: 21  Progress Note Due Date: 21  Subjective: I found my  AFO and the blue band, my back is bothering me more than anything.   Patient Currently in Pain: Yes  Pain Level: 4  Pain Type: Chronic pain  Pain Location: Back  Pain Orientation: Lower  Pain Descriptors: Aching       Treatment Activities:          Exercises  Exercise 1: RECOMMEND AMB FIRST**          ambulation up to 6 minutes with SBQC and blue t-band hip/knee flexor assist (shoe to anterior hip, attached to belt)  642'  in 6 MWT  Exercise 2: supine SAQ's with tapping/DTR faciliatation to quads/patellar tendon x 10 --this -------eccentric lowering x 15 with assist from therapist  Exercise 3: supine heel slides x 15--min to assist  and also resisted hip/knee ext  Exercise 4: supine lower trunk rotation stretch to right--4 reps, 15 seconds each  Exercise 5: sitting LAQ's with faciliatation (as needed)  and eccentric lowering x 15  Exercise 6: sitting h/s curls with band and facilitation as needed (yellow) x 10 and partial ROM *with facilitation  Exercise 7: sitting ball squeeze supine with manual assist x 15  Exercise 8: sitting hip abduction with band (with blocking of right leg to recruit left)  yellow  x 15  Exercise 9: sitting foot tapping DF 1 x 20 ea  Exercise 10: sitting heel raises with weights over lower thighs  5#  x 20 ea  Exercise 11: sitting marching x 10 ea  Exercise 12: repeated sit to stand with forced recruitment of left leg (higher surface to lower)  x 10  Exercise 13: partial squats--10 reps  Exercise 14: standing left hip flexion x 15 with AA  Exercise 15: standing left hip hike  x 10  Exercise 16: standing and marching--1'  Exercise 17: sidestepping in bars x 3 dwn/back   Exercise 18: supine left quad sets 3\" x 20--**  Exercise 19: ambulation in parallel bars  (down & back) using only right hand and with t-band flexion assist  x 2  Exercise 20: supine foot dorsiflexion/plantarflexion/eversion (DF and eversion with NMES x 10', pad just distal to tibial plateau and middle of ant tib) and PF with manual resist         Assessment:   Conditions Requiring Skilled Therapeutic Intervention  Body structures, Functions, Activity limitations: Decreased functional mobility ; Decreased ADL status; Decreased ROM; Decreased strength;Decreased balance;Decreased high-level IADLs;Decreased fine motor control;Decreased coordination;Decreased endurance; Increased pain  Assessment: Patient walked today with AFO and blue band assisting with hip flexion and DF and gait distance increased by 218' compaired to previous visit w/o these for assist, fwd walking in // bars using RUE to simulate cane and quad sets were added to program today so entire rouitne was performed this visit. Achieved good combination motion of DF/EVR using NMES. REQUIRES PT FOLLOW UP: Yes             Goals:  Short term goals  Time Frame for Short term goals: 6-8 weeks  Short term goal 1: Patient to be independent with HEP. Short term goal 2: Patient to be independent with resumption of use of AFO (if foot strength remains insufficient). Short term goal 3: Patient able to ambulate with roller walker with less drag of left foot. Short term goal 4: Patient to report greater ease getting in and out of vehicle.   Long term goals  Time Frame for Long term goals : 9-12 weeks  Long

## 2021-03-10 ENCOUNTER — HOSPITAL ENCOUNTER (OUTPATIENT)
Dept: OCCUPATIONAL THERAPY | Age: 56
Setting detail: THERAPIES SERIES
Discharge: HOME | End: 2021-03-10
Payer: COMMERCIAL

## 2021-03-10 ENCOUNTER — HOSPITAL ENCOUNTER (OUTPATIENT)
Dept: PHYSICAL THERAPY | Age: 56
Setting detail: THERAPIES SERIES
Discharge: HOME | End: 2021-03-10
Payer: COMMERCIAL

## 2021-03-10 PROCEDURE — 97110 THERAPEUTIC EXERCISES: CPT

## 2021-03-10 PROCEDURE — 97112 NEUROMUSCULAR REEDUCATION: CPT

## 2021-03-10 PROCEDURE — 97032 APPL MODALITY 1+ESTIM EA 15: CPT

## 2021-03-10 ASSESSMENT — PAIN DESCRIPTION - DESCRIPTORS: DESCRIPTORS: ACHING

## 2021-03-10 ASSESSMENT — PAIN DESCRIPTION - LOCATION
LOCATION: BACK
LOCATION: BACK

## 2021-03-10 ASSESSMENT — PAIN DESCRIPTION - PAIN TYPE: TYPE: CHRONIC PAIN

## 2021-03-10 ASSESSMENT — PAIN - FUNCTIONAL ASSESSMENT: PAIN_FUNCTIONAL_ASSESSMENT: PREVENTS OR INTERFERES SOME ACTIVE ACTIVITIES AND ADLS

## 2021-03-10 NOTE — PROGRESS NOTES
Daily Treatment Note  Date: 3/10/2021  Patient Name: Palmira Watson  :  1965  MRN: [id number]       Subjective   General  Chart Reviewed: Yes  Patient assessed for rehabilitation services?: Yes  Additional Pertinent Hx: Patient has had multiple TIA and CVAs. His most recent CVA was 2 weeks ago from 3/8/21. He went to get up and use the restroom and felt his face felt \"funny\" and when he looked in the mirror he noticed it drooping. He drove himself to the ER. He reports the MRI did not show signs of a CVA and they did not administer injection, but he had another stroke by his presentation of symptoms. Family / Caregiver Present: No  Referring Practitioner: Tabatha Fabian MD  Diagnosis: hemiplegia, nondominant, left side G81.94  OT Visit Information  Onset Date: 21  OT Insurance Information: [facility]  Total # of Visits Approved: 20  Total # of Visits to Date: 3  Certification Period Expiration Date: 21  Progress Note Due Date: 21  Subjective  Subjective: \"Oh PT really worked me over. I'm tired today. \"  General Comment  Comments: Patient reports he will still work hard through therapy despite being tired. Instructed patient to rest as needed and to breath throughout exercises. Pre Treatment Pain Screening  Intervention List: Patient able to continue with treatment  Comments / Details: Patient does not c/o of LUE at end of session. Pain Assessment  Pain Level: 4  Pain Type: Chronic pain  Pain Location: Back  Pain Orientation: Lower  Pain Descriptors: Aching  Pain Frequency: Continuous  Pain Onset: On-going  Functional Pain Assessment: Prevents or interferes some active activities and ADLs  Non-Pharmaceutical Pain Intervention(s): Therapeutic presence; Therapeutic touch  Response to Pain Intervention: Patient Satisfied  Multiple Pain Sites: No  Vital Signs  Patient Currently in Pain: Yes     Treatment Activities:    Other exercises  Other exercises?: Yes  Other exercises 1: Supine BUE dowel bar shoulder including scapular musculature and rotator cuff. Plan   Plan  Times per week: x2  Times per day: Daily  Plan weeks: 6  Specific instructions for Next Treatment: NMES/FES, dowel exercises in supine, AAROM LUE shoulder exercises in gravity eliminated, strengthen rotator cuff musculature with 2# and wall isometric exercises. Current Treatment Recommendations: Strengthening, ROM, Manual Therapy:  STM, Neuromuscular Re-education, Modalities (comment), Self-Care / ADL       Goals  Short term goals  Time Frame for Short term goals: 3 weeks  Short term goal 1: Patient to improve AROM shoulder flexion to >100 degrees. Short term goal 2: Patient to increase MMT score of elbow flexion/extension to 3+/5 for yardwork. Short term goal 3: Patient be able to open various light-moderate resistance containers with LUE hand. Short term goal 4: Patient to increase LUE  strength to 50# for fishing. Short term goal 5: Patient to be independent with home exercise program.  Long term goals  Time Frame for Long term goals : 6 weeks  Long term goal 1: Patient to improve AROM shoulder flexion to functional ROM of >130 degrees. Long term goal 2: Patient to increase MMT score of elbow flexion/extension to 4/5 for yardwork. Long term goal 3: Patient be able to open various heavy resistance containers with LUE hand. Long term goal 4: Patient to increase LUE  strength to 80# for fishing. Long term goal 5: Patient to improve LUE 9 hole peg test score to 50th percentile for age group. Patient Goals   Patient goals : \"I want to get my left arm stronger. I can't even open a jar anymore. \"    Therapy Time   Individual Concurrent Group Co-treatment   Time In 1400         Time Out 1501         Minutes 61         Timed Code Treatment Minutes: 2020 [facility],  Electronically signed by VIANEY Covarrubias on 3/10/2021 at 4:20 PM

## 2021-03-10 NOTE — PROGRESS NOTES
sitting marching x 10 ea  Exercise 12: repeated sit to stand with forced recruitment of left leg (higher surface to lower)  x 10  Exercise 13: partial squats--10 reps  Exercise 14: standing left hip flexion x 15 with AA  Exercise 15: standing left hip hike  x 10  Exercise 16: standing and marching--1'  Exercise 17: sidestepping in bars x 3 dwn/back   Exercise 18: ambulation in parallel bars  (down & back) using only right hand and with t-band flexion assist  x 2  Exercise 19: supine left quad sets 3\" x 20--**  Exercise 20: supine foot dorsiflexion/plantarflexion/eversion (DF and eversion with NMES x 10', pad just distal to tibial plateau and middle of ant tib) and PF with manual resist        Assessment:   Conditions Requiring Skilled Therapeutic Intervention  Body structures, Functions, Activity limitations: Decreased functional mobility ; Decreased ADL status; Decreased ROM; Decreased strength;Decreased balance;Decreased high-level IADLs;Decreased fine motor control;Decreased coordination;Decreased endurance; Increased pain  Assessment: Patient had more difficulty being able to clear his L foot today while walking and performingseated and standing hip flexion. Able to obtain a good combination of ankle DF and eversion while on NMES and had increased difficulty with heel slide as well. Will continue to advance his routine as appropriate. REQUIRES PT FOLLOW UP: Yes             Goals:  Short term goals  Time Frame for Short term goals: 6-8 weeks  Short term goal 1: Patient to be independent with HEP. Short term goal 2: Patient to be independent with resumption of use of AFO (if foot strength remains insufficient). Short term goal 3: Patient able to ambulate with roller walker with less drag of left foot. Short term goal 4: Patient to report greater ease getting in and out of vehicle.   Long term goals  Time Frame for Long term goals : 9-12 weeks  Long term goal 1: Patient able to ambulate with quad cane or single point cane 500'. Long term goal 2: Patient able to perform sit to stand 8 x in 30 seconds with increased weight bourne through left LE. Long term goal 3: Patient to have >= 4-/5 strength in left foot for df, pf, inversion and eversion. Long term goal 4: Patient to have >=4-/5 left hip flexors, 4+/5 left knee extensors. Long term goal 5: Patient to show increased protective reactions with standing balance perturbations. (11/27: Able to accept mod perturbations in all directions, but continued delay in LLE reaction, as well as generally erratic motions.)  Patient Goals   Patient goals : Return back to normal level of function, walk without a device or 1-point cane. Plan:    Times per week: 2x per week  Plan weeks: 6-9 weeks  Specific instructions for Next Treatment: Request made by the office to seek OT eval to address weakness and limited use of left arm. He will have a consult for speech therapy 3/16/21.   Current Treatment Recommendations: Strengthening, ROM, Balance Training, Transfer Training, Endurance Training, Gait Training, Stair training, Neuromuscular Re-education, Home Exercise Program, Patient/Caregiver Education & Training, Equipment Evaluation, Education, & procurement        Therapy Time   Individual Concurrent Group Co-treatment   Time In 1300         Time Out 1359         Minutes 61                 Leyda Recio PTA    Electronically signed by Leyda Recio PTA on 3/10/2021 at 3:20 PM

## 2021-03-15 ENCOUNTER — APPOINTMENT (OUTPATIENT)
Dept: PHYSICAL THERAPY | Age: 56
End: 2021-03-15
Payer: COMMERCIAL

## 2021-03-16 ENCOUNTER — HOSPITAL ENCOUNTER (OUTPATIENT)
Dept: OCCUPATIONAL THERAPY | Age: 56
Setting detail: THERAPIES SERIES
Discharge: HOME | End: 2021-03-16
Payer: COMMERCIAL

## 2021-03-16 ENCOUNTER — HOSPITAL ENCOUNTER (OUTPATIENT)
Dept: SPEECH THERAPY | Age: 56
Setting detail: THERAPIES SERIES
Discharge: HOME | End: 2021-03-16
Payer: COMMERCIAL

## 2021-03-16 ENCOUNTER — HOSPITAL ENCOUNTER (OUTPATIENT)
Dept: PHYSICAL THERAPY | Age: 56
Setting detail: THERAPIES SERIES
Discharge: HOME | End: 2021-03-16
Payer: COMMERCIAL

## 2021-03-16 PROCEDURE — 97032 APPL MODALITY 1+ESTIM EA 15: CPT

## 2021-03-16 PROCEDURE — 97110 THERAPEUTIC EXERCISES: CPT

## 2021-03-16 PROCEDURE — 97112 NEUROMUSCULAR REEDUCATION: CPT

## 2021-03-16 PROCEDURE — 92521 EVALUATION OF SPEECH FLUENCY: CPT

## 2021-03-16 ASSESSMENT — PAIN DESCRIPTION - LOCATION: LOCATION: BACK

## 2021-03-16 ASSESSMENT — PAIN DESCRIPTION - ORIENTATION
ORIENTATION: LOWER
ORIENTATION: LOWER

## 2021-03-16 ASSESSMENT — PAIN DESCRIPTION - PAIN TYPE: TYPE: CHRONIC PAIN

## 2021-03-16 ASSESSMENT — PAIN - FUNCTIONAL ASSESSMENT: PAIN_FUNCTIONAL_ASSESSMENT: PREVENTS OR INTERFERES SOME ACTIVE ACTIVITIES AND ADLS

## 2021-03-16 ASSESSMENT — PAIN DESCRIPTION - DESCRIPTORS
DESCRIPTORS: ACHING
DESCRIPTORS: ACHING

## 2021-03-16 ASSESSMENT — PAIN DESCRIPTION - ONSET: ONSET: ON-GOING

## 2021-03-16 NOTE — PROGRESS NOTES
Physical Therapy  Daily Treatment Note  Date: 3/16/2021  Patient Name: Ingris Murillo  MRN: [id number]     :   1965    Subjective:   General  Additional Pertinent Hx: 49year old male recently referred to PT with diagnosis of low back pain, currently referred back to PT with diagnosis of left sided hemiplegia and dysarthria. He had been seen in the past at our facility for rehab following a past stroke. Referring Practitioner: Aakash Rosales MD  PT Insurance Information: [facility] (20 visits per year, no precert)  Total # of Visits Approved: 18  Total # of Visits to Date: 5  Plan of Care/Certification Expiration Date: 21  Subjective: My AFO is broken again and i have an appointment on the  to be fitted for a new one, i took a flexeril before i came because of my back.   Patient Currently in Pain: Yes  Pain Level: 4  Pain Type: Chronic pain  Pain Location: Back  Pain Orientation: Lower  Pain Descriptors: Aching       Treatment Activities:         Exercises  Exercise 1: RECOMMEND AMB FIRST**          ambulation up to 6 minutes with SBQC and blue t-band hip/knee flexor assist (shoe to anterior hip, attached to belt)  661'  in 6 MWT  Exercise 2: supine SAQ's with tapping/DTR faciliatation to quads/patellar tendon x 10 --this -------eccentric lowering x 15 with assist from therapist  Exercise 3: supine heel slides x 15--min to assist  and also resisted hip/knee ext  Exercise 4: supine lower trunk rotation stretch to right--4 reps, 15 seconds each  Exercise 5: sitting LAQ's with faciliatation (as needed)  and eccentric lowering x 15  Exercise 6: sitting h/s curls with band and facilitation as needed (yellow) x 10 and partial ROM *with facilitation  Exercise 7: sitting ball squeeze supine with manual assist x 15  Exercise 8: sitting hip abduction with band (with blocking of right leg to recruit left)  yellow  x 15  Exercise 9: sitting foot tapping DF 1 x 20 ea  Exercise 10: sitting heel raises with weights over lower thighs  5#  x 20 ea  Exercise 11: sitting marching x 10 ea  Exercise 12: repeated sit to stand with forced recruitment of left leg (higher surface to lower)  x 10  Exercise 13: partial squats--10 reps  Exercise 14: standing left hip flexion x 15 with AA  Exercise 15: standing left hip hike  x 10  Exercise 16: standing and marching--1'  Exercise 17: sidestepping in bars x 3 dwn/back   Exercise 19: supine left quad sets 3\" x 20--**         Assessment:   Conditions Requiring Skilled Therapeutic Intervention  Body structures, Functions, Activity limitations: Decreased functional mobility ; Decreased ADL status; Decreased ROM; Decreased strength;Decreased balance;Decreased high-level IADLs;Decreased fine motor control;Decreased coordination;Decreased endurance; Increased pain  Assessment: Gait distance was increased by 31' performing 6 MWT but not wearing AFO so foot was scooting/dragging vs being able to  L foot to clear floor and occured while performing walking activites in // bars. Had less eccentric control with SAQ and LAQ today but demonstrated more active DF performing seated toe raises. REQUIRES PT FOLLOW UP: Yes        Goals:  Short term goals  Time Frame for Short term goals: 6-8 weeks  Short term goal 1: Patient to be independent with HEP. Short term goal 2: Patient to be independent with resumption of use of AFO (if foot strength remains insufficient). Short term goal 3: Patient able to ambulate with roller walker with less drag of left foot. Short term goal 4: Patient to report greater ease getting in and out of vehicle. Long term goals  Time Frame for Long term goals : 9-12 weeks  Long term goal 1: Patient able to ambulate with quad cane or single point cane 500'. Long term goal 2: Patient able to perform sit to stand 8 x in 30 seconds with increased weight bourne through left LE. Long term goal 3: Patient to have >= 4-/5 strength in left foot for df, pf, inversion and eversion.   Long term goal 4: Patient to have >=4-/5 left hip flexors, 4+/5 left knee extensors. Long term goal 5: Patient to show increased protective reactions with standing balance perturbations. (11/27: Able to accept mod perturbations in all directions, but continued delay in LLE reaction, as well as generally erratic motions.)  Patient Goals   Patient goals : Return back to normal level of function, walk without a device or 1-point cane. Plan:    Times per week: 2x per week  Plan weeks: 6-9 weeks  Specific instructions for Next Treatment: Request made by the office to seek OT eval to address weakness and limited use of left arm. He will have a consult for speech therapy 3/16/21.   Current Treatment Recommendations: Strengthening, ROM, Balance Training, Transfer Training, Endurance Training, Gait Training, Stair training, Neuromuscular Re-education, Home Exercise Program, Patient/Caregiver Education & Training, Equipment Evaluation, Education, & procurement        Therapy Time   Individual Concurrent Group Co-treatment   Time In 1101         Time Out 1200         Minutes 61                 Alni García PTA    Electronically signed by Alin García PTA on 3/16/2021 at 12:06 PM

## 2021-03-16 NOTE — PROGRESS NOTES
Daily Treatment Note  Date: 3/16/2021  Patient Name: Jd Quigley  :  1965  MRN: [id number]       Subjective   General  Chart Reviewed: Yes  Patient assessed for rehabilitation services?: Yes  Additional Pertinent Hx: Patient has had multiple TIA and CVAs. His most recent CVA was 2 weeks ago from 3/8/21. He went to get up and use the restroom and felt his face felt \"funny\" and when he looked in the mirror he noticed it drooping. He drove himself to the ER. He reports the MRI did not show signs of a CVA and they did not administer injection, but he had another stroke by his presentation of symptoms. Family / Caregiver Present: No  Referring Practitioner: Yaneth Lobo MD  Diagnosis: hemiplegia, nondominant, left side G81.94  OT Visit Information  Onset Date: 21  OT Insurance Information: [facility]  Total # of Visits Approved: 20  Total # of Visits to Date: 4  Certification Period Expiration Date: 21  Progress Note Due Date: 21  Subjective  Subjective: \"I'm feeling good today. My arm is getting better than it was. \"  General Comment  Comments: Patient reports he looks forward to coming to therapy. Pre Treatment Pain Screening  Intervention List: Patient able to continue with treatment  Comments / Details: Patient does not c/o of LUE pain at end of session. Pain Assessment  Pain Level: 4  Pain Type: Chronic pain  Pain Location: Back  Pain Orientation: Lower  Pain Descriptors: Aching  Pain Frequency: Continuous  Pain Onset: On-going  Functional Pain Assessment: Prevents or interferes some active activities and ADLs  Non-Pharmaceutical Pain Intervention(s): Therapeutic presence; Therapeutic touch; Distraction  Response to Pain Intervention: Patient Satisfied  Vital Signs  Patient Currently in Pain: Yes     Treatment Activities:    Other exercises  Other exercises?: Yes  Other exercises 1: Supine BUE dowel bar shoulder flexion 4 sets x5 reps with NMES  Other exercises 2: Supine BUE dowel shoulder abduction 62 Minutes  OT Equipment Recommendations  Other: Pt requires strengthening in all LUE musculature. Significant weakness throughout including scapular musculature and rotator cuff. Extra exercises added to exercise program this date to address deficits. Plan   Plan  Times per week: x2  Times per day: Daily  Plan weeks: 6  Specific instructions for Next Treatment: NMES/FES, dowel exercises in supine, AAROM LUE shoulder exercises in gravity eliminated, strengthen rotator cuff musculature with 2# and wall isometric exercises. Current Treatment Recommendations: Strengthening, ROM, Manual Therapy:  STM, Neuromuscular Re-education, Modalities (comment), Self-Care / ADL       Goals  Short term goals  Time Frame for Short term goals: 3 weeks  Short term goal 1: Patient to improve AROM shoulder flexion to >100 degrees. Short term goal 2: Patient to increase MMT score of elbow flexion/extension to 3+/5 for yardwork. Short term goal 3: Patient be able to open various light-moderate resistance containers with LUE hand. Short term goal 4: Patient to increase LUE  strength to 50# for fishing. Short term goal 5: Patient to be independent with home exercise program.  Long term goals  Time Frame for Long term goals : 6 weeks  Long term goal 1: Patient to improve AROM shoulder flexion to functional ROM of >130 degrees. Long term goal 2: Patient to increase MMT score of elbow flexion/extension to 4/5 for yardwork. Long term goal 3: Patient be able to open various heavy resistance containers with LUE hand. Long term goal 4: Patient to increase LUE  strength to 80# for fishing. Long term goal 5: Patient to improve LUE 9 hole peg test score to 50th percentile for age group. Patient Goals   Patient goals : \"I want to get my left arm stronger. I can't even open a jar anymore. \"    Therapy Time   Individual Concurrent Group Co-treatment   Time In 0901         Time Out 0959         Minutes 58         Timed Code Treatment Minutes: 1190 Yung Hamilton OT Electronically signed by DANIS Fitzpatrick/L on 3/16/2021 at 10:35 AM

## 2021-03-16 NOTE — PROGRESS NOTES
contrast. 20 mL MultiHance IV contrast.   Findings: There is no diffusion signal abnormality to suggest acute infarct. There is no intra-axial or extra-axial hemorrhage. No visualized mass   lesion or pathologic enhancement. Normal size and configuration of the   ventricular system for patient age. The posterior fossa structures are   unremarkable. The pituitary gland and sella are unremarkable. The   major intracranial flow voids are preserved. The orbits are unremarkable. Minimal chronic mucosal thickening in the   paranasal sinuses. The mastoids are clear. Marrow signal in the   calvarium and skull base appears normal.       Impression   Impression:    1. No acute intracranial process. In particular, there is no evidence   of acute ischemia. Signed by Dr Latisha Rodriguez on 2/22/2021 1:50 PM           Assessment:  The patient is recommended to be seen by speech therapy 1x/week for 6 weeks for severe dysfluencies characterized by initial sound and part word repetitions. He is also exhibiting secondary behaviors such as poor eye contact, head movements, and obvious tension. His dysfluenices have worsened since he was last seen at outpatient on June 6, 2020. Plan:   Goals:  Short-term Goals  Goal 1: The patient will complete tasks for easy onset of speech to reduce word repetitions with 80% accuracy and minimal verbal/visual cues. Goal 2: The patient will complete tasks for easy onset of speech to reduce sound repetitions with 80% accuracy and minimal verbal/visual cues. Goal 3: The patient will use cancellation and pull-out techniques for 80% of dysfluencies in a structured conversational task. Long-term Goals  Goal 1: The patient will demonstrate more fluent speech during unstructured tasks to communicate effectively with members of the community and family members.    Patient/family involved in developing goals and treatment plan: yes    Subjective:  General  Chart Reviewed: Yes  Patient assessed for rehabilitation services?: Yes  Family / Caregiver Present: No     Vision  Vision: Impaired  Vision Exceptions: Wears glasses for reading  Hearing  Hearing: Within functional limits           Objective: Auditory Comprehension  Yes/No Questions: (WNL)  Basic Questions: (WNL)  One Step Basic Commands: (WNL)  Two Step Basic Commands: (WNL)  Common Objects: (WNL)  Pictures: (WNL)  L/R Discrimination: (WNL)  Conversation: (WNL)    Reading Comprehension  Reading Status: Within functional limits    Expression  Primary Mode of Expression: Verbal    Verbal Expression  Initiation: (WNL)  Repetition: (WNL)  Automatic Speech: (WNL)  Convergent: (WNL)  Divergent: (WNL)  Conversation: (WNL)    Written Expression  Dominant Hand: Right  Written Expression: Within Functional Limits    Motor Speech  Motor Speech: Exceptions to WFL  Dysarthria : Mild    Pragmatics/Social Functioning  Affect: (WFL)  Eye Contact: (WFL)  Topic Maintenance: (WFL)  Turn Taking: (WFL)    Cognition:   Orientation  Overall Orientation Status: Within Normal Limits  Attention  Attention: Within Functional Limits  Memory  Memory: Within Funtional Limits  Problem Solving  Problem Solving: Within Functional Limits  Numeric Reasoning  Numeric Reasoning: Within Functional Limits    Additional Assessments: The Cognitive Linguistic Quick Test (CLQT) was developed for use with adults with acquired neurological dysfunction. This individually administered test is designed to gain information about cognitive-linguistic domains, a total composite severity rating and a clock drawing severity rating. The CLQT consists of ten tasks: Personal facts, Symbol cancellation, Confrontation naming, Clock drawing, Story retelling, Symbol trails, Generative naming, Design memory, Mazes, and Design generation.   He scored a 3.0 which is in the mild scoring range.        CLQT  Cognitive Domain Score Ranges of Severity    Severity Ranking   ATTENTION 172 179-125 Mild   MEMORY 161 185-155 WNL   EXECUTIVE FUNCTION 30 40-24 WNL   LANGUAGE 33 37-29 WNL   VISUOSPATIAL 91 105-82 WNL   COMPOSITE SEVERITY RANKING 3.0 4.0-3.5 Mild      The Stuttering Severity Instrument-4 or SSI-4 was completed this date.         SSI-4 Scores  Frequency 8   Duration 10   Physical Concomitants 5   Total Score 33   Percentile 78-88   Severity  Severe               Prognosis:  Fair    Education:  Education was provide regarding his scores on today's assessments. He verbalized understanding.         3/16/2021 5:13 PM     Electronically Signed By:  Lianet Blankenship M.S., CCC-SLP  3/16/2021,5:23 PM.

## 2021-03-17 NOTE — PROGRESS NOTES
[facility]  OUTPATIENT PHYSICAL THERAPY  DISCHARGE SUMMARY    Date: 3/17/2021  Patient Name: Tammi Jung        MRN: [id number]    ACCOUNT #: [de-identified]  : 1965  (49 y.o.)  Gender: male   Referring Practitioner: Krystyna Nguyễn  Diagnosis: low back pain (M54.5)  Referral Date : 21       Total # of Visits Approved: 12(12 visits anticipated for this diagnoses)  Total # of Visits to Date: 2    Subjective  Subjective: Patient reports no change since last seen yesterday for initial eval. Pain level about the same, 5/10. (Statement made at second session attended for back treatment). Additional Pertinent Hx: 49year old male referred to PT with diagnosis of low back pain. He had been seen in the past at our facility for rehab following a stroke. Recent x-ray showed mild retrolisthesis of L4 on L5 which may represent subluxation at the level of the facets. Objective  Treatments received included strengthening, ROM, Patient/Caregiver Education & Training, Modalities, Positioning, Pain Management, Manual Therapy - Joint Manipulation, Manual Therapy - Soft Tissue Mobilization, Home Exercise Program  See objective/subjective data in goals    Assessment  Assessment: At his second session attended for treatment of low back pain, Mr. Lauren Rosa appeared to tolerate his first PT session well with some modifications to standing exercises due to his past history of stroke and left hemiparesis. His left hamstrings in particular appeared tight compared to right side. He reported pain level remained at about a 5/10, but his low back and left leg felt more \"limber\". Since his second session, Mr. Kimberly Elder had an extension of his stroke and was referred back to PT for stroke rehab. As such, his time with therapy has not allowed for low back treatments and I will discharge this part of his treatment. I will issue him a written back program for him to continue with back exercises independently.            Plan: Discharge back treatment at this time to concentrate on stroke rehab. Plan to issue written back exercise routine for him to work on exercises at home. Goals  Short term goals  Time Frame for Short term goals: 3-4 weeks  Short term goal 1: Patient to be independent with HEP. Short term goal 2: Patient to report minimal to no radicular pain in left LE. Short term goal 3: Patient to report being able to stand long enough to go grocery shopping or  line. Long term goals  Time Frame for Long term goals : 4- weeks  Long term goal 1: Patient able to stand with equal weight between sides. Long term goal 2: Patient to have >= 30 degrees lumbar extension. Long term goal 3: Patient to score <=20% impairment on the Oswestry Disability Questionnaire.          Electronically signed by Dasha Mhaoney PT on 3/17/2021 at 4:56 PM

## 2021-03-19 ENCOUNTER — HOSPITAL ENCOUNTER (OUTPATIENT)
Dept: PHYSICAL THERAPY | Age: 56
Setting detail: THERAPIES SERIES
Discharge: HOME | End: 2021-03-19
Payer: COMMERCIAL

## 2021-03-19 ENCOUNTER — HOSPITAL ENCOUNTER (OUTPATIENT)
Dept: OCCUPATIONAL THERAPY | Age: 56
Setting detail: THERAPIES SERIES
Discharge: HOME | End: 2021-03-19
Payer: COMMERCIAL

## 2021-03-19 PROCEDURE — 97112 NEUROMUSCULAR REEDUCATION: CPT

## 2021-03-19 PROCEDURE — 97110 THERAPEUTIC EXERCISES: CPT

## 2021-03-19 PROCEDURE — 97032 APPL MODALITY 1+ESTIM EA 15: CPT

## 2021-03-19 ASSESSMENT — PAIN DESCRIPTION - DESCRIPTORS: DESCRIPTORS: ACHING

## 2021-03-19 ASSESSMENT — PAIN DESCRIPTION - FREQUENCY: FREQUENCY: CONTINUOUS

## 2021-03-19 ASSESSMENT — PAIN DESCRIPTION - LOCATION
LOCATION: BACK
LOCATION: BACK

## 2021-03-19 ASSESSMENT — PAIN DESCRIPTION - PAIN TYPE: TYPE: CHRONIC PAIN

## 2021-03-19 ASSESSMENT — PAIN SCALES - GENERAL
PAINLEVEL_OUTOF10: 4
PAINLEVEL_OUTOF10: 3

## 2021-03-19 ASSESSMENT — PAIN DESCRIPTION - ORIENTATION: ORIENTATION: LOWER

## 2021-03-19 NOTE — PROGRESS NOTES
Daily Treatment Note  Date: 3/19/2021  Patient Name: Teena Rivero  :  1965  MRN: [id number]       Subjective   General  Chart Reviewed: Yes  Patient assessed for rehabilitation services?: Yes  Additional Pertinent Hx: Patient has had multiple TIA and CVAs. His most recent CVA was 2 weeks ago from 3/8/21. He went to get up and use the restroom and felt his face felt \"funny\" and when he looked in the mirror he noticed it drooping. He drove himself to the ER. He reports the MRI did not show signs of a CVA and they did not administer injection, but he had another stroke by his presentation of symptoms. Family / Caregiver Present: No  Referring Practitioner: Elisa Turner MD  Diagnosis: hemiplegia, nondominant, left side G81.94  OT Visit Information  Onset Date: 21  OT Insurance Information: [facility]  Total # of Visits Approved: 20  Total # of Visits to Date: 5  Certification Period Expiration Date: 21  Progress Note Due Date: 21  Subjective  Subjective: \"I'm tired. You guys do a good job. \"  General Comment  Comments: Patient reports he has had a good therapy session today and is ready to rest this weekend. Pre Treatment Pain Screening  Intervention List: Patient able to continue with treatment  Pain Assessment  Pain Level: 4  Pain Type: Chronic pain  Pain Location: Back  Pain Orientation: Lower  Pain Descriptors: Aching  Pain Frequency: Continuous  Pain Onset: On-going  Functional Pain Assessment: Prevents or interferes some active activities and ADLs  Non-Pharmaceutical Pain Intervention(s): Distraction; Therapeutic touch; Therapeutic presence  Response to Pain Intervention: Patient Satisfied  Vital Signs  Patient Currently in Pain: Yes     Treatment Activities:    Other exercises  Other exercises?: Yes  Other exercises 1: Supine BUE dowel bar shoulder flexion 4 sets x5 reps with NMES  Other exercises 2: Supine BUE dowel shoulder abduction 4 sets x5 reps with NMES  Other exercises 4: LUE external rotation with 2# (elbow at 90 degrees at side) 2 sets x15 reps  Other exercises 5: LUE scaption to 30 degrees with 2# 2 sets x15 reps  Other exercises 7: LUE tricep pulldowns with red theraband on top of doorway 2 sets x10 reps  Other exercises 9: Velcro board keys 1 & 2  Other exercises 11: Clothespins activity 1 pt pinch on yellow & red, 3 pt pinch on green, and lateral pinch on blue- upgraded as tolerated  Other exercises 12: Isometric wall exercises of shoulder flexion, shoulder extension, ext rotation, int rotation with towel 2 sets x10 reps of each  Other exercises 13: BUE digit extension exercise with blue resistive rubberband 2 set x15 reps  Other exercises 14: LUE digit flexion with red digi  and red digi squeeze 2 sets x15 reps  Other exercises 15: LUE wall slides in shoulder flexion with pilow sheet 1 set x10 reps      Modalities: Yes  Electrical Stimulation  Electrical Stimulation Location: Left; Shoulder  Electrical Stimulation Action: Shoulder flexion with dowel BUE support and shoulder abduction with dowel BUE support  Electrical Stimulation Parameters: 25-30 intensity with 10/10 cycle, sh flex pads on anterior deltoid, sh abd pads on anterior and middle deltoid. Electrical Stimulation Goals: Strength     Assessment   Performance deficits / Impairments: Decreased coordination;Decreased ROM; Decreased strength;Decreased fine motor control;Decreased high-level IADLs  Assessment: Patient continues to make progress with LUE strengthening, coordination, dexterity, and ROM with POC. Pt enjoys NMES on shoulder during exercises for increased stretch and function. Added wall slides in shoulder flexion this date due to increased shoulder flexion while sitting with 2# dumbbell. Patient tolerated session well with minimal rest breaks. Treatment Diagnosis: M62.81, R27.8, M25.61  Prognosis: Good  History: Patient has had a hx of TIA and CVAs. This is his 3rd CVA.  He is an active smoker and reports he's trying to decrease his amount each day. He has not worked in 2-3 years since his CVAs. Discharge Recommendations: Continue to assess pending progress  REQUIRES OT FOLLOW UP: Yes  Timed Code Treatment Minutes: 59 Minutes  OT Equipment Recommendations  Other: Pt requires strengthening in all LUE musculature. Significant weakness throughout including scapular musculature and rotator cuff. Extra exercises added to exercise program this date to address deficits. Plan   Plan  Times per week: x2  Times per day: Daily  Plan weeks: 6  Specific instructions for Next Treatment: NMES/FES, dowel exercises in supine, AAROM LUE shoulder exercises in gravity eliminated, strengthen rotator cuff musculature with 2# and wall isometric exercises. Current Treatment Recommendations: Strengthening, ROM, Manual Therapy:  STM, Neuromuscular Re-education, Modalities (comment), Self-Care / ADL     Goals  Short term goals  Time Frame for Short term goals: 3 weeks  Short term goal 1: Patient to improve AROM shoulder flexion to >100 degrees. Short term goal 2: Patient to increase MMT score of elbow flexion/extension to 3+/5 for yardwork. Short term goal 3: Patient be able to open various light-moderate resistance containers with LUE hand. Short term goal 4: Patient to increase LUE  strength to 50# for fishing. Short term goal 5: Patient to be independent with home exercise program.  Long term goals  Time Frame for Long term goals : 6 weeks  Long term goal 1: Patient to improve AROM shoulder flexion to functional ROM of >130 degrees. Long term goal 2: Patient to increase MMT score of elbow flexion/extension to 4/5 for yardwork. Long term goal 3: Patient be able to open various heavy resistance containers with LUE hand. Long term goal 4: Patient to increase LUE  strength to 80# for fishing. Long term goal 5: Patient to improve LUE 9 hole peg test score to 50th percentile for age group.   Patient Goals   Patient goals : \"I want to get my left arm stronger. I can't even open a jar anymore. \"    Therapy Time   Individual Concurrent Group Co-treatment   Time In 1100         Time Out 1159         Minutes 59         Timed Code Treatment Minutes: [place], OT Electronically signed by VIANEY Covarrubias on 3/19/2021 at 12:10 PM

## 2021-03-19 NOTE — PROGRESS NOTES
Physical Therapy  Daily Treatment Note  Date: 3/19/2021  Patient Name: Meka Browne  MRN: [id number]     :   1965    Subjective:   General  Additional Pertinent Hx: 49year old male referred to PT with diagnosis of low back pain. He had been seen in the past at our facility for rehab following a stroke. Recent x-ray showed mild retrolisthesis of L4 on L5 which may represent subluxation at the level of the facets.   Referring Practitioner: Cori Stone  PT Insurance Information: [facility] (20 visits per year, no precert)  Total # of Visits Approved: 18  Total # of Visits to Date: 6  Plan of Care/Certification Expiration Date: 21  Progress Note Due Date: 21  Subjective: my back isn't bothering quiet as bad today  Patient Currently in Pain: Yes  Pain Level: 3  Pain Type: Chronic pain  Pain Location: Back  Pain Orientation: Lower  Pain Descriptors: Aching       Treatment Activities:          Exercises  Exercise 1: RECOMMEND AMB FIRST**          ambulation up to 6 minutes with SBQC and blue t-band hip/knee flexor assist (shoe to anterior hip, attached to belt)  667'  in 6 MWT  Exercise 2: supine SAQ's with tapping/DTR faciliatation to quads/patellar tendon x 10 --this -------eccentric lowering x 15 with assist from therapist  Exercise 3: supine heel slides x 15--min to assist  and also resisted hip/knee ext  Exercise 4: supine lower trunk rotation stretch to right--4 reps, 15 seconds each  Exercise 5: sitting LAQ's with faciliatation (as needed)  and eccentric lowering x 15  Exercise 6: sitting h/s curls with band and facilitation as needed (yellow) x 10 and partial ROM *with facilitation  Exercise 7: sitting ball squeeze supine with manual assist x 15  Exercise 8: sitting hip abduction with band (with blocking of right leg to recruit left)  yellow  x 15  Exercise 9: sitting foot tapping DF 1 x 20 ea  Exercise 10: sitting heel raises with weights over lower thighs  5#  x 20 ea  Exercise 11: sitting marching x 10 ea  Exercise 12: repeated sit to stand with forced recruitment of left leg (higher surface to lower)  x 10  Exercise 13: partial squats--10 reps   hip abduction x 10  Exercise 14: standing left hip flexion x 15 with AA  Exercise 15: standing left hip hike  x 10  Exercise 16: standing and marching--1'  Exercise 17: sidestepping in bars x 3 dwn/back   Exercise 18: ambulation in parallel bars  (down & back) using only right hand and with t-band flexion assist  x 3  Exercise 19: supine left quad sets 3\" x 20--**  Exercise 20: supine foot dorsiflexion/plantarflexion/eversion (DF and eversion with NMES x 10', pad just distal to tibial plateau and middle of ant tib) and PF with manual resist        Assessment:   Conditions Requiring Skilled Therapeutic Intervention  Body structures, Functions, Activity limitations: Decreased functional mobility ; Decreased ADL status; Decreased ROM; Decreased strength;Decreased balance;Decreased high-level IADLs;Decreased fine motor control;Decreased coordination;Decreased endurance; Increased pain  Assessment: Patient contiues to scoot foot since he ia awaiting a new AFO and wit cues can advance LLE w/o scooting his foot but only for 2-3 steps and then back to scooting his foot, Eccentric control was better than on pevious visit but fatiques rather quickly. Will advance his routine as appropriate. REQUIRES PT FOLLOW UP: Yes             Goals:  Short term goals  Time Frame for Short term goals: 6-8 weeks  Short term goal 1: Patient to be independent with HEP. Short term goal 2: Patient to be independent with resumption of use of AFO (if foot strength remains insufficient). Short term goal 3: Patient able to ambulate with roller walker with less drag of left foot. Short term goal 4: Patient to report greater ease getting in and out of vehicle.   Long term goals  Time Frame for Long term goals : 9-12 weeks  Long term goal 1: Patient able to ambulate with quad cane or single point cane 500'.  Long term goal 2: Patient able to perform sit to stand 8 x in 30 seconds with increased weight bourne through left LE. Long term goal 3: Patient to have >= 4-/5 strength in left foot for df, pf, inversion and eversion. Long term goal 4: Patient to have >=4-/5 left hip flexors, 4+/5 left knee extensors. Long term goal 5: Patient to show increased protective reactions with standing balance perturbations. (11/27: Able to accept mod perturbations in all directions, but continued delay in LLE reaction, as well as generally erratic motions.)  Patient Goals   Patient goals : Return back to normal level of function, walk without a device or 1-point cane. Plan:    Times per week: 2x per week  Plan weeks: 6-9 weeks  Specific instructions for Next Treatment: Request made by the office to seek OT eval to address weakness and limited use of left arm. He will have a consult for speech therapy 3/16/21.   Current Treatment Recommendations: Strengthening, ROM, Balance Training, Transfer Training, Endurance Training, Gait Training, Stair training, Neuromuscular Re-education, Home Exercise Program, Patient/Caregiver Education & Training, Equipment Evaluation, Education, & procurement        Therapy Time   Individual Concurrent Group Co-treatment   Time In 8143         Time Out 1054         Minutes 62                 Nehemias Boss PTA    Electronically signed by Nehemias Boss PTA on 3/19/2021 at 12:11 PM

## 2021-03-23 ENCOUNTER — HOSPITAL ENCOUNTER (OUTPATIENT)
Dept: SPEECH THERAPY | Age: 56
Setting detail: THERAPIES SERIES
Discharge: HOME | End: 2021-03-23
Payer: COMMERCIAL

## 2021-03-23 ENCOUNTER — HOSPITAL ENCOUNTER (OUTPATIENT)
Dept: OCCUPATIONAL THERAPY | Age: 56
Setting detail: THERAPIES SERIES
Discharge: HOME | End: 2021-03-23
Payer: COMMERCIAL

## 2021-03-23 ENCOUNTER — HOSPITAL ENCOUNTER (OUTPATIENT)
Dept: PHYSICAL THERAPY | Age: 56
Setting detail: THERAPIES SERIES
Discharge: HOME | End: 2021-03-23
Payer: COMMERCIAL

## 2021-03-23 PROCEDURE — 92507 TX SP LANG VOICE COMM INDIV: CPT

## 2021-03-23 PROCEDURE — 97032 APPL MODALITY 1+ESTIM EA 15: CPT

## 2021-03-23 PROCEDURE — 97110 THERAPEUTIC EXERCISES: CPT

## 2021-03-23 PROCEDURE — 97112 NEUROMUSCULAR REEDUCATION: CPT

## 2021-03-23 ASSESSMENT — PAIN DESCRIPTION - PAIN TYPE
TYPE: CHRONIC PAIN
TYPE: CHRONIC PAIN

## 2021-03-23 ASSESSMENT — PAIN SCALES - GENERAL: PAINLEVEL_OUTOF10: 6

## 2021-03-23 ASSESSMENT — PAIN DESCRIPTION - DESCRIPTORS: DESCRIPTORS: ACHING

## 2021-03-23 NOTE — PROGRESS NOTES
Physical Therapy  Daily Treatment Note  Date: 3/23/2021  Patient Name: Luna Corbin  MRN: [id number]     :   1965    Subjective:   General  Additional Pertinent Hx: 49year old male referred to PT with diagnosis of low back pain. He had been seen in the past at our facility for rehab following a stroke. Recent x-ray showed mild retrolisthesis of L4 on L5 which may represent subluxation at the level of the facets. Referring Practitioner: Danisha Trinh  PT Insurance Information: [facility] (20 visits per year, no precert)  Total # of Visits Approved: 18  Total # of Visits to Date: 7  Plan of Care/Certification Expiration Date: 21  Progress Note Due Date: 21  Subjective: i'm moving slower, went fishing for about 5 hrs yesterday.  Waiting for the flexaril to kick in  Patient Currently in Pain: Yes  Pain Level: 6  Pain Type: Chronic pain  Pain Location: Back  Pain Orientation: Lower  Pain Descriptors: Aching       Treatment Activities:         Exercises  Exercise 1: RECOMMEND AMB FIRST**          ambulation up to 6 minutes with SBQC and blue t-band hip/knee flexor assist (shoe to anterior hip, attached to belt)  601'  in 6 MWT  Exercise 2: supine SAQ's with tapping/DTR faciliatation to quads/patellar tendon x 10 --this -------eccentric lowering x 15 with assist from therapist  Exercise 3: supine heel slides x 15--min to assist  and also resisted hip/knee ext  Exercise 4: supine lower trunk rotation stretch to right--4 reps, 15 seconds each  Exercise 5: sitting LAQ's with faciliatation (as needed)  and eccentric lowering x 15  Exercise 6: sitting h/s curls with band and facilitation as needed (yellow) x 10 and partial ROM *with facilitation  Exercise 7: sitting ball squeeze supine with manual assist x 15  Exercise 8: sitting hip abduction with band (with blocking of right leg to recruit left)  yellow  x 15  Exercise 9: sitting foot tapping DF 1 x 20 ea  Exercise 10: sitting heel raises with weights over lower thighs 7.5#  x 20 ea  Exercise 11: sitting marching x 10 ea  Exercise 12: repeated sit to stand with forced recruitment of left leg (higher surface to lower)  x 10  Exercise 13: partial squats--10 reps   hip abduction x 10  Exercise 14: standing left hip flexion x 15 with AA  Exercise 15: standing left hip hike  x 10  Exercise 16: standing and marching--1'  Exercise 17: sidestepping in bars x 4 dwn/back  Exercise 18: ambulation in parallel bars  (down & back) using only right hand and with t-band flexion assist  x 4  Exercise 19: supine left quad sets 3\" x 20--**  Exercise 20: supine foot dorsiflexion/plantarflexion/eversion (DF and eversion with NMES x 10', pad just distal to tibial plateau and middle of ant tib) and PF with manual resist        Assessment:   Conditions Requiring Skilled Therapeutic Intervention  Body structures, Functions, Activity limitations: Decreased functional mobility ; Decreased ADL status; Decreased ROM; Decreased strength;Decreased balance;Decreased high-level IADLs;Decreased fine motor control;Decreased coordination;Decreased endurance; Increased pain  Assessment: Patient with increased difficulty picking up L foot today even with cues but did have more active heel slide today but still having difficulty performing seated resisted h/s curl. Used 7.5# with seated heel raises and increased passes with sidestepping and walking with one UE assist in // bars  REQUIRES PT FOLLOW UP: Yes             Goals:  Short term goals  Time Frame for Short term goals: 6-8 weeks  Short term goal 1: Patient to be independent with HEP. Short term goal 2: Patient to be independent with resumption of use of AFO (if foot strength remains insufficient). Short term goal 3: Patient able to ambulate with roller walker with less drag of left foot. Short term goal 4: Patient to report greater ease getting in and out of vehicle.   Long term goals  Time Frame for Long term goals : 9-12 weeks  Long term goal 1: Patient able to ambulate with quad cane or single point cane 500'. Long term goal 2: Patient able to perform sit to stand 8 x in 30 seconds with increased weight bourne through left LE. Long term goal 3: Patient to have >= 4-/5 strength in left foot for df, pf, inversion and eversion. Long term goal 4: Patient to have >=4-/5 left hip flexors, 4+/5 left knee extensors. Long term goal 5: Patient to show increased protective reactions with standing balance perturbations. (11/27: Able to accept mod perturbations in all directions, but continued delay in LLE reaction, as well as generally erratic motions.)  Patient Goals   Patient goals : Return back to normal level of function, walk without a device or 1-point cane. Plan:    Times per week: 2x per week  Plan weeks: 6-9 weeks  Specific instructions for Next Treatment: Request made by the office to seek OT eval to address weakness and limited use of left arm. He will have a consult for speech therapy 3/16/21.   Current Treatment Recommendations: Strengthening, ROM, Balance Training, Transfer Training, Endurance Training, Gait Training, Stair training, Neuromuscular Re-education, Home Exercise Program, Patient/Caregiver Education & Training, Equipment Evaluation, Education, & procurement        Therapy Time   Individual Concurrent Group Co-treatment   Time In 0901         Time Out 1745         Minutes 54                 Landon Collins PTA    Electronically signed by Landon Collins PTA on 3/23/2021 at 11:50 AM

## 2021-03-23 NOTE — PROGRESS NOTES
Daily Treatment Note  Date: 3/23/2021  Patient Name: Kenneth Jimenez  :  1965  MRN: [id number]       Subjective   General  Chart Reviewed: Yes  Patient assessed for rehabilitation services?: Yes  Additional Pertinent Hx: Patient has had multiple TIA and CVAs. His most recent CVA was 2 weeks ago from 3/8/21. He went to get up and use the restroom and felt his face felt \"funny\" and when he looked in the mirror he noticed it drooping. He drove himself to the ER. He reports the MRI did not show signs of a CVA and they did not administer injection, but he had another stroke by his presentation of symptoms. Family / Caregiver Present: No  Referring Practitioner: Chico Jauregui MD  Diagnosis: hemiplegia, nondominant, left side G81.94  OT Visit Information  Onset Date: 21  OT Insurance Information: [facility]  Total # of Visits Approved: 20  Total # of Visits to Date: 6  Certification Period Expiration Date: 21  Progress Note Due Date: 21  Subjective  Subjective: \"I thought about skipping today. But I hate missing therapy. My low back pain was about a 6/10 this morning before I took Flexoril. \"  Pre Treatment Pain Screening  Intervention List: Patient able to continue with treatment  Pain Assessment  Pain Level: 4  Pain Type: Chronic pain  Response to Pain Intervention: Patient Satisfied  Vital Signs  Patient Currently in Pain: Yes Patient's pain at 4/10 during session due to taking pain medicine prior to therapy.     Treatment Activities:    Other exercises  Other exercises?: Yes  Other exercises 1: Supine BUE dowel bar shoulder flexion 4 sets x5 reps with NMES  Other exercises 2: Supine BUE dowel shoulder abduction 4 sets x5 reps with NMES  Other exercises 4: LUE external rotation with 2# (elbow at 90 degrees at side) 2 sets x15 reps  Other exercises 5: LUE scaption to 30 degrees with 2# 2 sets x15 reps  Other exercises 7: LUE tricep pulldowns with red theraband on top of doorway 2 sets x10 reps  Other exercises 9: Velcro board keys 1 & 2  Other exercises 11: Clothespins activity 1 pt pinch on yellow & red, 3 pt pinch on green, and lateral pinch on blue- upgraded as tolerated  Other exercises 12: Isometric wall exercises of shoulder flexion, shoulder extension, ext rotation, int rotation with towel 2 sets x10 reps of each  Other exercises 13: BUE digit extension exercise with blue resistive rubberband 2 set x15 reps  Other exercises 14: LUE digit flexion with red digi  and red digi squeeze 2 sets x15 reps  Other exercises 15: LUE wall slides in shoulder flexion with pilow sheet 1 set x10 reps            Modalities: Yes  Electrical Stimulation  Electrical Stimulation Location: Left; Shoulder  Electrical Stimulation Action: Shoulder flexion with dowel BUE support and shoulder abduction with dowel BUE support  Electrical Stimulation Parameters: 30-40 intensity with 10/10 cycle, sh flex pads on anterior deltoid, sh abd pads on anterior and middle deltoid. Electrical Stimulation Goals: Strength      Assessment   Performance deficits / Impairments: Decreased coordination;Decreased ROM; Decreased strength;Decreased fine motor control;Decreased high-level IADLs  Assessment: Patient tolerated session well this date and continues to improve with shoulder ROM and strengthening with wall slides and NMES. Pt continues to benefit from skilled OT services. Treatment Diagnosis: M62.81, R27.8, M25.61  Prognosis: Good  History: Patient has had a hx of TIA and CVAs. This is his 3rd CVA. He is an active smoker and reports he's trying to decrease his amount each day. He has not worked in 2-3 years since his CVAs. Discharge Recommendations: Continue to assess pending progress  REQUIRES OT FOLLOW UP: Yes  Timed Code Treatment Minutes: [facility]  Times per week: x2  Times per day: Daily  Plan weeks: 6  Specific instructions for Next Treatment: Plan to upgrade to green digi  on 3/26/21.   Current Treatment Recommendations: Strengthening, ROM, Manual Therapy:  STM, Neuromuscular Re-education, Modalities (comment), Self-Care / ADL    Goals  Short term goals  Time Frame for Short term goals: 3 weeks  Short term goal 1: Patient to improve AROM shoulder flexion to >100 degrees. Short term goal 2: Patient to increase MMT score of elbow flexion/extension to 3+/5 for yardwork. Short term goal 3: Patient be able to open various light-moderate resistance containers with LUE hand. Short term goal 4: Patient to increase LUE  strength to 50# for fishing. Short term goal 5: Patient to be independent with home exercise program.  Long term goals  Time Frame for Long term goals : 6 weeks  Long term goal 1: Patient to improve AROM shoulder flexion to functional ROM of >130 degrees. Long term goal 2: Patient to increase MMT score of elbow flexion/extension to 4/5 for yardwork. Long term goal 3: Patient be able to open various heavy resistance containers with LUE hand. Long term goal 4: Patient to increase LUE  strength to 80# for fishing. Long term goal 5: Patient to improve LUE 9 hole peg test score to 50th percentile for age group. Patient Goals   Patient goals : \"I want to get my left arm stronger. I can't even open a jar anymore. \"    Therapy Time   Individual Concurrent Group Co-treatment   Time In 1100         Time Out 1200         Minutes 60         Timed Code Treatment Minutes: [address] [facility], OT Electronically signed by VIANEY Liu on 3/23/2021 at 12:08 PM

## 2021-03-23 NOTE — PROGRESS NOTES
Speech Language Pathology  Facility/Department: [facility] SPEECH THERAPY  Outpatient Treatment Note     NAME: Meka Browne  : 1965   MRN: [id number]  ADMISSION DATE: 3/16/2021   Date of Eval: 3/23/2021   Evaluating Therapist: Karen Lyons MS CCC-SLP  Insurance: [employer]  20 visits per year approved  2 out of 20 visits used       Subjective: The patient arrived early to his appointment. He was cooperative with all tasks. Objective:  Much less dysfluent this date. He states he went fishing yesterday and is worn out today. His stuttering was severe last session. Today, only minimal dysfluencies were noted . During Conversation:  Syllables per minute   69  Dysfluent syllable  2  Total number of syllable  139  Fluent syllables   137  Percentage of dysfluent syllables  1%    During a Reading Task   Syllables per minute   65  Dysfluent syllable  55  Total number of syllable  197  Fluent syllables   142  Percentage of dysfluent syllables  27%      The patient was able to use easy onset with two syllable words. Much less dysfluencies were noted. He states he isn't having much difficulty with word finding in conversation. He states his short term and immediate recall are good. Assessment:  The patient is recommended to be seen by speech therapy 1x/week for 6 weeks for severe dysfluencies characterized by initial sound and part word repetitions. He is also exhibiting secondary behaviors such as poor eye contact, head movements, and obvious tension. His dysfluenices have worsened since he was last seen at outpatient on 2020.        Plan:   Goals:  Short-term Goals  Goal 1: The patient will complete tasks for easy onset of speech to reduce word repetitions with 80% accuracy and minimal verbal/visual cues. Goal 2: The patient will complete tasks for easy onset of speech to reduce sound repetitions with 80% accuracy and minimal verbal/visual cues.   Goal 3: The patient will use

## 2021-03-26 ENCOUNTER — HOSPITAL ENCOUNTER (OUTPATIENT)
Dept: OCCUPATIONAL THERAPY | Age: 56
Setting detail: THERAPIES SERIES
Discharge: HOME | End: 2021-03-26
Payer: COMMERCIAL

## 2021-03-26 ENCOUNTER — HOSPITAL ENCOUNTER (OUTPATIENT)
Dept: PHYSICAL THERAPY | Age: 56
Setting detail: THERAPIES SERIES
Discharge: HOME | End: 2021-03-26
Payer: COMMERCIAL

## 2021-03-26 PROCEDURE — 97112 NEUROMUSCULAR REEDUCATION: CPT

## 2021-03-26 PROCEDURE — 97032 APPL MODALITY 1+ESTIM EA 15: CPT

## 2021-03-26 PROCEDURE — 97110 THERAPEUTIC EXERCISES: CPT

## 2021-03-26 ASSESSMENT — PAIN DESCRIPTION - ORIENTATION
ORIENTATION: LOWER
ORIENTATION: LOWER

## 2021-03-26 ASSESSMENT — PAIN DESCRIPTION - ONSET: ONSET: ON-GOING

## 2021-03-26 ASSESSMENT — PAIN DESCRIPTION - LOCATION
LOCATION: BACK
LOCATION: BACK

## 2021-03-26 ASSESSMENT — PAIN DESCRIPTION - PROGRESSION: CLINICAL_PROGRESSION: NOT CHANGED

## 2021-03-26 ASSESSMENT — PAIN DESCRIPTION - PAIN TYPE: TYPE: CHRONIC PAIN

## 2021-03-26 ASSESSMENT — PAIN DESCRIPTION - DESCRIPTORS: DESCRIPTORS: ACHING

## 2021-03-26 NOTE — PROGRESS NOTES
Physical Therapy  Daily Treatment Note  Date: 3/26/2021  Patient Name: Abi Angela  MRN: [id number]     :   1965    Subjective:   General  Referring Practitioner: Stephanie Perez PT Insurance Information: [facility] (20 visits per year, no precert)  Total # of Visits Approved: 18  Total # of Visits to Date: 8  Plan of Care/Certification Expiration Date: 21  Progress Note Due Date: 21  Subjective: i got measured for an new AFO yesterday, back pain is abput a 4  Patient Currently in Pain: Yes  Pain Level: 4  Pain Type: Chronic pain  Pain Location: Back  Pain Orientation: Lower  Pain Descriptors: Aching       Treatment Activities:          Exercises  Exercise 1: RECOMMEND AMB FIRST**          ambulation up to 6 minutes with SBQC and blue t-band hip/knee flexor assist (shoe to anterior hip, attached to belt)  611'  in 6 MWT  Exercise 2: supine SAQ's with tapping/DTR faciliatation to quads/patellar tendon x 10 --this -------eccentric lowering x 15 with assist from therapist  Exercise 3: supine heel slides x 15--min to assist  and also resisted hip/knee ext  Exercise 4: supine lower trunk rotation stretch to right--4 reps, 15 seconds each  Exercise 5: sitting LAQ's with faciliatation (as needed)  and eccentric lowering x 15  Exercise 6: sitting h/s curls with band and facilitation as needed (yellow) x 10 and partial ROM *with facilitation  Exercise 7: sitting ball squeeze supine with manual assist x 15  Exercise 8: sitting hip abduction with band (with blocking of right leg to recruit left)  yellow  x 15  Exercise 9: sitting foot tapping DF 1 x 20 ea  Exercise 10: sitting heel raises with weights over lower thighs  7.5#  x 20 ea  Exercise 11: sitting marching x 15 ea  Exercise 12: repeated sit to stand with forced recruitment of left leg (higher surface to lower)  x 10  Exercise 13: partial squats--10 reps   hip abduction x 10  Exercise 14: standing left hip flexion x 15 with AA  Exercise 15: standing left hip hike  x 15  Exercise 16: standing and marching--1'  Exercise 17: sidestepping in bars x 4 dwn/back  Exercise 18: ambulation in parallel bars  (down & back) using only right hand and with t-band flexion assist  x 4  Exercise 19: supine left quad sets 3\" x 20--**  Exercise 20: supine foot dorsiflexion/plantarflexion/eversion (DF and eversion with NMES x 10', pad just distal to tibial plateau and middle of ant tib) and PF with manual resist        Assessment:   Conditions Requiring Skilled Therapeutic Intervention  Body structures, Functions, Activity limitations: Decreased functional mobility ; Decreased ADL status; Decreased ROM; Decreased strength;Decreased balance;Decreased high-level IADLs;Decreased fine motor control;Decreased coordination;Decreased endurance; Increased pain  Assessment: Patient continues to have difficulty picking up L foot today even with cues and required more assist with heel slide today and still having difficulty performing seated resisted h/s curls and increased reps with hip hike. Will continue to monitor his progress and advance his routine as appropriate. REQUIRES PT FOLLOW UP: Yes        Goals:  Short term goals  Time Frame for Short term goals: 6-8 weeks  Short term goal 1: Patient to be independent with HEP. Short term goal 2: Patient to be independent with resumption of use of AFO (if foot strength remains insufficient). Short term goal 3: Patient able to ambulate with roller walker with less drag of left foot. Short term goal 4: Patient to report greater ease getting in and out of vehicle. Long term goals  Time Frame for Long term goals : 9-12 weeks  Long term goal 1: Patient able to ambulate with quad cane or single point cane 500'. Long term goal 2: Patient able to perform sit to stand 8 x in 30 seconds with increased weight bourne through left LE. Long term goal 3: Patient to have >= 4-/5 strength in left foot for df, pf, inversion and eversion.   Long term goal 4: Patient to have >=4-/5 left hip flexors, 4+/5 left knee extensors. Long term goal 5: Patient to show increased protective reactions with standing balance perturbations. (11/27: Able to accept mod perturbations in all directions, but continued delay in LLE reaction, as well as generally erratic motions.)  Patient Goals   Patient goals : Return back to normal level of function, walk without a device or 1-point cane. Plan:    Times per week: 2x per week  Plan weeks: 6-9 weeks  Specific instructions for Next Treatment: Request made by the office to seek OT eval to address weakness and limited use of left arm. He will have a consult for speech therapy 3/16/21.   Current Treatment Recommendations: Strengthening, ROM, Balance Training, Transfer Training, Endurance Training, Gait Training, Stair training, Neuromuscular Re-education, Home Exercise Program, Patient/Caregiver Education & Training, Equipment Evaluation, Education, & procurement       Therapy Time   Individual Concurrent Group Co-treatment   Time In 9151         Time Out 1052         Minutes 54         Timed Code Treatment Minutes: Nel Yung, PTA    Electronically signed by John Randolph PTA on 3/26/2021 at 12:05 PM

## 2021-03-30 ENCOUNTER — HOSPITAL ENCOUNTER (OUTPATIENT)
Dept: PHYSICAL THERAPY | Age: 56
Setting detail: THERAPIES SERIES
Discharge: HOME | End: 2021-03-30
Payer: COMMERCIAL

## 2021-03-30 ENCOUNTER — HOSPITAL ENCOUNTER (OUTPATIENT)
Dept: SPEECH THERAPY | Age: 56
Setting detail: THERAPIES SERIES
Discharge: HOME | End: 2021-03-30
Payer: COMMERCIAL

## 2021-03-30 ENCOUNTER — HOSPITAL ENCOUNTER (OUTPATIENT)
Dept: OCCUPATIONAL THERAPY | Age: 56
Setting detail: THERAPIES SERIES
Discharge: HOME | End: 2021-03-30
Payer: COMMERCIAL

## 2021-03-30 PROCEDURE — 97032 APPL MODALITY 1+ESTIM EA 15: CPT

## 2021-03-30 PROCEDURE — 97110 THERAPEUTIC EXERCISES: CPT

## 2021-03-30 PROCEDURE — 92507 TX SP LANG VOICE COMM INDIV: CPT

## 2021-03-30 PROCEDURE — 97112 NEUROMUSCULAR REEDUCATION: CPT

## 2021-03-30 ASSESSMENT — PAIN DESCRIPTION - DESCRIPTORS
DESCRIPTORS: ACHING
DESCRIPTORS: ACHING

## 2021-03-30 ASSESSMENT — PAIN DESCRIPTION - PROGRESSION: CLINICAL_PROGRESSION: NOT CHANGED

## 2021-03-30 ASSESSMENT — PAIN DESCRIPTION - ONSET: ONSET: ON-GOING

## 2021-03-30 ASSESSMENT — PAIN DESCRIPTION - PAIN TYPE: TYPE: CHRONIC PAIN

## 2021-03-30 ASSESSMENT — PAIN DESCRIPTION - FREQUENCY: FREQUENCY: CONTINUOUS

## 2021-03-30 ASSESSMENT — PAIN DESCRIPTION - ORIENTATION: ORIENTATION: LOWER

## 2021-03-30 NOTE — PROGRESS NOTES
Physical Therapy  Daily Treatment Note  Date: 3/30/2021  Patient Name: Cary Aschoff  MRN: [id number]     :   1965    Subjective:   General  Additional Pertinent Hx: 49year old male referred to PT with diagnosis of low back pain. He had been seen in the past at our facility for rehab following a stroke. Recent x-ray showed mild retrolisthesis of L4 on L5 which may represent subluxation at the level of the facets.   Referring Practitioner: Gurpreet Strickland  PT Insurance Information: [facility] (20 visits per year, no precert)  Total # of Visits Approved: 18  Total # of Visits to Date: 5  Plan of Care/Certification Expiration Date: 21  Progress Note Due Date: 21  Subjective: went fishing yesterday but didn';t catch anything, back pain about a 5 but i took a flexaril  Patient Currently in Pain: Yes  Pain Level: 5  Pain Type: Chronic pain  Pain Location: Back  Pain Orientation: Lower  Pain Descriptors: Aching       Treatment Activities:          Exercises  Exercise 1: RECOMMEND AMB FIRST**          ambulation up to 6 minutes with SBQC and blue t-band hip/knee flexor assist (shoe to anterior hip, attached to belt)  729'  in 6 MWT  Exercise 2: supine SAQ's with tapping/DTR faciliatation to quads/patellar tendon x 10 --this -------eccentric lowering x 15 with assist from therapist  Exercise 3: supine heel slides x 15--min to assist  and also resisted hip/knee ext  Exercise 4: supine lower trunk rotation stretch to right--4 reps, 15 seconds each  Exercise 5: sitting LAQ's with faciliatation (as needed)  and eccentric lowering x 15  Exercise 6: sitting h/s curls with band and facilitation as needed (yellow) x 10 and partial ROM *with facilitation  Exercise 7: sitting ball squeeze supine with manual assist x 15  Exercise 8: sitting hip abduction with band (with blocking of right leg to recruit left)  yellow  x 15  Exercise 9: sitting foot tapping DF 1 x 20 ea  Exercise 10: sitting heel raises with weights over lower thighs 7.5#  x 20 ea  Exercise 11: sitting marching x 15 ea  Exercise 12: repeated sit to stand with forced recruitment of left leg (higher surface to lower)  x 10  Exercise 13: partial squats--10 reps   hip abduction x 10  Exercise 14: standing left hip flexion x 15 with AA  Exercise 15: standing left hip hike  x 15  Exercise 16: standing and marching--1'  Exercise 17: sidestepping in bars x 4 dwn/back  Exercise 18: ambulation in parallel bars  (down & back) using only right hand and with t-band flexion assist  x 4  Exercise 19: supine left quad sets 3\" x 20--**  Exercise 20: supine foot dorsiflexion/plantarflexion/eversion (DF and eversion with NMES x 10', pad just distal to tibial plateau and middle of ant tib) and PF with manual resist      Assessment:   Conditions Requiring Skilled Therapeutic Intervention  Body structures, Functions, Activity limitations: Decreased functional mobility ; Decreased ADL status; Decreased ROM; Decreased strength;Decreased balance;Decreased high-level IADLs;Decreased fine motor control;Decreased coordination;Decreased endurance; Increased pain  Assessment: Patient with less difficulty picking up L foot today and required less assist with heel slide today and gait distance increased by 118ft compaired to previous visit and also required less assist with standing hip flexion. Will continue to monitor his progress and advance his routine as appropriate. REQUIRES PT FOLLOW UP: Yes        Goals:  Short term goals  Time Frame for Short term goals: 6-8 weeks  Short term goal 1: Patient to be independent with HEP. Short term goal 2: Patient to be independent with resumption of use of AFO (if foot strength remains insufficient). Short term goal 3: Patient able to ambulate with roller walker with less drag of left foot. Short term goal 4: Patient to report greater ease getting in and out of vehicle.   Long term goals  Time Frame for Long term goals : 9-12 weeks  Long term goal 1: Patient able to ambulate with quad cane or single point cane 500'. Long term goal 2: Patient able to perform sit to stand 8 x in 30 seconds with increased weight bourne through left LE. Long term goal 3: Patient to have >= 4-/5 strength in left foot for df, pf, inversion and eversion. Long term goal 4: Patient to have >=4-/5 left hip flexors, 4+/5 left knee extensors. Long term goal 5: Patient to show increased protective reactions with standing balance perturbations. (11/27: Able to accept mod perturbations in all directions, but continued delay in LLE reaction, as well as generally erratic motions.)  Patient Goals   Patient goals : Return back to normal level of function, walk without a device or 1-point cane. Plan:    Times per week: 2x per week  Plan weeks: 6-9 weeks  Specific instructions for Next Treatment: Request made by the office to seek OT eval to address weakness and limited use of left arm. He will have a consult for speech therapy 3/16/21.   Current Treatment Recommendations: Strengthening, ROM, Balance Training, Transfer Training, Endurance Training, Gait Training, Stair training, Neuromuscular Re-education, Home Exercise Program, Patient/Caregiver Education & Training, Equipment Evaluation, Education, & procurement       Therapy Time   Individual Concurrent Group Co-treatment   Time In 0901         Time Out 3976         Minutes 57         Timed Code Treatment Minutes: SALEEM Alexis    Electronically signed by Lizy Hernandez PTA on 3/30/2021 at 2:25 PM

## 2021-03-30 NOTE — PROGRESS NOTES
Speech Language Pathology  Facility/Department: [facility] SPEECH THERAPY  Outpatient Treatment Note      NAME: Sanket Montero  BBR: [birth year]   [facility]: [id number]  ADMISSION DATE: 3/16/2021   Date: 3/30/2021   Evaluating Therapist: Carrol Sosa MS CCC-SLP  Insurance: [employer]  20 visits per year approved  3 out of 20 visits used        Subjective: The patient arrived early to his appointment. He was cooperative with all tasks.            Objective:  Much less dysfluent this date. He states he went fishing again yesterday and felt tired again today. His stuttering was mild last session and was mild again today.      During Conversation:  Syllables per minute   46  Dysfluent syllable  10  Total number of syllable  140  Fluent syllables   130  Percentage of dysfluent syllables 7%       During a Reading Task:   Syllables per minute  67  Dysfluent syllable 39  Total number of syllable  201  Fluent syllables   162  Percentage of dysfluent syllables 19%    During a Reading Task:  Syllables per minute 54  Dysfluent syllables 57  Total Number of Syllables 273  Fluent Syllable 216  Precentage of dysfluent syllables 20%       The patient was able to use easy onset with two syllable words. Much less dysfluencies were noted.        Assessment:  The patient is recommended to be seen by speech therapy 1x/week for 6 weeks for mild to moderate dysfluencies characterized by initial sound and part word repetitions. He is also exhibiting secondary behaviors such as poor eye contact, head movements, and obvious tension. His dysfluenices have worsened since he was last seen at outpatient on June 6, 2020.        Plan:   Goals:  Short-term Goals  Goal 1: The patient will complete tasks for easy onset of speech to reduce word repetitions with 80% accuracy and minimal verbal/visual cues. Goal 2: The patient will complete tasks for easy onset of speech to reduce sound repetitions with 80% accuracy and minimal verbal/visual cues.   Goal 3: The patient will use cancellation and pull-out techniques for 80% of dysfluencies in a structured conversational task.   Long-term Goals  Goal 1: The patient will demonstrate more fluent speech during unstructured tasks to communicate effectively with members of the community and family members.   Patient/family involved in developing goals and treatment plan: yes           Electronically Signed By:  Jane Jarrett M.S., CCC-SLP  3/30/2021,10:03 AM.

## 2021-03-30 NOTE — PROGRESS NOTES
Daily Treatment Note  Date: 3/30/2021  Patient Name: Jyoti Morocho  :  1965  MRN: [id number]       Subjective   General  Chart Reviewed: Yes  Patient assessed for rehabilitation services?: Yes  Additional Pertinent Hx: Patient has had multiple TIA and CVAs. His most recent CVA was 2 weeks ago from 3/8/21. He went to get up and use the restroom and felt his face felt \"funny\" and when he looked in the mirror he noticed it drooping. He drove himself to the ER. He reports the MRI did not show signs of a CVA and they did not administer injection, but he had another stroke by his presentation of symptoms. Family / Caregiver Present: No  Referring Practitioner: Ros Corona MD  Diagnosis: hemiplegia, nondominant, left side G81.94  OT Visit Information  Onset Date: 21  OT Insurance Information: [facility]  Total # of Visits Approved: 20  Total # of Visits to Date: 8  Certification Period Expiration Date: 21  Progress Note Due Date: 21  Subjective  Subjective: \"I'm feeling okay today. \"  Pre Treatment Pain Screening  Intervention List: Patient able to continue with treatment  Pain Assessment  Pain Level: 5  Pain Type: Chronic pain  Pain Location: Back  Pain Orientation: Lower  Pain Descriptors: Aching  Pain Frequency: Continuous  Pain Onset: On-going  Clinical Progression: Not changed  Non-Pharmaceutical Pain Intervention(s): Distraction; Therapeutic presence; Therapeutic touch  Response to Pain Intervention: Patient Satisfied  Vital Signs  Patient Currently in Pain: Yes     Treatment Activities:    Other exercises  Other exercises?: Yes  Other exercises 1: Supine BUE dowel bar shoulder flexion 4 sets x5 reps with NMES  Other exercises 2: Supine BUE dowel shoulder abduction 4 sets x5 reps with NMES  Other exercises 3: Sidelying gravity eliminated AAROM LUE shoulder flexion 2 sets x3 reps  Other exercises 4: LUE external rotation with 2# (elbow at 90 degrees at side) 2 sets x15 reps  Other exercises 5: LUE CVAs. This is his 3rd CVA. He is an active smoker and reports he's trying to decrease his amount each day. He has not worked in 2-3 years since his CVAs. Discharge Recommendations: Continue to assess pending progress  REQUIRES OT FOLLOW UP: Yes  Timed Code Treatment Minutes: [address]  Times per week: x2  Times per day: Daily  Plan weeks: 6  Specific instructions for Next Treatment: Reassessment next treatment  Current Treatment Recommendations: Strengthening, ROM, Manual Therapy:  STM, Neuromuscular Re-education, Modalities (comment), Self-Care / ADL     Goals  Short term goals  Time Frame for Short term goals: 3 weeks  Short term goal 1: Patient to improve AROM shoulder flexion to >100 degrees. Short term goal 2: Patient to increase MMT score of elbow flexion/extension to 3+/5 for yardwork. Short term goal 3: Patient be able to open various light-moderate resistance containers with LUE hand. Short term goal 4: Patient to increase LUE  strength to 50# for fishing. Short term goal 5: Patient to be independent with home exercise program.  Long term goals  Time Frame for Long term goals : 6 weeks  Long term goal 1: Patient to improve AROM shoulder flexion to functional ROM of >130 degrees. Long term goal 2: Patient to increase MMT score of elbow flexion/extension to 4/5 for yardwork. Long term goal 3: Patient be able to open various heavy resistance containers with LUE hand. Long term goal 4: Patient to increase LUE  strength to 80# for fishing. Long term goal 5: Patient to improve LUE 9 hole peg test score to 50th percentile for age group. Patient Goals   Patient goals : \"I want to get my left arm stronger. I can't even open a jar anymore. \"    Therapy Time   Individual Concurrent Group Co-treatment   Time In 1101         Time Out 1158         Minutes 57         Timed Code Treatment Minutes: [address], OT Electronically signed by DANIS Dahl/SHA on 3/30/2021 at 12:03 PM

## 2021-04-01 ENCOUNTER — IMMUNIZATION (OUTPATIENT)
Dept: VACCINE CLINIC | Facility: HOSPITAL | Age: 56
End: 2021-04-01

## 2021-04-01 PROCEDURE — 0011A: CPT | Performed by: OBSTETRICS & GYNECOLOGY

## 2021-04-01 PROCEDURE — 91301 HC SARSCO02 VAC 100MCG/0.5ML IM: CPT | Performed by: OBSTETRICS & GYNECOLOGY

## 2021-04-02 ENCOUNTER — HOSPITAL ENCOUNTER (OUTPATIENT)
Dept: OCCUPATIONAL THERAPY | Age: 56
Setting detail: THERAPIES SERIES
Discharge: HOME | End: 2021-04-02
Payer: COMMERCIAL

## 2021-04-02 ENCOUNTER — HOSPITAL ENCOUNTER (OUTPATIENT)
Dept: PHYSICAL THERAPY | Age: 56
Setting detail: THERAPIES SERIES
Discharge: HOME | End: 2021-04-02
Payer: COMMERCIAL

## 2021-04-02 PROCEDURE — 97110 THERAPEUTIC EXERCISES: CPT

## 2021-04-02 PROCEDURE — 97530 THERAPEUTIC ACTIVITIES: CPT

## 2021-04-02 PROCEDURE — 97032 APPL MODALITY 1+ESTIM EA 15: CPT

## 2021-04-02 ASSESSMENT — PAIN DESCRIPTION - PAIN TYPE
TYPE: CHRONIC PAIN
TYPE: CHRONIC PAIN

## 2021-04-02 ASSESSMENT — PAIN DESCRIPTION - DESCRIPTORS: DESCRIPTORS: ACHING

## 2021-04-02 ASSESSMENT — PAIN DESCRIPTION - ONSET: ONSET: ON-GOING

## 2021-04-02 ASSESSMENT — PAIN SCALES - GENERAL: PAINLEVEL_OUTOF10: 6

## 2021-04-02 NOTE — PROGRESS NOTES
Physical Therapy  Daily Treatment Note/Reassessment  Date: 2021  Patient Name: Melody John  MRN: [id number]     :   1965    Subjective:   General  Referring Practitioner: Stephania Tyson MD  PT Visit Information  PT Insurance Information: [facility] (20 visits per year, no precert)  Total # of Visits Approved: 18  Total # of Visits to Date: 10  Plan of Care/Certification Expiration Date: 21  Progress Note Due Date: 21  Subjective  Subjective: Patient states he is getting stronger, got fitted for custom AFO. He is wearing blue t-band to assist with hip flexion when walking. He reports he is not falling, using quad cane now and hasn't tried using SPC yet.   General Comment  Comments: 20 visits hard max/ 2 used for   Pain Screening  Patient Currently in Pain: Yes  Pain Assessment  Pain Assessment: 0-10  Pain Level: 6  Pain Type: Chronic pain  Vital Signs  Patient Currently in Pain: Yes       Treatment Activities:                                 Strength RLE  Strength RLE: WNL  Strength LLE  L Hip Flexion: 3-/5;2+/5  L Knee Flexion: 3-/5  L Knee Extension: 3-/5;2+/5  L Ankle Dorsiflexion: 2-/5  L Ankle Plantar Flexion: 2/5;2+/5  L Ankle Inversion: 3-/5  L Ankle Eversion: 1+/5  Strength RUE  Strength RUE: WNL  Strength LUE  Strength LUE: WNL  Comment: OT evaluation performed 21  Exercises  Exercise 1: RECOMMEND AMB FIRST**          ambulation up to 6 minutes with SBQC and blue t-band hip/knee flexor assist (shoe to anterior hip, attached to belt)  662'  in 6 MWT  Exercise 2: supine SAQ's with tapping/DTR faciliatation to quads/patellar tendon x 10 --this -------eccentric lowering x 15 with assist from therapist  Exercise 3: supine heel slides x 15--min to assist  and also resisted hip/knee ext  Exercise 4: supine lower trunk rotation stretch to right--4 reps, 15 seconds each  Exercise 5: sitting LAQ's with faciliatation (as needed)  and eccentric lowering x 15  Exercise 6: sitting h/s curls with band and facilitation as needed (yellow) x 10 and partial ROM *with facilitation  Exercise 7: sitting ball squeeze supine with manual assist x 15  Exercise 8: sitting hip abduction with band (with blocking of right leg to recruit left)  yellow  x 15  Exercise 9: sitting foot tapping DF 1 x 20 ea  Exercise 10: sitting heel raises with weights over lower thighs  7.5#  x 20 ea  Exercise 11: sitting marching x 15 ea  Exercise 12: repeated sit to stand with forced recruitment of left leg (higher surface to lower)  x 10  Exercise 13: partial squats--10 reps   hip abduction x 10  Exercise 14: standing left hip flexion x 15 with AA  Exercise 15: standing left hip hike  x 15  Exercise 16: standing and marching--1'  Exercise 17: sidestepping in bars x 5 dwn/back  Exercise 18: ambulation in parallel bars  (down & back) using only right hand and with t-band flexion assist  x 4  Exercise 19: supine left quad sets 3\" x 20--**  Exercise 20: supine foot dorsiflexion/plantarflexion/eversion (DF and eversion with NMES x 10', pad just distal to tibial plateau and middle of ant tib) and PF with manual resist                               Assessment:   Conditions Requiring Skilled Therapeutic Intervention  Body structures, Functions, Activity limitations: Decreased functional mobility ; Decreased ADL status; Decreased ROM; Decreased strength;Decreased balance;Decreased high-level IADLs;Decreased fine motor control;Decreased coordination;Decreased endurance; Increased pain  Assessment: Mr. Fatou Knott appears to slowly make gains approaching his level before his second stroke. He is not using quad cane to ambulate, able to walk longer distances using quad cane (not yet walking with single point cane), and his tolerance for exercise has improved. He has not shown great gain in left LE strength, though his muscles appear to intermittently \"kick in\" during exercise.  He has been fiited for a custom AFO and I expect the quality of his gait pattern to improve. REQUIRES PT FOLLOW UP: Yes  Discharge Recommendations: Continue to assess pending progress    Goals:  Short term goals  Time Frame for Short term goals: 6-8 weeks  Short term goal 1: Patient to be independent with HEP.----not yet met(4/2/21 Patient has not yet been issued a HEP.)  Short term goal 2: Patient to be independent with resumption of use of AFO (if foot strength remains insufficient). --progress(4/2/21 Patient had been using AFO, strap was worn out and broke. He has custom AFO on order by [employer].)  Short term goal 3: Patient able to ambulate with cane with less drag of left foot. --progress(4/2/21 Patient walks with quad cane, slightly less drag of left foot but using band for flexion aid.)  Short term goal 4: Patient to report greater ease getting in and out of vehicle. --progress(4/2/21 Patient is having trouble getting in/out of cars, not having trouble with truck.)  Long term goals  Time Frame for Long term goals : 9-12 weeks  Long term goal 1: Patient able to ambulate with quad cane or single point cane 500'. --met for using quad cane.(4/2/21 Patient has walked in excess of 500' using quad cane. 662' feet at reassessment.)  Long term goal 2: Patient able to perform sit to stand 8 x in 30 seconds with increased weight bourne through left LE.---progress(4/2/21 sit to stand 5 x in 30 secs using right arm to push up.)  Long term goal 3: Patient to have >= 4-/5 strength in left foot for df, pf, inversion and eversion. --not met(4/2/21 No change yet regarding left LE muscle strength (see strength section))  Long term goal 4: Patient to have >=4-/5 left hip flexors, 4+/5 left knee extensors. --not met(4/2/21 No change in strength from initial evaluation. See strength section)  Long term goal 5: Patient to show increased protective reactions with standing balance perturbations. --progress(4/2/21 Able to accept modest balance perturbations in all directions, but continued delay in LLE reaction, as well as

## 2021-04-02 NOTE — PROGRESS NOTES
2# (elbow at 90 degrees at side) 2 sets x15 reps  Other exercises 9: Velcro board keys 1 & 2  Other exercises 11: Clothespins activity 1 pt pinch on yellow & red, 3 pt pinch on green, and lateral pinch on blue- upgraded as tolerated  Other exercises 13: BUE digit extension exercise with blue resistive rubberband 2 set x15 reps  Other exercises 14: LUE digit flexion with green digi  1 set x10 reps and red digi squeeze 2 sets x15 reps   LUE PROM (degrees)  LUE PROM: WFL  LUE AROM (degrees)  LUE AROM : Exceptions  L Shoulder Flexion 0-180: 0-101  L Shoulder Extension 0-45: WFL  L Shoulder ABduction 0-180: 0-84  L Shoulder Int Rotation  0-70: WFL  L Shoulder Ext Rotation  0-90: WFL  L Elbow Flexion 0-145: WFL  L Elbow Extension 145-0: WFL  L Forearm Pron 0-90: WFL  L Forearm Supination  0-90: WFL  L Wrist Flexion 0-80: WFL  L Wrist Extension 0-70: WFL  L Wrist Radial Deviation 0-20: WFL  L Wrist Ulnar Deviation 0-45: WFL  Left Hand PROM (degrees)  Left Hand PROM: WFL  Left Hand AROM (degrees)  Left Hand AROM: Exceptions  Left Hand General AROM: Full fist  L Thumb Opposition: WFL  RUE PROM (degrees)  RUE PROM: WNL  RUE AROM (degrees)  RUE AROM : WNL  Right Hand PROM (degrees)  Right Hand PROM: WNL  Right Hand AROM (degrees)  Right Hand AROM: WNL     Modalities: Yes  Electrical Stimulation  Electrical Stimulation Location: Left; Shoulder  Electrical Stimulation Action: Shoulder flexion LUE in sidelying with therapist support and Shoulder abduction LUE supine with therapist support  Electrical Stimulation Parameters: 20-30 intensity with 10/10 cycle, sh flex pads on anterior deltoid, sh abd pads on anterior and middle deltoid. Electrical Stimulation Goals: Strength           Functional Activity Tolerance  Functional Activity Tolerance:  Tolerates 30 min exercise with multiple rests        Hand Dominance  Hand Dominance: Right  Left Hand Strength -  (lbs)  Handle Setting 3: 35, 36, 20  Left Hand Strength - Pinch (lbs)  Lateral: 20, 14, 14  Tip: 10, 10, 8  Palmar 3 point: 14, 10, 13  LUE Edema - Circumference (cm)  LUE Edema Present?: No  Right Hand Strength -  (lbs)  Handle Setting 3: 111, 100, 95  Right Hand Strength - Pinch (lbs)  Lateral: 24, 23, 26  Tip: 16, 14, 17  Palmar 3 point: 24, 22, 23  RUE Edema - Circumference (cm)  RUE Edema Present?: No  Hand Assessment Comment  Hand Assessment Comment: Patient demonstrates similar  strength scores since evaluation in which patient attributes that to receiving COVID vaccination yeserday in the effected LUE. Assessment   Performance deficits / Impairments: Decreased coordination;Decreased ROM; Decreased strength;Decreased fine motor control;Decreased high-level IADLs  Assessment: Patient demonstrates increased ROM and strength this date despite receiving COVID vaccination in LUE deltoid yesterday skewing some recent ROM developments from prior sessions. Patient met 3 STGs this date with additional added. Continues to progress with LTGs. Patient to be administered specific HEP next week with emphasis on PNF patterns, elbow flexion/extension, external rotation, and  strengthening. Patient continues to benefit from skilled OT services. Treatment Diagnosis: M62.81, R27.8, M25.61  Prognosis: Good  History: Patient has had a hx of TIA and CVAs. This is his 3rd CVA. He is an active smoker and reports he's trying to decrease his amount each day. He has not worked in 2-3 years since his CVAs.   Discharge Recommendations: Continue to assess pending progress  REQUIRES OT FOLLOW UP: Yes  Timed Code Treatment Minutes: [address]  Times per week: x2  Times per day: Daily  Plan weeks: 6  Current Treatment Recommendations: Strengthening, ROM, Manual Therapy:  STM, Neuromuscular Re-education, Modalities (comment), Self-Care / ADL, Patient/Caregiver Education & Training, Endurance Training      OutComes Score  QuickDASH Total Score: 22 (04/02/21 0748)

## 2021-04-06 ENCOUNTER — HOSPITAL ENCOUNTER (OUTPATIENT)
Dept: PHYSICAL THERAPY | Age: 56
Setting detail: THERAPIES SERIES
Discharge: HOME | End: 2021-04-06
Payer: COMMERCIAL

## 2021-04-06 ENCOUNTER — HOSPITAL ENCOUNTER (OUTPATIENT)
Dept: OCCUPATIONAL THERAPY | Age: 56
Setting detail: THERAPIES SERIES
Discharge: HOME | End: 2021-04-06
Payer: COMMERCIAL

## 2021-04-06 ENCOUNTER — APPOINTMENT (OUTPATIENT)
Dept: SPEECH THERAPY | Age: 56
End: 2021-04-06
Payer: COMMERCIAL

## 2021-04-06 PROCEDURE — 97110 THERAPEUTIC EXERCISES: CPT

## 2021-04-06 PROCEDURE — 97140 MANUAL THERAPY 1/> REGIONS: CPT

## 2021-04-06 PROCEDURE — 97032 APPL MODALITY 1+ESTIM EA 15: CPT

## 2021-04-06 PROCEDURE — 97530 THERAPEUTIC ACTIVITIES: CPT

## 2021-04-06 ASSESSMENT — PAIN DESCRIPTION - ORIENTATION
ORIENTATION: LOWER
ORIENTATION: LOWER

## 2021-04-06 ASSESSMENT — PAIN - FUNCTIONAL ASSESSMENT: PAIN_FUNCTIONAL_ASSESSMENT: ACTIVITIES ARE NOT PREVENTED

## 2021-04-06 ASSESSMENT — PAIN DESCRIPTION - LOCATION
LOCATION: BACK
LOCATION: BACK

## 2021-04-06 ASSESSMENT — PAIN DESCRIPTION - PAIN TYPE: TYPE: CHRONIC PAIN

## 2021-04-06 ASSESSMENT — PAIN DESCRIPTION - FREQUENCY: FREQUENCY: CONTINUOUS

## 2021-04-06 NOTE — PROGRESS NOTES
exercises 13: BUE digit extension exercise with blue resistive rubberband 2 set x15 reps  Other exercises 14: LUE digit flexion with green digi  1 set x10 reps and red digi squeeze 1 sets x10 reps  Other exercises 15: LUE wall slides in shoulder flexion with pilow sheet 1 set x5 reps    Modalities: Yes  Electrical Stimulation  Electrical Stimulation Location: Left; Shoulder  Electrical Stimulation Action: Shoulder flexion LUE in sidelying with therapist support and Shoulder abduction LUE supine with therapist support  Electrical Stimulation Parameters: 20-30 intensity with 10/10 cycle, sh flex pads on anterior deltoid, sh abd pads on anterior and middle deltoid. Electrical Stimulation Goals: Strength      Assessment   Performance deficits / Impairments: Decreased coordination;Decreased ROM; Decreased strength;Decreased fine motor control;Decreased high-level IADLs  Assessment: Patient demonstrates increased ROM and strength this date with shoulder flexion in sidelying and sitting with and without NMES. Patient able to perform shoulder flexion this date with 2# to 100-110 degrees. Patient given theraband HEP this date for putty  strengthening and LUE strengthening. Pt verbalized and demonstrated good understanding and technique with green/red therbands. Patient continues to benefit from skilled OT services. Treatment Diagnosis: M62.81, R27.8, M25.61  Prognosis: Good  History: Patient has had a hx of TIA and CVAs. This is his 3rd CVA. He is an active smoker and reports he's trying to decrease his amount each day. He has not worked in 2-3 years since his CVAs.   Discharge Recommendations: Continue to assess pending progress  REQUIRES OT FOLLOW UP: Yes  Timed Code Treatment Minutes: [address]  Times per week: x2  Times per day: Daily  Plan weeks: 6  Current Treatment Recommendations: Strengthening, ROM, Manual Therapy:  STM, Neuromuscular Re-education, Modalities (comment), Self-Care / ADL, Patient/Caregiver Education & Training, Endurance Training     Goals  Short term goals  Time Frame for Short term goals: 3 weeks  Short term goal 1: Patient to improve AROM shoulder flexion to >100 degrees. (Goal Met- 101 degrees)  Short term goal 2: Patient to increase MMT score of elbow flexion/extension to 3+/5 for yardwork. (Goal Met- 3+/5)  Short term goal 3: Patient be able to open various light-moderate resistance containers with LUE hand. (Goal Met)  Short term goal 4: Patient to increase LUE  strength to 50# for fishing. (Progressing)  Short term goal 5: Patient to be independent with home exercise program. (Progressing)  Short term goal 6: Patient to improve AROM shoulder abduction to >100 degrees. (Progressing- 84 degrees)  Long term goals  Time Frame for Long term goals : 6 weeks  Long term goal 1: Patient to improve AROM shoulder flexion to functional ROM of >130 degrees. (Progressing)  Long term goal 2: Patient to increase MMT score of elbow flexion/extension to 4/5 for yardwork. (Progressing)  Long term goal 3: Patient be able to open various heavy resistance containers with LUE hand. (Progressing)  Long term goal 4: Patient to increase LUE  strength to 80# for fishing. (Progressing)  Long term goal 5: Patient to improve LUE 9 hole peg test score to 50th percentile for age group. (Progressing)  Patient Goals   Patient goals : \"I want to get my left arm stronger. I can't even open a jar anymore. \"    Therapy Time   Individual Concurrent Group Co-treatment   Time In 1101         Time Out 1200         Minutes 59         Timed Code Treatment Minutes: [place], OT Electronically signed by VIANEY Gomez on 4/6/2021 at 12:26 PM

## 2021-04-06 NOTE — PROGRESS NOTES
15 ea  Exercise 12: repeated sit to stand with forced recruitment of left leg (higher surface to lower)  x 10  Exercise 13: partial squats--10 reps   hip abduction x 10  Exercise 14: standing left hip flexion x 15 with AA  Exercise 15: standing left hip hike  x 15  Exercise 16: standing and marching--1'  Exercise 17: sidestepping in bars x 5 dwn/back  Exercise 18: ambulation in parallel bars  (down & back) using only right hand and with t-band flexion assist  x 5  Exercise 19: supine left quad sets 3\" x 20--**  Exercise 20: supine foot dorsiflexion/plantarflexion/eversion (DF and eversion with NMES x 10', pad just distal to tibial plateau and middle of ant tib) and PF with manual resist        Assessment:   Conditions Requiring Skilled Therapeutic Intervention  Body structures, Functions, Activity limitations: Decreased functional mobility ; Decreased ADL status; Decreased ROM; Decreased strength;Decreased balance;Decreased high-level IADLs;Decreased fine motor control;Decreased coordination;Decreased endurance; Increased pain  Assessment: Gait distance during 6 MWT was farthest to date and at least 75% of the time able to clear his L foot w/o scooting or dragging. Required less assist with heel slide and still performing SAQ and LAQ with eccentric lowering and able to clear the floor picking up L foot but unable to perform a marching motion due to limited ROM. REQUIRES PT FOLLOW UP: Yes        Goals:  Short term goals  Time Frame for Short term goals: 6-8 weeks  Short term goal 1: Patient to be independent with HEP.----not yet met(4/2/21 Patient has not yet been issued a HEP.)  Short term goal 2: Patient to be independent with resumption of use of AFO (if foot strength remains insufficient). --progress(4/2/21 Patient had been using AFO, strap was worn out and broke. He has custom AFO on order by [employer].)  Short term goal 3: Patient able to ambulate with cane with less drag of left foot. --progress(4/2/21 Patient walks with quad cane, slightly less drag of left foot but using band for flexion aid.)  Short term goal 4: Patient to report greater ease getting in and out of vehicle. --progress(4/2/21 Patient is having trouble getting in/out of cars, not having trouble with truck.)  Long term goals  Time Frame for Long term goals : 9-12 weeks  Long term goal 1: Patient able to ambulate with quad cane or single point cane 500'. --met for using quad cane.(4/2/21 Patient has walked in excess of 500' using quad cane. 662' feet at reassessment.)  Long term goal 2: Patient able to perform sit to stand 8 x in 30 seconds with increased weight bourne through left LE.---progress(4/2/21 sit to stand 5 x in 30 secs using right arm to push up.)  Long term goal 3: Patient to have >= 4-/5 strength in left foot for df, pf, inversion and eversion. --not met(4/2/21 No change yet regarding left LE muscle strength (see strength section))  Long term goal 4: Patient to have >=4-/5 left hip flexors, 4+/5 left knee extensors. --not met(4/2/21 No change in strength from initial evaluation. See strength section)  Long term goal 5: Patient to show increased protective reactions with standing balance perturbations. --progress(4/2/21 Able to accept modest balance perturbations in all directions, but continued delay in LLE reaction, as well as generally erratic motions.)  Patient Goals   Patient goals : Return back to normal level of function, walk without a device or 1-point cane.     Plan:    Times per week: 2x per week  Plan weeks: 6-9 weeks  Current Treatment Recommendations: Strengthening, ROM, Balance Training, Transfer Training, Endurance Training, Gait Training, Stair training, Neuromuscular Re-education, Home Exercise Program, Patient/Caregiver Education & Training, Equipment Evaluation, Education, & procurement       Therapy Time   Individual Concurrent Group Co-treatment   Time In 1001         Time Out 1058         Minutes 57         Timed Code Treatment Minutes: SALEEM Alexis    Electronically signed by Karina Suárez PTA on 4/6/2021 at 11:00 AM

## 2021-04-09 ENCOUNTER — HOSPITAL ENCOUNTER (OUTPATIENT)
Dept: OCCUPATIONAL THERAPY | Age: 56
Setting detail: THERAPIES SERIES
Discharge: HOME | End: 2021-04-09
Payer: COMMERCIAL

## 2021-04-09 ENCOUNTER — HOSPITAL ENCOUNTER (OUTPATIENT)
Dept: PHYSICAL THERAPY | Age: 56
Setting detail: THERAPIES SERIES
Discharge: HOME | End: 2021-04-09
Payer: COMMERCIAL

## 2021-04-09 PROCEDURE — 97032 APPL MODALITY 1+ESTIM EA 15: CPT

## 2021-04-09 PROCEDURE — 97110 THERAPEUTIC EXERCISES: CPT

## 2021-04-09 PROCEDURE — 97530 THERAPEUTIC ACTIVITIES: CPT

## 2021-04-09 ASSESSMENT — PAIN DESCRIPTION - ORIENTATION: ORIENTATION: LOWER

## 2021-04-09 ASSESSMENT — PAIN DESCRIPTION - LOCATION
LOCATION: BACK
LOCATION: BACK

## 2021-04-09 ASSESSMENT — PAIN SCALES - GENERAL: PAINLEVEL_OUTOF10: 2

## 2021-04-09 ASSESSMENT — PAIN DESCRIPTION - DESCRIPTORS: DESCRIPTORS: ACHING

## 2021-04-09 NOTE — PROGRESS NOTES
Physical Therapy  Daily Treatment Note  Date: 2021  Patient Name: Flo Padilla  MRN: [id number]     :   1965    Subjective:   General  Additional Pertinent Hx: 49year old male referred to PT with diagnosis of low back pain. He had been seen in the past at our facility for rehab following a stroke. Recent x-ray showed mild retrolisthesis of L4 on L5 which may represent subluxation at the level of the facets.   Referring Practitioner: Edwardo Ordonez MD  PT Insurance Information: [facility] (20 visits per year, no precert)  Total # of Visits Approved: 18  Total # of Visits to Date: 15  Plan of Care/Certification Expiration Date: 21  Progress Note Due Date: 21  Subjective: i got my AFO and i don't have to wear that band now to help  my leg  Patient Currently in Pain: Yes  Pain Level: 2  Pain Type: Chronic pain  Pain Location: Back  Pain Orientation: Lower  Pain Descriptors: Aching       Treatment Activities:       Exercises  Exercise 1: RECOMMEND AMB FIRST**          ambulation up to 6 minutes with SWYF and AFO  720'  in 5' (set at 5' by mistake)  Exercise 2: supine SAQ's with tapping/DTR-------eccentric lowering x 15 with assist from therapist  Exercise 3: supine heel slides x 20--min to assist  and also resisted hip/knee ext  Exercise 4: supine lower trunk rotation stretch to right--4 reps, 15 seconds each  Exercise 5: sitting LAQ's with faciliatation (as needed)  and eccentric lowering x 15  Exercise 6: sitting h/s curls with band and facilitation as needed (yellow) x 15 and partial ROM *with facilitation  Exercise 7: sitting ball squeeze supine with manual assist x 15  Exercise 8: sitting hip abduction with band (with blocking of right leg to recruit left)  yellow  x 15  Exercise 9: sitting foot tapping DF 1 x 20 ea  Exercise 10: sitting heel raises with weights over lower thighs  7.5#  x 20 ea  Exercise 11: sitting marching x 20 ea  Exercise 12: repeated sit to stand with forced recruitment of left to report greater ease getting in and out of vehicle. --progress(4/2/21 Patient is having trouble getting in/out of cars, not having trouble with truck.)  Long term goals  Time Frame for Long term goals : 9-12 weeks  Long term goal 1: Patient able to ambulate with quad cane or single point cane 500'. --met for using quad cane.(4/2/21 Patient has walked in excess of 500' using quad cane. 662' feet at reassessment.)  Long term goal 2: Patient able to perform sit to stand 8 x in 30 seconds with increased weight bourne through left LE.---progress(4/2/21 sit to stand 5 x in 30 secs using right arm to push up.)  Long term goal 3: Patient to have >= 4-/5 strength in left foot for df, pf, inversion and eversion. --not met(4/2/21 No change yet regarding left LE muscle strength (see strength section))  Long term goal 4: Patient to have >=4-/5 left hip flexors, 4+/5 left knee extensors. --not met(4/2/21 No change in strength from initial evaluation. See strength section)  Long term goal 5: Patient to show increased protective reactions with standing balance perturbations. --progress(4/2/21 Able to accept modest balance perturbations in all directions, but continued delay in LLE reaction, as well as generally erratic motions.)  Patient Goals   Patient goals : Return back to normal level of function, walk without a device or 1-point cane.     Plan:    Times per week: 2x per week  Plan weeks: 6-9 weeks  Current Treatment Recommendations: Strengthening, ROM, Balance Training, Transfer Training, Endurance Training, Gait Training, Stair training, Neuromuscular Re-education, Home Exercise Program, Patient/Caregiver Education & Training, Equipment Evaluation, Education, & procurement        Therapy Time   Individual Concurrent Group Co-treatment   Time In [phone]         Time Out 1512         Minutes 47                 Kobi Fenton PTA    Electronically signed by Kobi Fenton PTA on 4/9/2021 at 9:57 AM

## 2021-04-09 NOTE — PROGRESS NOTES
Daily Treatment Note  Date: 2021  Patient Name: Zula Spurling  :  1965  MRN: [id number]       Subjective   General  Chart Reviewed: Yes  Patient assessed for rehabilitation services?: Yes  Family / Caregiver Present: No  Referring Practitioner: Diego Rudolph MD  Diagnosis: hemiplegia, nondominant, left side G81.94  OT Visit Information  Onset Date: 21  OT Insurance Information: [facility]  Total # of Visits Approved: 20  Total # of Visits to Date: 6  Certification Period Expiration Date: 21  Progress Note Due Date: 21  Subjective  Subjective: \"I really think this stuff is helping my shoulder. \"  Pre Treatment Pain Screening  Intervention List: Patient able to continue with treatment  Pain Assessment  Pain Assessment: 0-10  Pain Level: 2  Pain Type: Chronic pain  Pain Location: Back  Pain Orientation: Lower  Pain Descriptors: Aching  Pain Frequency: Continuous  Pain Onset: On-going  Clinical Progression: Not changed  Functional Pain Assessment: Activities are not prevented  Non-Pharmaceutical Pain Intervention(s): Distraction; Therapeutic touch; Therapeutic presence  Response to Pain Intervention: Patient Satisfied  Vital Signs  Patient Currently in Pain: Yes     Treatment Activities:    Other exercises  Other exercises?: Yes  Other exercises 1: LUE shoulder flexion in sidelying 4 sets x5 reps with NMES  Other exercises 2: Supine LUE shoulder abduction 4 sets x5 reps with NMES  Other exercises 3: LUE elbow flexion 2 set x15 reps with 2# dumbbell  Other exercises 4: LUE external rotation with 2# (elbow at 90 degrees at side) 2 sets x15 reps  Other exercises 5: LUE shoulder flexion with 2# 2 sets x15 reps  Other exercises 7: LUE tricep pulldowns with red theraband on top of doorway 2 sets x10 reps  Other exercises 11: Clothespins activity 1 pt pinch on yellow & red, 3 pt pinch on green, and lateral pinch on blue- upgraded as tolerated  Other exercises 12: Isometric wall exercises of shoulder flexion, shoulder extension, abduction, adduction with orange ball 2 sets x10 reps of each  Other exercises 13: BUE digit extension exercise with blue resistive rubberband 2 set x15 reps  Other exercises 14: LUE digit flexion with green digi  1 set x10 reps and red digi squeeze 1 sets x10 reps  Other exercises 15: LUE wall slides in shoulder flexion with pilow sheet 1 set x5 reps         Modalities: Yes  Electrical Stimulation  Electrical Stimulation Location: Left; Shoulder  Electrical Stimulation Action: Shoulder flexion LUE in sidelying with therapist support at wrist and Shoulder abduction LUE supine with therapist support at wrist  Electrical Stimulation Parameters: 20-25 intensity with 10/10 cycle, sh flex pads on anterior deltoid, sh abd pads on middle deltoid. Electrical Stimulation Goals: Strength     Assessment   Performance deficits / Impairments: Decreased coordination;Decreased ROM; Decreased strength;Decreased fine motor control;Decreased high-level IADLs  Assessment: Patient continues to demonstrate increased shoulder flexion this date with 119 degrees of shoulder flexion in sitting against gravity after performing estim assisted exercises in sidelying. Patient does not c/o pain or signs of impingement. Patient continues to benefit from skilled OT services. Treatment Diagnosis: M62.81, R27.8, M25.61  Prognosis: Good  History: Patient has had a hx of TIA and CVAs. This is his 3rd CVA. He is an active smoker and reports he's trying to decrease his amount each day. He has not worked in 2-3 years since his CVAs.   Discharge Recommendations: Continue to assess pending progress  REQUIRES OT FOLLOW UP: Yes  Timed Code Treatment Minutes: [address]  Times per week: x2  Times per day: Daily  Plan weeks: 6  Current Treatment Recommendations: Strengthening, ROM, Manual Therapy:  STM, Neuromuscular Re-education, Modalities (comment), Self-Care / ADL, Patient/Caregiver Education & Training, Endurance Training     Goals  Short term goals  Time Frame for Short term goals: 3 weeks  Short term goal 1: Patient to improve AROM shoulder flexion to >100 degrees. (Goal Met- 101 degrees)  Short term goal 2: Patient to increase MMT score of elbow flexion/extension to 3+/5 for yardwork. (Goal Met- 3+/5)  Short term goal 3: Patient be able to open various light-moderate resistance containers with LUE hand. (Goal Met)  Short term goal 4: Patient to increase LUE  strength to 50# for fishing. (Progressing)  Short term goal 5: Patient to be independent with home exercise program. (Progressing)  Short term goal 6: Patient to improve AROM shoulder abduction to >100 degrees. (Progressing- 84 degrees)  Long term goals  Time Frame for Long term goals : 6 weeks  Long term goal 1: Patient to improve AROM shoulder flexion to functional ROM of >130 degrees. (Progressing)  Long term goal 2: Patient to increase MMT score of elbow flexion/extension to 4/5 for yardwork. (Progressing)  Long term goal 3: Patient be able to open various heavy resistance containers with LUE hand. (Progressing)  Long term goal 4: Patient to increase LUE  strength to 80# for fishing. (Progressing)  Long term goal 5: Patient to improve LUE 9 hole peg test score to 50th percentile for age group. (Progressing)  Patient Goals   Patient goals : \"I want to get my left arm stronger. I can't even open a jar anymore. \"    Therapy Time   Individual Concurrent Group Co-treatment   Time In 1002         Time Out 1059         Minutes 57         Timed Code Treatment Minutes: [place], OT Electronically signed by DANIS Payan/L on 4/9/2021 at 11:32 AM

## 2021-04-13 ENCOUNTER — HOSPITAL ENCOUNTER (OUTPATIENT)
Dept: OCCUPATIONAL THERAPY | Age: 56
Setting detail: THERAPIES SERIES
End: 2021-04-13
Payer: COMMERCIAL

## 2021-04-13 ENCOUNTER — APPOINTMENT (OUTPATIENT)
Dept: PHYSICAL THERAPY | Age: 56
End: 2021-04-13
Payer: COMMERCIAL

## 2021-04-13 ENCOUNTER — APPOINTMENT (OUTPATIENT)
Dept: OCCUPATIONAL THERAPY | Age: 56
End: 2021-04-13
Payer: COMMERCIAL

## 2021-04-13 ENCOUNTER — APPOINTMENT (OUTPATIENT)
Dept: SPEECH THERAPY | Age: 56
End: 2021-04-13
Payer: COMMERCIAL

## 2021-04-15 ENCOUNTER — APPOINTMENT (OUTPATIENT)
Dept: OCCUPATIONAL THERAPY | Age: 56
End: 2021-04-15
Payer: COMMERCIAL

## 2021-04-15 ENCOUNTER — APPOINTMENT (OUTPATIENT)
Dept: PHYSICAL THERAPY | Age: 56
End: 2021-04-15
Payer: COMMERCIAL

## 2021-04-16 ENCOUNTER — HOSPITAL ENCOUNTER (OUTPATIENT)
Dept: OCCUPATIONAL THERAPY | Age: 56
Setting detail: THERAPIES SERIES
Discharge: HOME | End: 2021-04-16
Payer: COMMERCIAL

## 2021-04-16 ENCOUNTER — HOSPITAL ENCOUNTER (OUTPATIENT)
Dept: PHYSICAL THERAPY | Age: 56
Setting detail: THERAPIES SERIES
Discharge: HOME | End: 2021-04-16
Payer: COMMERCIAL

## 2021-04-16 PROCEDURE — 97530 THERAPEUTIC ACTIVITIES: CPT

## 2021-04-16 PROCEDURE — 97110 THERAPEUTIC EXERCISES: CPT

## 2021-04-16 PROCEDURE — 97032 APPL MODALITY 1+ESTIM EA 15: CPT

## 2021-04-16 ASSESSMENT — PAIN DESCRIPTION - DESCRIPTORS
DESCRIPTORS: ACHING
DESCRIPTORS: ACHING

## 2021-04-16 ASSESSMENT — PAIN DESCRIPTION - PAIN TYPE: TYPE: CHRONIC PAIN

## 2021-04-16 ASSESSMENT — PAIN SCALES - GENERAL: PAINLEVEL_OUTOF10: 2

## 2021-04-16 ASSESSMENT — PAIN DESCRIPTION - ORIENTATION: ORIENTATION: LOWER

## 2021-04-16 ASSESSMENT — PAIN DESCRIPTION - PROGRESSION: CLINICAL_PROGRESSION: NOT CHANGED

## 2021-04-16 ASSESSMENT — PAIN DESCRIPTION - ONSET: ONSET: ON-GOING

## 2021-04-16 ASSESSMENT — PAIN DESCRIPTION - LOCATION: LOCATION: BACK

## 2021-04-16 NOTE — PROGRESS NOTES
Daily Treatment Note  Date: 2021  Patient Name: Geeta Loera  :  1965  MRN: [id number]       Subjective   General  Chart Reviewed: Yes  Patient assessed for rehabilitation services?: Yes  Family / Caregiver Present: No  Referring Practitioner: Tiago Aragon MD  Diagnosis: hemiplegia, nondominant, left side G81.94  OT Visit Information  Onset Date: 21  OT Insurance Information: [facility]  Total # of Visits Approved: 20  Total # of Visits to Date: 12  Certification Period Expiration Date: 21  Progress Note Due Date: 21  Subjective  Subjective: \"I was able to mow 6 lawns Monday and fished a little this week and noticed I was raising my arm up more. \"  Pre Treatment Pain Screening  Intervention List: Patient able to continue with treatment  Pain Assessment  Pain Assessment: 0-10  Pain Level: 2  Pain Type: Chronic pain  Pain Location: Back  Pain Orientation: Lower  Pain Descriptors: Aching  Pain Frequency: Continuous  Pain Onset: On-going  Clinical Progression: Not changed  Non-Pharmaceutical Pain Intervention(s): Distraction; Therapeutic presence; Therapeutic touch  Response to Pain Intervention: Patient Satisfied  Vital Signs  Patient Currently in Pain: Yes     Treatment Activities:    Other exercises  Other exercises?: Yes  Other exercises 1: LUE shoulder flexion in sidelying 4 sets x5 reps with NMES  Other exercises 2: Supine LUE shoulder abduction 4 sets x5 reps with NMES  Other exercises 3: LUE elbow flexion 2 set x15 reps with 3# dumbbell  Other exercises 4: LUE external rotation with 3# (elbow at 90 degrees at side) 2 sets x15 reps  Other exercises 5: LUE shoulder flexion with 2# 2 sets x15 reps  Other exercises 7: LUE tricep pulldowns with red theraband on top of doorway 2 sets x10 reps  Other exercises 9: Velcro board keys 1 & 2  Other exercises 11: Clothespins activity 1 pt pinch on yellow, red, green, blue, and lateral pinch on black- patient able to perform all clothespins this date  Other exercises 12: Isometric wall exercises of shoulder flexion, shoulder extension, abduction, adduction with orange ball 2 sets x10 reps of each  Other exercises 13: BUE digit extension exercise with blue resistive rubberband 2 set x15 reps  Other exercises 14: LUE digit flexion with green digi  2 set x15 reps and green digi squeeze 2 sets x15 reps  Other exercises 15: LUE wall slides in shoulder flexion with pilow sheet 1 set x10 reps  Other exercises 18: LUE shoulder abduction with 2# 1 set x10 reps         Modalities: Yes  Electrical Stimulation  Electrical Stimulation Location: Left; Shoulder  Electrical Stimulation Action: Shoulder flexion LUE in sidelying with therapist support at wrist and Shoulder abduction LUE supine with therapist support at wrist  Electrical Stimulation Parameters: 20-25 intensity with 10/10 cycle, sh flex pads on anterior deltoid, sh abd pads on middle deltoid. Electrical Stimulation Goals: Strength     Assessment   Performance deficits / Impairments: Decreased coordination;Decreased ROM; Decreased strength;Decreased fine motor control;Decreased high-level IADLs  Assessment: Patient continues to increase shoulder flexion this date to 120-13 degrees and shoulder abduction to 120-130 degrees with no weight against gravity after NMES. Patient able to achieve donning clothespins this date fully including all the black. Patient upgraded to 3# for elbow flexion/ext and external rotation. Patient continues to benefit from skilled OT services. Treatment Diagnosis: M62.81, R27.8, M25.61  Prognosis: Good  History: Patient has had a hx of TIA and CVAs. This is his 3rd CVA. He is an active smoker and reports he's trying to decrease his amount each day. He has not worked in 2-3 years since his CVAs.   Discharge Recommendations: Continue to assess pending progress  REQUIRES OT FOLLOW UP: Yes  Timed Code Treatment Minutes: [address] [facility]  Times per week: x2  Times per day: Daily  Plan weeks: 6  Current Treatment Recommendations: Strengthening, ROM, Manual Therapy:  STM, Neuromuscular Re-education, Modalities (comment), Self-Care / ADL, Patient/Caregiver Education & Training, Endurance Training     Goals  Short term goals  Time Frame for Short term goals: 3 weeks  Short term goal 1: Patient to improve AROM shoulder flexion to >100 degrees. (Goal Met- 101 degrees)  Short term goal 2: Patient to increase MMT score of elbow flexion/extension to 3+/5 for yardwork. (Goal Met- 3+/5)  Short term goal 3: Patient be able to open various light-moderate resistance containers with LUE hand. (Goal Met)  Short term goal 4: Patient to increase LUE  strength to 50# for fishing. (Progressing)  Short term goal 5: Patient to be independent with home exercise program. (Progressing)  Short term goal 6: Patient to improve AROM shoulder abduction to >100 degrees. (Progressing- 84 degrees)  Long term goals  Time Frame for Long term goals : 6 weeks  Long term goal 1: Patient to improve AROM shoulder flexion to functional ROM of >130 degrees. (Progressing)  Long term goal 2: Patient to increase MMT score of elbow flexion/extension to 4/5 for yardwork. (Progressing)  Long term goal 3: Patient be able to open various heavy resistance containers with LUE hand. (Progressing)  Long term goal 4: Patient to increase LUE  strength to 80# for fishing. (Progressing)  Long term goal 5: Patient to improve LUE 9 hole peg test score to 50th percentile for age group. (Progressing)  Patient Goals   Patient goals : \"I want to get my left arm stronger. I can't even open a jar anymore. \"    Therapy Time   Individual Concurrent Group Co-treatment   Time In 1001         Time Out 1102         Minutes 61         Timed Code Treatment Minutes: [facility],  Electronically signed by VIANEY Wood on 4/16/2021 at 11:25 AM

## 2021-04-16 NOTE — PROGRESS NOTES
Physical Therapy  Daily Treatment Note  Date: 2021  Patient Name: Kenia Fernandez  MRN: [id number]     :   1965    Subjective:   General  Additional Pertinent Hx: 49year old male referred to PT with diagnosis of low back pain. He had been seen in the past at our facility for rehab following a stroke. Recent x-ray showed mild retrolisthesis of L4 on L5 which may represent subluxation at the level of the facets.   Referring Practitioner: Antoine Alcantar MD  PT Insurance Information: [facility] (20 visits per year, no precert)  Total # of Visits Approved: 18  Total # of Visits to Date: 15  Plan of Care/Certification Expiration Date: 21  Progress Note Due Date: 21  Subjective: I mowed too much on Monday, woke up Tuesday and was hurting all over  Patient Currently in Pain: Yes  Pain Level: 2  Pain Type: Chronic pain  Pain Location: Back  Pain Orientation: Lower  Pain Descriptors: Aching       Treatment Activities:     Exercises  Exercise 1: RECOMMEND AMB FIRST**          ambulation up to 6 minutes with SBQC and AFO  781 ft  Exercise 2: supine SAQ's with tapping/DTR-------eccentric lowering x 15 with assist from therapist  Exercise 3: supine heel slides x 20--min to assist  and also resisted hip/knee ext  Exercise 4: supine lower trunk rotation stretch to right--4 reps, 15 seconds each  Exercise 5: sitting LAQ's with faciliatation (as needed)  and eccentric lowering x 15  Exercise 6: sitting h/s curls with band and facilitation as needed (yellow) x 15 and partial ROM *with facilitation  Exercise 7: sitting ball squeeze supine with manual assist x 15  Exercise 8: sitting hip abduction with band (with blocking of right leg to recruit left)  yellow  x 15  Exercise 9: sitting foot tapping DF 1 x 20 ea  Exercise 10: sitting heel raises with weights over lower thighs  7.5#  x 20 ea  Exercise 11: sitting marching x 20 ea  Exercise 12: repeated sit to stand with forced recruitment of left leg (higher surface to lower)  x 10  Exercise 13: partial squats--10 reps   hip abduction x 10  Exercise 14: standing left hip flexion x 15 with AA  Exercise 15: standing left hip hike  x 15  Exercise 16: standing and marching--1'  Exercise 17: sidestepping in bars x 5 dwn/back  Exercise 18: ambulation in parallel bars  (down & back) using only right hand and with t-band flexion assist  x 5  Exercise 19: supine left quad sets 3\" x 30--**  Exercise 20: supine foot dorsiflexion/plantarflexion/eversion (DF and eversion with NMES x 10', pad just distal to tibial plateau and middle of ant tib) and PF with manual resist      Assessment:   Conditions Requiring Skilled Therapeutic Intervention  Body structures, Functions, Activity limitations: Decreased functional mobility ; Decreased ADL status; Decreased ROM; Decreased strength;Decreased balance;Decreased high-level IADLs;Decreased fine motor control;Decreased coordination;Decreased endurance; Increased pain  Assessment: Continues to demonstrate a more consistant gait pattern but has a flat foot vs heel strike pattern on LLE, required slightly more aa with heel slides today but had slightly better eccentric control with SAQ and LAQ exercise. Will continue to advance routine as appropriate. REQUIRES PT FOLLOW UP: Yes        Goals:  Short term goals  Time Frame for Short term goals: 6-8 weeks  Short term goal 1: Patient to be independent with HEP.----not yet met(4/2/21 Patient has not yet been issued a HEP.)  Short term goal 2: Patient to be independent with resumption of use of AFO (if foot strength remains insufficient). --progress(4/2/21 Patient had been using AFO, strap was worn out and broke. He has custom AFO on order by [employer].)  Short term goal 3: Patient able to ambulate with cane with less drag of left foot. --progress(4/2/21 Patient walks with quad cane, slightly less drag of left foot but using band for flexion aid.)  Short term goal 4: Patient to report greater ease getting in and out of vehicle. --progress(4/2/21 Patient is having trouble getting in/out of cars, not having trouble with truck.)  Long term goals  Time Frame for Long term goals : 9-12 weeks  Long term goal 1: Patient able to ambulate with quad cane or single point cane 500'. --met for using quad cane.(4/2/21 Patient has walked in excess of 500' using quad cane. 662' feet at reassessment.)  Long term goal 2: Patient able to perform sit to stand 8 x in 30 seconds with increased weight bourne through left LE.---progress(4/2/21 sit to stand 5 x in 30 secs using right arm to push up.)  Long term goal 3: Patient to have >= 4-/5 strength in left foot for df, pf, inversion and eversion. --not met(4/2/21 No change yet regarding left LE muscle strength (see strength section))  Long term goal 4: Patient to have >=4-/5 left hip flexors, 4+/5 left knee extensors. --not met(4/2/21 No change in strength from initial evaluation. See strength section)  Long term goal 5: Patient to show increased protective reactions with standing balance perturbations. --progress(4/2/21 Able to accept modest balance perturbations in all directions, but continued delay in LLE reaction, as well as generally erratic motions.)  Patient Goals   Patient goals : Return back to normal level of function, walk without a device or 1-point cane.     Plan:    Times per week: 2x per week  Plan weeks: 6-9 weeks  Current Treatment Recommendations: Strengthening, ROM, Balance Training, Transfer Training, Endurance Training, Gait Training, Stair training, Neuromuscular Re-education, Home Exercise Program, Patient/Caregiver Education & Training, Equipment Evaluation, Education, & procurement        Therapy Time   Individual Concurrent Group Co-treatment   Time In 1103         Time Out 1156         Minutes 48                 Paula Schreiber PTA    Electronically signed by Paula Schreiber PTA on 4/16/2021 at 11:59 AM

## 2021-04-20 ENCOUNTER — HOSPITAL ENCOUNTER (OUTPATIENT)
Dept: OCCUPATIONAL THERAPY | Age: 56
Setting detail: THERAPIES SERIES
Discharge: HOME | End: 2021-04-20
Payer: COMMERCIAL

## 2021-04-20 ENCOUNTER — HOSPITAL ENCOUNTER (OUTPATIENT)
Dept: PHYSICAL THERAPY | Age: 56
Setting detail: THERAPIES SERIES
Discharge: HOME | End: 2021-04-20
Payer: COMMERCIAL

## 2021-04-20 ENCOUNTER — HOSPITAL ENCOUNTER (OUTPATIENT)
Dept: SPEECH THERAPY | Age: 56
Setting detail: THERAPIES SERIES
Discharge: HOME | End: 2021-04-20
Payer: COMMERCIAL

## 2021-04-20 PROCEDURE — 97032 APPL MODALITY 1+ESTIM EA 15: CPT

## 2021-04-20 PROCEDURE — 92507 TX SP LANG VOICE COMM INDIV: CPT

## 2021-04-20 PROCEDURE — 97110 THERAPEUTIC EXERCISES: CPT

## 2021-04-20 PROCEDURE — G0283 ELEC STIM OTHER THAN WOUND: HCPCS

## 2021-04-20 PROCEDURE — 97530 THERAPEUTIC ACTIVITIES: CPT

## 2021-04-20 ASSESSMENT — PAIN DESCRIPTION - PAIN TYPE
TYPE: CHRONIC PAIN
TYPE: CHRONIC PAIN

## 2021-04-20 ASSESSMENT — PAIN DESCRIPTION - PROGRESSION: CLINICAL_PROGRESSION: NOT CHANGED

## 2021-04-20 ASSESSMENT — PAIN DESCRIPTION - FREQUENCY
FREQUENCY: CONTINUOUS
FREQUENCY: CONTINUOUS

## 2021-04-20 ASSESSMENT — PAIN SCALES - GENERAL
PAINLEVEL_OUTOF10: 2
PAINLEVEL_OUTOF10: 2

## 2021-04-20 ASSESSMENT — PAIN DESCRIPTION - ORIENTATION: ORIENTATION: LOWER

## 2021-04-20 ASSESSMENT — PAIN DESCRIPTION - LOCATION
LOCATION: BACK
LOCATION: BACK

## 2021-04-20 ASSESSMENT — PAIN - FUNCTIONAL ASSESSMENT: PAIN_FUNCTIONAL_ASSESSMENT: ACTIVITIES ARE NOT PREVENTED

## 2021-04-20 ASSESSMENT — PAIN DESCRIPTION - DESCRIPTORS: DESCRIPTORS: ACHING

## 2021-04-20 NOTE — PROGRESS NOTES
sit to stand with forced recruitment of left leg (higher surface to lower)  x 10  Exercise 13: partial squats--10 reps   hip abduction x 10  Exercise 14: standing left hip flexion x 15 with AA  Exercise 15: standing left hip hike  x 15  Exercise 16: standing and marching--1'  Exercise 17: sidestepping in bars x 5 dwn/back  Exercise 18: ambulation in parallel bars  (down & back) using only right hand and with t-band flexion assist  x 5  Exercise 19: supine left quad sets 3\" x 30--**  Exercise 20: supine foot dorsiflexion/plantarflexion/eversion (DF and eversion with NMES x 10', pad just distal to tibial plateau and middle of ant tib) and PF with manual resist           Assessment:   Conditions Requiring Skilled Therapeutic Intervention  Body structures, Functions, Activity limitations: Decreased functional mobility ; Decreased ADL status; Decreased ROM; Decreased strength;Decreased balance;Decreased high-level IADLs;Decreased fine motor control;Decreased coordination;Decreased endurance; Increased pain  Assessment: Patient did well with session. Ambulating with improved gait pattern with new AFO. However, distance in 6mwt does vary from visit to visti. Patient gave good effort. He reported no pain pre or post session. REQUIRES PT FOLLOW UP: Yes    Goals:  Short term goals  Time Frame for Short term goals: 6-8 weeks  Short term goal 1: Patient to be independent with HEP.----not yet met(4/2/21 Patient has not yet been issued a HEP.)  Short term goal 2: Patient to be independent with resumption of use of AFO (if foot strength remains insufficient). --progress(4/2/21 Patient had been using AFO, strap was worn out and broke. He has custom AFO on order by [employer].)  Short term goal 3: Patient able to ambulate with cane with less drag of left foot. --progress(4/2/21 Patient walks with quad cane, slightly less drag of left foot but using band for flexion aid.)  Short term goal 4: Patient to report greater ease getting in and out of vehicle. --progress(4/2/21 Patient is having trouble getting in/out of cars, not having trouble with truck.)  Long term goals  Time Frame for Long term goals : 9-12 weeks  Long term goal 1: Patient able to ambulate with quad cane or single point cane 500'. --met for using quad cane.(4/2/21 Patient has walked in excess of 500' using quad cane. 662' feet at reassessment.)  Long term goal 2: Patient able to perform sit to stand 8 x in 30 seconds with increased weight bourne through left LE.---progress(4/2/21 sit to stand 5 x in 30 secs using right arm to push up.)  Long term goal 3: Patient to have >= 4-/5 strength in left foot for df, pf, inversion and eversion. --not met(4/2/21 No change yet regarding left LE muscle strength (see strength section))  Long term goal 4: Patient to have >=4-/5 left hip flexors, 4+/5 left knee extensors. --not met(4/2/21 No change in strength from initial evaluation. See strength section)  Long term goal 5: Patient to show increased protective reactions with standing balance perturbations. --progress(4/2/21 Able to accept modest balance perturbations in all directions, but continued delay in LLE reaction, as well as generally erratic motions.)  Patient Goals   Patient goals : Return back to normal level of function, walk without a device or 1-point cane.   Plan:    Plan  Times per week: 2x per week  Plan weeks: 6-9 weeks  Current Treatment Recommendations: Strengthening, ROM, Balance Training, Transfer Training, Endurance Training, Gait Training, Stair training, Neuromuscular Re-education, Home Exercise Program, Patient/Caregiver Education & Training, Equipment Evaluation, Education, & procurement  Timed Code Treatment Minutes: 62 Minutes     Therapy Time   Individual Concurrent Group Co-treatment   Time In 1102         Time Out 1200         Minutes 58         Timed Code Treatment Minutes: 58 Minutes  Electronically signed by Mecca Lloyd PTA on 4/20/2021 at 2:55 PM

## 2021-04-20 NOTE — PROGRESS NOTES
Daily Treatment Note  Date: 2021  Patient Name: Corazon Sampson  :  1965  MRN: [id number]       Subjective   General  Chart Reviewed: Yes  Patient assessed for rehabilitation services?: Yes  Family / Caregiver Present: No  Referring Practitioner: Blanka Boss MD  Diagnosis: hemiplegia, nondominant, left side G81.94  OT Visit Information  Onset Date: 21  OT Insurance Information: [facility]  Total # of Visits Approved: 20  Total # of Visits to Date: 15  Certification Period Expiration Date: 21  Progress Note Due Date: 21  Pre Treatment Pain Screening  Intervention List: Patient able to continue with treatment  Pain Assessment  Pain Assessment: 0-10  Pain Level: 2  Pain Type: Chronic pain  Pain Location: Back  Pain Orientation: Lower  Pain Descriptors: Aching  Pain Frequency: Continuous  Pain Onset: On-going  Clinical Progression: Not changed  Functional Pain Assessment: Activities are not prevented  Non-Pharmaceutical Pain Intervention(s): Distraction; Therapeutic presence; Therapeutic touch  Response to Pain Intervention: Patient Satisfied  Vital Signs  Patient Currently in Pain: Yes     Treatment Activities:    Other exercises  Other exercises?: Yes  Other exercises 1: LUE shoulder flexion in sidelying 4 sets x5 reps with NMES  Other exercises 2: Supine LUE shoulder abduction 4 sets x5 reps with NMES  Other exercises 3: LUE elbow flexion 2 set x15 reps with 3# dumbbell  Other exercises 4: LUE external rotation with 3# dumbbell (elbow at 90 degrees at side) 2 sets x15 reps  Other exercises 5: LUE shoulder flexion with 2# 2 sets x15 reps  Other exercises 7: LUE tricep pulldowns with green theraband on top of doorway 2 sets x15 reps (upgrade to blue next session)  Other exercises 9: Velcro board keys 1 & 2  Other exercises 11: Clothespins activity 1 pt pinch on yellow, red, green, blue, and lateral pinch on black- patient able to perform all clothespins this date  Other exercises 12: Isometric wall exercises of shoulder flexion, shoulder extension, abduction, adduction with orange ball 2 sets x10 reps of each  Other exercises 13: BUE digit extension exercise with blue resistive rubberband 2 set x15 reps  Other exercises 14: LUE digit flexion with green digi  2 set x15 reps and green digi squeeze 2 sets x15 reps  Other exercises 15: LUE wall slides in shoulder flexion with pilow sheet 1 set x10 reps  Other exercises 18: LUE shoulder abduction with 2# 1 set x10 reps                        Modalities: Yes  Electrical Stimulation  Electrical Stimulation Location: Left; Shoulder  Electrical Stimulation Action: Shoulder flexion LUE in sidelying with therapist support at wrist and Shoulder abduction LUE supine with therapist support at wrist  Electrical Stimulation Parameters: 20-25 intensity with 10/10 cycle, sh flex pads on anterior deltoid, sh abd pads on middle deltoid. Electrical Stimulation Goals: Strength     Assessment   Performance deficits / Impairments: Decreased coordination;Decreased ROM; Decreased strength;Decreased fine motor control;Decreased high-level IADLs  Assessment: Patient continues to increase shoulder flexion and abduction. No s/s of adverse reactions. Patient continues to benefit from skilled OT services. Treatment Diagnosis: M62.81, R27.8, M25.61  Prognosis: Good  History: Patient has had a hx of TIA and CVAs. This is his 3rd CVA. He is an active smoker and reports he's trying to decrease his amount each day. He has not worked in 2-3 years since his CVAs.   Discharge Recommendations: Continue to assess pending progress  REQUIRES OT FOLLOW UP: Yes  Timed Code Treatment Minutes: [email or web address]  Times per week: x2  Times per day: Daily  Plan weeks: 6  Current Treatment Recommendations: Strengthening, ROM, Manual Therapy:  STM, Neuromuscular Re-education, Modalities (comment), Self-Care / ADL, Patient/Caregiver Education & Training, Endurance Training     Goals  Short term goals  Time Frame for Short term goals: 3 weeks  Short term goal 1: Patient to improve AROM shoulder flexion to >100 degrees. (Goal Met- 101 degrees)  Short term goal 2: Patient to increase MMT score of elbow flexion/extension to 3+/5 for yardwork. (Goal Met- 3+/5)  Short term goal 3: Patient be able to open various light-moderate resistance containers with LUE hand. (Goal Met)  Short term goal 4: Patient to increase LUE  strength to 50# for fishing. (Progressing)  Short term goal 5: Patient to be independent with home exercise program. (Progressing)  Short term goal 6: Patient to improve AROM shoulder abduction to >100 degrees. (Progressing- 84 degrees)  Long term goals  Time Frame for Long term goals : 6 weeks  Long term goal 1: Patient to improve AROM shoulder flexion to functional ROM of >130 degrees. (Progressing)  Long term goal 2: Patient to increase MMT score of elbow flexion/extension to 4/5 for yardwork. (Progressing)  Long term goal 3: Patient be able to open various heavy resistance containers with LUE hand. (Progressing)  Long term goal 4: Patient to increase LUE  strength to 80# for fishing. (Progressing)  Long term goal 5: Patient to improve LUE 9 hole peg test score to 50th percentile for age group. (Progressing)  Patient Goals   Patient goals : \"I want to get my left arm stronger. I can't even open a jar anymore. \"    Therapy Time   Individual Concurrent Group Co-treatment   Time In 0901         Time Out 1000         Minutes 59         Timed Code Treatment Minutes: [place], OT Electronically signed by VIANEY Azevedo on 4/20/2021 at 10:11 AM

## 2021-04-20 NOTE — PROGRESS NOTES
dysfluencies in a structured conversational task.   Long-term Goals  Goal 1: The patient will demonstrate more fluent speech during unstructured tasks to communicate effectively with members of the community and family members.   Patient/family involved in developing goals and treatment plan: yes             Electronically Signed By:  Briana Galindo M.S., CCC-SLP  4/20/2021,10:53 AM.

## 2021-04-23 ENCOUNTER — HOSPITAL ENCOUNTER (OUTPATIENT)
Dept: PHYSICAL THERAPY | Age: 56
Setting detail: THERAPIES SERIES
Discharge: HOME | End: 2021-04-23
Payer: COMMERCIAL

## 2021-04-23 ENCOUNTER — HOSPITAL ENCOUNTER (OUTPATIENT)
Dept: OCCUPATIONAL THERAPY | Age: 56
Setting detail: THERAPIES SERIES
Discharge: HOME | End: 2021-04-23
Payer: COMMERCIAL

## 2021-04-23 PROCEDURE — 97530 THERAPEUTIC ACTIVITIES: CPT

## 2021-04-23 PROCEDURE — 97032 APPL MODALITY 1+ESTIM EA 15: CPT

## 2021-04-23 PROCEDURE — 97110 THERAPEUTIC EXERCISES: CPT

## 2021-04-23 PROCEDURE — G0283 ELEC STIM OTHER THAN WOUND: HCPCS

## 2021-04-23 ASSESSMENT — PAIN DESCRIPTION - PAIN TYPE
TYPE: CHRONIC PAIN
TYPE: CHRONIC PAIN

## 2021-04-23 ASSESSMENT — PAIN DESCRIPTION - ONSET: ONSET: ON-GOING

## 2021-04-23 ASSESSMENT — PAIN SCALES - GENERAL
PAINLEVEL_OUTOF10: 2
PAINLEVEL_OUTOF10: 2

## 2021-04-23 ASSESSMENT — PAIN DESCRIPTION - ORIENTATION: ORIENTATION: LOWER

## 2021-04-23 ASSESSMENT — PAIN DESCRIPTION - PROGRESSION: CLINICAL_PROGRESSION: NOT CHANGED

## 2021-04-23 ASSESSMENT — PAIN DESCRIPTION - FREQUENCY: FREQUENCY: CONTINUOUS

## 2021-04-23 NOTE — PROGRESS NOTES
Daily Treatment Note  Date: 2021  Patient Name: Geeta Loera  :  1965  MRN: [id number]       Subjective   General  Chart Reviewed: Yes  Patient assessed for rehabilitation services?: Yes  Family / Caregiver Present: No  Referring Practitioner: Tiago Aragon MD  Diagnosis: hemiplegia, nondominant, left side G81.94  OT Visit Information  Onset Date: 21  OT Insurance Information: [facility]  Total # of Visits Approved: 20  Total # of Visits to Date: 15  Certification Period Expiration Date: 21  Progress Note Due Date: 21  Subjective  Subjective: \"I was going to mow today. \"  Pre Treatment Pain Screening  Intervention List: Patient able to continue with treatment  Pain Assessment  Pain Assessment: 0-10  Pain Level: 2  Pain Type: Chronic pain  Pain Location: Back  Pain Orientation: Lower  Pain Descriptors: Aching  Pain Frequency: Continuous  Pain Onset: On-going  Clinical Progression: Not changed  Functional Pain Assessment: Activities are not prevented  Non-Pharmaceutical Pain Intervention(s): Distraction; Therapeutic presence; Therapeutic touch  Response to Pain Intervention: Patient Satisfied  Vital Signs  Patient Currently in Pain: Yes     Treatment Activities:    Other exercises  Other exercises?: Yes  Other exercises 1: LUE shoulder flexion in sidelying 4 sets x5 reps with NMES  Other exercises 2: Supine LUE shoulder abduction 4 sets x5 reps with NMES  Other exercises 3: LUE elbow flexion 2 set x15 reps with 3# dumbbell  Other exercises 4: LUE external rotation with 3# dumbbell (elbow at 90 degrees at side) 2 sets x15 reps  Other exercises 5: LUE shoulder flexion with 2# 2 sets x15 reps  Other exercises 7: LUE tricep pulldowns with green theraband on top of doorway 2 sets x15 reps (upgrade to blue next session)  Other exercises 9: Velcro board keys 1 & 2  Other exercises 11: Clothespins activity 1 pt pinch on yellow, red, green, blue, and lateral pinch on black- patient able to perform all clothespins this date  Other exercises 12: Isometric wall exercises of shoulder flexion, shoulder extension, abduction, adduction with orange ball 2 sets x10 reps of each  Other exercises 13: BUE digit extension exercise with blue resistive rubberband 2 set x15 reps  Other exercises 14: LUE digit flexion with green digi  2 set x15 reps and green digi squeeze 2 sets x15 reps  Other exercises 15: LUE wall slides in shoulder flexion with pilow sheet 1 set x10 reps  Other exercises 18: LUE shoulder abduction with 2# 1 set x10 reps      Modalities: Yes  Electrical Stimulation  Electrical Stimulation Location: Left; Shoulder  Electrical Stimulation Action: Shoulder flexion LUE in sidelying with therapist support at wrist and Shoulder abduction LUE supine with therapist support at wrist  Electrical Stimulation Parameters: 20-25 intensity with 10/10 cycle, sh flex pads on anterior deltoid, sh abd pads on middle deltoid. Electrical Stimulation Goals: Strength     Assessment   Performance deficits / Impairments: Decreased coordination;Decreased ROM; Decreased strength;Decreased fine motor control;Decreased high-level IADLs  Assessment: Patient continues to increase his shoulder flexion and abduction. Patient performing movements with 2# against gravity to ~150 degrees with decrease shaking or fatigue. Patient continues to benefit from skilled OT services to safely rem[facility] to [facility]. Treatment Diagnosis: M62.81, R27.8, M25.61  Prognosis: Good  History: Patient has had a hx of TIA and CVAs. This is his 3rd CVA. He is an active smoker and reports he's trying to decrease his amount each day. He has not worked in 2-3 years since his CVAs.   Discharge Recommendations: Continue to assess pending progress  REQUIRES OT FOLLOW UP: Yes  Timed Code Treatment Minutes: [facility]  Times per week: x2  Times per day: Daily  Plan weeks: 6  Current Treatment Recommendations: Strengthening, ROM, Manual Therapy:  STM, Neuromuscular Re-education, Modalities (comment), Self-Care / ADL, Patient/Caregiver Education & Training, Endurance Training    Goals  Short term goals  Time Frame for Short term goals: 3 weeks  Short term goal 1: Patient to improve AROM shoulder flexion to >100 degrees. (Goal Met- 101 degrees)  Short term goal 2: Patient to increase MMT score of elbow flexion/extension to 3+/5 for yardwork. (Goal Met- 3+/5)  Short term goal 3: Patient be able to open various light-moderate resistance containers with LUE hand. (Goal Met)  Short term goal 4: Patient to increase LUE  strength to 50# for fishing. (Progressing)  Short term goal 5: Patient to be independent with home exercise program. (Progressing)  Short term goal 6: Patient to improve AROM shoulder abduction to >100 degrees. (Progressing- 84 degrees)  Long term goals  Time Frame for Long term goals : 6 weeks  Long term goal 1: Patient to improve AROM shoulder flexion to functional ROM of >130 degrees. (Progressing)  Long term goal 2: Patient to increase MMT score of elbow flexion/extension to 4/5 for yardwork. (Progressing)  Long term goal 3: Patient be able to open various heavy resistance containers with LUE hand. (Progressing)  Long term goal 4: Patient to increase LUE  strength to 80# for fishing. (Progressing)  Long term goal 5: Patient to improve LUE 9 hole peg test score to 50th percentile for age group. (Progressing)  Patient Goals   Patient goals : \"I want to get my left arm stronger. I can't even open a jar anymore. \"    Therapy Time   Individual Concurrent Group Co-treatment   Time In 0902         Time Out 0957         Minutes 55         Timed Code Treatment Minutes: [address], OT Electronically signed by VIANEY Ambrose on 4/23/2021 at 11:26 AM

## 2021-04-23 NOTE — PROGRESS NOTES
Physical Therapy  Daily Treatment Note  Date: 2021  Patient Name: José Stevens  MRN: [id number]     :   1965    Subjective:   General  Additional Pertinent Hx: 49year old male referred to PT with diagnosis of low back pain. He had been seen in the past at our facility for rehab following a stroke. Recent x-ray showed mild retrolisthesis of L4 on L5 which may represent subluxation at the level of the facets. Referring Practitioner: Quoc Nair MD  PT Visit Information  PT Insurance Information: [facility] (20 visits per year, no precert)  Total # of Visits Approved: 18  Total # of Visits to Date: 13  Plan of Care/Certification Expiration Date: 21  Progress Note Due Date: 21  Subjective  Subjective: Patient states he is walking better with new AFO, walking mainly with quad cane, has not yet attempted walking with single point cane.   Pain Screening  Patient Currently in Pain: Yes  Pain Assessment  Pain Level: 2  Pain Type: Chronic pain  Pain Location: Back  Pain Orientation: Lower  Vital Signs  Patient Currently in Pain: Yes       Treatment Activities:                                    Exercises  Exercise 1: RECOMMEND AMB FIRST**          ambulation up to 6 minutes with 1-point cane and AFO  813' ft  Exercise 2: supine SAQ's with tapping/DTR-------concentric with slow lowering 2 x 15 with assist from therapist  Exercise 3: supine heel slides x 20--min to assist  and also resisted hip/knee ext  Exercise 4: supine lower trunk rotation stretch to right--4 reps, 15 seconds each  Exercise 5: sitting LAQ's with faciliatation (as needed)  and eccentric lowering x 15  Exercise 6: sitting h/s curls with band and facilitation as needed (yellow) x 15 and partial ROM *with facilitation  Exercise 7: sitting ball squeeze supine with manual assist x 15  Exercise 8: sitting hip abduction with band (with blocking of right leg to recruit left)  yellow  x 15  Exercise 9: sitting foot tapping DF 1 x 20 ea  Exercise 10: sitting heel raises with weights over lower thighs  7.5#  x 20 ea  Exercise 11: sitting marching x 20 ea  Exercise 12: repeated sit to stand with forced recruitment of left leg (higher surface to lower)  x 10  Exercise 13: partial squats--15 reps   hip abduction x 10 bilaterally  Exercise 14: standing left hip flexion x 15 with AA  Exercise 15: standing left hip hike  x 15  Exercise 16: standing and marching--1'  Exercise 17: sidestepping in bars x 5 dwn/back  Exercise 18: ambulation in parallel bars  (down & back) using only right hand and with t-band flexion assist  x 5  Exercise 19: supine left quad sets 3\" x 30--**--  Exercise 20: supine foot dorsiflexion/plantarflexion/eversion (DF and eversion with NMES x 10', pad just distal to tibial plateau and middle of ant tib) and PF with manual resist                               Assessment:   Conditions Requiring Skilled Therapeutic Intervention  Body structures, Functions, Activity limitations: Decreased functional mobility ; Decreased ADL status; Decreased ROM; Decreased strength;Decreased balance;Decreased high-level IADLs;Decreased fine motor control;Decreased coordination;Decreased endurance; Increased pain  Assessment: Mr. Susan Shaw appeared to have a good session today, with him trying out a 1-pt. cane for ambulation and walking his best distance to date. He was getting solid contractions at his ankle into the desired dorsiflexion/eversion orientation. Endurance for exercise appears improving from his status at initial evaluation and use of his AFO has definitely improved his gait form.   REQUIRES PT FOLLOW UP: Yes  Discharge Recommendations: Continue to assess pending progress    Goals:  Short term goals  Time Frame for Short term goals: 6-8 weeks  Short term goal 1: Patient to be independent with HEP.----not yet met(4/2/21 Patient has not yet been issued a HEP.)  Short term goal 2: Patient to be independent with resumption of use of AFO (if foot strength remains insufficient). --progress(4/2/21 Patient had been using AFO, strap was worn out and broke. He has custom AFO on order by [employer].)  Short term goal 3: Patient able to ambulate with cane with less drag of left foot. --progress(4/2/21 Patient walks with quad cane, slightly less drag of left foot but using band for flexion aid.)  Short term goal 4: Patient to report greater ease getting in and out of vehicle. --progress(4/2/21 Patient is having trouble getting in/out of cars, not having trouble with truck.)  Long term goals  Time Frame for Long term goals : 9-12 weeks  Long term goal 1: Patient able to ambulate with quad cane or single point cane 500'. --met for using quad cane.(4/2/21 Patient has walked in excess of 500' using quad cane. 662' feet at reassessment.)  Long term goal 2: Patient able to perform sit to stand 8 x in 30 seconds with increased weight bourne through left LE.---progress(4/2/21 sit to stand 5 x in 30 secs using right arm to push up.)  Long term goal 3: Patient to have >= 4-/5 strength in left foot for df, pf, inversion and eversion. --not met(4/2/21 No change yet regarding left LE muscle strength (see strength section))  Long term goal 4: Patient to have >=4-/5 left hip flexors, 4+/5 left knee extensors. --not met(4/2/21 No change in strength from initial evaluation. See strength section)  Long term goal 5: Patient to show increased protective reactions with standing balance perturbations. --progress(4/2/21 Able to accept modest balance perturbations in all directions, but continued delay in LLE reaction, as well as generally erratic motions.)  Patient Goals   Patient goals : Return back to normal level of function, walk without a device or 1-point cane.   Plan:    Plan  Times per week: 2x per week  Plan weeks: 6-9 weeks  Current Treatment Recommendations: Strengthening, ROM, Balance Training, Transfer Training, Endurance Training, Gait Training, Stair training, Neuromuscular Re-education, Home Exercise Program, Patient/Caregiver Education & Training, Equipment Evaluation, Education, & procurement  Timed Code Treatment Minutes: 39 Minutes(15 mins estim to right lower leg)     Therapy Time   Individual Concurrent Group Co-treatment   Time In 1006         Time Out 1105         Minutes 59         Timed Code Treatment Minutes: 39 Minutes(15 mins estim to right lower leg)  Electronically signed by Danielito Phillips, PT on 4/23/2021 at 12:07 PM

## 2021-04-27 ENCOUNTER — APPOINTMENT (OUTPATIENT)
Dept: SPEECH THERAPY | Age: 56
End: 2021-04-27
Payer: COMMERCIAL

## 2021-04-27 ENCOUNTER — APPOINTMENT (OUTPATIENT)
Dept: PHYSICAL THERAPY | Age: 56
End: 2021-04-27
Payer: COMMERCIAL

## 2021-04-27 ENCOUNTER — APPOINTMENT (OUTPATIENT)
Dept: OCCUPATIONAL THERAPY | Age: 56
End: 2021-04-27
Payer: COMMERCIAL

## 2021-04-29 ENCOUNTER — APPOINTMENT (OUTPATIENT)
Dept: OCCUPATIONAL THERAPY | Age: 56
End: 2021-04-29
Payer: COMMERCIAL

## 2021-04-29 ENCOUNTER — IMMUNIZATION (OUTPATIENT)
Dept: VACCINE CLINIC | Facility: HOSPITAL | Age: 56
End: 2021-04-29

## 2021-04-29 ENCOUNTER — APPOINTMENT (OUTPATIENT)
Dept: PHYSICAL THERAPY | Age: 56
End: 2021-04-29
Payer: COMMERCIAL

## 2021-04-29 PROCEDURE — 0012A: CPT | Performed by: OBSTETRICS & GYNECOLOGY

## 2021-04-29 PROCEDURE — 91301 HC SARSCO02 VAC 100MCG/0.5ML IM: CPT | Performed by: OBSTETRICS & GYNECOLOGY

## 2021-04-30 ENCOUNTER — APPOINTMENT (OUTPATIENT)
Dept: PHYSICAL THERAPY | Age: 56
End: 2021-04-30
Payer: COMMERCIAL

## 2021-04-30 ENCOUNTER — APPOINTMENT (OUTPATIENT)
Dept: OCCUPATIONAL THERAPY | Age: 56
End: 2021-04-30
Payer: COMMERCIAL

## 2021-05-04 ENCOUNTER — APPOINTMENT (OUTPATIENT)
Dept: PHYSICAL THERAPY | Age: 56
End: 2021-05-04
Payer: COMMERCIAL

## 2021-05-04 ENCOUNTER — HOSPITAL ENCOUNTER (OUTPATIENT)
Dept: PHYSICAL THERAPY | Age: 56
Setting detail: THERAPIES SERIES
Discharge: HOME | End: 2021-05-04
Payer: COMMERCIAL

## 2021-05-04 ENCOUNTER — HOSPITAL ENCOUNTER (OUTPATIENT)
Dept: OCCUPATIONAL THERAPY | Age: 56
Setting detail: THERAPIES SERIES
Discharge: HOME | End: 2021-05-04
Payer: COMMERCIAL

## 2021-05-04 ENCOUNTER — HOSPITAL ENCOUNTER (OUTPATIENT)
Dept: SPEECH THERAPY | Age: 56
Setting detail: THERAPIES SERIES
Discharge: HOME | End: 2021-05-04
Payer: COMMERCIAL

## 2021-05-04 PROCEDURE — 97032 APPL MODALITY 1+ESTIM EA 15: CPT

## 2021-05-04 PROCEDURE — 97750 PHYSICAL PERFORMANCE TEST: CPT

## 2021-05-04 PROCEDURE — 97110 THERAPEUTIC EXERCISES: CPT

## 2021-05-04 PROCEDURE — 92507 TX SP LANG VOICE COMM INDIV: CPT

## 2021-05-04 PROCEDURE — 97530 THERAPEUTIC ACTIVITIES: CPT

## 2021-05-04 ASSESSMENT — PAIN - FUNCTIONAL ASSESSMENT: PAIN_FUNCTIONAL_ASSESSMENT: ACTIVITIES ARE NOT PREVENTED

## 2021-05-04 ASSESSMENT — PAIN DESCRIPTION - LOCATION: LOCATION: BACK

## 2021-05-04 ASSESSMENT — 9 HOLE PEG TEST
TESTTIME_SECONDS: 19.2
TEST_RESULT: FUNCTIONAL
TEST_RESULT: FUNCTIONAL

## 2021-05-04 ASSESSMENT — PAIN DESCRIPTION - ORIENTATION: ORIENTATION: LOWER

## 2021-05-04 ASSESSMENT — PAIN DESCRIPTION - FREQUENCY: FREQUENCY: CONTINUOUS

## 2021-05-04 ASSESSMENT — PAIN DESCRIPTION - DESCRIPTORS: DESCRIPTORS: ACHING

## 2021-05-04 NOTE — PROGRESS NOTES
Occupational Therapy Reassessment/Daily Treatment Note  Date: 2021  Patient Name: Janet Doe  :  1965  MRN: [id number]       Subjective   General  Chart Reviewed: Yes  Patient assessed for rehabilitation services?: Yes  Family / Caregiver Present: No  Referring Practitioner: Toney Warner MD  Diagnosis: hemiplegia, nondominant, left side G81.94  OT Visit Information  Onset Date: 21  OT Insurance Information: [facility]  Total # of Visits Approved: 20  Total # of Visits to Date: 13  Certification Period Expiration Date: 21  Progress Note Due Date: 21  Pre Treatment Pain Screening  Intervention List: Patient able to continue with treatment  Pain Assessment  Pain Assessment: 0-10  Pain Level: 2  Pain Type: Chronic pain  Pain Location: Back  Pain Orientation: Lower  Pain Descriptors: Aching  Pain Frequency: Continuous  Pain Onset: On-going  Clinical Progression: Not changed  Functional Pain Assessment: Activities are not prevented  Non-Pharmaceutical Pain Intervention(s): Distraction; Therapeutic touch; Therapeutic presence  Response to Pain Intervention: Patient Satisfied  Vital Signs  Patient Currently in Pain: Yes     Treatment Activities:    LUE PROM (degrees)  LUE PROM: WFL  LUE AROM (degrees)  LUE AROM : Exceptions  L Shoulder Flexion 0-180: 0-180  L Shoulder Extension 0-45: WFL  L Shoulder ABduction 0-180: 0-175  L Shoulder Int Rotation  0-70: WFL  L Shoulder Ext Rotation  0-90: WFL  L Elbow Flexion 0-145: WFL  L Elbow Extension 145-0: WFL  L Forearm Pron 0-90: WFL  L Forearm Supination  0-90: WFL  L Wrist Flexion 0-80: WFL  L Wrist Extension 0-70: WFL  L Wrist Radial Deviation 0-20: WFL  L Wrist Ulnar Deviation 0-45: WFL  Left Hand PROM (degrees)  Left Hand PROM: WFL  Left Hand AROM (degrees)  Left Hand AROM: Exceptions  Left Hand General AROM: Full fist  L Thumb Opposition: WFL  RUE PROM (degrees)  RUE PROM: WNL  RUE AROM (degrees)  RUE AROM : WNL  Right Hand PROM (degrees)  Right Hand PROM: Plan  Times per week: x2  Times per day: Daily  Plan weeks: 6  Current Treatment Recommendations: Strengthening, ROM, Manual Therapy:  STM, Neuromuscular Re-education, Modalities (comment), Self-Care / ADL, Patient/Caregiver Education & Training, Endurance Training    OutComes Score  QuickDASH Total Score: 18 (05/04/21 0921)       QuickDASH Disability/Symptom Score : 15.91 % (05/04/21 0921)                        Goals  Short term goals  Time Frame for Short term goals: 3 weeks  Short term goal 1: Patient to improve AROM shoulder flexion to >100 degrees. (Goal Met- 101 degrees)  Short term goal 2: Patient to increase MMT score of elbow flexion/extension to 3+/5 for yardwork. (Goal Met- 3+/5)  Short term goal 3: Patient be able to open various light-moderate resistance containers with LUE hand. (Goal Met)  Short term goal 4: Patient to increase LUE  strength to 50# for fishing. (Progressing- not able to test this date due to dynamometer getting calibrated)  Short term goal 5: Patient to be independent with home exercise program. (Goal Met)  Short term goal 6: Patient to improve AROM shoulder abduction to >100 degrees. (Goal Met- 175 degrees)  Long term goals  Time Frame for Long term goals : 6 weeks  Long term goal 1: Patient to improve AROM shoulder flexion to functional ROM of >130 degrees. (Goal Met- 180 degrees)  Long term goal 2: Patient to increase MMT score of elbow flexion/extension to 4/5 for yardwork. (Goal Met- 4+/5)  Long term goal 3: Patient be able to open various heavy resistance containers with LUE hand. (Progressing)  Long term goal 4: Patient to increase LUE  strength to 80# for fishing. (Progressing)  Long term goal 5: Patient to improve LUE 9 hole peg test score to 50th percentile for age group. (Goal Met- 50th percentile)  Long term goals 6: Patient to increase MMT score of shoulder abduction/adduction to 4+/5 for yardwork.   Patient Goals   Patient goals : \"I want to get my left arm stronger. I can't even open a jar anymore. \"    Therapy Time   Individual Concurrent Group Co-treatment   Time In 0858         Time Out 0958         Minutes 60         Timed Code Treatment Minutes: [address] [facility], OT Electronically signed by VIANEY Joshi on 5/4/2021 at 10:01 AM

## 2021-05-04 NOTE — PROGRESS NOTES
Speech Language Pathology  Facility/Department: [address] [place] [address] SPEECH THERAPY   Outpatient Speech/Language/Cognitive Re-Assessment   and Discharge Note  NAME: Alberto Corbin  : 1965   MRN: [id number]  ADMISSION DATE: 3/16/2021   Date of Eval: 2021   Evaluating Therapist: Stephen Acevedo MS CCC-SLP     ADMITTING DIAGNOSIS:   has Other chest pain; Right carpal tunnel syndrome; Left carpal tunnel syndrome; Stroke-like symptoms; Chronic cerebrovascular accident (CVA); Weakness due to acute cerebrovascular accident (CVA) ([address] Alcohol use with withdrawal; Tobacco abuse counseling; Cigarette nicotine dependence with nicotine-induced disorder; and Personal history of noncompliance with medical treatment and regimen on their problem list.     HISTORY OF PRESENT ILLNESS:  Per physician: Pamella Pepe an 49 y. o. male.  He is a very unfortunate male with previous history of TIA, CVA, smoking and alcohol use.  He has a history of TIA in , and stroke in  and  with left-sided weakness for which he requires cane for ambulation.  He went to bed at 11 PM, woke up at 1 AM and he was complaining of worsening weakness in the left lower leg.  He had 7 glasses of wine before he went to bed and he smokes a pack and a half on a regular basis. Lidya Hebert came to the ER for evaluation on 21, work-up was done which confirmed left arm and leg weakness. Gricelda Joineraps his prior history of strokes and TIAs, he had work-up done and is being admitted for medical management, neurological evaluation and TIA/stroke work-up.  He was discharged from the hospital on 21.      He was referred to outpatient speech therapy to further evaluate his dysfluencies and cognition.     RECENT RESULTS  CT OF HEAD/MRI:  Narrative   MRI BRAIN W WO CONTRAST 2021 11:07 AM   HISTORY: Stroke like symptoms   Comparison: CT scan dated 2021   Technique: Multiplanar imaging of the brain was performed in a routine   fashion before and after the intravenous injection of gadolinium   contrast. 20 mL MultiHance IV contrast.   Findings: There is no diffusion signal abnormality to suggest acute infarct. There is no intra-axial or extra-axial hemorrhage. No visualized mass   lesion or pathologic enhancement. Normal size and configuration of the   ventricular system for patient age. The posterior fossa structures are   unremarkable. The pituitary gland and sella are unremarkable. The   major intracranial flow voids are preserved. The orbits are unremarkable. Minimal chronic mucosal thickening in the   paranasal sinuses. The mastoids are clear. Marrow signal in the   calvarium and skull base appears normal.       Impression   Impression:    1. No acute intracranial process. In particular, there is no evidence   of acute ischemia. Signed by Dr Jefry Batista on 2/22/2021 1:50 PM             Assessment:  The patient was recommended to be seen by speech therapy 1x/week for 6 weeks for severe dysfluencies characterized by initial sound and part word repetitions. He has improved greatly and is now exhibiting only mild dysfluencies.        Plan:   Goals:  Short-term Goals  Goal 1: The patient will complete tasks for easy onset of speech to reduce word repetitions with 80% accuracy and minimal verbal/visual cues. Met  Goal 2: The patient will complete tasks for easy onset of speech to reduce sound repetitions with 80% accuracy and minimal verbal/visual cues. Met  Goal 3: The patient will use cancellation and pull-out techniques for 80% of dysfluencies in a structured conversational task. Met  Long-term Goals  Goal 1: The patient will demonstrate more fluent speech during unstructured tasks to communicate effectively with members of the community and family members.  Met  Patient/family involved in developing goals and treatment plan: yes     Subjective:  General  Chart Reviewed: Yes  Patient assessed for rehabilitation services?: Yes  Family / Caregiver Present: No  Vision  Vision: Impaired  Vision Exceptions: Wears glasses for reading  Hearing  Hearing: Within functional limits     Objective: Auditory Comprehension  Yes/No Questions: (WNL)  Basic Questions: (WNL)  One Step Basic Commands: (WNL)  Two Step Basic Commands: (WNL)  Common Objects: (WNL)  Pictures: (WNL)  L/R Discrimination: (WNL)  Conversation: (WNL)     Reading Comprehension  Reading Status: Within functional limits     Expression  Primary Mode of Expression: Verbal     Verbal Expression  Initiation: (WNL)  Repetition: (WNL)  Automatic Speech: (WNL)  Convergent: (WNL)  Divergent: (WNL)  Conversation: (WNL)     Written Expression  Dominant Hand: Right  Written Expression: Within Functional Limits     Motor Speech  Motor Speech: Exceptions to WFL  Dysarthria : Mild     Pragmatics/Social Functioning  Affect: (WFL)  Eye Contact: (WFL)  Topic Maintenance: (WFL)  Turn Taking: [de-identified])     Cognition:   Orientation  Overall Orientation Status: Within Normal Limits  Attention  Attention: Within Functional Limits  Memory  Memory: Within Funtional Limits  Problem Solving  Problem Solving: Within Functional Limits  Numeric Reasoning  Numeric Reasoning: Within Functional Limits     Additional Assessments  The Stuttering Severity Instrument-4 or SSI-4 was completed this date. He scored within the mild scoring range on the SSI-2. No further speech therapy services are warranted at this time as he is utilizing dysfluency strategies independently.                         Prognosis:  Fair     Education:  Education was provide regarding his scores on today's assessments. He verbalized understanding. Plan:  Discharge from speech therapy services.      Electronically Signed By:  Thania Balbuena  5/4/2021,10:52 AM.

## 2021-05-04 NOTE — PROGRESS NOTES
Physical Therapy  Daily Treatment Note/Re-assessment  Date: 2021  Patient Name: Kristi Correia  MRN: [id number]     :   1965    Subjective:   General  Chart Reviewed: Yes  Additional Pertinent Hx: 49year old male referred to PT with diagnosis of low back pain. He had been seen in the past at our facility for rehab following a stroke. Recent x-ray showed mild retrolisthesis of L4 on L5 which may represent subluxation at the level of the facets. Response To Previous Treatment: Patient with no complaints from previous session. Referring Practitioner: Nestor Hobson MD  PT Visit Information  PT Insurance Information: [facility] (20 visits per year, no precert)  Total # of Visits Approved: 18  Total # of Visits to Date: 12  Plan of Care/Certification Expiration Date: 21  Progress Note Due Date: 21  Subjective  Subjective: No complaints today. He is happy with his new AFO and feels he is able to ambulate with better technique with it. Enters with SPC today.   Pain Screening  Patient Currently in Pain: Yes  Pain Assessment  Pain Assessment: 0-10  Pain Level: 2  Pain Type: Chronic pain  Pain Location: Back  Pain Orientation: Lower  Pain Descriptors: Aching  Vital Signs  Patient Currently in Pain: Yes       Treatment Activities:     Exercises  Exercise 1: RECOMMEND AMB FIRST**          ambulation up to 6 minutes with 1-point cane and AFO  722' ft  Exercise 2: supine SAQ's with tapping/DTR-------concentric with slow lowering 2 x 15 with assist from therapist- not today  Exercise 3: supine heel slides x 20--min to assist  and also resisted hip/knee ext- not today  Exercise 4: supine lower trunk rotation stretch to right--4 reps, 15 seconds each- not today  Exercise 5: sitting LAQ's with faciliatation (as needed)  and eccentric lowering x 15- not today  Exercise 6: sitting h/s curls with band and facilitation as needed (yellow) x 15 and partial ROM *with facilitation- not today  Exercise 7: sitting ball squeeze supine with manual assist x 15- not today  Exercise 8: sitting hip abduction with band (with blocking of right leg to recruit left)  yellow  x 15- not today  Exercise 9: sitting foot tapping DF 1 x 20 ea- not today  Exercise 10: sitting heel raises with weights over lower thighs  7.5#  x 20 ea- not today  Exercise 11: sitting marching x 20 ea- not today  Exercise 12: repeated sit to stand with forced recruitment of left leg (higher surface to lower)  x 10- not today  Exercise 13: partial squats--15 reps   hip abduction x 10 bilaterally  Exercise 14: standing left hip flexion x 15 with AA  Exercise 15: standing left hip hike  x 15  Exercise 16: standing and marching--1'  Exercise 17: sidestepping in bars x 5 dwn/back  Exercise 18: ambulation in parallel bars  (down & back) using only right hand x 5  Exercise 19: supine left quad sets 3\" x 30--**--- not today  Exercise 20: supine foot dorsiflexion/plantarflexion/eversion (DF and eversion with NMES x 10', pad just distal to tibial plateau and middle of ant tib) and PF with manual resist     Assessment:   Conditions Requiring Skilled Therapeutic Intervention  Body structures, Functions, Activity limitations: Decreased functional mobility ; Decreased ADL status; Decreased ROM; Decreased strength;Decreased balance;Decreased high-level IADLs;Decreased fine motor control;Decreased coordination;Decreased endurance; Increased pain  Assessment: Re-assessment today. Pt displays improved L ankle strength and improved functional mobility, moving from quad cane to use of SPC. He gives good effort during sessions, though still presents wtih notable gait deviations and compensation patterns d/t weakness. Will continue to benefit from skilled PT intervention to address listed impairments.   REQUIRES PT FOLLOW UP: Yes  Discharge Recommendations: Continue to assess pending progress     Goals:  Short term goals  Time Frame for Short term goals: 6-8 weeks  Short term goal 1: Patient to be perturbations. --progress(4/2/21 Able to accept modest balance perturbations in all directions, but continued delay in LLE reaction, as well as generally erratic motions. 5/4: Continues with erratic movements of LLE. Able to accept min-mod perturbations.)  Patient Goals   Patient goals : Return back to normal level of function, walk without a device or 1-point cane.     Plan:    Plan  Times per week: 2x per week  Plan weeks: 6-9 weeks  Current Treatment Recommendations: Strengthening, ROM, Balance Training, Transfer Training, Endurance Training, Gait Training, Stair training, Neuromuscular Re-education, Home Exercise Program, Patient/Caregiver Education & Training, Equipment Evaluation, Education, & procurement  Timed Code Treatment Minutes: 64 Minutes     Therapy Time   Individual Concurrent Group Co-treatment   Time In 1106         Time Out 1202         Minutes 56         Timed Code Treatment Minutes: Lorraine Mora 298, PT

## 2021-05-06 ENCOUNTER — APPOINTMENT (OUTPATIENT)
Dept: PHYSICAL THERAPY | Age: 56
End: 2021-05-06
Payer: COMMERCIAL

## 2021-05-06 ENCOUNTER — APPOINTMENT (OUTPATIENT)
Dept: OCCUPATIONAL THERAPY | Age: 56
End: 2021-05-06
Payer: COMMERCIAL

## 2021-05-07 ENCOUNTER — APPOINTMENT (OUTPATIENT)
Dept: OCCUPATIONAL THERAPY | Age: 56
End: 2021-05-07
Payer: COMMERCIAL

## 2021-05-07 ENCOUNTER — APPOINTMENT (OUTPATIENT)
Dept: PHYSICAL THERAPY | Age: 56
End: 2021-05-07
Payer: COMMERCIAL

## 2021-05-11 ENCOUNTER — HOSPITAL ENCOUNTER (OUTPATIENT)
Dept: OCCUPATIONAL THERAPY | Age: 56
Setting detail: THERAPIES SERIES
Discharge: HOME | End: 2021-05-11
Payer: COMMERCIAL

## 2021-05-11 ENCOUNTER — HOSPITAL ENCOUNTER (OUTPATIENT)
Dept: PHYSICAL THERAPY | Age: 56
Setting detail: THERAPIES SERIES
Discharge: HOME | End: 2021-05-11
Payer: COMMERCIAL

## 2021-05-11 ENCOUNTER — APPOINTMENT (OUTPATIENT)
Dept: SPEECH THERAPY | Age: 56
End: 2021-05-11
Payer: COMMERCIAL

## 2021-05-11 PROCEDURE — 97110 THERAPEUTIC EXERCISES: CPT

## 2021-05-11 PROCEDURE — 97530 THERAPEUTIC ACTIVITIES: CPT

## 2021-05-11 PROCEDURE — G0283 ELEC STIM OTHER THAN WOUND: HCPCS

## 2021-05-11 PROCEDURE — 97032 APPL MODALITY 1+ESTIM EA 15: CPT

## 2021-05-11 ASSESSMENT — PAIN SCALES - GENERAL: PAINLEVEL_OUTOF10: 6

## 2021-05-11 ASSESSMENT — PAIN DESCRIPTION - PAIN TYPE: TYPE: CHRONIC PAIN

## 2021-05-11 ASSESSMENT — PAIN DESCRIPTION - LOCATION: LOCATION: BACK;HIP;LEG

## 2021-05-11 ASSESSMENT — PAIN DESCRIPTION - ORIENTATION: ORIENTATION: RIGHT

## 2021-05-11 NOTE — PROGRESS NOTES
coordination;Decreased ROM; Decreased strength;Decreased fine motor control;Decreased high-level IADLs  Assessment: Pt tolerated tx well. Pt has made significant gains and continues to benefit from skillled OT services. Treatment Diagnosis: M62.81, R27.8, M25.61  Prognosis: Good  History: Patient has had a hx of TIA and CVAs. This is his 3rd CVA. He is an active smoker and reports he's trying to decrease his amount each day. He has not worked in 2-3 years since his CVAs. Assistance / Modification: Pt requires assistance throughout eval for LUE shoulder repositioning for assessments.   Discharge Recommendations: Continue to assess pending progress  REQUIRES OT FOLLOW UP: Yes  Timed Code Treatment Minutes: Nel Wolff 69  Times per week: x2  Plan weeks: 6  Current Treatment Recommendations: Strengthening, ROM, Manual Therapy:  STM, Neuromuscular Re-education, Modalities (comment), Self-Care / ADL, Patient/Caregiver Education & Training, Endurance Training     Goals       Therapy Time   Individual Concurrent Group Co-treatment   Time In 1002         Time Out 1100         Minutes 58         Timed Code Treatment Minutes: 58 Minutes    Electronically signed by ADRIANA Connor on 5/11/2021 at 2:14 PM

## 2021-05-11 NOTE — PROGRESS NOTES
Physical Therapy  Daily Treatment Note  Date: 2021  Patient Name: Jeff Mercer  MRN: [id number]     :   1965    Subjective:   General  Referring Practitioner: Janny Bustillos MD  PT Insurance Information: [facility] (20 visits per year, no precert)  Total # of Visits Approved: 18  Total # of Visits to Date: 16  Plan of Care/Certification Expiration Date: 21  Progress Note Due Date: 21  Subjective: my sciatic nerve is giving me trouble today  Patient Currently in Pain: Yes  Pain Level: 6  Pain Type: Chronic pain  Pain Location: Back;Hip;Leg  Pain Orientation: Right       Treatment Activities:          Exercises  Exercise 1: RECOMMEND AMB FIRST**          ambulation up to 6 minutes with 1-point cane and ' ft  Exercise 2: supine SAQ's with tapping/DTR-------concentric with slow lowering 2 x 15 with assist from therapist  Exercise 3: supine heel slides x 20--min to assist  and also resisted hip/knee ext  Exercise 4: supine lower trunk rotation stretch to right--4 reps, 15 seconds each  Exercise 5: sitting LAQ's with faciliatation (as needed)  and eccentric lowering x 15  Exercise 6: sitting h/s curls with band and facilitation as needed (yellow) x 15 and partial ROM *with facilitation-  Exercise 7: sitting ball squeeze supine with manual assist x 15  Exercise 8: sitting hip abduction with band (with blocking of right leg to recruit left)  yellow  x 15  Exercise 9: sitting foot tapping DF 1 x 20 ea  Exercise 10: sitting heel raises with weights over lower thighs  7.5#  x 20 ea  Exercise 11: sitting marching x 20 ea  Exercise 12: repeated sit to stand with forced recruitment of left leg (higher surface to lower)  x 10  Exercise 13: partial squats--15 reps   hip abduction x 10 bilaterally  Exercise 14: standing left hip flexion x 15 with AA  Exercise 15: standing left hip hike  x 15  Exercise 16: standing and marching--1'  Exercise 17: sidestepping in bars x 5 dwn/back  Exercise 18: ambulation in parallel bars  (down & back) using only right hand x 5  Exercise 19: supine left quad sets 3\" x 30  Exercise 20: supine foot dorsiflexion/plantarflexion/eversion (DF and eversion with NMES x 10', pad just distal to tibial plateau and middle of ant tib) and PF with manual resist        Assessment:   Conditions Requiring Skilled Therapeutic Intervention  Body structures, Functions, Activity limitations: Decreased functional mobility ; Decreased ADL status; Decreased ROM; Decreased strength;Decreased balance;Decreased high-level IADLs;Decreased fine motor control;Decreased coordination;Decreased endurance; Increased pain  Assessment: Patient requiring more assist with heel slides and eccentric SAQ, having to shift hard to the R performing sit to stand. Had c/o increased RLE pain performing L LAQ. Next visit is last approved so goals need assessed. REQUIRES PT FOLLOW UP: Yes        Goals:  Short term goals  Time Frame for Short term goals: 6-8 weeks  Short term goal 1: Patient to be independent with HEP.----not yet met(4/2/21 Patient has not yet been issued a HEP.)  Short term goal 2: Patient to be independent with resumption of use of AFO (if foot strength remains insufficient). --met(4/2/21 Patient had been using AFO, strap was worn out and broke. He has custom AFO on order by [employer]. 5/4: Has new AFO and no concerns)  Short term goal 3: Patient able to ambulate with cane with less drag of left foot. --progress(4/2/21 Patient walks with quad cane, slightly less drag of left foot but using band for flexion aid. 5/4: Improved with assist of L AFO)  Short term goal 4: Patient to report greater ease getting in and out of vehicle. --progress(4/2/21 Patient is having trouble getting in/out of cars, not having trouble with truck.   5/4: Continued difficulty with lower cars, but no difficulty with his truck or SUV.)  Long term goals  Time Frame for Long term goals : 9-12 weeks  Long term goal 1: Patient able to ambulate with quad cane or single point cane 500'. --met using both(4/2/21 Patient has walked in excess of 500' using quad cane. 662' feet at reassessment. 5/4: Amb 200' with SPC today.)  Long term goal 2: Patient able to perform sit to stand 8 x in 30 seconds with increased weight bourne through left LE.---progress(4/2/21 sit to stand 5 x in 30 secs using right arm to push up. 5/4: 6.5 in 30\", uses RUE to push up)  Long term goal 3: Patient to have >= 4-/5 strength in left foot for df, pf, inversion and eversion. --progress(4/2/21 No change yet regarding left LE muscle strength (see strength section)  5/4: DF 3 to 3+/5, PF 3 to 3+/5, Inversion 3 to 3+/5, Eversion 3 to 3+/5.)  Long term goal 4: Patient to have >=4-/5 left hip flexors, 4+/5 left knee extensors. --progress(4/2/21 No change in strength from initial evaluation. See strength section  5/4: Hip flex 3- to 3/5, knee ext 3- to 3/5)  Long term goal 5: Patient to show increased protective reactions with standing balance perturbations. --progress(4/2/21 Able to accept modest balance perturbations in all directions, but continued delay in LLE reaction, as well as generally erratic motions. 5/4: Continues with erratic movements of LLE. Able to accept min-mod perturbations.)  Patient Goals   Patient goals : Return back to normal level of function, walk without a device or 1-point cane.     Plan:    Times per week: 2x per week  Plan weeks: 6-9 weeks  Current Treatment Recommendations: Strengthening, ROM, Balance Training, Transfer Training, Endurance Training, Gait Training, Stair training, Neuromuscular Re-education, Home Exercise Program, Patient/Caregiver Education & Training, Equipment Evaluation, Education, & procurement       Therapy Time   Individual Concurrent Group Co-treatment   Time In [phone]         Time Out 0955         Minutes 58         Timed Code Treatment Minutes: [address] PTA    Electronically signed by Evelyn Babcock PTA on 5/11/2021 at 10:25 AM

## 2021-05-13 ENCOUNTER — HOSPITAL ENCOUNTER (OUTPATIENT)
Dept: PHYSICAL THERAPY | Age: 56
Setting detail: THERAPIES SERIES
Discharge: HOME | End: 2021-05-13
Payer: COMMERCIAL

## 2021-05-13 ENCOUNTER — HOSPITAL ENCOUNTER (OUTPATIENT)
Dept: OCCUPATIONAL THERAPY | Age: 56
Setting detail: THERAPIES SERIES
Discharge: HOME | End: 2021-05-13
Payer: COMMERCIAL

## 2021-05-13 PROCEDURE — 97032 APPL MODALITY 1+ESTIM EA 15: CPT

## 2021-05-13 PROCEDURE — 97530 THERAPEUTIC ACTIVITIES: CPT

## 2021-05-13 PROCEDURE — 97110 THERAPEUTIC EXERCISES: CPT

## 2021-05-13 ASSESSMENT — PAIN DESCRIPTION - PAIN TYPE
TYPE: CHRONIC PAIN
TYPE: CHRONIC PAIN

## 2021-05-13 ASSESSMENT — PAIN DESCRIPTION - FREQUENCY: FREQUENCY: CONTINUOUS

## 2021-05-13 ASSESSMENT — PAIN DESCRIPTION - ORIENTATION
ORIENTATION: RIGHT
ORIENTATION: RIGHT

## 2021-05-13 ASSESSMENT — 9 HOLE PEG TEST
TESTTIME_SECONDS: 19.2
TEST_RESULT: FUNCTIONAL

## 2021-05-13 ASSESSMENT — PAIN DESCRIPTION - LOCATION: LOCATION: BACK

## 2021-05-13 ASSESSMENT — PAIN SCALES - GENERAL
PAINLEVEL_OUTOF10: 4
PAINLEVEL_OUTOF10: 5

## 2021-05-13 NOTE — PROGRESS NOTES
Daily Treatment Note  Date: 2021  Patient Name: Alison Jarquin  :  1965  MRN: [id number]       Subjective   General  Chart Reviewed: Yes  Patient assessed for rehabilitation services?: Yes  Family / Caregiver Present: No  Referring Practitioner: Dulce Serrano MD  Diagnosis: hemiplegia, nondominant, left side G81.94  OT Visit Information  Onset Date: 21  OT Insurance Information: [facility]  Total # of Visits Approved: 20  Total # of Visits to Date: 12  Certification Period Expiration Date: 21  Progress Note Due Date: 21  Pre Treatment Pain Screening  Intervention List: Patient able to continue with treatment  Comments / Details: Patient reports he took a flexaril before therapy that has helped. Pain Assessment  Pain Assessment: 0-10  Pain Level: 5  Pain Type: Chronic pain  Pain Location: Back  Pain Orientation: Right  Pain Descriptors: Aching  Pain Frequency: Continuous  Pain Onset: On-going  Clinical Progression: Not changed  Functional Pain Assessment: Activities are not prevented  Non-Pharmaceutical Pain Intervention(s): Distraction; Therapeutic touch; Therapeutic presence  Response to Pain Intervention: Patient Satisfied  Vital Signs  Patient Currently in Pain: Yes     Treatment Activities:    Other exercises  Other exercises 1: LUE shoulder flexion in sidelying 4 sets x5 reps with NMES  Other exercises 2: Supine LUE shoulder abduction 4 sets x5 reps with NMES  Other exercises 3: LUE elbow flexion 2 set x15 reps with 3# dumbbell  Other exercises 4: LUE external rotation with 3# dumbbell (elbow at 90 degrees at side) 2 sets x15 reps  Other exercises 5: LUE shoulder flexion with 2# 2 sets x15 reps  Other exercises 7: LUE tricep pulldowns with green theraband on top of doorway 2 sets x15 reps (upgrade to blue next session)  Other exercises 11: Clothespins activity 1 pt pinch on yellow, red, green, blue, and lateral pinch on black- patient able to perform all clothespins this date  Other exercises 13: BUE digit extension exercise with blue resistive rubberband 2 set x15 reps  Other exercises 14: LUE digit flexion with green digi  2 set x15 reps and green digi squeeze 2 sets x15 reps  Other exercises 15: LUE wall slides in shoulder flexion with pilow sheet 1 set x10 reps  Other exercises 16: LUE supination/pronation with 2# 1 set x10 reps  Other exercises 17: LUE wrist flexion/extension with 2# 1 set x10 reps  Other exercises 18: LUE shoulder abduction with 2# 1 set x10 reps            Modalities: Yes  Electrical Stimulation  Electrical Stimulation Location: Left; Shoulder  Electrical Stimulation Action: Shoulder flexion LUE in sidelying with therapist support at wrist and Shoulder abduction LUE supine. Electrical Stimulation Parameters: 20-25 intensity with 10/10 cycle, sh flex pads on anterior deltoid, sh abd pads on middle deltoid. Electrical Stimulation Goals: Strength     Hand Dominance  Hand Dominance: Right  Left Hand Strength -  (lbs)  Handle Setting 3: 106.7, 104.3, 111.4  Left Hand Strength - Pinch (lbs)  Lateral: 24, 22, 22  Tip: 17, 11, 9  Palmar 3 point: 18, 15, 14  LUE Edema - Circumference (cm)  LUE Edema Present?: No  Left 9-Hole Peg Test  Left 9-Hole Peg Test: Functional  Right Hand Strength -  (lbs)  Handle Setting 3: 126.3, 122.3, 118.5  Right Hand Strength - Pinch (lbs)  Lateral: 29, 32, 32  Tip: 14, 14, 17  Palmar 3 point: 21, 18, 18  RUE Edema - Circumference (cm)  RUE Edema Present?: No  Right 9-Hole Peg Test  Right 9-Hole Peg Test: Functional  Fine Motor Skills  Left 9-Hole Peg Test: Functional  Right 9-Hole Peg Test: Functional  Right 9 Hole Peg Test Time (secs): 19.2  Hand Assessment Comment  Hand Assessment Comment:  and pinch strength assessed this date due to back in facility. Assessment   Performance deficits / Impairments: Decreased coordination;Decreased ROM; Decreased strength;Decreased fine motor control;Decreased high-level IADLs  Assessment: Patient tolerated session well with no s/s of adverse reactions. Patient has made significant  strength and pinch strength gains. Patient upgraded to black digi  at end of session for LUE  strengthening. Patient continues to benefit from skilled OT services. Treatment Diagnosis: M62.81, R27.8, M25.61  Prognosis: Good  History: Patient has had a hx of TIA and CVAs. This is his 3rd CVA. He is an active smoker and reports he's trying to decrease his amount each day. He has not worked in 2-3 years since his CVAs. Discharge Recommendations: Continue to assess pending progress  REQUIRES OT FOLLOW UP: Yes  Timed Code Treatment Minutes: [address]  Times per week: x2  Plan weeks: 6  Current Treatment Recommendations: Strengthening, ROM, Manual Therapy:  STM, Neuromuscular Re-education, Modalities (comment), Self-Care / ADL, Patient/Caregiver Education & Training, Endurance Training    Goals  Short term goals  Time Frame for Short term goals: 3 weeks  Short term goal 1: Patient to improve AROM shoulder flexion to >100 degrees. (Goal Met- 101 degrees)  Short term goal 2: Patient to increase MMT score of elbow flexion/extension to 3+/5 for yardwork. (Goal Met- 3+/5)  Short term goal 3: Patient be able to open various light-moderate resistance containers with LUE hand. (Goal Met)  Short term goal 4: Patient to increase LUE  strength to 50# for fishing. (Progressing- not able to test this date due to dynamometer getting calibrated)  Short term goal 5: Patient to be independent with home exercise program. (Goal Met)  Short term goal 6: Patient to improve AROM shoulder abduction to >100 degrees. (Goal Met- 175 degrees)  Long term goals  Time Frame for Long term goals : 6 weeks  Long term goal 1: Patient to improve AROM shoulder flexion to functional ROM of >130 degrees. (Goal Met- 180 degrees)  Long term goal 2: Patient to increase MMT score of elbow flexion/extension to 4/5 for yardwork.  (Goal Met- 4+/5)  Long term goal 3: Patient be able to open various heavy resistance containers with LUE hand. (Progressing)  Long term goal 4: Patient to increase LUE  strength to 80# for fishing. (Progressing)  Long term goal 5: Patient to improve LUE 9 hole peg test score to 50th percentile for age group. (Goal Met- 50th percentile)  Long term goals 6: Patient to increase MMT score of shoulder abduction/adduction to 4+/5 for yardwork. Patient Goals   Patient goals : \"I want to get my left arm stronger. I can't even open a jar anymore. \"    Therapy Time   Individual Concurrent Group Co-treatment   Time In 1003         Time Out 1100         Minutes 57         Timed Code Treatment Minutes: Sara Devlin 39., OT Electronically signed by Catheryn Cogan, OTR/L on 5/13/2021 at 4:02 PM

## 2021-05-13 NOTE — PROGRESS NOTES
Physical Therapy  Daily Treatment Note  Date: 2021  Patient Name: Gayle Carbone  MRN: [id number]     :   1965    Subjective:   General  Referring Practitioner: Tatum Lance MD  PT Insurance Information: [facility] (20 visits per year, no precert)  Total # of Visits Approved: 18  Total # of Visits to Date: 25  Plan of Care/Certification Expiration Date: 21  Subjective: my sciatic nerve is giving me a little trouble again  Patient Currently in Pain: Yes  Pain Level: 4  Pain Type: Chronic pain  Pain Location: Back  Pain Orientation: Right  Pain Descriptors: Aching       Treatment Activities:      Exercises  Exercise 1: RECOMMEND AMB FIRST**          ambulation up to 6 minutes with 1-point cane and  ft        **: HEP ISSUED**  Exercise 2: supine SAQ's with tapping/DTR-------concentric with slow lowering 2 x 15 with assist from therapist  Exercise 3: supine heel slides x 20--min to assist  and also resisted hip/knee ext  Exercise 4: supine lower trunk rotation stretch to right--4 reps, 15 seconds each  Exercise 5: sitting LAQ's with faciliatation (as needed)  and eccentric lowering x 15  Exercise 6: sitting h/s curls with band and facilitation as needed (yellow) x 15 and partial ROM *with facilitation-  Exercise 7: sitting ball squeeze supine with manual assist x 20  Exercise 8: sitting hip abduction with band (with blocking of right leg to recruit left)  yellow  x 15  Exercise 9: sitting foot tapping DF 1 x 20 ea  Exercise 10: sitting heel raises with weights over lower thighs  7.5#  x 20 ea  Exercise 11: sitting marching x 20 ea  Exercise 12: repeated sit to stand with forced recruitment of left leg (higher surface to lower)  x 10  Exercise 17: sidestepping in bars x 5 dwn/back  Exercise 18: ambulation in parallel bars  (down & back) using only right hand x 5  Exercise 19: supine left quad sets 3\" x 30     Assessment:   Conditions Requiring Skilled Therapeutic Intervention  Body structures, Functions, Activity limitations: Decreased functional mobility ; Decreased ADL status; Decreased ROM; Decreased strength;Decreased balance;Decreased high-level IADLs;Decreased fine motor control;Decreased coordination;Decreased endurance; Increased pain  Assessment: HEP issued with pictures and written instructions and goals assessed due to this being his last approved visit and findings/measurements recorded in goal section of chart. Continues to have LLE weakness and decreased coordination with certain exercises. Goals:  Short term goals  Time Frame for Short term goals: 6-8 weeks  Short term goal 1: Patient to be independent with HEP.----not yet met(4/2/21 Patient has not yet been issued a HEP.   5/13:  HEP issued)  Short term goal 2: Patient to be independent with resumption of use of AFO (if foot strength remains insufficient). --met(4/2/21 Patient had been using AFO, strap was worn out and broke. He has custom AFO on order by [employer]. 5/4: Has new AFO and no concerns)  Short term goal 3: Patient able to ambulate with cane with less drag of left foot. --progress(4/2/21 Patient walks with quad cane, slightly less drag of left foot but using band for flexion aid. 5/4: Improved with assist of L AFO   5/13: no foot drag with new AFO)  Short term goal 4: Patient to report greater ease getting in and out of vehicle. --progress(4/2/21 Patient is having trouble getting in/out of cars, not having trouble with truck. 5/4: Continued difficulty with lower cars, but no difficulty with his truck or SUV.  5/13: same as 5/4)  Long term goals  Time Frame for Long term goals : 9-12 weeks  Long term goal 1: Patient able to ambulate with quad cane or single point cane 500'. --met using both(4/2/21 Patient has walked in excess of 500' using quad cane. 662' feet at reassessment. 5/4: Amb 200' with SPC today.    5/13:  781' with SPC)  Long term goal 2: Patient able to perform sit to stand 8 x in 30 seconds with increased weight bourne through left LE.---progress(4/2/21 sit to stand 5 x in 30 secs using right arm to push up. 5/4: 6.5 in 30\", uses RUE to push up   5/13: x 7 from mat table and with use of RUE)  Long term goal 3: Patient to have >= 4-/5 strength in left foot for df, pf, inversion and eversion. --progress(4/2/21 No change yet regarding left LE muscle strength (see strength section)  5/4: DF 3 to 3+/5, PF 3 to 3+/5, Inversion 3 to 3+/5, Eversion 3 to 3+/5.   5/13: same as 5/4)  Long term goal 4: Patient to have >=4-/5 left hip flexors, 4+/5 left knee extensors. --progress(4/2/21 No change in strength from initial evaluation. See strength section  5/4: Hip flex 3- to 3/5, knee ext 3- to 3/5 5/13: same as 5/4)  Long term goal 5: Patient to show increased protective reactions with standing balance perturbations. --progress(4/2/21 Able to accept modest balance perturbations in all directions, but continued delay in LLE reaction, as well as generally erratic motions. 5/4: Continues with erratic movements of LLE. Able to accept min-mod perturbations. 5/13: same as 5/4)  Patient Goals   Patient goals : Return back to normal level of function, walk without a device or 1-point cane.     Plan:    Plan Comment: D/C from skilled therapy       Therapy Time   Individual Concurrent Group Co-treatment   Time In 1059         Time Out 1157         Minutes 58         Timed Code Treatment Minutes: [address] [facility]    Electronically signed by Robert Kohler PTA on 5/13/2021 at 12:00 PM

## 2021-05-18 ENCOUNTER — APPOINTMENT (OUTPATIENT)
Dept: SPEECH THERAPY | Age: 56
End: 2021-05-18
Payer: COMMERCIAL

## 2021-05-18 ENCOUNTER — HOSPITAL ENCOUNTER (OUTPATIENT)
Dept: OCCUPATIONAL THERAPY | Age: 56
Setting detail: THERAPIES SERIES
Discharge: HOME | End: 2021-05-18
Payer: COMMERCIAL

## 2021-05-18 ENCOUNTER — APPOINTMENT (OUTPATIENT)
Dept: PHYSICAL THERAPY | Age: 56
End: 2021-05-18
Payer: COMMERCIAL

## 2021-05-18 PROCEDURE — 97530 THERAPEUTIC ACTIVITIES: CPT

## 2021-05-18 PROCEDURE — 97032 APPL MODALITY 1+ESTIM EA 15: CPT

## 2021-05-18 PROCEDURE — 97110 THERAPEUTIC EXERCISES: CPT

## 2021-05-18 ASSESSMENT — PAIN DESCRIPTION - FREQUENCY: FREQUENCY: CONTINUOUS

## 2021-05-18 ASSESSMENT — PAIN DESCRIPTION - DESCRIPTORS: DESCRIPTORS: ACHING

## 2021-05-18 ASSESSMENT — PAIN DESCRIPTION - ONSET: ONSET: ON-GOING

## 2021-05-18 NOTE — PROGRESS NOTES
perform all clothespins this date  Other exercises 13: BUE digit extension exercise with blue resistive rubberband 2 set x15 reps  Other exercises 14: LUE digit flexion with green digi  2 set x15 reps and green digi squeeze 2 sets x15 reps  Other exercises 15: LUE wall slides in shoulder flexion with pilow sheet 1 set x10 reps  Other exercises 18: LUE shoulder abduction with 3# 1 set x10 reps                        Modalities: Yes  Electrical Stimulation  Electrical Stimulation Location: Left; Shoulder  Electrical Stimulation Action: Shoulder flexion LUE in sidelying with therapist support at wrist and Shoulder abduction LUE supine. Electrical Stimulation Parameters: 20-25 intensity with 10/10 cycle, sh flex pads on anterior deltoid, sh abd pads on middle deltoid. Electrical Stimulation Goals: Strength     Assessment   Performance deficits / Impairments: Decreased coordination;Decreased ROM; Decreased strength;Decreased fine motor control;Decreased high-level IADLs  Assessment: Patient tolerated session well with no s/s of adverse reactions. Patient continues to make progress with endurance and strength through skilled OT services. Patient planned to d/c on 5/25/21. Treatment Diagnosis: M62.81, R27.8, M25.61  Prognosis: Good  History: Patient has had a hx of TIA and CVAs. This is his 3rd CVA. He is an active smoker and reports he's trying to decrease his amount each day. He has not worked in 2-3 years since his CVAs.   Discharge Recommendations: Continue to assess pending progress  REQUIRES OT FOLLOW UP: Yes  Timed Code Treatment Minutes: [address]  Times per week: x2  Plan weeks: 6  Current Treatment Recommendations: Strengthening, ROM, Manual Therapy:  STM, Neuromuscular Re-education, Modalities (comment), Self-Care / ADL, Patient/Caregiver Education & Training, Endurance Training    Goals  Short term goals  Time Frame for Short term goals: 3 weeks  Short term goal 1: Patient to improve AROM shoulder flexion to >100 degrees. (Goal Met- 101 degrees)  Short term goal 2: Patient to increase MMT score of elbow flexion/extension to 3+/5 for yardwork. (Goal Met- 3+/5)  Short term goal 3: Patient be able to open various light-moderate resistance containers with LUE hand. (Goal Met)  Short term goal 4: Patient to increase LUE  strength to 50# for fishing. (Progressing- not able to test this date due to dynamometer getting calibrated)  Short term goal 5: Patient to be independent with home exercise program. (Goal Met)  Short term goal 6: Patient to improve AROM shoulder abduction to >100 degrees. (Goal Met- 175 degrees)  Long term goals  Time Frame for Long term goals : 6 weeks  Long term goal 1: Patient to improve AROM shoulder flexion to functional ROM of >130 degrees. (Goal Met- 180 degrees)  Long term goal 2: Patient to increase MMT score of elbow flexion/extension to 4/5 for yardwork. (Goal Met- 4+/5)  Long term goal 3: Patient be able to open various heavy resistance containers with LUE hand. (Progressing)  Long term goal 4: Patient to increase LUE  strength to 80# for fishing. (Progressing)  Long term goal 5: Patient to improve LUE 9 hole peg test score to 50th percentile for age group. (Goal Met- 50th percentile)  Long term goals 6: Patient to increase MMT score of shoulder abduction/adduction to 4+/5 for yardwork. Patient Goals   Patient goals : \"I want to get my left arm stronger. I can't even open a jar anymore. \"    Therapy Time   Individual Concurrent Group Co-treatment   Time In 1055         Time Out 1148         Minutes 53         Timed Code Treatment Minutes: Mike 63, OT Electronically signed by VIANEY Raygoza on 5/18/2021 at 11:52 AM

## 2021-05-19 NOTE — PROGRESS NOTES
[facility]  OUTPATIENT PHYSICAL THERAPY  DISCHARGE SUMMARY    Date: 2021  Patient Name: Farheen Urrutia        MRN: [id number]    ACCOUNT #: [de-identified]  : 1965  (49 y.o.)  Gender: male   Referring Practitioner: Jamila Alejo MD  Diagnosis: hemiplegia ((G81.94)  Referral Date : 21       Total # of Visits Approved: 18  Total # of Visits to Date: 18    Subjective  Subjective: \"My sciatic nerve is giving me a little trouble again. \" (Brief comment reported at last session attended 21). Additional Pertinent Hx: 49year old male referred to PT with diagnosis of low back pain. He had been seen in the past at our facility for rehab following a stroke. Recent x-ray showed mild retrolisthesis of L4 on L5 which may represent subluxation at the level of the facets. Objective  Treatments received included Strengthening, ROM, Balance Training, Transfer Training, Endurance Training, Gait Training, Stair training, Neuromuscular Re-education, Home Exercise Program, Patient/Caregiver Education & Training, Equipment Evaluation, Education, & procurement of medical equipment. See objective/subjective data in goals    Assessment  Assessment: At the last PT session attended, Mr. Machelle Mayen was issued a  HEP with pictures/ written instructions provided. All unmet goals were reportedly assessed due to that being his last insurance- approved visit, findings/measurements recorded in goal section of chart. He was reported to continue to present with LLE weakness and decreased coordination with certain exercises. The use of a custom AFO appear to improve his gait pattern and proficiency, though his left hip flexor weakness prevented the desired step height and length. He appeared to have progressed to about the level he was at before his most recent extension of stroke. He has been provided with both a back routine and and general strengthening program to continue independently at home. Will discharge Mr. Brantley Haw

## 2021-05-20 ENCOUNTER — APPOINTMENT (OUTPATIENT)
Dept: PHYSICAL THERAPY | Age: 56
End: 2021-05-20
Payer: COMMERCIAL

## 2021-05-20 ENCOUNTER — HOSPITAL ENCOUNTER (OUTPATIENT)
Dept: OCCUPATIONAL THERAPY | Age: 56
Setting detail: THERAPIES SERIES
Discharge: HOME | End: 2021-05-20
Payer: COMMERCIAL

## 2021-05-20 PROCEDURE — 97530 THERAPEUTIC ACTIVITIES: CPT

## 2021-05-20 PROCEDURE — 97110 THERAPEUTIC EXERCISES: CPT

## 2021-05-20 PROCEDURE — 97032 APPL MODALITY 1+ESTIM EA 15: CPT

## 2021-05-20 ASSESSMENT — PAIN DESCRIPTION - FREQUENCY: FREQUENCY: CONTINUOUS

## 2021-05-20 ASSESSMENT — PAIN DESCRIPTION - PAIN TYPE: TYPE: CHRONIC PAIN

## 2021-05-20 ASSESSMENT — PAIN DESCRIPTION - ONSET: ONSET: ON-GOING

## 2021-05-20 ASSESSMENT — PAIN DESCRIPTION - DESCRIPTORS: DESCRIPTORS: ACHING

## 2021-05-20 ASSESSMENT — PAIN - FUNCTIONAL ASSESSMENT: PAIN_FUNCTIONAL_ASSESSMENT: ACTIVITIES ARE NOT PREVENTED

## 2021-05-20 ASSESSMENT — PAIN DESCRIPTION - PROGRESSION: CLINICAL_PROGRESSION: NOT CHANGED

## 2021-05-20 ASSESSMENT — PAIN DESCRIPTION - ORIENTATION: ORIENTATION: RIGHT

## 2021-05-20 NOTE — PROGRESS NOTES
Daily Treatment Note  Date: 2021  Patient Name: Keith Paulino  :  1965  MRN: [id number]       Subjective   General  Chart Reviewed: Yes  Patient assessed for rehabilitation services?: Yes  Family / Caregiver Present: No  Referring Practitioner: Jason Suarez MD  Diagnosis: hemiplegia, nondominant, left side G81.94  OT Visit Information  Onset Date: 21  OT Insurance Information: [facility]  Total # of Visits Approved: 20  Total # of Visits to Date: 23  Certification Period Expiration Date: 21  Progress Note Due Date: 21  Progress Note Counter: 21 will be last day D/C  Pre Treatment Pain Screening  Intervention List: Patient able to continue with treatment  Pain Assessment  Pain Assessment: 0-10  Pain Level: 4  Pain Type: Chronic pain  Pain Location: Back  Pain Orientation: Right  Pain Descriptors: Aching  Pain Frequency: Continuous  Pain Onset: On-going  Clinical Progression: Not changed  Functional Pain Assessment: Activities are not prevented  Non-Pharmaceutical Pain Intervention(s): Distraction; Therapeutic presence; Therapeutic touch  Response to Pain Intervention: Patient Satisfied  Vital Signs  Patient Currently in Pain: Yes     Treatment Activities:    Other exercises  Other exercises 1: LUE shoulder flexion in sidelying 4 sets x5 reps with NMES  Other exercises 2: Supine LUE shoulder abduction 4 sets x5 reps with NMES  Other exercises 3: LUE elbow flexion 2 set x15 reps with 3# dumbbell  Other exercises 4: LUE external rotation with 3# dumbbell (elbow at 90 degrees at side) 2 sets x15 reps  Other exercises 5: LUE shoulder flexion with 3# 2 sets x15 reps  Other exercises 7: LUE tricep pulldowns with black theraband on top of doorway 2 sets x15 reps  Other exercises 11: Clothespins activity 1 pt pinch on yellow, red, green, blue, and lateral pinch on black- patient able to perform all clothespins this date  Other exercises 13: BUE digit extension exercise with blue resistive rubberband 2 set x15 reps  Other exercises 14: LUE digit flexion with green digi  2 set x15 reps and green digi squeeze 2 sets x15 reps  Other exercises 15: LUE wall slides in shoulder flexion with pilow sheet 1 set x10 reps  Other exercises 18: LUE shoulder abduction with 3# 1 set x10 reps               Modalities: Yes  Electrical Stimulation  Electrical Stimulation Location: Left; Shoulder  Electrical Stimulation Action: Shoulder flexion LUE in sidelying with therapist support at wrist and Shoulder abduction LUE supine. Electrical Stimulation Parameters: 20-25 intensity with 10/10 cycle, sh flex pads on anterior deltoid, sh abd pads on middle deltoid. Electrical Stimulation Goals: Strength     Assessment   Performance deficits / Impairments: Decreased coordination;Decreased ROM; Decreased strength;Decreased fine motor control;Decreased high-level IADLs  Assessment: Patient tolerated session well with no s/s of adverse reactions. Patient continues to make progress with endurance and strength through skilled OT services. Patient planned to d/c on 5/25/21. Treatment Diagnosis: M62.81, R27.8, M25.61  Prognosis: Good  History: Patient has had a hx of TIA and CVAs. This is his 3rd CVA. He is an active smoker and reports he's trying to decrease his amount each day. He has not worked in 2-3 years since his CVAs. Discharge Recommendations: Continue to assess pending progress  REQUIRES OT FOLLOW UP: Yes  Timed Code Treatment Minutes: Walt 59  Times per week: x2  Plan weeks: 6  Current Treatment Recommendations: Strengthening, ROM, Manual Therapy:  STM, Neuromuscular Re-education, Modalities (comment), Self-Care / ADL, Patient/Caregiver Education & Training, Endurance Training    Goals  Short term goals  Time Frame for Short term goals: 3 weeks  Short term goal 1: Patient to improve AROM shoulder flexion to >100 degrees.  (Goal Met- 101 degrees)  Short term goal 2: Patient to increase MMT score of elbow flexion/extension to 3+/5 for yardwork. (Goal Met- 3+/5)  Short term goal 3: Patient be able to open various light-moderate resistance containers with LUE hand. (Goal Met)  Short term goal 4: Patient to increase LUE  strength to 50# for fishing. (Progressing- not able to test this date due to dynamometer getting calibrated)  Short term goal 5: Patient to be independent with home exercise program. (Goal Met)  Short term goal 6: Patient to improve AROM shoulder abduction to >100 degrees. (Goal Met- 175 degrees)  Long term goals  Time Frame for Long term goals : 6 weeks  Long term goal 1: Patient to improve AROM shoulder flexion to functional ROM of >130 degrees. (Goal Met- 180 degrees)  Long term goal 2: Patient to increase MMT score of elbow flexion/extension to 4/5 for yardwork. (Goal Met- 4+/5)  Long term goal 3: Patient be able to open various heavy resistance containers with LUE hand. (Progressing)  Long term goal 4: Patient to increase LUE  strength to 80# for fishing. (Progressing)  Long term goal 5: Patient to improve LUE 9 hole peg test score to 50th percentile for age group. (Goal Met- 50th percentile)  Long term goals 6: Patient to increase MMT score of shoulder abduction/adduction to 4+/5 for yardwork. Patient Goals   Patient goals : \"I want to get my left arm stronger. I can't even open a jar anymore. \"    Therapy Time   Individual Concurrent Group Co-treatment   Time In 1100         Time Out 1156         Minutes 56         Timed Code Treatment Minutes: [address] [facility] M302, OT Electronically signed by VIANEY Millan on 5/20/2021 at 12:02 PM

## 2021-05-25 ENCOUNTER — HOSPITAL ENCOUNTER (OUTPATIENT)
Dept: OCCUPATIONAL THERAPY | Age: 56
Setting detail: THERAPIES SERIES
Discharge: HOME | End: 2021-05-25
Payer: COMMERCIAL

## 2021-05-25 ENCOUNTER — APPOINTMENT (OUTPATIENT)
Dept: PHYSICAL THERAPY | Age: 56
End: 2021-05-25
Payer: COMMERCIAL

## 2021-05-25 ENCOUNTER — APPOINTMENT (OUTPATIENT)
Dept: SPEECH THERAPY | Age: 56
End: 2021-05-25
Payer: COMMERCIAL

## 2021-05-25 PROCEDURE — 97032 APPL MODALITY 1+ESTIM EA 15: CPT

## 2021-05-25 PROCEDURE — 97110 THERAPEUTIC EXERCISES: CPT

## 2021-05-25 ASSESSMENT — PAIN DESCRIPTION - LOCATION: LOCATION: BACK

## 2021-05-25 ASSESSMENT — PAIN DESCRIPTION - ORIENTATION: ORIENTATION: RIGHT

## 2021-05-25 ASSESSMENT — PAIN DESCRIPTION - PAIN TYPE: TYPE: CHRONIC PAIN

## 2021-05-25 ASSESSMENT — PAIN - FUNCTIONAL ASSESSMENT: PAIN_FUNCTIONAL_ASSESSMENT: ACTIVITIES ARE NOT PREVENTED

## 2021-05-25 ASSESSMENT — PAIN DESCRIPTION - PROGRESSION: CLINICAL_PROGRESSION: NOT CHANGED

## 2021-05-25 NOTE — PROGRESS NOTES
Daily Treatment Note  Date: 2021  Patient Name: Patrice Nunez  :  1965  MRN: [id number]       Subjective   General  Chart Reviewed: Yes  Patient assessed for rehabilitation services?: Yes  Family / Caregiver Present: No  Referring Practitioner: Luna Zaidi MD  Diagnosis: hemiplegia, nondominant, left side G81.94  OT Visit Information  Onset Date: 21  OT Insurance Information: [facility]  Total # of Visits Approved: 20  Total # of Visits to Date: 21  Certification Period Expiration Date: 21  Progress Note Due Date: 21  Progress Note Counter: 21 will be last day D/C  Pre Treatment Pain Screening  Intervention List: Patient able to continue with treatment  Pain Assessment  Pain Assessment: 0-10  Pain Level: 5  Pain Type: Chronic pain  Pain Location: Back  Pain Orientation: Right  Pain Descriptors: Aching  Pain Frequency: Continuous  Pain Onset: On-going  Clinical Progression: Not changed  Functional Pain Assessment: Activities are not prevented  Non-Pharmaceutical Pain Intervention(s): Therapeutic presence; Therapeutic touch  Response to Pain Intervention: Patient Satisfied  Vital Signs  Patient Currently in Pain: Yes     Treatment Activities:    Modalities: Yes  Electrical Stimulation  Electrical Stimulation Location: Left; Shoulder  Electrical Stimulation Action: Shoulder flexion LUE in sidelying with therapist support at wrist and Shoulder abduction LUE supine. Electrical Stimulation Parameters: 20-25 intensity with 10/10 cycle, sh flex pads on anterior deltoid, sh abd pads on middle deltoid. Electrical Stimulation Goals: Strength     Assessment   Performance deficits / Impairments: Decreased coordination;Decreased ROM; Decreased strength;Decreased fine motor control;Decreased high-level IADLs  Assessment: Patient tolerated session well with no s/s of adverse reactions. Patient participated in d/c this date as well as HEP education with grey theraband.  Patient discharged from [facility] OT services this date secondary to meeting all goals. Treatment Diagnosis: M62.81, R27.8, M25.61  Prognosis: Good  History: Patient has had a hx of TIA and CVAs. This is his 3rd CVA. He is an active smoker and reports he's trying to decrease his amount each day. He has not worked in 2-3 years since his CVAs. Discharge Recommendations: Continue to assess pending progress;Home independently  REQUIRES OT FOLLOW UP: No  Timed Code Treatment Minutes: 43 Minutes         Plan   Plan  Current Treatment Recommendations: Strengthening, ROM, Manual Therapy:  STM, Neuromuscular Re-education, Modalities (comment), Self-Care / ADL, Patient/Caregiver Education & Training, Endurance Training  Plan Comment: Patient discharged this date secondary to meeting all STG/LTGs. OutComes Score  QuickDASH Total Score: 11 (05/25/21 1132)       QuickDASH Disability/Symptom Score : 0 % (05/25/21 1132)    Goals  Short term goals  Time Frame for Short term goals: 3 weeks  Short term goal 1: Patient to improve AROM shoulder flexion to >100 degrees. (Goal Met- 101 degrees)  Short term goal 2: Patient to increase MMT score of elbow flexion/extension to 3+/5 for yardwork. (Goal Met- 3+/5)  Short term goal 3: Patient be able to open various light-moderate resistance containers with LUE hand. (Goal Met)  Short term goal 4: Patient to increase LUE  strength to 50# for fishing. (Goal Met)  Short term goal 5: Patient to be independent with home exercise program. (Goal Met)  Short term goal 6: Patient to improve AROM shoulder abduction to >100 degrees. (Goal Met- 175 degrees)  Long term goals  Time Frame for Long term goals : 6 weeks  Long term goal 1: Patient to improve AROM shoulder flexion to functional ROM of >130 degrees. (Goal Met- 180 degrees)  Long term goal 2: Patient to increase MMT score of elbow flexion/extension to 4/5 for yardwork.  (Goal Met- 4+/5)  Long term goal 3: Patient be able to open various heavy resistance containers with LUE hand. (Goal Met)  Long term goal 4: Patient to increase LUE  strength to 80# for fishing. (Goal Met)  Long term goal 5: Patient to improve LUE 9 hole peg test score to 50th percentile for age group. (Goal Met- 50th percentile)  Long term goals 6: Patient to increase MMT score of shoulder abduction/adduction to 4+/5 for yardwork. (Goal Met- 5/5)  Patient Goals   Patient goals : \"I want to get my left arm stronger. I can't even open a jar anymore. \"    Therapy Time   Individual Concurrent Group Co-treatment   Time In 1102         Time Out 1145         Minutes 43         Timed Code Treatment Minutes: [address] [facility], OT Electronically signed by VIANEY Negron on 5/25/2021 at 12:03 PM

## 2021-05-25 NOTE — PROGRESS NOTES
[address] [facility]. Outpatient  Discharge Summary     Italo Jain was evaluated by  VINAEY Miller with Onset Date: 03/05/21 and seen for Total # of Visits to Date: 20 treatment session prior to DC on 5/25/2021. The patient's outpatient therapy goals were:     Short term goals:   Patient Goals   Patient goals : \"I want to get my left arm stronger. I can't even open a jar anymore. \"  Short term goals  Time Frame for Short term goals: 3 weeks  Short term goal 1: Patient to improve AROM shoulder flexion to >100 degrees. (Goal Met- 101 degrees)  Short term goal 2: Patient to increase MMT score of elbow flexion/extension to 3+/5 for yardwork. (Goal Met- 3+/5)  Short term goal 3: Patient be able to open various light-moderate resistance containers with LUE hand. (Goal Met)  Short term goal 4: Patient to increase LUE  strength to 50# for fishing. (Goal Met)  Short term goal 5: Patient to be independent with home exercise program. (Goal Met)  Short term goal 6: Patient to improve AROM shoulder abduction to >100 degrees. (Goal Met- 175 degrees)    Long term goals:  Long term goals  Time Frame for Long term goals : 6 weeks  Long term goal 1: Patient to improve AROM shoulder flexion to functional ROM of >130 degrees. (Goal Met- 180 degrees)  Long term goal 2: Patient to increase MMT score of elbow flexion/extension to 4/5 for yardwork. (Goal Met- 4+/5)  Long term goal 3: Patient be able to open various heavy resistance containers with LUE hand. (Goal Met)  Long term goal 4: Patient to increase LUE  strength to 80# for fishing. (Goal Met)  Long term goal 5: Patient to improve LUE 9 hole peg test score to 50th percentile for age group. (Goal Met- 50th percentile)  Long term goals 6: Patient to increase MMT score of shoulder abduction/adduction to 4+/5 for yardwork. (Goal Met- 5/5)    Pt met 10/10 goals. Assessment: Patient tolerated session well with no s/s of adverse reactions.  Patient

## 2021-05-27 ENCOUNTER — APPOINTMENT (OUTPATIENT)
Dept: OCCUPATIONAL THERAPY | Age: 56
End: 2021-05-27
Payer: COMMERCIAL

## 2021-05-27 ENCOUNTER — APPOINTMENT (OUTPATIENT)
Dept: PHYSICAL THERAPY | Age: 56
End: 2021-05-27
Payer: COMMERCIAL

## 2021-06-17 ENCOUNTER — HOSPITAL ENCOUNTER (OUTPATIENT)
Dept: PAIN MANAGEMENT | Age: 56
Discharge: HOME | End: 2021-06-17
Payer: COMMERCIAL

## 2021-06-17 VITALS
HEART RATE: 60 BPM | TEMPERATURE: 97 F | HEIGHT: 73 IN | WEIGHT: 250 LBS | BODY MASS INDEX: 33.13 KG/M2 | DIASTOLIC BLOOD PRESSURE: 90 MMHG | OXYGEN SATURATION: 98 % | SYSTOLIC BLOOD PRESSURE: 139 MMHG

## 2021-06-17 DIAGNOSIS — M54.41 CHRONIC RIGHT-SIDED LOW BACK PAIN WITH RIGHT-SIDED SCIATICA: ICD-10-CM

## 2021-06-17 DIAGNOSIS — M54.9 BACK PAIN WITH HISTORY OF SPINAL SURGERY: ICD-10-CM

## 2021-06-17 DIAGNOSIS — Z02.89 PAIN MEDICATION AGREEMENT: ICD-10-CM

## 2021-06-17 DIAGNOSIS — Z98.890 BACK PAIN WITH HISTORY OF SPINAL SURGERY: ICD-10-CM

## 2021-06-17 DIAGNOSIS — M54.16 LUMBAR RADICULOPATHY: ICD-10-CM

## 2021-06-17 DIAGNOSIS — G89.29 CHRONIC RIGHT-SIDED LOW BACK PAIN WITH RIGHT-SIDED SCIATICA: ICD-10-CM

## 2021-06-17 PROCEDURE — 99205 OFFICE O/P NEW HI 60 MIN: CPT

## 2021-06-17 PROCEDURE — 99214 OFFICE O/P EST MOD 30 MIN: CPT | Performed by: NURSE PRACTITIONER

## 2021-06-17 RX ORDER — HYDROCODONE BITARTRATE AND ACETAMINOPHEN 10; 325 MG/1; MG/1
1 TABLET ORAL EVERY 8 HOURS PRN
Qty: 60 TABLET | Refills: 0 | Status: SHIPPED | OUTPATIENT
Start: 2021-06-17 | End: 2021-07-17

## 2021-06-17 ASSESSMENT — ENCOUNTER SYMPTOMS
BACK PAIN: 1
BOWEL INCONTINENCE: 0
CONSTIPATION: 0

## 2021-06-17 ASSESSMENT — PAIN DESCRIPTION - LOCATION: LOCATION: BACK

## 2021-06-17 ASSESSMENT — PAIN DESCRIPTION - PAIN TYPE: TYPE: CHRONIC PAIN

## 2021-06-17 ASSESSMENT — PAIN SCALES - GENERAL: PAINLEVEL_OUTOF10: 4

## 2021-06-17 NOTE — H&P
Medications  Current Outpatient Medications   Medication Sig Dispense Refill    HYDROcodone-acetaminophen (NORCO)  MG per tablet Take 1 tablet by mouth every 8 hours as needed for Pain for up to 30 days. Intended supply: 30 days 60 tablet 0    cyclobenzaprine (FLEXERIL) 5 MG tablet Take 5 mg by mouth 3 times daily as needed for Muscle spasms      amLODIPine (NORVASC) 5 MG tablet Take 5 mg by mouth daily      escitalopram (LEXAPRO) 20 MG tablet Take 20 mg by mouth daily      aspirin 81 MG tablet Take 81 mg by mouth daily      Melatonin 10 MG CAPS Take by mouth nightly as needed       clopidogrel (PLAVIX) 75 MG tablet Take 75 mg by mouth daily       Acetaminophen (TYLENOL 8 HOUR PO) Take 500 mg by mouth as needed      atorvastatin (LIPITOR) 40 MG tablet Take 40 mg by mouth daily      Butalbital-Acetaminophen  MG CAPS Take 1 tablet by mouth 2 times daily as needed       ALPRAZolam (XANAX) 0.25 MG tablet Take 0.5 mg by mouth nightly as needed for Sleep.  nitroGLYCERIN (NITROSTAT) 0.4 MG SL tablet Place 1 tablet under the tongue every 5 minutes as needed for Chest pain up to max of 3 total doses. If no relief after 1 dose, call 911. 25 tablet 3     No current facility-administered medications for this encounter. Social History    Social History     Socioeconomic History    Marital status:      Spouse name: None    Number of children: 0    Years of education: None    Highest education level: None   Occupational History    None   Tobacco Use    Smoking status: Current Some Day Smoker     Packs/day: 1.00     Years: 40.00     Pack years: 40.00     Types: Cigarettes    Smokeless tobacco: Never Used    Tobacco comment: Cutting down   Vaping Use    Vaping Use: Never used   Substance and Sexual Activity    Alcohol use:  Yes     Alcohol/week: 7.0 standard drinks     Types: 7 Cans of beer per week     Comment: 1 0R 2 TIMES A WEEK    Drug use: Never    Sexual activity: Yes Other Topics Concern    None   Social History Narrative    None     Social Determinants of Health     Financial Resource Strain:     Difficulty of Paying Living Expenses:    Food Insecurity:     Worried About Running Out of Food in the Last Year:     [address] in the Last Year:    Transportation Needs:     Lack of Transportation (Medical):  Lack of Transportation (Non-Medical):    Physical Activity:     Days of Exercise per Week:     Minutes of Exercise per Session:    Stress:     Feeling of Stress :    Social Connections:     Frequency of Communication with Friends and Family:     Frequency of Social Gatherings with Friends and Family:     Attends Islam Services:     Active Member of Clubs or Organizations:     Attends Club or Organization Meetings:     Marital Status:    Intimate Partner Violence:     Fear of Current or Ex-Partner:     Emotionally Abused:     Physically Abused:     Sexually Abused:          Family History  family history includes Brain Cancer in his mother; Cancer in his mother; Heart Attack in his father; Kidney Cancer in his mother; Sabrina Perry in his mother. Review of Systems:  Review of Systems   Constitutional: Negative for activity change. Gastrointestinal: Negative for bowel incontinence and constipation. Genitourinary: Negative for bladder incontinence. Musculoskeletal: Positive for arthralgias, back pain and myalgias. Negative for joint swelling, neck pain and neck stiffness. Neurological: Negative for weakness and numbness. Psychiatric/Behavioral: Positive for sleep disturbance (occasionally). Negative for agitation, self-injury and suicidal ideas. The patient is not nervous/anxious.          14 point ROS negative besides that noted in HPI    Physical exam:     Vitals:    06/17/21 0950   BP: (!) 139/90   Pulse: 60   Temp: 97 °F (36.1 °C)   TempSrc: Temporal   SpO2: 98%   Weight: 250 lb (113.4 kg)   Height: 6' 1\" (1.854 m)       Body mass index is 32.98 kg/m². Physical Exam  Vitals and nursing note reviewed. Constitutional:       General: He is not in acute distress. Appearance: He is well-developed. HENT:      Head: Normocephalic. Right Ear: External ear normal.      Left Ear: External ear normal.      Nose: Nose normal.   Eyes:      Conjunctiva/sclera: Conjunctivae normal.      Pupils: Pupils are equal, round, and reactive to light. Neck:      Vascular: No JVD. Trachea: No tracheal deviation. Cardiovascular:      Rate and Rhythm: Normal rate. Pulmonary:      Effort: Pulmonary effort is normal.   Abdominal:      General: There is no distension. Tenderness: There is no abdominal tenderness. Musculoskeletal:      Lumbar back: Spasms (tender palpable knots ie trigger points identified) and tenderness present. Positive right straight leg raise test. Negative left straight leg raise test.      Comments: No foot drop  No clonus  SI exam negative  Piriformis negative     Skin:     General: Skin is warm and dry. Neurological:      Mental Status: He is alert and oriented to person, place, and time. Sensory: No sensory deficit. Psychiatric:         Behavior: Behavior normal.         Thought Content:  Thought content normal.         Judgment: Judgment normal.         Labs:     Lab Results   Component Value Date     02/23/2021    K 4.4 02/23/2021    K 3.6 02/22/2021     02/23/2021    CO2 29 02/23/2021    BUN 11 02/23/2021    CREATININE 0.9 02/23/2021    GLUCOSE 73 02/23/2021    CALCIUM 8.8 02/23/2021        Lab Results   Component Value Date    WBC 4.9 02/23/2021    HGB 15.5 02/23/2021    HCT 45.7 02/23/2021    .4 (H) 02/23/2021     02/23/2021       Assessment:                                                                                                                                        Active Problems:    Lumbar radiculopathy    Back pain with history of spinal surgery    Chronic right-sided low back pain with right-sided sciatica  Resolved Problems:    * No resolved hospital problems. *      PLAN:  Chronic right-sided low back pain with right-sided sciatica  Back pain with history of spinal surgery  Lumbar radiculopathy  - MRI LUMBAR SPINE W WO CONTRAST; Future    - HYDROcodone-acetaminophen (NORCO)  MG per tablet; Take 1 tablet by mouth every 8 hours as needed for Pain for up to 30 days. Intended supply: 30 days  Dispense: 60 tablet; Refill: 0    We will schedule MRI with and without contrast due to worsening low back pain with history of low back surgery. Patient has underwent physical therapy. Will start pain medication until able to obtain MRI. Patient is open to injections. We will have to contact patient's PCP regarding holding Plavix and aspirin if injections are ordered. Discussion: Discussed exam findings and plan of care with patient. Patient agreed with POC and questions were asked and answered. Activity: discussed exercise as beneficial to pain reduction, encouraged stretching exercise with a focus on torso strengthening. Goal:  Pain Management Goals of Therapy:   [] Resolution in pain  [x] Decrease in pain level  [x] Improvement in ADL's  [x] Increase in activities with less pain  [] Decrease in medication     Controlled substance monitoring:    [x] Discussed medication side effects, risk of tolerance and/or dependence, and/or alternative treatment  [] Discussed the detrimental effects of long term narcotic use in younger patients especially women of childbearing years.   [x] No signs and symptoms of potential drug abuse or diversion were identified  [] Signs of potential drug abuse or diversion were identified   [] ORT Score   [] UDS non-compliant   [] See Note  [] Random urine drug screen sent today  [x] Random urine drug screen not completed today   [x] Deferred New Patient  [] Compliant   [] Not Compliant see note  [x] Medication agreement with provider signed today  [] Medication agreement with provider on file under media   [] Medication regimen effective with no c/o side effects and continued   [x] New patient continuing current medication while developing POC   [] On going assessment and evaluation of medication regimen  [] Medication regimen not effective see plan for changes  [x] Myles Tobias reviewed & on file under media     CC:  MD Dimitri Madera, APRN - CNP, 6/17/2021 at 10:54 AM    EMR dragon/transcription disclaimer: Much of this encounter note is electronic transcription/translation of spoken language to printed tach. Electronic translation of spoken language may be erroneous, or at times, nonsensical words or phrases may be inadvertently transcribed.  Although, I have reviewed the note for such errors, some may still exist.

## 2021-06-22 ENCOUNTER — TELEPHONE (OUTPATIENT)
Dept: PAIN MANAGEMENT | Age: 56
End: 2021-06-22

## 2021-06-22 NOTE — TELEPHONE ENCOUNTER
Call received from patient's wife on third floor scheduling line regarding increased pain and needing to get MRI sooner. Patient was seen in office on 6/17 but cannot receive MRI until 7/30. He states that his pain has worsened since he saw Rasheed Laboy in the office and he is having difficulty walking. He said his pain medication helps to \"take the edge off\" but it doesn't last very long and he does not feel that he can wait until the end of July for the MRI. Call placed to central scheduling by [facility] RN. Patient has been moved to 7/2 at 3:45 with arrival at 3:15. I have spoken to Rasheed Laboy regarding pain medication. I returned patient's call and let him know his new appointment date and time, and have instructed him that he may begin taking his hydrocodone four times daily. I explained to him that his current refill was to last until 7/17 but he will run out sooner than that. I told him to call several days prior to running out of medication and request his refill. Patient verbalized understanding and will be at 7/2 appointment. Also routing script to Rasheed Laboy for Toradol. Patient's kidney function WNL per most recent labs, and he is not diabetic.

## 2021-06-24 RX ORDER — KETOROLAC TROMETHAMINE 10 MG/1
10 TABLET, FILM COATED ORAL EVERY 6 HOURS PRN
Qty: 16 TABLET | Refills: 0 | Status: ON HOLD | OUTPATIENT
Start: 2021-06-24 | End: 2021-11-04 | Stop reason: ALTCHOICE

## 2021-07-01 DIAGNOSIS — F40.240 CLAUSTROPHOBIA: ICD-10-CM

## 2021-07-01 DIAGNOSIS — F41.8 TEST ANXIETY: ICD-10-CM

## 2021-07-01 DIAGNOSIS — F41.9 ANXIETY DUE TO INVASIVE PROCEDURE: Primary | ICD-10-CM

## 2021-07-01 NOTE — TELEPHONE ENCOUNTER
Call received from patient requesting medication to take prior to his MRI. He is concerned that he won't be able to lay on his back and he is claustrophobic. Have spoken with Ella Oro, and am routing script from [facility] at this time. Patient notified.

## 2021-07-02 ENCOUNTER — HOSPITAL ENCOUNTER (OUTPATIENT)
Dept: MRI IMAGING | Age: 56
Discharge: HOME | End: 2021-07-02
Payer: COMMERCIAL

## 2021-07-02 DIAGNOSIS — M54.41 CHRONIC RIGHT-SIDED LOW BACK PAIN WITH RIGHT-SIDED SCIATICA: ICD-10-CM

## 2021-07-02 DIAGNOSIS — G89.29 CHRONIC RIGHT-SIDED LOW BACK PAIN WITH RIGHT-SIDED SCIATICA: ICD-10-CM

## 2021-07-02 DIAGNOSIS — M54.16 LUMBAR RADICULOPATHY: ICD-10-CM

## 2021-07-02 PROCEDURE — A9577 INJ MULTIHANCE: HCPCS | Performed by: NURSE PRACTITIONER

## 2021-07-02 PROCEDURE — 6360000004 HC RX CONTRAST MEDICATION: Performed by: NURSE PRACTITIONER

## 2021-07-02 PROCEDURE — 72158 MRI LUMBAR SPINE W/O & W/DYE: CPT

## 2021-07-02 RX ORDER — DIAZEPAM 5 MG/1
10 TABLET ORAL ONCE
Qty: 2 TABLET | Refills: 0 | Status: SHIPPED | OUTPATIENT
Start: 2021-07-02 | End: 2021-07-02

## 2021-07-02 RX ADMIN — GADOBENATE DIMEGLUMINE 20 ML: 529 INJECTION, SOLUTION INTRAVENOUS at 16:01

## 2021-07-02 RX ADMIN — GADOBENATE DIMEGLUMINE 10 ML: 529 INJECTION, SOLUTION INTRAVENOUS at 17:30

## 2021-07-08 ENCOUNTER — TELEPHONE (OUTPATIENT)
Dept: PAIN MANAGEMENT | Age: 56
End: 2021-07-08

## 2021-07-08 NOTE — TELEPHONE ENCOUNTER
Patient called third floor scheduling line regarding the results of his MRI and if he can make an appointment for injections. I spoke with Oh Cruz and called him back. I have scheduled him for LESI L4-5 on 7/20/21. Patient is on aspirin and Plavix. I let him know that we will send blood thinner approval form to Dr. Alex Garcia office. Injection is pending the approval to hold the blood thinners. Patient was instructed that it is required to hold the aspirin for five days and the Plavix for 7 days prior to the epidural.  Patient is aware that on day of appointment he needs to arrive one half hour early to register and then come to [address].

## 2021-07-13 ENCOUNTER — TELEPHONE (OUTPATIENT)
Dept: PAIN MANAGEMENT | Age: 56
End: 2021-07-13

## 2021-07-13 NOTE — TELEPHONE ENCOUNTER
Patient left message that he had contacted Dr. Saray Cruz office regarding his blood thinner approval form. He stated that Dr. Saray Cruz office said they did not receive the form. I have fax confirmation dated 7/12/21 at [phone] that fax was received to [phone]. He stated that Dr. Adilia Ochoa is not in the office but someone was checking regarding approval.  Patient states that on his own today he held his blood thinner and requests that we do not move out his 7/20 procedure due to his pain. Will call Dr. Saray Cruz office, and also make [facility] aware.

## 2021-07-13 NOTE — TELEPHONE ENCOUNTER
Patient called to see if we have received blood thinner approval from Dr. Gretchen Mckenzie for his upcoming [address]. Patient will need to start holding blood thinners today. I checked with Marlo and approval has not been received yet. I placed call to patient to let him know that he will be rescheduled. He did not answer. I left him a voicemail and stated he may call back at his convenience.

## 2021-07-20 ENCOUNTER — HOSPITAL ENCOUNTER (OUTPATIENT)
Dept: PAIN MANAGEMENT | Age: 56
Discharge: HOME | End: 2021-07-20
Payer: COMMERCIAL

## 2021-07-20 VITALS
OXYGEN SATURATION: 99 % | RESPIRATION RATE: 18 BRPM | TEMPERATURE: 96 F | HEART RATE: 93 BPM | DIASTOLIC BLOOD PRESSURE: 82 MMHG | SYSTOLIC BLOOD PRESSURE: 113 MMHG

## 2021-07-20 DIAGNOSIS — R52 PAIN MANAGEMENT: ICD-10-CM

## 2021-07-20 PROCEDURE — 2580000003 HC RX 258

## 2021-07-20 PROCEDURE — 6360000002 HC RX W HCPCS

## 2021-07-20 PROCEDURE — 2500000003 HC RX 250 WO HCPCS

## 2021-07-20 PROCEDURE — 62323 NJX INTERLAMINAR LMBR/SAC: CPT

## 2021-07-20 PROCEDURE — 3209999900 FLUORO FOR SURGICAL PROCEDURES

## 2021-07-20 RX ORDER — METHYLPREDNISOLONE ACETATE 80 MG/ML
80 INJECTION, SUSPENSION INTRA-ARTICULAR; INTRALESIONAL; INTRAMUSCULAR; SOFT TISSUE ONCE
Status: DISCONTINUED | OUTPATIENT
Start: 2021-07-20 | End: 2021-07-22 | Stop reason: HOSPADM

## 2021-07-20 RX ORDER — SODIUM CHLORIDE 9 MG/ML
5 INJECTION INTRAVENOUS ONCE
Status: DISCONTINUED | OUTPATIENT
Start: 2021-07-20 | End: 2021-07-22 | Stop reason: HOSPADM

## 2021-07-20 RX ORDER — LIDOCAINE HYDROCHLORIDE 10 MG/ML
5 INJECTION, SOLUTION EPIDURAL; INFILTRATION; INTRACAUDAL; PERINEURAL ONCE
Status: DISCONTINUED | OUTPATIENT
Start: 2021-07-20 | End: 2021-07-22 | Stop reason: HOSPADM

## 2021-07-20 ASSESSMENT — PAIN SCALES - GENERAL: PAINLEVEL_OUTOF10: 6

## 2021-07-20 ASSESSMENT — PAIN DESCRIPTION - ORIENTATION: ORIENTATION: LOWER

## 2021-07-20 ASSESSMENT — PAIN DESCRIPTION - LOCATION: LOCATION: BACK

## 2021-07-20 ASSESSMENT — PAIN - FUNCTIONAL ASSESSMENT: PAIN_FUNCTIONAL_ASSESSMENT: 0-10

## 2021-07-20 ASSESSMENT — PAIN DESCRIPTION - PAIN TYPE: TYPE: CHRONIC PAIN

## 2021-07-20 NOTE — INTERVAL H&P NOTE
Update History & Physical    The patient's History and Physical   was reviewed with the patient and I examined the patient. There was  NO CHANGE:[id number]}. The surgical site was confirmed by the patient and me. Plan: The risks, benefits, expected outcome, and alternative to the recommended procedure have been discussed with the patient. Patient understands and wants to proceed with the procedure.      Electronically signed by Nieves Richardson MD on 7/20/2021 at 11:20 AM

## 2021-07-21 PROBLEM — Z02.89 PAIN MEDICATION AGREEMENT: Status: ACTIVE | Noted: 2021-07-21

## 2021-07-26 ENCOUNTER — TELEPHONE (OUTPATIENT)
Dept: PAIN MANAGEMENT | Age: 56
End: 2021-07-26

## 2021-09-02 ENCOUNTER — HOSPITAL ENCOUNTER (OUTPATIENT)
Dept: PAIN MANAGEMENT | Age: 56
Discharge: HOME | End: 2021-09-02
Payer: COMMERCIAL

## 2021-09-02 VITALS
WEIGHT: 243 LBS | HEIGHT: 73 IN | BODY MASS INDEX: 32.2 KG/M2 | HEART RATE: 78 BPM | OXYGEN SATURATION: 95 % | SYSTOLIC BLOOD PRESSURE: 135 MMHG | DIASTOLIC BLOOD PRESSURE: 90 MMHG | TEMPERATURE: 97 F

## 2021-09-02 DIAGNOSIS — Z98.890 BACK PAIN WITH HISTORY OF SPINAL SURGERY: Primary | ICD-10-CM

## 2021-09-02 DIAGNOSIS — G89.29 CHRONIC RIGHT-SIDED LOW BACK PAIN WITH RIGHT-SIDED SCIATICA: ICD-10-CM

## 2021-09-02 DIAGNOSIS — M54.9 BACK PAIN WITH HISTORY OF SPINAL SURGERY: Primary | ICD-10-CM

## 2021-09-02 DIAGNOSIS — M54.41 CHRONIC RIGHT-SIDED LOW BACK PAIN WITH RIGHT-SIDED SCIATICA: ICD-10-CM

## 2021-09-02 PROCEDURE — 99214 OFFICE O/P EST MOD 30 MIN: CPT | Performed by: NURSE PRACTITIONER

## 2021-09-02 PROCEDURE — 99213 OFFICE O/P EST LOW 20 MIN: CPT

## 2021-09-02 RX ORDER — HYDROCODONE BITARTRATE AND ACETAMINOPHEN 10; 325 MG/1; MG/1
1 TABLET ORAL EVERY 8 HOURS PRN
Qty: 60 TABLET | Refills: 0 | Status: SHIPPED | OUTPATIENT
Start: 2021-09-02 | End: 2021-10-02

## 2021-09-02 RX ORDER — GABAPENTIN 300 MG/1
300 CAPSULE ORAL 3 TIMES DAILY
Qty: 90 CAPSULE | Refills: 1 | Status: SHIPPED | OUTPATIENT
Start: 2021-09-22 | End: 2021-10-12 | Stop reason: SDUPTHER

## 2021-09-02 RX ORDER — GABAPENTIN 100 MG/1
CAPSULE ORAL
Qty: 126 CAPSULE | Refills: 0 | Status: SHIPPED | OUTPATIENT
Start: 2021-09-02 | End: 2021-10-21 | Stop reason: DRUGHIGH

## 2021-09-02 RX ORDER — DICLOFENAC EPOLAMINE 0.01 G/1
1 SYSTEM TOPICAL 2 TIMES DAILY
Qty: 60 PATCH | Refills: 5 | Status: SHIPPED | OUTPATIENT
Start: 2021-09-02 | End: 2021-10-12

## 2021-09-02 ASSESSMENT — ENCOUNTER SYMPTOMS
CONSTIPATION: 0
BOWEL INCONTINENCE: 0
BACK PAIN: 1

## 2021-09-02 ASSESSMENT — PAIN SCALES - GENERAL: PAINLEVEL_OUTOF10: 8

## 2021-09-02 ASSESSMENT — PAIN DESCRIPTION - PAIN TYPE: TYPE: CHRONIC PAIN

## 2021-09-02 NOTE — PROGRESS NOTES
Clinic Documentation      Education Provided:  [x] Review of Gilles Boggs  [] Agreement Review  [x] PEG Score Calculated [] PHQ Score Calculated [] ORT Score Calculated    [] Compliance Issues Discussed [] Cognitive Behavior Needs [x] Exercise [] Review of Test [] Financial Issues  [x] Tobacco/Alcohol Use Reviewed [x] Teaching [] New Patient [] Picture Obtained    Physician Plan:  [] Outgoing Referral  [] Pharmacy Consult  [] Test Ordered [x] Prescription Ordered/Changed   [] Obtained Test Results / Consult Notes        Complete if patient is withholding blood thinner for procedure     Blood Thinner Patient is currently taking:      [] Plavix (Hold for 7 days)  [] Aspirin (Hold for 5 days)     [] Pletal (Hold for 2 days)  [] Pradaxa (Hold for 3 days)    [] Effient (Hold for 7 days)  [] Xarelto (Hold for 2 days)    [] Eliquis (Hold for 2 days)  [] Brilinta (Hold for 7 days)    [] Coumadin (Hold for 5 days) - (INR needs to be drawn the day prior to procedure- INR < 2.0)    [] Aggrenox (Hold for 7 days)        [] Patient will stop medication on their own.    [] Blood Thinner Form Faxed for approval to hold.    Provider form faxed to:   Assessment Completed by:  Electronically signed by Jan Virk on 9/2/2021 at 11:57 AM

## 2021-09-02 NOTE — PROGRESS NOTES
[facility]    Office Visit    Patient Name: Vinicio Zavala    MR #: [id number]    Account [de-identified]    : 1965    Age: 49 y.o. Sex: male    Date: 2021    PCP: Florencio Quiñonez MD    Chief Complaint:   Chief Complaint   Patient presents with    Lower Back Pain       History of Present Illness: The patient is a 49 y.o. male who presents for imaging follow up and procedure follow up. Patient had MRI of Lumbar spine on 2021. Degenerative disc disease noted at L2-L3 and L5-S1 with loss of hydration and disc height. L2-L3 has a annular fissure with broad-based left lateral protrusion of disc moderate left-sided foraminal narrowing present L3-L4 moderate facet and ligamentous hypertrophy with mild broad-based disc bulge mild central canal stenosis and left-sided foraminal narrowing L4-L5 moderate facet ligamentous hypertrophy mild broad-based disc bulge and mild central spinal stenosis mild bilateral neural narrowing L5-S1 central disc protrusion with no evidence of central stenosis mild facet ligamentous hypertrophy    Scheduled for LESI of L4-L5 on 2021. Patient had at least 85% relief of pain from procedure(s) for at least 6 weeks and was able to increase activity after procedure. Patient received enough pain relief from injections that the patient would like to repeat the injection(s). Back Pain  This is a recurrent problem. The current episode started more than 1 year ago. The problem occurs constantly. The problem has been gradually worsening since onset. The pain is present in the lumbar spine and sacro-iliac. The quality of the pain is described as aching, burning and stabbing. Radiates to: right low back into buttocks posteriorly down leg to knee/calf area. Exacerbated by: general activity pain intermittently flares. Associated symptoms include leg pain. Pertinent negatives include no bladder incontinence, bowel incontinence, numbness or weakness.  Frequency of Social Gatherings with Friends and Family:     Attends Orthodox Services:     Active Member of Clubs or Organizations:     Attends Club or Organization Meetings:     Marital Status:    Intimate Partner Violence:     Fear of Current or Ex-Partner:     Emotionally Abused:     Physically Abused:     Sexually Abused:          Family History  family history includes Brain Cancer in his mother; Cancer in his mother; Heart Attack in his father; Kidney Cancer in his mother; Lysle Hashimoto in his mother. Review of Systems:  Review of Systems   Constitutional: Negative for activity change. Gastrointestinal: Negative for bowel incontinence and constipation. Genitourinary: Negative for bladder incontinence. Musculoskeletal: Positive for arthralgias, back pain and myalgias. Negative for joint swelling, neck pain and neck stiffness. Neurological: Negative for weakness and numbness. Psychiatric/Behavioral: Positive for sleep disturbance (occasionally). Negative for agitation, self-injury and suicidal ideas. The patient is not nervous/anxious. 14 point ROS negative besides that noted in HPI    Physical exam:     Vitals:    09/02/21 1049   BP: (!) 135/90   Pulse: 78   Temp: 97 °F (36.1 °C)   TempSrc: Temporal   SpO2: 95%   Weight: 243 lb (110.2 kg)   Height: 6' 1\" (1.854 m)       Body mass index is 32.06 kg/m². Physical Exam  Vitals and nursing note reviewed. Constitutional:       General: He is not in acute distress. Appearance: He is well-developed. HENT:      Head: Normocephalic. Right Ear: External ear normal.      Left Ear: External ear normal.      Nose: Nose normal.   Eyes:      Conjunctiva/sclera: Conjunctivae normal.      Pupils: Pupils are equal, round, and reactive to light. Neck:      Vascular: No JVD. Trachea: No tracheal deviation. Cardiovascular:      Rate and Rhythm: Normal rate.    Pulmonary:      Effort: Pulmonary effort is normal.   Abdominal: General: There is no distension. Tenderness: There is no abdominal tenderness. Musculoskeletal:      Lumbar back: Spasms (tender palpable knots ie trigger points identified) and tenderness present. Positive right straight leg raise test. Negative left straight leg raise test.      Comments: No foot drop  No clonus  SI exam negative  Piriformis negative     Skin:     General: Skin is warm and dry. Neurological:      Mental Status: He is alert and oriented to person, place, and time. Sensory: No sensory deficit. Psychiatric:         Behavior: Behavior normal.         Thought Content: Thought content normal.         Judgment: Judgment normal.         Labs:     Lab Results   Component Value Date     02/23/2021    K 4.4 02/23/2021    K 3.6 02/22/2021     02/23/2021    CO2 29 02/23/2021    BUN 11 02/23/2021    CREATININE 0.9 02/23/2021    GLUCOSE 73 02/23/2021    CALCIUM 8.8 02/23/2021        Lab Results   Component Value Date    WBC 4.9 02/23/2021    HGB 15.5 02/23/2021    HCT 45.7 02/23/2021    .4 (H) 02/23/2021     02/23/2021       Assessment:                                                                                                                                        Active Problems:    Late effect of cerebrovascular accident (CVA)    Lumbar radiculopathy    Back pain with history of spinal surgery    Chronic right-sided low back pain with right-sided sciatica    Pain medication agreement  Resolved Problems:    * No resolved hospital problems. *      PLAN:  Chronic right-sided low back pain with right-sided sciatica  Back pain with history of spinal surgery  Lumbar radiculopathy    1. Back pain with history of spinal surgery  - HYDROcodone-acetaminophen (NORCO)  MG per tablet; Take 1 tablet by mouth every 8 hours as needed for Pain for up to 30 days. Intended supply: 30 days  Dispense: 60 tablet;  Refill: 0  - diclofenac (FLECTOR) 1.3 % PTCH patch; Place 1 patch onto the skin 2 times daily  Dispense: 60 patch; Refill: 5    2. Chronic right-sided low back pain with right-sided sciatica  - gabapentin (NEURONTIN) 300 MG capsule; Take 1 capsule by mouth 3 times daily for 60 days. Dispense: 90 capsule; Refill: 1  - gabapentin (NEURONTIN) 100 MG capsule; 100 mg TID for 7 days then 200 mg TID for 7 days then 300 mg TID for 7 days. Dispense: 126 capsule; Refill: 0      Schedule bilateral thoracic and lumbar trigger point injections for myofascial pain with NP     Repeat LESI of L4-L5 under fluoroscopy with Dr. Iwona Evangeilsta as patient had good results from previous injection. Patient on Plavix and aspirin. Received authorization from PCP for patient to hold medication. Starting gabapentin to help with radicular symptoms. Her patches for low back pain. Discussion: Discussed exam findings and plan of care with patient. Patient agreed with POC and questions were asked and answered. Activity: discussed exercise as beneficial to pain reduction, encouraged stretching exercise with a focus on torso strengthening. Goal:  Pain Management Goals of Therapy:   [] Resolution in pain  [x] Decrease in pain level  [x] Improvement in ADL's  [x] Increase in activities with less pain  [] Decrease in medication     Controlled substance monitoring:    [x] Discussed medication side effects, risk of tolerance and/or dependence, and/or alternative treatment  [] Discussed the detrimental effects of long term narcotic use in younger patients especially women of childbearing years.   [x] No signs and symptoms of potential drug abuse or diversion were identified  [] Signs of potential drug abuse or diversion were identified   [] ORT Score   [] UDS non-compliant   [] See Note  [x] Random urine drug screen sent today  [] Random urine drug screen not completed today   [] Deferred New Patient  [] Compliant   [] Not Compliant see note  [] Medication agreement with provider signed today  [x] Medication agreement with provider on file under media   [] Medication regimen effective with no c/o side effects and continued   [] New patient continuing current medication while developing POC   [x] On going assessment and evaluation of medication regimen  [] Medication regimen not effective see plan for changes  [x] Herb Liu reviewed & on file under media     CC:  MD Coreen Mchugh, APRN - CNP, 9/12/2021 at 6:37 PM    EMR dragon/transcription disclaimer: Much of this encounter note is electronic transcription/translation of spoken language to printed tach. Electronic translation of spoken language may be erroneous, or at times, nonsensical words or phrases may be inadvertently transcribed.  Although, I have reviewed the note for such errors, some may still exist.

## 2021-09-12 PROBLEM — I69.30 LATE EFFECT OF CEREBROVASCULAR ACCIDENT (CVA): Status: ACTIVE | Noted: 2021-02-22

## 2021-09-15 ENCOUNTER — HOSPITAL ENCOUNTER (OUTPATIENT)
Dept: PAIN MANAGEMENT | Age: 56
Discharge: HOME | End: 2021-09-15
Payer: COMMERCIAL

## 2021-09-15 VITALS
RESPIRATION RATE: 20 BRPM | TEMPERATURE: 98 F | SYSTOLIC BLOOD PRESSURE: 141 MMHG | HEART RATE: 90 BPM | DIASTOLIC BLOOD PRESSURE: 88 MMHG | OXYGEN SATURATION: 96 %

## 2021-09-15 DIAGNOSIS — M62.830 SPASM OF THORACIC BACK MUSCLE: ICD-10-CM

## 2021-09-15 DIAGNOSIS — M62.830 MUSCLE SPASM OF BACK: ICD-10-CM

## 2021-09-15 PROCEDURE — 20553 NJX 1/MLT TRIGGER POINTS 3/>: CPT | Performed by: NURSE PRACTITIONER

## 2021-09-15 PROCEDURE — 20553 NJX 1/MLT TRIGGER POINTS 3/>: CPT

## 2021-09-15 PROCEDURE — 2500000003 HC RX 250 WO HCPCS

## 2021-09-15 RX ORDER — BUPIVACAINE HYDROCHLORIDE 5 MG/ML
15 INJECTION, SOLUTION EPIDURAL; INTRACAUDAL ONCE
Status: DISCONTINUED | OUTPATIENT
Start: 2021-09-15 | End: 2021-09-17 | Stop reason: HOSPADM

## 2021-09-15 RX ORDER — LIDOCAINE HYDROCHLORIDE 10 MG/ML
15 INJECTION, SOLUTION EPIDURAL; INFILTRATION; INTRACAUDAL; PERINEURAL ONCE
Status: DISCONTINUED | OUTPATIENT
Start: 2021-09-15 | End: 2021-09-17 | Stop reason: HOSPADM

## 2021-09-15 NOTE — PROCEDURES
[facility]    Patient Name: Daisy Walker    : 1965                    Age: 49 y.o. Sex: male    Date: September 15, 2021    Pre-op Diagnosis: Myofascial Pain/ Muscle Spasms    Post-op Diagnosis: Myofascial Pain/ Muscle Spasms    Procedure: Thoracic Trigger Point Injections    Performing Procedure: OMNTY Fraser - BC, VA-BC    Patient Vitals for the past 24 hrs:   BP Temp Temp src Pulse Resp SpO2   09/15/21 1254 (!) 141/88 98 °F (36.7 °C) Temporal 90 20 96 %       Description of Procedure:  After a brief physical assessment and failure to improve with conservative measures the patient presented for Thoracic Trigger Point Injections The indications, limitations and possible complications were discussed with the patient and the patient elected to proceed with the procedure. After voluntary, informed and signed consent obtained the patient was placed in a seated position. Appropriate time out was obtained per policy. The areas were then prepped in a sterile fashion with Chloro-Prep. The area of maximal tenderness was palpated over the bilaterally Thoracic Muscles - Erector Spinae, Upper/Mid Latissimus, Rhomboid Minor, Rhomboid Major. The skin overlying these areas was marked with a skin marker. The areas were prepped using aseptic technique with CHG prep. The skin overlying the proposed injection sites were then sprayed with Gebauer's Solution while protecting patient eyes.  Each trigger point of the Thoracic Muscles - Erector Spinae, Upper/Mid Latissimus, Rhomboid Minor, Rhomboid Major was injected after negative aspiration was injected with approximately 1-2 ml of a solution of 5 ml of 1% Lidocaine Plain and 5 ml of 0.5% Marcaine Plain after negative aspiration    Pre-op Diagnosis: Myofascial Pain/ Muscle Spasms    Post-op Diagnosis: Myofascial Pain/ Muscle Spasms    Procedure: Lumbar Trigger Point Injections    Performing Procedure: Andrez Magana ACAGNP - BC; VA-BC    Patient Vitals for the past 24 hrs:   BP Temp Temp src Pulse Resp SpO2   09/15/21 1254 (!) 141/88 98 °F (36.7 °C) Temporal 90 20 96 %       Description of Procedure:    After a brief physical assessment and failure to improve with conservative measures the patient presented for Lumbar Trigger Point Injections The indications, limitations and possible complications were discussed with the patient and the patient elected to proceed with the procedure. After voluntary, informed and signed consent obtained the patient was placed in a seated position. Appropriate time out was obtained per policy. The area of maximal tenderness was palpated over the bilaterally Lumbar Muscles - Lower Latissimus,  Erector Spinae, Lumbar Paraspinous. The skin overlying these areas was marked with a skin marker. The areas were prepped using aseptic technique with CHG prep. The skin overlying the proposed injection sites were then sprayed with Gebauer's Solution while protecting patient eyes. Each trigger point of the Lumbar Muscles - Lower Latissimus,  Erector Spinae, Lumbar Paraspinous was injected after negative aspiration was injected with approximately 1-2 ml of a solution of 5 ml of 1% Lidocaine Plain and 5 ml of 0.5% Marcaine Plain after negative aspiration    Discharge: The patient tolerated the procedure well. There were no complications during the procedure and the patient was discharged home with discharge instructions. The patient has been instructed to contact the office should there be any complications or questions to arise between today and their next appointment. Plan:     Will return to the office in 6 weeks for follow up    SUZANNE Espinosa CNP, 9/15/2021 at 1:37 PM never

## 2021-10-12 ENCOUNTER — OFFICE VISIT (OUTPATIENT)
Dept: CARDIOLOGY CLINIC | Age: 56
End: 2021-10-12
Payer: COMMERCIAL

## 2021-10-12 VITALS
BODY MASS INDEX: 32.07 KG/M2 | HEART RATE: 92 BPM | OXYGEN SATURATION: 98 % | DIASTOLIC BLOOD PRESSURE: 82 MMHG | WEIGHT: 242 LBS | HEIGHT: 73 IN | SYSTOLIC BLOOD PRESSURE: 138 MMHG

## 2021-10-12 DIAGNOSIS — I10 ESSENTIAL HYPERTENSION: Primary | ICD-10-CM

## 2021-10-12 DIAGNOSIS — E78.5 HYPERLIPIDEMIA, UNSPECIFIED HYPERLIPIDEMIA TYPE: ICD-10-CM

## 2021-10-12 PROCEDURE — 4004F PT TOBACCO SCREEN RCVD TLK: CPT | Performed by: NURSE PRACTITIONER

## 2021-10-12 PROCEDURE — G8417 CALC BMI ABV UP PARAM F/U: HCPCS | Performed by: NURSE PRACTITIONER

## 2021-10-12 PROCEDURE — G8484 FLU IMMUNIZE NO ADMIN: HCPCS | Performed by: NURSE PRACTITIONER

## 2021-10-12 PROCEDURE — G8427 DOCREV CUR MEDS BY ELIG CLIN: HCPCS | Performed by: NURSE PRACTITIONER

## 2021-10-12 PROCEDURE — 3017F COLORECTAL CA SCREEN DOC REV: CPT | Performed by: NURSE PRACTITIONER

## 2021-10-12 PROCEDURE — 99214 OFFICE O/P EST MOD 30 MIN: CPT | Performed by: NURSE PRACTITIONER

## 2021-10-12 RX ORDER — HYDROCODONE BITARTRATE AND ACETAMINOPHEN 10; 325 MG/1; MG/1
1 TABLET ORAL EVERY 6 HOURS PRN
COMMUNITY
End: 2021-10-21 | Stop reason: DRUGHIGH

## 2021-10-12 ASSESSMENT — ENCOUNTER SYMPTOMS
SORE THROAT: 0
SHORTNESS OF BREATH: 0
COUGH: 0
CHEST TIGHTNESS: 0
WHEEZING: 0

## 2021-10-12 NOTE — PROGRESS NOTES
[id number] [facility] Cardiology   Established Patient Office Visit   [facility]. [address]  [phone]        OFFICE VISIT:  10/12/2021    Elba Garcia TASHIII - : 1965    Reason For Visit:  Jesu Simpson is a 49 y.o. male who is here for 1 Year Follow Up (No Cardiac Sx ) and Hypertension    1. Essential hypertension    2. Hyperlipidemia, unspecified hyperlipidemia type         Patient with a history of hypertension, hyperlipidemia and CVA. Presents to clinic today for routine follow-up. Patient denies any complaints of chest pain, pressure or tightness. There is no shortness of breath, orthopnea or PND. Patient denies any lightheadedness, dizziness or syncope.        [facility]:     [facility]:    Normal ejection fraction normal perfusion study no evidence of    ischemia or infarction. Signed by Dr Yusef Washington on 2020 4:26 PM         6/10/2020 2D echo      Summary   Normal left ventricular size and function.   Mild concentric left ventricular hypertrophy.   Left ventricular ejection fraction is estimated at 60%.    Trace mitral regurg.      Signature      ----------------------------------------------------------------   Electronically signed by Masha Guzman MD(Interpreting   physician) on 2020 08:31 PM   ----------------------------------------------------------------       Sidney Will is a 49 y.o. male with the following history as recorded in [employer]:    Patient Active Problem List    Diagnosis Date Noted    Other chest pain 2020    Muscle spasm of back 09/15/2021    Spasm of thoracic back muscle 09/15/2021    Pain medication agreement 2021    Lumbar radiculopathy 2021    Back pain with history of spinal surgery 2021    Chronic right-sided low back pain with right-sided sciatica 2021    Stroke-like symptoms 2021    Chronic cerebrovascular accident (CVA) 2021    Late effect of cerebrovascular accident (CVA) 02/22/2021    Alcohol use with withdrawal ([facility] [address] 02/22/2021    Tobacco abuse counseling 02/22/2021    Cigarette nicotine dependence with nicotine-induced disorder 02/22/2021    Personal history of noncompliance with medical treatment and regimen 02/22/2021    Left carpal tunnel syndrome 12/03/2020    Right carpal tunnel syndrome 10/22/2020     Current Outpatient Medications   Medication Sig Dispense Refill    HYDROcodone-acetaminophen (NORCO)  MG per tablet Take 1 tablet by mouth every 6 hours as needed for Pain.  gabapentin (NEURONTIN) 100 MG capsule 100 mg TID for 7 days then 200 mg TID for 7 days then 300 mg TID for 7 days. (Patient taking differently: 300 mg 3 times daily. ) 126 capsule 0    amLODIPine (NORVASC) 5 MG tablet Take 5 mg by mouth daily      escitalopram (LEXAPRO) 20 MG tablet Take 20 mg by mouth daily      aspirin 81 MG tablet Take 81 mg by mouth daily      Melatonin 10 MG CAPS Take by mouth nightly as needed       clopidogrel (PLAVIX) 75 MG tablet Take 75 mg by mouth daily       Acetaminophen (TYLENOL 8 HOUR PO) Take 500 mg by mouth as needed      atorvastatin (LIPITOR) 40 MG tablet Take 40 mg by mouth daily      Butalbital-Acetaminophen  MG CAPS Take 1 tablet by mouth 2 times daily as needed       ALPRAZolam (XANAX) 0.25 MG tablet Take 0.5 mg by mouth nightly as needed for Sleep.  nitroGLYCERIN (NITROSTAT) 0.4 MG SL tablet Place 1 tablet under the tongue every 5 minutes as needed for Chest pain up to max of 3 total doses. If no relief after 1 dose, call 911. 25 tablet 3    ketorolac (TORADOL) 10 MG tablet Take 1 tablet by mouth every 6 hours as needed for Pain (Patient not taking: Reported on 10/12/2021) 16 tablet 0     No current facility-administered medications for this visit.      Allergies: Penicillins  Past Medical History:   Diagnosis Date    Anxiety     Diverticulitis     Hx of blood clots     Hypertension     Pneumonia     Stroke (cerebrum) ([facility] [address]     2019 and 2014     Past Surgical History:   Procedure Laterality Date    BACK SURGERY  2013    CARPAL TUNNEL RELEASE Right 10/22/2020    RIGHT OPEN CARPAL TUNNEL RELEASE performed by Pepe Perez MD at [address] Left 12/3/2020    LEFT OPEN CARPAL TUNNEL RELEASE performed by Pepe Perez MD at [address] [place]      TONSILLECTOMY       Family History   Problem Relation Age of Onset    Cancer Mother     Lung Cancer Mother     Kidney Cancer Mother     Brain Cancer Mother     Heart Attack Father      Social History     Tobacco Use    Smoking status: Current Some Day Smoker     Packs/day: 1.00     Years: 40.00     Pack years: 40.00     Types: Cigarettes    Smokeless tobacco: Never Used    Tobacco comment: Cutting down   Substance Use Topics    Alcohol use: Yes     Alcohol/week: 7.0 standard drinks     Types: 7 Cans of beer per week     Comment: 1 0R 2 TIMES A WEEK          Review of Systems:    Review of Systems   Constitutional: Negative for activity change, chills, diaphoresis, fatigue and fever. HENT: Negative for congestion and sore throat. Respiratory: Negative for cough, chest tightness, shortness of breath and wheezing. Cardiovascular: Negative for chest pain, palpitations and leg swelling. Neurological: Negative for dizziness, syncope, light-headedness and headaches. Psychiatric/Behavioral: Negative for confusion. The patient is not nervous/anxious.          Objective:    /82   Pulse 92   Ht 6' 1\" (1.854 m)   Wt 242 lb (109.8 kg)   SpO2 98%   BMI 31.93 kg/m²     GENERAL - well developed and well nourished, conversant  HEENT   PERRLA, Hearing appears normal, gentleman lids are normal.  External inspection of ears and nose appear normal.  NECK - no thyromegaly, no JVD, trachea is in the midline  CARDIOVASCULAR  PMI is in the mid line clavicular position, Normal S1 and S2 with no systolic murmur. No S3 or S4    PULMONARY  no respiratory distress. No wheezes or rales. Lungs are clear to ausculation, normal respiratory effort. ABDOMEN   soft, non tender, no rebound  MUSCULOSKELETAL   range of motion of the upper and lower extermites appears normal and equal and is without pain   EXTREMITIES - no significant edema   NEUROLOGIC  gait and station are normal  SKIN - turgor is normal, can warm and dry. PSYCHIATRIC - normal mood and affect, alert and orientated x 3,      ASSESSMENT:    ALL THE CARDIOLOGY PROBLEMS ARE LISTED ABOVE; HOWEVER, THE FOLLOWING SPECIFIC CARDIAC PROBLEMS / CONDITIONS WERE ADDRESSED AND TREATED DURING THE OFFICE VISIT TODAY:                                                                                            MEDICAL DECISION MAKING             Cardiac Specific Problem / Diagnosis  Discussion and Data Reviewed Diagnostic Procedures Ordered Management Options Selected           1. Hypertension   Stable   Review and summation of old records:    Pressure in the office today 138/82. O2 sat 98%. Patient is on Norvasc 5 mg daily. No Continue current medications:     Yes           2. Hyperlipidemia  Stable   Patient is on Lipitor 40 mg daily. No Continue current medications: Yes                                     No orders of the defined types were placed in this encounter. No orders of the defined types were placed in this encounter. Discussed with patient. Return in about 1 year (around 10/12/2022) for Yearly with me . I greatly appreciate the opportunity to meet Adriano Masters and your confidence in allowing me to participate in his cardiovascular care. SUZANNE Sánchez NP  10/12/2021 1:17 PM CDT                    This dictation was generated by voice recognition computer software. Although all attempts are made to edit dictation for accuracy, there may be errors in the transcription that are not intended.

## 2021-10-21 ENCOUNTER — HOSPITAL ENCOUNTER (OUTPATIENT)
Dept: PAIN MANAGEMENT | Age: 56
Discharge: HOME | End: 2021-10-21
Payer: COMMERCIAL

## 2021-10-21 ENCOUNTER — TRANSCRIBE ORDERS (OUTPATIENT)
Dept: ADMINISTRATIVE | Facility: HOSPITAL | Age: 56
End: 2021-10-21

## 2021-10-21 VITALS — HEIGHT: 73 IN | BODY MASS INDEX: 33.4 KG/M2 | WEIGHT: 252 LBS | TEMPERATURE: 97 F | OXYGEN SATURATION: 97 %

## 2021-10-21 DIAGNOSIS — M54.16 LUMBAR RADICULOPATHY: ICD-10-CM

## 2021-10-21 DIAGNOSIS — Z98.890 BACK PAIN WITH HISTORY OF SPINAL SURGERY: ICD-10-CM

## 2021-10-21 DIAGNOSIS — M25.511 CHRONIC RIGHT SHOULDER PAIN: Primary | ICD-10-CM

## 2021-10-21 DIAGNOSIS — G89.29 CHRONIC RIGHT SHOULDER PAIN: Primary | ICD-10-CM

## 2021-10-21 DIAGNOSIS — G89.29 CHRONIC RIGHT-SIDED LOW BACK PAIN WITH RIGHT-SIDED SCIATICA: Primary | ICD-10-CM

## 2021-10-21 DIAGNOSIS — M54.41 CHRONIC RIGHT-SIDED LOW BACK PAIN WITH RIGHT-SIDED SCIATICA: Primary | ICD-10-CM

## 2021-10-21 DIAGNOSIS — M54.9 BACK PAIN WITH HISTORY OF SPINAL SURGERY: ICD-10-CM

## 2021-10-21 PROCEDURE — 99214 OFFICE O/P EST MOD 30 MIN: CPT | Performed by: NURSE PRACTITIONER

## 2021-10-21 PROCEDURE — 99213 OFFICE O/P EST LOW 20 MIN: CPT

## 2021-10-21 RX ORDER — GABAPENTIN 400 MG/1
400 CAPSULE ORAL 3 TIMES DAILY
Qty: 90 CAPSULE | Refills: 2 | Status: SHIPPED | OUTPATIENT
Start: 2021-10-21 | End: 2022-02-07 | Stop reason: SDUPTHER

## 2021-10-21 RX ORDER — HYDROCODONE BITARTRATE AND ACETAMINOPHEN 10; 325 MG/1; MG/1
1 TABLET ORAL EVERY 6 HOURS PRN
Qty: 90 TABLET | Refills: 0 | Status: SHIPPED | OUTPATIENT
Start: 2021-10-21 | End: 2021-12-15 | Stop reason: SDUPTHER

## 2021-10-21 ASSESSMENT — PAIN DESCRIPTION - LOCATION: LOCATION: BACK

## 2021-10-21 ASSESSMENT — ENCOUNTER SYMPTOMS
BACK PAIN: 1
CONSTIPATION: 0
BOWEL INCONTINENCE: 0

## 2021-10-21 ASSESSMENT — PAIN DESCRIPTION - PAIN TYPE: TYPE: CHRONIC PAIN

## 2021-10-21 ASSESSMENT — PAIN SCALES - GENERAL: PAINLEVEL_OUTOF10: 10

## 2021-10-21 NOTE — LETTER
[facility]  [address]  [address]  Phone: [phone]  Fax: [phone]    SUZANNE Mendosa CNP        October 21, 2021     Patient: Robert Hutchinson   YOB: 1965   Date of Visit: 10/21/2021       To Whom It May Concern:     Saleem Alexis has been under my care since June of 2021. For the following diagnosis Chronic right-sided low back pain with right-sided sciatica, Lumbar radiculopathy, Back pain with history of spinal surgery,  Muscle spasm of back, Spasm of thoracic back muscle. Patient's treatments include injections and medications to help keep pain at a tolerable level. If you have any questions or concerns, please don't hesitate to call.      .      Sincerely,        SUZANNE Mendosa CNP

## 2021-10-21 NOTE — PROGRESS NOTES
[facility] Physical & Pain Medicine    Office Visit    Patient Name: Froylan Gaines    MR #: [id number]    Account [de-identified]    : 1965    Age: 49 y.o. Sex: male    Date: 10/21/2021    PCP: Preston Govea MD    Chief Complaint:   Chief Complaint   Patient presents with    Back Pain    Carpal Tunnel     bilateral       History of Present Illness: The patient is a 49 y.o. male who presents for procedure follow-up. Patient had LESI of L4-L5 on 2021. Bilateral thoracic and lumbar trigger points on 9/15/2021. Patient had at least 85% relief of pain from procedure(s) for at least 6 weeks and was able to increase activity after procedure. Patient received enough pain relief from injections that the patient would like to repeat the injection(s). Back Pain  This is a recurrent problem. The current episode started more than 1 year ago. The problem occurs constantly. The problem has been gradually worsening since onset. The pain is present in the lumbar spine and sacro-iliac. The quality of the pain is described as aching, burning and stabbing. Radiates to: right low back into buttocks posteriorly down leg to knee/calf area. Exacerbated by: general activity pain intermittently flares. Associated symptoms include leg pain. Pertinent negatives include no bladder incontinence, bowel incontinence, numbness or weakness. Screening Tools:     PEG: 10    Past PE    ORT: 0    PHQ-9: 3    Current Pain Assessment  Pain Assessment  Pain Assessment: 0-10  Pain Level: 10  Patient's Stated Pain Goal: 3  Pain Type: Chronic pain  Pain Location: Back    Past Visit HPI:   2021  presents for imaging follow up and procedure follow up. Patient had MRI of Lumbar spine on 2021. Degenerative disc disease noted at L2-L3 and L5-S1 with loss of hydration and disc height.  L2-L3 has a annular fissure with broad-based left lateral protrusion of disc moderate left-sided foraminal narrowing present L3-L4 moderate facet and ligamentous hypertrophy with mild broad-based disc bulge mild central canal stenosis and left-sided foraminal narrowing L4-L5 moderate facet ligamentous hypertrophy mild broad-based disc bulge and mild central spinal stenosis mild bilateral neural narrowing L5-S1 central disc protrusion with no evidence of central stenosis mild facet ligamentous hypertrophy    Scheduled for LESI of L4-L5 on 7/20/2021. Patient had at least 85% relief of pain from procedure(s) for at least 6 weeks and was able to increase activity after procedure. Patient received enough pain relief from injections that the patient would like to repeat the injection(s). 6/17/2021  Eight years ago, He had discectomy of left side. He did really good until now. He did have injections under fluoroscopy that did help but over time stopped. That's when he underwent surgery. Patient started workup for his low back. He has xray in Jan which indicated DDD and started PT. However, patient did have a stroke this past spring. Patient has exhausted his therapy benefits. Employment: manual labor    Past Medical History  Past Medical History:   Diagnosis Date    Anxiety     Diverticulitis     Hx of blood clots     Hypertension     Pneumonia     Stroke (cerebrum) ([facility] [address]     2019 and 2014       Allergies  Penicillins    Current Medications  Current Outpatient Medications   Medication Sig Dispense Refill    HYDROcodone-acetaminophen (NORCO)  MG per tablet Take 1 tablet by mouth every 6 hours as needed for Pain.  gabapentin (NEURONTIN) 100 MG capsule 100 mg TID for 7 days then 200 mg TID for 7 days then 300 mg TID for 7 days.  (Patient taking differently: 300 mg 3 times daily. ) 126 capsule 0    ketorolac (TORADOL) 10 MG tablet Take 1 tablet by mouth every 6 hours as needed for Pain (Patient not taking: Reported on 10/12/2021) 16 tablet 0    amLODIPine (NORVASC) 5 MG tablet Take 5 mg by mouth daily      escitalopram (LEXAPRO) 20 MG tablet Take 20 mg by mouth daily      aspirin 81 MG tablet Take 81 mg by mouth daily      Melatonin 10 MG CAPS Take by mouth nightly as needed       clopidogrel (PLAVIX) 75 MG tablet Take 75 mg by mouth daily       Acetaminophen (TYLENOL 8 HOUR PO) Take 500 mg by mouth as needed      atorvastatin (LIPITOR) 40 MG tablet Take 40 mg by mouth daily      Butalbital-Acetaminophen  MG CAPS Take 1 tablet by mouth 2 times daily as needed       ALPRAZolam (XANAX) 0.25 MG tablet Take 0.5 mg by mouth nightly as needed for Sleep.  nitroGLYCERIN (NITROSTAT) 0.4 MG SL tablet Place 1 tablet under the tongue every 5 minutes as needed for Chest pain up to max of 3 total doses. If no relief after 1 dose, call 911. 25 tablet 3     No current facility-administered medications for this encounter. Social History    Social History     Socioeconomic History    Marital status:      Spouse name: None    Number of children: 0    Years of education: None    Highest education level: None   Occupational History    None   Tobacco Use    Smoking status: Current Some Day Smoker     Packs/day: 1.00     Years: 40.00     Pack years: 40.00     Types: Cigarettes    Smokeless tobacco: Never Used    Tobacco comment: Cutting down   Vaping Use    Vaping Use: Never used   Substance and Sexual Activity    Alcohol use: Yes     Alcohol/week: 7.0 standard drinks     Types: 7 Cans of beer per week     Comment: 1 0R 2 TIMES A WEEK    Drug use: Never    Sexual activity: Yes   Other Topics Concern    None   Social History Narrative    None     Social Determinants of Health     Financial Resource Strain:     Difficulty of Paying Living Expenses:    Food Insecurity:     Worried About Running Out of Food in the Last Year:     [address] in the Last Year:    Transportation Needs:     Lack of Transportation (Medical):      Lack of Transportation (Non-Medical):    Physical Activity:     Days of Exercise per Week:     Minutes of Exercise per Session:    Stress:     Feeling of Stress :    Social Connections:     Frequency of Communication with Friends and Family:     Frequency of Social Gatherings with Friends and Family:     Attends Shinto Services:     Active Member of Clubs or Organizations:     Attends Club or Organization Meetings:     Marital Status:    Intimate Partner Violence:     Fear of Current or Ex-Partner:     Emotionally Abused:     Physically Abused:     Sexually Abused:          Family History  family history includes Brain Cancer in his mother; Cancer in his mother; Heart Attack in his father; Kidney Cancer in his mother; Emily Pickup in his mother. Review of Systems:  Review of Systems   Constitutional: Negative for activity change. Gastrointestinal: Negative for bowel incontinence and constipation. Genitourinary: Negative for bladder incontinence. Musculoskeletal: Positive for arthralgias, back pain and myalgias. Negative for joint swelling, neck pain and neck stiffness. Neurological: Negative for weakness and numbness. Psychiatric/Behavioral: Positive for sleep disturbance (occasionally). Negative for agitation, self-injury and suicidal ideas. The patient is not nervous/anxious. 14 point ROS negative besides that noted in HPI    Physical exam:     Vitals:    10/21/21 1020   Temp: 97 °F (36.1 °C)   TempSrc: Temporal   SpO2: 97%   Weight: 252 lb (114.3 kg)   Height: 6' 1\" (1.854 m)       Body mass index is 33.25 kg/m². Physical Exam  Vitals and nursing note reviewed. Constitutional:       General: He is not in acute distress. Appearance: He is well-developed. HENT:      Head: Normocephalic. Right Ear: External ear normal.      Left Ear: External ear normal.      Nose: Nose normal.   Eyes:      Conjunctiva/sclera: Conjunctivae normal.      Pupils: Pupils are equal, round, and reactive to light. Neck:      Vascular: No JVD. Trachea: No tracheal deviation. Cardiovascular:      Rate and Rhythm: Normal rate. Pulmonary:      Effort: Pulmonary effort is normal.   Abdominal:      General: There is no distension. Tenderness: There is no abdominal tenderness. Musculoskeletal:      Lumbar back: Spasms (tender palpable knots ie trigger points identified) and tenderness present. Positive right straight leg raise test. Negative left straight leg raise test.      Comments: No foot drop  No clonus  SI exam negative  Piriformis negative     Skin:     General: Skin is warm and dry. Neurological:      Mental Status: He is alert and oriented to person, place, and time. Sensory: No sensory deficit. Psychiatric:         Behavior: Behavior normal.         Thought Content: Thought content normal.         Judgment: Judgment normal.         Labs:     Lab Results   Component Value Date     02/23/2021    K 4.4 02/23/2021    K 3.6 02/22/2021     02/23/2021    CO2 29 02/23/2021    BUN 11 02/23/2021    CREATININE 0.9 02/23/2021    GLUCOSE 73 02/23/2021    CALCIUM 8.8 02/23/2021        Lab Results   Component Value Date    WBC 4.9 02/23/2021    HGB 15.5 02/23/2021    HCT 45.7 02/23/2021    .4 (H) 02/23/2021     02/23/2021       Assessment:                                                                                                                                        Active Problems:    Lumbar radiculopathy    Back pain with history of spinal surgery    Chronic right-sided low back pain with right-sided sciatica    Pain medication agreement    Muscle spasm of back    Spasm of thoracic back muscle  Resolved Problems:    * No resolved hospital problems. *      PLAN:  Back pain with history of spinal surgery  Chronic right-sided low back pain with right-sided sciatica  - HYDROcodone-acetaminophen (NORCO)  MG per tablet; Take 1 tablet by mouth every 6 hours as needed for Pain for up to 30 days.  Intended supply: 30 days  Dispense: 90 tablet; Refill: 0      Lumbar radiculopathy  - gabapentin (NEURONTIN) 400 MG capsule; Take 1 capsule by mouth 3 times daily for 90 days. Dispense: 90 capsule; Refill: 2      Continue- diclofenac (FLECTOR) 1.3 % PTCH patch; Place 1 patch onto the skin 2 times daily  Dispense: 60 patch; Refill: 5    Increase in gabapentin due to continued radicular pain. Patient has noticed improvement with previous dose of gabapentin but is still having breakthrough radicular symptoms. Patient takes pain medication sparingly but he has been having worsening pain and has needed more than 2 a day on some days therefore will increase quantity to 90. Patient is to have surgery for bilateral carpal tunnel syndrome did discuss receiving medication from surgeon and notify in this office. If this was to take place. Repeat bilateral thoracic and lumbar trigger point injections for myofascial pain with NP     Hold off on LESI until after carpal tunnel surgery. LESI of L4-L5 under fluoroscopy with Dr. Rena Dunaway as patient had good results from previous injection. Patient on Plavix and aspirin. Received authorization from PCP for patient to hold medication. [x] Follow up    [] 4 weeks   [x] 6-8 weeks   [] 10-12 weeks   [] 3 months  [x] Post procedure to evaluate effectiveness of treatment  [] To evaluate medications changes made at office visit. [] To review diagnostics ordered at last visit. [] To evaluate response to therapy    [x] For management of controlled substance  [x] Random UDS as indicated by ORT score or if indicated by abberent behaviors    Discussion: Discussed exam findings and plan of care with patient. Patient agreed with POC and questions were asked and answered. Activity: discussed exercise as beneficial to pain reduction, encouraged stretching exercise with a focus on torso strengthening.     Goal:  Pain Management Goals of Therapy:   [] Resolution in pain  [x] Decrease in pain

## 2021-10-21 NOTE — PROGRESS NOTES
Clinic Documentation      Education Provided:  [x] Review of Dominique Hunting  [] Agreement Review  [x] PEG Score Calculated [] PHQ Score Calculated [] ORT Score Calculated    [] Compliance Issues Discussed [] Cognitive Behavior Needs [x] Exercise [] Review of Test [] Financial Issues  [x] Tobacco/Alcohol Use Reviewed [x] Teaching [] New Patient [] Picture Obtained    Physician Plan:  [] Outgoing Referral  [] Pharmacy Consult  [] Test Ordered [x] Prescription Ordered/Changed   [] Obtained Test Results / Consult Notes        Complete if patient is withholding blood thinner for procedure     Blood Thinner Patient is currently taking:      [] Plavix (Hold for 7 days)  [] Aspirin (Hold for 5 days)     [] Pletal (Hold for 2 days)  [] Pradaxa (Hold for 3 days)    [] Effient (Hold for 7 days)  [] Xarelto (Hold for 2 days)    [] Eliquis (Hold for 2 days)  [] Brilinta (Hold for 7 days)    [] Coumadin (Hold for 5 days) - (INR needs to be drawn the day prior to procedure- INR < 2.0)    [] Aggrenox (Hold for 7 days)        [] Patient will stop medication on their own.    [] Blood Thinner Form Faxed for approval to hold.    Provider form faxed to:  Assessment Completed by:  Electronically signed by Flaquita Olson on 10/21/2021 at 11:19 AM

## 2021-10-26 ENCOUNTER — HOSPITAL ENCOUNTER (OUTPATIENT)
Dept: PAIN MANAGEMENT | Age: 56
Discharge: HOME | End: 2021-10-26
Payer: COMMERCIAL

## 2021-10-26 VITALS
OXYGEN SATURATION: 99 % | RESPIRATION RATE: 18 BRPM | TEMPERATURE: 97.3 F | DIASTOLIC BLOOD PRESSURE: 93 MMHG | HEART RATE: 76 BPM | SYSTOLIC BLOOD PRESSURE: 144 MMHG

## 2021-10-26 DIAGNOSIS — R52 PAIN MANAGEMENT: ICD-10-CM

## 2021-10-26 PROCEDURE — 2500000003 HC RX 250 WO HCPCS

## 2021-10-26 PROCEDURE — 62323 NJX INTERLAMINAR LMBR/SAC: CPT

## 2021-10-26 PROCEDURE — 6360000002 HC RX W HCPCS

## 2021-10-26 PROCEDURE — 2580000003 HC RX 258

## 2021-10-26 PROCEDURE — 3209999900 FLUORO FOR SURGICAL PROCEDURES

## 2021-10-26 RX ORDER — SODIUM CHLORIDE 9 MG/ML
5 INJECTION INTRAVENOUS ONCE
Status: DISCONTINUED | OUTPATIENT
Start: 2021-10-26 | End: 2021-10-28 | Stop reason: HOSPADM

## 2021-10-26 RX ORDER — METHYLPREDNISOLONE ACETATE 80 MG/ML
80 INJECTION, SUSPENSION INTRA-ARTICULAR; INTRALESIONAL; INTRAMUSCULAR; SOFT TISSUE ONCE
Status: DISCONTINUED | OUTPATIENT
Start: 2021-10-26 | End: 2021-10-28 | Stop reason: HOSPADM

## 2021-10-26 RX ORDER — LIDOCAINE HYDROCHLORIDE 10 MG/ML
5 INJECTION, SOLUTION EPIDURAL; INFILTRATION; INTRACAUDAL; PERINEURAL ONCE
Status: DISCONTINUED | OUTPATIENT
Start: 2021-10-26 | End: 2021-10-28 | Stop reason: HOSPADM

## 2021-10-26 ASSESSMENT — PAIN - FUNCTIONAL ASSESSMENT
PAIN_FUNCTIONAL_ASSESSMENT: 0-10
PAIN_FUNCTIONAL_ASSESSMENT: PREVENTS OR INTERFERES SOME ACTIVE ACTIVITIES AND ADLS

## 2021-10-26 ASSESSMENT — PAIN SCALES - GENERAL: PAINLEVEL_OUTOF10: 10

## 2021-10-26 ASSESSMENT — PAIN DESCRIPTION - DESCRIPTORS: DESCRIPTORS: CONSTANT;ACHING;SHOOTING;SHARP

## 2021-10-26 ASSESSMENT — PAIN DESCRIPTION - LOCATION: LOCATION: BACK

## 2021-10-26 ASSESSMENT — PAIN DESCRIPTION - PAIN TYPE: TYPE: CHRONIC PAIN

## 2021-10-26 ASSESSMENT — PAIN DESCRIPTION - FREQUENCY: FREQUENCY: CONTINUOUS

## 2021-10-26 ASSESSMENT — PAIN DESCRIPTION - DIRECTION: RADIATING_TOWARDS: RADIATES TO RIGHT LEG

## 2021-10-26 ASSESSMENT — PAIN DESCRIPTION - ORIENTATION: ORIENTATION: LOWER

## 2021-10-26 NOTE — INTERVAL H&P NOTE
Update History & Physical    The patient's History and Physical  was reviewed with the patient and I examined the patient. There was  NO [id number]}. The surgical site was confirmed by the patient and me. Plan: The risks, benefits, expected outcome, and alternative to the recommended procedure have been discussed with the patient. Patient understands and wants to proceed with the procedure.      Electronically signed by Timothy Jerome MD on 10/26/2021 at 12:18 PM

## 2021-10-29 ENCOUNTER — HOSPITAL ENCOUNTER (OUTPATIENT)
Dept: NON INVASIVE DIAGNOSTICS | Age: 56
Discharge: HOME | End: 2021-10-29
Payer: COMMERCIAL

## 2021-10-29 ENCOUNTER — ANESTHESIA EVENT (OUTPATIENT)
Dept: OPERATING ROOM | Age: 56
End: 2021-10-29

## 2021-10-29 ENCOUNTER — HOSPITAL ENCOUNTER (OUTPATIENT)
Dept: LAB | Age: 56
Discharge: HOME | End: 2021-10-29
Payer: COMMERCIAL

## 2021-10-29 DIAGNOSIS — Z01.818 PRE-OP TESTING: ICD-10-CM

## 2021-10-29 LAB
ANION GAP SERPL CALCULATED.3IONS-SCNC: 10 MMOL/L (ref 7–19)
BASOPHILS ABSOLUTE: 0 K/UL (ref 0–0.2)
BASOPHILS RELATIVE PERCENT: 0.5 % (ref 0–1)
BUN BLDV-MCNC: 19 MG/DL (ref 6–20)
CALCIUM SERPL-MCNC: 9.4 MG/DL (ref 8.6–10)
CHLORIDE BLD-SCNC: 100 MMOL/L (ref 98–111)
CO2: 29 MMOL/L (ref 22–29)
CREAT SERPL-MCNC: 0.9 MG/DL (ref 0.5–1.2)
EKG P AXIS: 52 DEGREES
EKG P-R INTERVAL: 162 MS
EKG Q-T INTERVAL: 356 MS
EKG QRS DURATION: 88 MS
EKG QTC CALCULATION (BAZETT): 380 MS
EKG T AXIS: 48 DEGREES
EOSINOPHILS ABSOLUTE: 0.1 K/UL (ref 0–0.6)
EOSINOPHILS RELATIVE PERCENT: 1.1 % (ref 0–5)
GFR AFRICAN AMERICAN: >59
GFR NON-AFRICAN AMERICAN: >60
GLUCOSE BLD-MCNC: 92 MG/DL (ref 74–109)
HCT VFR BLD CALC: 50.8 % (ref 42–52)
HEMOGLOBIN: 16.7 G/DL (ref 14–18)
IMMATURE GRANULOCYTES #: 0 K/UL
LYMPHOCYTES ABSOLUTE: 2.6 K/UL (ref 1.1–4.5)
LYMPHOCYTES RELATIVE PERCENT: 35.3 % (ref 20–40)
MCH RBC QN AUTO: 34.2 PG (ref 27–31)
MCHC RBC AUTO-ENTMCNC: 32.9 G/DL (ref 33–37)
MCV RBC AUTO: 104.1 FL (ref 80–94)
MONOCYTES ABSOLUTE: 0.5 K/UL (ref 0–0.9)
MONOCYTES RELATIVE PERCENT: 7 % (ref 0–10)
NEUTROPHILS ABSOLUTE: 4.1 K/UL (ref 1.5–7.5)
NEUTROPHILS RELATIVE PERCENT: 55.7 % (ref 50–65)
PDW BLD-RTO: 13.3 % (ref 11.5–14.5)
PLATELET # BLD: 256 K/UL (ref 130–400)
PMV BLD AUTO: 8.3 FL (ref 9.4–12.4)
POTASSIUM SERPL-SCNC: 4.7 MMOL/L (ref 3.5–5)
RBC # BLD: 4.88 M/UL (ref 4.7–6.1)
SODIUM BLD-SCNC: 139 MMOL/L (ref 136–145)
WBC # BLD: 7.3 K/UL (ref 4.8–10.8)

## 2021-10-29 PROCEDURE — 93010 ELECTROCARDIOGRAM REPORT: CPT | Performed by: INTERNAL MEDICINE

## 2021-10-29 PROCEDURE — 93005 ELECTROCARDIOGRAM TRACING: CPT | Performed by: ANESTHESIOLOGY

## 2021-11-03 ENCOUNTER — HOSPITAL ENCOUNTER (OUTPATIENT)
Dept: MRI IMAGING | Facility: HOSPITAL | Age: 56
Discharge: HOME | End: 2021-11-03
Admitting: ORTHOPAEDIC SURGERY

## 2021-11-03 ENCOUNTER — OFFICE VISIT (OUTPATIENT)
Age: 56
End: 2021-11-03

## 2021-11-03 ENCOUNTER — HOSPITAL ENCOUNTER (OUTPATIENT)
Dept: PAIN MANAGEMENT | Age: 56
Discharge: HOME | End: 2021-11-03
Payer: COMMERCIAL

## 2021-11-03 ENCOUNTER — TELEPHONE (OUTPATIENT)
Dept: PAIN MANAGEMENT | Age: 56
End: 2021-11-03

## 2021-11-03 VITALS
SYSTOLIC BLOOD PRESSURE: 137 MMHG | TEMPERATURE: 97.1 F | OXYGEN SATURATION: 100 % | HEART RATE: 83 BPM | DIASTOLIC BLOOD PRESSURE: 98 MMHG | RESPIRATION RATE: 18 BRPM

## 2021-11-03 DIAGNOSIS — Z11.59 SCREENING FOR VIRAL DISEASE: Primary | ICD-10-CM

## 2021-11-03 LAB — SARS-COV-2, PCR: NOT DETECTED

## 2021-11-03 PROCEDURE — 99999 PR OFFICE/OUTPT VISIT,PROCEDURE ONLY: CPT | Performed by: NURSE PRACTITIONER

## 2021-11-03 PROCEDURE — 20553 NJX 1/MLT TRIGGER POINTS 3/>: CPT | Performed by: NURSE PRACTITIONER

## 2021-11-03 PROCEDURE — 73221 MRI JOINT UPR EXTREM W/O DYE: CPT

## 2021-11-03 PROCEDURE — 20553 NJX 1/MLT TRIGGER POINTS 3/>: CPT

## 2021-11-03 PROCEDURE — 2500000003 HC RX 250 WO HCPCS

## 2021-11-03 RX ORDER — BUPIVACAINE HYDROCHLORIDE 5 MG/ML
15 INJECTION, SOLUTION EPIDURAL; INTRACAUDAL ONCE
Status: DISCONTINUED | OUTPATIENT
Start: 2021-11-03 | End: 2021-11-05 | Stop reason: HOSPADM

## 2021-11-03 RX ORDER — LIDOCAINE HYDROCHLORIDE 10 MG/ML
15 INJECTION, SOLUTION EPIDURAL; INFILTRATION; INTRACAUDAL; PERINEURAL ONCE
Status: DISCONTINUED | OUTPATIENT
Start: 2021-11-03 | End: 2021-11-05 | Stop reason: HOSPADM

## 2021-11-03 NOTE — TELEPHONE ENCOUNTER
Spoke with pt concerning his injections he had on 10/27. Pt states he is doing well. He is 50% better at this time and feels we did get to the source of his pain. No further questions at this time.

## 2021-11-04 ENCOUNTER — HOSPITAL ENCOUNTER (OUTPATIENT)
Age: 56
Setting detail: OUTPATIENT SURGERY
Discharge: HOME | End: 2021-11-04
Attending: ORTHOPAEDIC SURGERY | Admitting: ORTHOPAEDIC SURGERY

## 2021-11-04 ENCOUNTER — ANESTHESIA (OUTPATIENT)
Dept: OPERATING ROOM | Age: 56
End: 2021-11-04

## 2021-11-04 VITALS
BODY MASS INDEX: 33.4 KG/M2 | HEIGHT: 73 IN | DIASTOLIC BLOOD PRESSURE: 90 MMHG | OXYGEN SATURATION: 95 % | RESPIRATION RATE: 16 BRPM | WEIGHT: 252 LBS | TEMPERATURE: 97.2 F | SYSTOLIC BLOOD PRESSURE: 149 MMHG | HEART RATE: 79 BPM

## 2021-11-04 VITALS
SYSTOLIC BLOOD PRESSURE: 145 MMHG | RESPIRATION RATE: 1 BRPM | OXYGEN SATURATION: 95 % | DIASTOLIC BLOOD PRESSURE: 88 MMHG

## 2021-11-04 DIAGNOSIS — Z01.818 PRE-OP TESTING: Primary | ICD-10-CM

## 2021-11-04 PROBLEM — G56.22 CUBITAL TUNNEL SYNDROME ON LEFT: Status: ACTIVE | Noted: 2021-11-04

## 2021-11-04 PROCEDURE — 64718 REVISE ULNAR NERVE AT ELBOW: CPT

## 2021-11-04 RX ORDER — LIDOCAINE HYDROCHLORIDE 10 MG/ML
INJECTION, SOLUTION EPIDURAL; INFILTRATION; INTRACAUDAL; PERINEURAL PRN
Status: DISCONTINUED | OUTPATIENT
Start: 2021-11-04 | End: 2021-11-04 | Stop reason: SDUPTHER

## 2021-11-04 RX ORDER — MEPERIDINE HYDROCHLORIDE 25 MG/ML
12.5 INJECTION INTRAMUSCULAR; INTRAVENOUS; SUBCUTANEOUS EVERY 5 MIN PRN
Status: DISCONTINUED | OUTPATIENT
Start: 2021-11-04 | End: 2021-11-04 | Stop reason: HOSPADM

## 2021-11-04 RX ORDER — LIDOCAINE HYDROCHLORIDE AND EPINEPHRINE 10; 10 MG/ML; UG/ML
INJECTION, SOLUTION INFILTRATION; PERINEURAL PRN
Status: DISCONTINUED | OUTPATIENT
Start: 2021-11-04 | End: 2021-11-04 | Stop reason: ALTCHOICE

## 2021-11-04 RX ORDER — LIDOCAINE HYDROCHLORIDE 10 MG/ML
1 INJECTION, SOLUTION EPIDURAL; INFILTRATION; INTRACAUDAL; PERINEURAL
Status: DISCONTINUED | OUTPATIENT
Start: 2021-11-04 | End: 2021-11-04 | Stop reason: HOSPADM

## 2021-11-04 RX ORDER — ONDANSETRON 2 MG/ML
INJECTION INTRAMUSCULAR; INTRAVENOUS PRN
Status: DISCONTINUED | OUTPATIENT
Start: 2021-11-04 | End: 2021-11-04 | Stop reason: SDUPTHER

## 2021-11-04 RX ORDER — HYDRALAZINE HYDROCHLORIDE 20 MG/ML
5 INJECTION INTRAMUSCULAR; INTRAVENOUS EVERY 10 MIN PRN
Status: DISCONTINUED | OUTPATIENT
Start: 2021-11-04 | End: 2021-11-04 | Stop reason: HOSPADM

## 2021-11-04 RX ORDER — LABETALOL HYDROCHLORIDE 5 MG/ML
5 INJECTION, SOLUTION INTRAVENOUS EVERY 10 MIN PRN
Status: DISCONTINUED | OUTPATIENT
Start: 2021-11-04 | End: 2021-11-04 | Stop reason: HOSPADM

## 2021-11-04 RX ORDER — FENTANYL CITRATE 50 UG/ML
INJECTION, SOLUTION INTRAMUSCULAR; INTRAVENOUS PRN
Status: DISCONTINUED | OUTPATIENT
Start: 2021-11-04 | End: 2021-11-04 | Stop reason: SDUPTHER

## 2021-11-04 RX ORDER — PROMETHAZINE HYDROCHLORIDE 25 MG/ML
6.25 INJECTION, SOLUTION INTRAMUSCULAR; INTRAVENOUS
Status: DISCONTINUED | OUTPATIENT
Start: 2021-11-04 | End: 2021-11-04 | Stop reason: HOSPADM

## 2021-11-04 RX ORDER — FENTANYL CITRATE 50 UG/ML
25 INJECTION, SOLUTION INTRAMUSCULAR; INTRAVENOUS EVERY 5 MIN PRN
Status: DISCONTINUED | OUTPATIENT
Start: 2021-11-04 | End: 2021-11-04 | Stop reason: HOSPADM

## 2021-11-04 RX ORDER — PROPOFOL 10 MG/ML
INJECTION, EMULSION INTRAVENOUS PRN
Status: DISCONTINUED | OUTPATIENT
Start: 2021-11-04 | End: 2021-11-04 | Stop reason: SDUPTHER

## 2021-11-04 RX ORDER — MORPHINE SULFATE 10 MG/ML
1 INJECTION, SOLUTION INTRAMUSCULAR; INTRAVENOUS EVERY 5 MIN PRN
Status: DISCONTINUED | OUTPATIENT
Start: 2021-11-04 | End: 2021-11-04 | Stop reason: HOSPADM

## 2021-11-04 RX ORDER — ONDANSETRON 2 MG/ML
4 INJECTION INTRAMUSCULAR; INTRAVENOUS
Status: DISCONTINUED | OUTPATIENT
Start: 2021-11-04 | End: 2021-11-04 | Stop reason: HOSPADM

## 2021-11-04 RX ORDER — OXYCODONE HYDROCHLORIDE AND ACETAMINOPHEN 5; 325 MG/1; MG/1
1 TABLET ORAL
Status: DISCONTINUED | OUTPATIENT
Start: 2021-11-04 | End: 2021-11-04 | Stop reason: HOSPADM

## 2021-11-04 RX ORDER — MIDAZOLAM HYDROCHLORIDE 1 MG/ML
INJECTION INTRAMUSCULAR; INTRAVENOUS PRN
Status: DISCONTINUED | OUTPATIENT
Start: 2021-11-04 | End: 2021-11-04 | Stop reason: SDUPTHER

## 2021-11-04 RX ORDER — 0.9 % SODIUM CHLORIDE 0.9 %
500 INTRAVENOUS SOLUTION INTRAVENOUS
Status: DISCONTINUED | OUTPATIENT
Start: 2021-11-04 | End: 2021-11-04 | Stop reason: HOSPADM

## 2021-11-04 RX ORDER — SODIUM CHLORIDE, SODIUM LACTATE, POTASSIUM CHLORIDE, CALCIUM CHLORIDE 600; 310; 30; 20 MG/100ML; MG/100ML; MG/100ML; MG/100ML
INJECTION, SOLUTION INTRAVENOUS CONTINUOUS
Status: DISCONTINUED | OUTPATIENT
Start: 2021-11-04 | End: 2021-11-04 | Stop reason: HOSPADM

## 2021-11-04 RX ORDER — DIPHENHYDRAMINE HYDROCHLORIDE 50 MG/ML
12.5 INJECTION INTRAMUSCULAR; INTRAVENOUS
Status: DISCONTINUED | OUTPATIENT
Start: 2021-11-04 | End: 2021-11-04 | Stop reason: HOSPADM

## 2021-11-04 RX ORDER — DEXAMETHASONE SODIUM PHOSPHATE 4 MG/ML
INJECTION, SOLUTION INTRA-ARTICULAR; INTRALESIONAL; INTRAMUSCULAR; INTRAVENOUS; SOFT TISSUE PRN
Status: DISCONTINUED | OUTPATIENT
Start: 2021-11-04 | End: 2021-11-04 | Stop reason: SDUPTHER

## 2021-11-04 RX ADMIN — MIDAZOLAM HYDROCHLORIDE 2 MG: 1 INJECTION INTRAMUSCULAR; INTRAVENOUS at 10:03

## 2021-11-04 RX ADMIN — DEXAMETHASONE SODIUM PHOSPHATE 4 MG: 4 INJECTION, SOLUTION INTRA-ARTICULAR; INTRALESIONAL; INTRAMUSCULAR; INTRAVENOUS; SOFT TISSUE at 10:12

## 2021-11-04 RX ADMIN — PROPOFOL 200 MG: 10 INJECTION, EMULSION INTRAVENOUS at 10:07

## 2021-11-04 RX ADMIN — LIDOCAINE HYDROCHLORIDE 30 MG: 10 INJECTION, SOLUTION EPIDURAL; INFILTRATION; INTRACAUDAL; PERINEURAL at 10:07

## 2021-11-04 RX ADMIN — FENTANYL CITRATE 50 MCG: 50 INJECTION, SOLUTION INTRAMUSCULAR; INTRAVENOUS at 10:07

## 2021-11-04 RX ADMIN — SODIUM CHLORIDE, SODIUM LACTATE, POTASSIUM CHLORIDE, CALCIUM CHLORIDE: 600; 310; 30; 20 INJECTION, SOLUTION INTRAVENOUS at 08:43

## 2021-11-04 RX ADMIN — FENTANYL CITRATE 50 MCG: 50 INJECTION, SOLUTION INTRAMUSCULAR; INTRAVENOUS at 10:31

## 2021-11-04 RX ADMIN — ONDANSETRON 4 MG: 2 INJECTION INTRAMUSCULAR; INTRAVENOUS at 10:58

## 2021-11-04 NOTE — ANESTHESIA PRE PROCEDURE
Department of Anesthesiology  Preprocedure Note       Name:  Jose Roberto Otero [facility]   Age:  49 y.o.  :  1965                                          MRN:  [id number]         Date:  2021      Surgeon: Baldo Barrera):  Rey Lam MD    Procedure: Procedure(s):  LEFT IN SITU CUBITAL TUNNEL RELEASE    Medications prior to admission:   Prior to Admission medications    Medication Sig Start Date End Date Taking? Authorizing Provider   gabapentin (NEURONTIN) 400 MG capsule Take 1 capsule by mouth 3 times daily for 90 days. 10/21/21 1/19/22 Yes [facility] APRN - CNP   HYDROcodone-acetaminophen (NORCO)  MG per tablet Take 1 tablet by mouth every 6 hours as needed for Pain for up to 30 days. Intended supply: 30 days 10/21/21 11/20/21 Yes [facility], APRN - CNP   amLODIPine (NORVASC) 5 MG tablet Take 5 mg by mouth daily   Yes Historical Provider, MD   escitalopram (LEXAPRO) 20 MG tablet Take 20 mg by mouth daily   Yes Historical Provider, MD   aspirin 81 MG tablet Take 81 mg by mouth daily   Yes Historical Provider, MD   Melatonin 10 MG CAPS Take by mouth nightly as needed    Yes Historical Provider, MD   clopidogrel (PLAVIX) 75 MG tablet Take 75 mg by mouth daily  3/12/20  Yes Historical Provider, MD   Acetaminophen (TYLENOL 8 HOUR PO) Take 500 mg by mouth as needed   Yes Historical Provider, MD   atorvastatin (LIPITOR) 40 MG tablet Take 40 mg by mouth daily   Yes Historical Provider, MD   Butalbital-Acetaminophen  MG CAPS Take 1 tablet by mouth 2 times daily as needed    Yes Historical Provider, MD   ALPRAZolam (XANAX) 0.25 MG tablet Take 0.5 mg by mouth nightly as needed for Sleep. Yes Historical Provider, MD   nitroGLYCERIN (NITROSTAT) 0.4 MG SL tablet Place 1 tablet under the tongue every 5 minutes as needed for Chest pain up to max of 3 total doses.  If no relief after 1 dose, call 911. 3/19/20   Jeison Hansen MD       Current medications:    Current Facility-Administered Medications Medication Dose Route Frequency Provider Last Rate Last Admin    lactated ringers infusion   IntraVENous Continuous Jerri Montanezring, APRN - CRNA 125 mL/hr at 11/04/21 0843 New Bag at 11/04/21 0843    lidocaine PF 1 % injection 1 mL  1 mL IntraDERmal Once PRN Jerri Levy, APRN - CRNA        clindamycin (CLEOCIN) 900 mg in dextrose 5 % 50 mL IVPB  900 mg IntraVENous Once Rocco Jackson MD         Facility-Administered Medications Ordered in Other Encounters   Medication Dose Route Frequency Provider Last Rate Last Admin    bupivacaine (PF) (MARCAINE) 0.5 % injection 75 mg  15 mL IntraMUSCular Once Kay Branch, APRN - CNP        lidocaine PF 1 % injection 15 mL  15 mL Other Once Kay Branch, APRN - CNP           Allergies:     Allergies   Allergen Reactions    Penicillins Anaphylaxis       Problem List:    Patient Active Problem List   Diagnosis Code    Other chest pain R07.89    Right carpal tunnel syndrome G56.01    Left carpal tunnel syndrome G56.02    Stroke-like symptoms R29.90    Chronic cerebrovascular accident (CVA) I69.30    Late effect of cerebrovascular accident (CVA) I69.30    Alcohol use with withdrawal ([facility] [address] F10.939    Tobacco abuse counseling Z71.6    Cigarette nicotine dependence with nicotine-induced disorder F17.219    Personal history of noncompliance with medical treatment and regimen Z91.19    Lumbar radiculopathy M54.16    Back pain with history of spinal surgery M54.9, Z98.890    Chronic right-sided low back pain with right-sided sciatica M54.41, G89.29    Pain medication agreement Z02.89    Muscle spasm of back M62.830    Spasm of thoracic back muscle M62.830       Past Medical History:        Diagnosis Date    Anxiety     Diverticulitis     Hx of blood clots     Hypertension     Pneumonia     Stroke (cerebrum) ([facility] [address]     2019 and 2014 tia june 2013 and feb 2021       Past Surgical History:        Procedure Laterality Date    BACK SURGERY  2013    CARPAL TUNNEL RELEASE Right 10/22/2020    RIGHT OPEN CARPAL TUNNEL RELEASE performed by Dasha Kelley MD at [address] Left 12/3/2020    LEFT OPEN CARPAL TUNNEL RELEASE performed by Dasha Kelley MD at [address] [facility]      tooth cut out 2 weeks ago    SMALL INTESTINE SURGERY      TONSILLECTOMY         Social History:    Social History     Tobacco Use    Smoking status: Current Some Day Smoker     Packs/day: 1.00     Years: 40.00     Pack years: 40.00     Types: Cigarettes    Smokeless tobacco: Never Used    Tobacco comment: Cutting down   Substance Use Topics    Alcohol use: Yes     Alcohol/week: 7.0 standard drinks     Types: 7 Cans of beer per week     Comment: 1 0R 2 TIMES A WEEK                                Ready to quit: Not Answered  Counseling given: Not Answered  Comment: Cutting down      Vital Signs (Current):   Vitals:    10/28/21 1323 11/04/21 0838   BP:  (!) 138/93   Pulse:  75   Resp:  20   Temp:  98.3 °F (36.8 °C)   TempSrc:  Temporal   SpO2:  96%   Weight: 252 lb (114.3 kg) 252 lb (114.3 kg)   Height: 6' 1\" (1.854 m) 6' 1\" (1.854 m)                                              BP Readings from Last 3 Encounters:   11/04/21 (!) 138/93   11/03/21 (!) 137/98   10/26/21 (!) 144/93       NPO Status: Time of last liquid consumption: 0700                        Time of last solid consumption: 2300                        Date of last liquid consumption: 11/04/21 (water with meds)                        Date of last solid food consumption: 11/03/21    BMI:   Wt Readings from Last 3 Encounters:   11/04/21 252 lb (114.3 kg)   10/21/21 252 lb (114.3 kg)   10/12/21 242 lb (109.8 kg)     Body mass index is 33.25 kg/m².     CBC:   Lab Results   Component Value Date    WBC 7.3 10/29/2021    RBC 4.88 10/29/2021    HGB 16.7 10/29/2021    HCT 50.8 10/29/2021    .1 10/29/2021    RDW 13.3 10/29/2021     10/29/2021       CMP:   Lab Results Component Value Date     10/29/2021    K 4.7 10/29/2021    K 3.6 02/22/2021     10/29/2021    CO2 29 10/29/2021    BUN 19 10/29/2021    CREATININE 0.9 10/29/2021    GFRAA >59 10/29/2021    LABGLOM >60 10/29/2021    GLUCOSE 92 10/29/2021    PROT 7.1 02/22/2021    CALCIUM 9.4 10/29/2021    BILITOT 0.6 02/22/2021    ALKPHOS 84 02/22/2021    AST 34 02/22/2021    ALT 47 02/22/2021       POC Tests: No results for input(s): POCGLU, POCNA, POCK, POCCL, POCBUN, POCHEMO, POCHCT in the last 72 hours. Coags:   Lab Results   Component Value Date    PROTIME 12.9 02/22/2021    INR 0.98 02/22/2021    APTT 23.9 02/22/2021       HCG (If Applicable): No results found for: PREGTESTUR, PREGSERUM, HCG, HCGQUANT     ABGs: No results found for: PHART, PO2ART, EVY3RAS, LCO3ACC, BEART, P6IUFDTW     Type & Screen (If Applicable):  No results found for: LABABO, LABRH    Drug/Infectious Status (If Applicable):  No results found for: HIV, HEPCAB    COVID-19 Screening (If Applicable):   Lab Results   Component Value Date    COVID19 Not Detected 11/03/2021           Anesthesia Evaluation  Patient summary reviewed and Nursing notes reviewed  Airway:   TM distance: >3 FB   Neck ROM: full  Mouth opening: > = 3 FB Dental: normal exam         Pulmonary:normal exam    (+) pneumonia:                             Cardiovascular:Negative CV ROS          ECG reviewed               Beta Blocker:  Not on Beta Blocker      ROS comment: ECG - 74 BPM   Sinus rhythm  Within normal limits as previously  Comparison Summary: No significant change  Summary: Normal ECG   Compared with:2/22/2021     Neuro/Psych:   (+) CVA: residual symptoms, neuromuscular disease:, psychiatric history:            GI/Hepatic/Renal: Neg GI/Hepatic/Renal ROS            Endo/Other: Negative Endo/Other ROS                    Abdominal:             Vascular: negative vascular ROS.          Other Findings:             Anesthesia Plan      general     ASA 3       Induction: intravenous. Anesthetic plan and risks discussed with patient. Plan discussed with CRNA.                   SUZANNE Mccann - DENIS   11/4/2021

## 2021-11-04 NOTE — PROCEDURES
[facility] Physical [facility] Pain Medicine    Patient Name: Idalia Valentino III    : 1965                    Age: 49 y.o. Sex: male    Date: November 3, 2021    Pre-op Diagnosis: Myofascial Pain/ Muscle Spasms    Post-op Diagnosis: Myofascial Pain/ Muscle Spasms    Procedure: Thoracic Trigger Point Injections    Performing Procedure: Delmer Cardoso, MONTY - BC, VA-BC    Patient Vitals for the past 24 hrs:   BP Temp Temp src Pulse Resp SpO2   21 1341 (!) 137/98 97.1 °F (36.2 °C) Temporal 83 18 100 %       Description of Procedure:  After a brief physical assessment and failure to improve with conservative measures the patient presented for Thoracic Trigger Point Injections The indications, limitations and possible complications were discussed with the patient and the patient elected to proceed with the procedure. After voluntary, informed and signed consent obtained the patient was placed in a seated position. Appropriate time out was obtained per policy. The areas were then prepped in a sterile fashion with Chloro-Prep. The area of maximal tenderness was palpated over the bilaterally Thoracic Muscles - Erector Spinae, Upper/Mid Latissimus, Rhomboid Minor, Rhomboid Major. The skin overlying these areas was marked with a skin marker. The areas were prepped using aseptic technique with CHG prep. The skin overlying the proposed injection sites were then sprayed with Gebauer's Solution while protecting patient eyes.  Each trigger point of the Thoracic Muscles - Erector Spinae, Upper/Mid Latissimus, Rhomboid Minor, Rhomboid Major was injected after negative aspiration was injected with approximately 1-2 ml of a solution of 5 ml of 1% Lidocaine Plain and 5 ml of 0.5% Marcaine Plain after negative aspiration    Pre-op Diagnosis: Myofascial Pain/ Muscle Spasms    Post-op Diagnosis: Myofascial Pain/ Muscle Spasms    Procedure: Lumbar Trigger Point Injections    Performing Procedure: Frida Tam Barbara Velazquez; [facility]    Patient Vitals for the past 24 hrs:   BP Temp Temp src Pulse Resp SpO2   11/03/21 1341 (!) 137/98 97.1 °F (36.2 °C) Temporal 83 18 100 %       Description of Procedure:    After a brief physical assessment and failure to improve with conservative measures the patient presented for Lumbar Trigger Point Injections The indications, limitations and possible complications were discussed with the patient and the patient elected to proceed with the procedure. After voluntary, informed and signed consent obtained the patient was placed in a seated position. Appropriate time out was obtained per policy. The area of maximal tenderness was palpated over the bilaterally Lumbar Muscles - Lower Latissimus,  Erector Spinae, Lumbar Paraspinous. The skin overlying these areas was marked with a skin marker. The areas were prepped using aseptic technique with CHG prep. The skin overlying the proposed injection sites were then sprayed with Gebauer's Solution while protecting patient eyes. Each trigger point of the Lumbar Muscles - Lower Latissimus,  Erector Spinae, Lumbar Paraspinous was injected after negative aspiration was injected with approximately 1-2 ml of a solution of 5 ml of 1% Lidocaine Plain and 5 ml of 0.5% Marcaine Plain after negative aspiration    Discharge: The patient tolerated the procedure well. There were no complications during the procedure and the patient was discharged home with discharge instructions. The patient has been instructed to contact the office should there be any complications or questions to arise between today and their next appointment. Plan:      Will return to the office in 6 weeks for follow up    Marylen Reap, APRN - CNP, 11/3/2021 at 9:29 PM

## 2021-11-04 NOTE — OP NOTE
Patient Name: Bo Plasencia  MRN: [id number]  : 1965      [address] [facility]  DATE of SURGERY: 2021    SURGEON: Ellen Chao MD    ASSISTANT: NONE     PREOPERATIVE DIAGNOSIS:  1. Left cubital tunnel syndrome  2. History of alcohol use with withdrawal  3. History of CVA  4. Chronic pain  5. History of tobacco use    POSTOPERATIVE DIAGNOSIS  1. Left cubital tunnel syndrome  2. History of alcohol use with withdrawal  3. History of CVA  4. Chronic pain  5. History of tobacco use    PROCEDURE PERFORMED  1. Left in situ cubital tunnel decompression    IMPLANTS  None. ANESTHESIA USED  Laryngeal mask airway. PREOPERATIVE INDICATIONS  Patient is a 72-year-old male who presented clinically with complaints of numbness and tingling in the ulnar nerve distribution. The patient had nerve conduction velocity study which showed cubital tunnel syndrome. Having failed conservative treatment with night splinting and NSAID's the patient wished to proceed with surgery understanding the risks, benefits, and alternatives. The risks include but are not limited to that of anesthesia, bleeding, infection, pain, damage to local structures, need for further surgery. We did discuss the possibility of having to anteriorly transpose the nerve if there was subluxation which was present. I also discussed damage to the ulnar nerve itself including the first branch to the flexor carpi ulnaris muscle. ESTIMATED BLOOD LOSS    10 mL     SPECIMENS  None. DRAINS  None. COMPLICATIONS  None. PROCEDURE IN DETAIL  The patient was seen in the preoperative holding room. Once again the informed consent was reviewed with the patient and signed. The site of surgery was marked with the patient's agreement. The patient was transported to the operating room where time out was performed identifying the correct patient as well as the operative site.    Preoperative antibiotics were administered within 1 hour of incision. A nonsterile tourniquet was placed about the left brachium. The operative upper extremity was prepped and draped in the usual sterile fashion. A tourniquet was placed high on the brachium, inflated to 250 mmHg and total tourniquet time was less than 30 minutes. An incision was made just along the medial aspect of the elbow adjacent to the cubital tunnel along the medial epicondyle. Soft tissue was dissected down to the level of the cubital tunnel. The ulnar nerve was identified. The roof of the cubital tunnel was then released with a 15 blade while protecting the underlying nerve. The nerve was then freed from all points of compression working proximally from the medial intermuscular septum distally to the arch of the supinator muscle. The nerve was completely freed identifying small branches to the FCU muscle as well. After a complete release of the nerve was performed, the elbow was taken through a range of motion showing no subluxation of the nerve. The incision site was thoroughly irrigated followed by closure in layers. The skin was closed with nylon suture. Sterile, soft dressing was applied to the left upper extremity. Adequate capillary refill was retained all digits at the conclusion of the procedure. The patient was awakened from anesthesia, transported to the recovery room in stable condition. DISCHARGE PLAN  Discharge home with family. Follow up in 2 weeks for clinical check.

## 2021-11-04 NOTE — ANESTHESIA PRE PROCEDURE
Department of Anesthesiology  Preprocedure Note       [facility]   Age:  49 y.o.  :  1965                                          MRN:  [id number]         Date:  2021      Surgeon: Jayme Zamudio):  Edy Hernandez MD    Procedure: Procedure(s):  LEFT IN SITU CUBITAL TUNNEL RELEASE    Medications prior to admission:   Prior to Admission medications    Medication Sig Start Date End Date Taking? Authorizing Provider   gabapentin (NEURONTIN) 400 MG capsule Take 1 capsule by mouth 3 times daily for 90 days. 10/21/21 1/19/22  SUZANNE Estrada CNP   HYDROcodone-acetaminophen (NORCO)  MG per tablet Take 1 tablet by mouth every 6 hours as needed for Pain for up to 30 days. Intended supply: 30 days 10/21/21 11/20/21  SUZANNE Estrada CNP   amLODIPine (NORVASC) 5 MG tablet Take 5 mg by mouth daily    Historical Provider, MD   escitalopram (LEXAPRO) 20 MG tablet Take 20 mg by mouth daily    Historical Provider, MD   aspirin 81 MG tablet Take 81 mg by mouth daily    Historical Provider, MD   Melatonin 10 MG CAPS Take by mouth nightly as needed     Historical Provider, MD   clopidogrel (PLAVIX) 75 MG tablet Take 75 mg by mouth daily  3/12/20   Historical Provider, MD   Acetaminophen (TYLENOL 8 HOUR PO) Take 500 mg by mouth as needed    Historical Provider, MD   atorvastatin (LIPITOR) 40 MG tablet Take 40 mg by mouth daily    Historical Provider, MD   Butalbital-Acetaminophen  MG CAPS Take 1 tablet by mouth 2 times daily as needed     Historical Provider, MD   ALPRAZolam (XANAX) 0.25 MG tablet Take 0.5 mg by mouth nightly as needed for Sleep. Historical Provider, MD   nitroGLYCERIN (NITROSTAT) 0.4 MG SL tablet Place 1 tablet under the tongue every 5 minutes as needed for Chest pain up to max of 3 total doses. If no relief after 1 dose, call 911. 3/19/20   Thanh Stephenson MD       Current medications:    No current facility-administered medications for this visit.      No current outpatient medications on file. Facility-Administered Medications Ordered in Other Visits   Medication Dose Route Frequency Provider Last Rate Last Admin    lactated ringers infusion   IntraVENous Continuous Vamshi MartinSUZANNE CRNA 125 mL/hr at 11/04/21 0843 New Bag at 11/04/21 0843    lidocaine PF 1 % injection 1 mL  1 mL IntraDERmal Once PRN Vamshi Martin, APRN - CRNA        meperidine (DEMEROL) injection 12.5 mg  12.5 mg IntraVENous Q5 Min PRN Kelyll Drier, APRN - CRNA        fentaNYL (SUBLIMAZE) injection 25 mcg  25 mcg IntraVENous Q5 Min PRN Sherryll Drier, APRN - CRNA        HYDROmorphone (DILAUDID) injection 0.5 mg  0.5 mg IntraVENous Q5 Min PRN Jorgito Drier, APRN - CRNA        morphine injection 1 mg  1 mg IntraVENous Q5 Min PRN Sherryll Drier, APRN - CRNA        oxyCODONE-acetaminophen (PERCOCET) 5-325 MG per tablet 1 tablet  1 tablet Oral Once PRN Jorgito Drier, APRN - CRNA        ondansetron [facility]) injection 4 mg  4 mg IntraVENous Once PRN Sherryll Drier, APRN - CRNA        promethazine (PHENERGAN) injection 6.25 mg  6.25 mg IntraMUSCular Once PRN Kelyll Drier, APRN - CRNA        0.9 % sodium chloride bolus  500 mL IntraVENous Once PRN Sherryll Drier, APRN - CRNA        diphenhydrAMINE (BENADRYL) injection 12.5 mg  12.5 mg IntraVENous Once PRN Kelyll Drier, APRN - CRNA        labetalol (NORMODYNE;TRANDATE) injection 5 mg  5 mg IntraVENous Q10 Min PRN Sherryll Drier, APRN - CRNA        hydrALAZINE (APRESOLINE) injection 5 mg  5 mg IntraVENous Q10 Min PRN Kelyll Drier, APRN - CRNA        lidocaine-EPINEPHrine 1 %-[id number] injection    PRN Cammy Deng MD   10 mL at 11/04/21 1112    bupivacaine (PF) (MARCAINE) 0.5 % injection 75 mg  15 mL IntraMUSCular Once Paramjit Navarrete APRN - CNP        lidocaine PF 1 % injection 15 mL  15 mL Other Once Paramjit Navarrete, APRN - CNP           Allergies:     Allergies   Allergen Reactions    Penicillins Anaphylaxis       Problem List:    Patient Active Problem List   Diagnosis Code    Other chest pain R07.89    Right carpal tunnel syndrome G56.01    Left carpal tunnel syndrome G56.02    Stroke-like symptoms R29.90    Chronic cerebrovascular accident (CVA) I69.30    Late effect of cerebrovascular accident (CVA) I69.30    Alcohol use with withdrawal ([facility] [address] F10.939    Tobacco abuse counseling Z71.6    Cigarette nicotine dependence with nicotine-induced disorder F17.219    Personal history of noncompliance with medical treatment and regimen Z91.19    Lumbar radiculopathy M54.16    Back pain with history of spinal surgery M54.9, Z98.890    Chronic right-sided low back pain with right-sided sciatica M54.41, G89.29    Pain medication agreement Z02.89    Muscle spasm of back M62.830    Spasm of thoracic back muscle M62.830    Cubital tunnel syndrome on left G56.22       Past Medical History:        Diagnosis Date    Anxiety     Diverticulitis     Hx of blood clots     Hypertension     Pneumonia     [facility]     2019 and 2014 tia june 2013 and feb 2021       Past Surgical History:        Procedure Laterality Date    BACK SURGERY  2013    CARPAL TUNNEL RELEASE Right 10/22/2020    RIGHT OPEN CARPAL TUNNEL RELEASE performed by Dasha Martin MD at [address] [place] [address] Left 12/3/2020    LEFT OPEN CARPAL TUNNEL RELEASE performed by Dasha Martin MD at [facility] OR    COLONOSCOPY      MOUTH SURGERY      tooth cut out 2 weeks ago    SMALL INTESTINE SURGERY      TONSILLECTOMY         Social History:    Social History     Tobacco Use    Smoking status: Current Some Day Smoker     Packs/day: 1.00     Years: 40.00     Pack years: 40.00     Types: Cigarettes    Smokeless tobacco: Never Used    Tobacco comment: Cutting down   Substance Use Topics    Alcohol use:  Yes     Alcohol/week: 7.0 standard drinks     Types: 7 Cans of beer per week     Comment: 1 0R 2 TIMES A WEEK Ready to quit: Not Answered  Counseling given: Not Answered  Comment: Cutting down      Vital Signs (Current): There were no vitals filed for this visit. BP Readings from Last 3 Encounters:   11/04/21 (!) 140/85   11/03/21 (!) 137/98   10/26/21 (!) 144/93       NPO Status:                                                                                 BMI:   Wt Readings from Last 3 Encounters:   11/04/21 252 lb (114.3 kg)   10/21/21 252 lb (114.3 kg)   10/12/21 242 lb (109.8 kg)     There is no height or weight on file to calculate BMI.    CBC:   Lab Results   Component Value Date    WBC 7.3 10/29/2021    RBC 4.88 10/29/2021    HGB 16.7 10/29/2021    HCT 50.8 10/29/2021    .1 10/29/2021    RDW 13.3 10/29/2021     10/29/2021       CMP:   Lab Results   Component Value Date     10/29/2021    K 4.7 10/29/2021    K 3.6 02/22/2021     10/29/2021    CO2 29 10/29/2021    BUN 19 10/29/2021    CREATININE 0.9 10/29/2021    GFRAA >59 10/29/2021    LABGLOM >60 10/29/2021    GLUCOSE 92 10/29/2021    PROT 7.1 02/22/2021    CALCIUM 9.4 10/29/2021    BILITOT 0.6 02/22/2021    ALKPHOS 84 02/22/2021    AST 34 02/22/2021    ALT 47 02/22/2021       POC Tests: No results for input(s): POCGLU, POCNA, POCK, POCCL, POCBUN, POCHEMO, POCHCT in the last 72 hours.     Coags:   Lab Results   Component Value Date    PROTIME 12.9 02/22/2021    INR 0.98 02/22/2021    APTT 23.9 02/22/2021       HCG (If Applicable): No results found for: PREGTESTUR, PREGSERUM, HCG, HCGQUANT     ABGs: No results found for: PHART, PO2ART, JGR8ZMK, MUR7SAR, BEART, M3ZGWTXS     Type & Screen (If Applicable):  No results found for: LABABO, LABRH    Drug/Infectious Status (If Applicable):  No results found for: HIV, HEPCAB    COVID-19 Screening (If Applicable):   Lab Results   Component Value Date    COVID19 Not Detected 11/03/2021         Anesthesia Evaluation  Patient

## 2021-11-04 NOTE — ANESTHESIA POSTPROCEDURE EVALUATION
Department of Anesthesiology  Postprocedure Note    Patient: Jose Roberto Otero [facility]  MRN: [id number]  YOB: 1965  Date of evaluation: 11/4/2021  Time:  11:31 AM     Procedure Summary     Date: 11/04/21 Room / Location: [place] [address] / [address] [place] [address]    Anesthesia Start: 1512 Anesthesia Stop: 1999    Procedure: LEFT IN SITU CUBITAL TUNNEL RELEASE (Left Elbow) Diagnosis: (B85.81)    Surgeons: Rey Lam MD Responsible Provider: SUZANNE Yu CRNA    Anesthesia Type: general ASA Status: 3          Anesthesia Type: general    Norma Phase I:      Norma Phase II: Norma Score: 10    Last vitals: Reviewed and per EMR flowsheets.        Anesthesia Post Evaluation    Patient location during evaluation: bedside  Patient participation: complete - patient participated  Level of consciousness: awake  Pain score: 0  Airway patency: patent  Nausea & Vomiting: no nausea and no vomiting  Complications: no  Cardiovascular status: hemodynamically stable  Respiratory status: acceptable, spontaneous ventilation and room air  Hydration status: euvolemic  Comments: Temp 97.2F

## 2021-11-09 ENCOUNTER — TELEPHONE (OUTPATIENT)
Dept: PAIN MANAGEMENT | Age: 56
End: 2021-11-09

## 2021-12-15 DIAGNOSIS — M54.41 CHRONIC RIGHT-SIDED LOW BACK PAIN WITH RIGHT-SIDED SCIATICA: ICD-10-CM

## 2021-12-15 DIAGNOSIS — G89.29 CHRONIC RIGHT-SIDED LOW BACK PAIN WITH RIGHT-SIDED SCIATICA: ICD-10-CM

## 2021-12-19 RX ORDER — HYDROCODONE BITARTRATE AND ACETAMINOPHEN 10; 325 MG/1; MG/1
1 TABLET ORAL EVERY 6 HOURS PRN
Qty: 90 TABLET | Refills: 0 | Status: SHIPPED | OUTPATIENT
Start: 2021-12-19 | End: 2022-01-18

## 2021-12-29 ENCOUNTER — ANESTHESIA EVENT (OUTPATIENT)
Dept: OPERATING ROOM | Age: 56
End: 2021-12-29

## 2021-12-30 ENCOUNTER — ANESTHESIA (OUTPATIENT)
Dept: OPERATING ROOM | Age: 56
End: 2021-12-30

## 2021-12-30 ENCOUNTER — HOSPITAL ENCOUNTER (OUTPATIENT)
Age: 56
Setting detail: OUTPATIENT SURGERY
Discharge: HOME | End: 2021-12-30
Attending: ORTHOPAEDIC SURGERY | Admitting: ORTHOPAEDIC SURGERY

## 2021-12-30 VITALS
RESPIRATION RATE: 16 BRPM | DIASTOLIC BLOOD PRESSURE: 62 MMHG | TEMPERATURE: 97.1 F | WEIGHT: 250 LBS | HEIGHT: 73 IN | SYSTOLIC BLOOD PRESSURE: 147 MMHG | OXYGEN SATURATION: 93 % | BODY MASS INDEX: 33.13 KG/M2 | HEART RATE: 83 BPM

## 2021-12-30 VITALS
SYSTOLIC BLOOD PRESSURE: 112 MMHG | DIASTOLIC BLOOD PRESSURE: 78 MMHG | RESPIRATION RATE: 1 BRPM | OXYGEN SATURATION: 97 %

## 2021-12-30 PROBLEM — G56.21 CUBITAL TUNNEL SYNDROME ON RIGHT: Status: ACTIVE | Noted: 2021-12-30

## 2021-12-30 PROCEDURE — 64718 REVISE ULNAR NERVE AT ELBOW: CPT

## 2021-12-30 RX ORDER — LABETALOL HYDROCHLORIDE 5 MG/ML
5 INJECTION, SOLUTION INTRAVENOUS EVERY 10 MIN PRN
Status: DISCONTINUED | OUTPATIENT
Start: 2021-12-30 | End: 2021-12-30 | Stop reason: HOSPADM

## 2021-12-30 RX ORDER — ONDANSETRON 2 MG/ML
4 INJECTION INTRAMUSCULAR; INTRAVENOUS
Status: DISCONTINUED | OUTPATIENT
Start: 2021-12-30 | End: 2021-12-30 | Stop reason: HOSPADM

## 2021-12-30 RX ORDER — MEPERIDINE HYDROCHLORIDE 25 MG/ML
12.5 INJECTION INTRAMUSCULAR; INTRAVENOUS; SUBCUTANEOUS EVERY 5 MIN PRN
Status: DISCONTINUED | OUTPATIENT
Start: 2021-12-30 | End: 2021-12-30 | Stop reason: HOSPADM

## 2021-12-30 RX ORDER — DIPHENHYDRAMINE HYDROCHLORIDE 50 MG/ML
12.5 INJECTION INTRAMUSCULAR; INTRAVENOUS
Status: DISCONTINUED | OUTPATIENT
Start: 2021-12-30 | End: 2021-12-30 | Stop reason: HOSPADM

## 2021-12-30 RX ORDER — LIDOCAINE HYDROCHLORIDE AND EPINEPHRINE 10; 10 MG/ML; UG/ML
INJECTION, SOLUTION INFILTRATION; PERINEURAL PRN
Status: DISCONTINUED | OUTPATIENT
Start: 2021-12-30 | End: 2021-12-30 | Stop reason: ALTCHOICE

## 2021-12-30 RX ORDER — PROMETHAZINE HYDROCHLORIDE 25 MG/ML
12.5 INJECTION, SOLUTION INTRAMUSCULAR; INTRAVENOUS
Status: DISCONTINUED | OUTPATIENT
Start: 2021-12-30 | End: 2021-12-30 | Stop reason: HOSPADM

## 2021-12-30 RX ORDER — LIDOCAINE HYDROCHLORIDE 10 MG/ML
INJECTION, SOLUTION INFILTRATION; PERINEURAL PRN
Status: DISCONTINUED | OUTPATIENT
Start: 2021-12-30 | End: 2021-12-30 | Stop reason: SDUPTHER

## 2021-12-30 RX ORDER — FENTANYL CITRATE 50 UG/ML
INJECTION, SOLUTION INTRAMUSCULAR; INTRAVENOUS PRN
Status: DISCONTINUED | OUTPATIENT
Start: 2021-12-30 | End: 2021-12-30 | Stop reason: SDUPTHER

## 2021-12-30 RX ORDER — LIDOCAINE HYDROCHLORIDE 10 MG/ML
1 INJECTION, SOLUTION EPIDURAL; INFILTRATION; INTRACAUDAL; PERINEURAL
Status: DISCONTINUED | OUTPATIENT
Start: 2021-12-30 | End: 2021-12-30 | Stop reason: HOSPADM

## 2021-12-30 RX ORDER — OXYCODONE HYDROCHLORIDE AND ACETAMINOPHEN 5; 325 MG/1; MG/1
2 TABLET ORAL PRN
Status: DISCONTINUED | OUTPATIENT
Start: 2021-12-30 | End: 2021-12-30 | Stop reason: HOSPADM

## 2021-12-30 RX ORDER — FENTANYL CITRATE 50 UG/ML
50 INJECTION, SOLUTION INTRAMUSCULAR; INTRAVENOUS EVERY 5 MIN PRN
Status: DISCONTINUED | OUTPATIENT
Start: 2021-12-30 | End: 2021-12-30 | Stop reason: HOSPADM

## 2021-12-30 RX ORDER — DEXAMETHASONE SODIUM PHOSPHATE 4 MG/ML
INJECTION, SOLUTION INTRA-ARTICULAR; INTRALESIONAL; INTRAMUSCULAR; INTRAVENOUS; SOFT TISSUE PRN
Status: DISCONTINUED | OUTPATIENT
Start: 2021-12-30 | End: 2021-12-30 | Stop reason: SDUPTHER

## 2021-12-30 RX ORDER — OXYCODONE HYDROCHLORIDE AND ACETAMINOPHEN 5; 325 MG/1; MG/1
1 TABLET ORAL PRN
Status: DISCONTINUED | OUTPATIENT
Start: 2021-12-30 | End: 2021-12-30 | Stop reason: HOSPADM

## 2021-12-30 RX ORDER — SODIUM CHLORIDE, SODIUM LACTATE, POTASSIUM CHLORIDE, CALCIUM CHLORIDE 600; 310; 30; 20 MG/100ML; MG/100ML; MG/100ML; MG/100ML
INJECTION, SOLUTION INTRAVENOUS CONTINUOUS
Status: DISCONTINUED | OUTPATIENT
Start: 2021-12-30 | End: 2021-12-30 | Stop reason: HOSPADM

## 2021-12-30 RX ORDER — CEFAZOLIN SODIUM 1 G/3ML
INJECTION, POWDER, FOR SOLUTION INTRAMUSCULAR; INTRAVENOUS PRN
Status: DISCONTINUED | OUTPATIENT
Start: 2021-12-30 | End: 2021-12-30 | Stop reason: SDUPTHER

## 2021-12-30 RX ORDER — KETOROLAC TROMETHAMINE 30 MG/ML
INJECTION, SOLUTION INTRAMUSCULAR; INTRAVENOUS PRN
Status: DISCONTINUED | OUTPATIENT
Start: 2021-12-30 | End: 2021-12-30 | Stop reason: SDUPTHER

## 2021-12-30 RX ORDER — ONDANSETRON 2 MG/ML
INJECTION INTRAMUSCULAR; INTRAVENOUS PRN
Status: DISCONTINUED | OUTPATIENT
Start: 2021-12-30 | End: 2021-12-30 | Stop reason: SDUPTHER

## 2021-12-30 RX ORDER — PROPOFOL 10 MG/ML
INJECTION, EMULSION INTRAVENOUS PRN
Status: DISCONTINUED | OUTPATIENT
Start: 2021-12-30 | End: 2021-12-30 | Stop reason: SDUPTHER

## 2021-12-30 RX ORDER — HYDRALAZINE HYDROCHLORIDE 20 MG/ML
5 INJECTION INTRAMUSCULAR; INTRAVENOUS EVERY 10 MIN PRN
Status: DISCONTINUED | OUTPATIENT
Start: 2021-12-30 | End: 2021-12-30 | Stop reason: HOSPADM

## 2021-12-30 RX ADMIN — ONDANSETRON 4 MG: 2 INJECTION INTRAMUSCULAR; INTRAVENOUS at 13:53

## 2021-12-30 RX ADMIN — PROPOFOL 100 MG: 10 INJECTION, EMULSION INTRAVENOUS at 13:19

## 2021-12-30 RX ADMIN — SODIUM CHLORIDE, SODIUM LACTATE, POTASSIUM CHLORIDE, CALCIUM CHLORIDE: 600; 310; 30; 20 INJECTION, SOLUTION INTRAVENOUS at 12:13

## 2021-12-30 RX ADMIN — KETOROLAC TROMETHAMINE 30 MG: 30 INJECTION, SOLUTION INTRAMUSCULAR; INTRAVENOUS at 14:08

## 2021-12-30 RX ADMIN — CEFAZOLIN SODIUM 2000 MG: 1 INJECTION, POWDER, FOR SOLUTION INTRAMUSCULAR; INTRAVENOUS at 13:24

## 2021-12-30 RX ADMIN — DEXAMETHASONE SODIUM PHOSPHATE 4 MG: 4 INJECTION, SOLUTION INTRA-ARTICULAR; INTRALESIONAL; INTRAMUSCULAR; INTRAVENOUS; SOFT TISSUE at 13:53

## 2021-12-30 RX ADMIN — LIDOCAINE HYDROCHLORIDE 40 MG: 10 INJECTION, SOLUTION INFILTRATION; PERINEURAL at 13:19

## 2021-12-30 RX ADMIN — PROPOFOL 100 MG: 10 INJECTION, EMULSION INTRAVENOUS at 13:20

## 2021-12-30 RX ADMIN — KETOROLAC TROMETHAMINE 30 MG: 30 INJECTION, SOLUTION INTRAMUSCULAR; INTRAVENOUS at 14:09

## 2021-12-30 RX ADMIN — FENTANYL CITRATE 50 MCG: 50 INJECTION, SOLUTION INTRAMUSCULAR; INTRAVENOUS at 13:18

## 2021-12-30 NOTE — OP NOTE
Patient Name: Tiffani Hicks  : 1965  MRN: [id number]      DATE of SURGERY: 2021    SURGEON: Milotn Shannon MD    ASSISTANT: NONE    PREOPERATIVE DIAGNOSIS:  1. Right cubital tunnel syndrome  2. History of alcohol use with withdrawal  3. History of CVA  4. Chronic pain  5. History of tobacco use     POSTOPERATIVE DIAGNOSIS  1. Right cubital tunnel syndrome  2. History of alcohol use with withdrawal  3. History of CVA  4. Chronic pain  5. History of tobacco use     PROCEDURE PERFORMED  1. Right in situ cubital tunnel decompression     IMPLANTS  None.     ANESTHESIA USED  Laryngeal mask airway.     PREOPERATIVE INDICATIONS  Patient is a 73-year-old male who presented clinically with complaints of numbness and tingling in the right ulnar nerve distribution.  The patient had nerve conduction velocity study which showed cubital tunnel syndrome.  Having failed conservative treatment with night splinting and NSAID's the patient wished to proceed with surgery understanding the risks, benefits, and alternatives.  The risks include but are not limited to that of anesthesia, bleeding, infection, pain, damage to local structures, need for further surgery.  We did discuss the possibility of having to anteriorly transpose the nerve if there was subluxation which was present.  I also discussed damage to the ulnar nerve itself including the first branch to the flexor carpi ulnaris muscle.   He is recently undergone left cubital tunnel release and is doing reasonably well, he does have some residual numbness along the posterior aspect of the elbow but is otherwise without issue.      ESTIMATED BLOOD LOSS    10 mL      SPECIMENS  None.     DRAINS  None.     COMPLICATIONS  None.     PROCEDURE IN DETAIL  The patient was seen in the preoperative holding room.  Once again the informed consent was reviewed with the patient and signed.  The site of surgery was marked with the patient's agreement.  The patient was transported to the operating room where time out was performed identifying the correct patient as well as the operative site.   Preoperative antibiotics were administered within 1 hour of incision. A nonsterile tourniquet was placed about the right brachium. The operative upper extremity was prepped and draped in the usual sterile fashion.  A tourniquet was placed high on the brachium, inflated to 250 mmHg and total tourniquet time was less than 30 minutes.     An incision was made just along the medial aspect of the elbow adjacent to the cubital tunnel along the medial epicondyle.  Soft tissue was dissected down to the level of the cubital tunnel. The ulnar nerve was identified.  The roof of the cubital tunnel was then released with a 15 blade while protecting the underlying nerve.  The nerve was then freed from all points of compression working proximally from the medial intermuscular septum distally to the arch of the supinator muscle.  The nerve was completely freed identifying small branches to the FCU muscle as well.  After a complete release of the nerve was performed, the elbow was taken through a range of motion showing no subluxation of the nerve.     The incision site was thoroughly irrigated followed by closure in layers.   The skin was closed with nylon suture.    For postoperative pain control, approximately 20 cc of 1% lidocaine with epinephrine were injected in line with the incision. Sterile, soft dressing was applied to the right upper extremity.   Adequate capillary refill was retained all digits at the conclusion of the procedure.     The patient was awakened from anesthesia, transported to the recovery room in stable condition.     DISCHARGE PLAN  Discharge home with family.  Follow up in 2 weeks for clinical check.

## 2021-12-30 NOTE — ANESTHESIA POSTPROCEDURE EVALUATION
Department of Anesthesiology  Postprocedure Note    Patient: Damir Robbins [facility] III  MRN: [id number]  YOB: 1965  Date of evaluation: 12/30/2021  Time:  2:10 PM     Procedure Summary     Date: 12/30/21 Room / Location: [facility] OR  / [address] [place] [address]    Anesthesia Start: 9204 Anesthesia Stop:     Procedure: RIGHT IN SITU CUBITAL TUNNEL DECOMPRESSION (Right Elbow) Diagnosis: (G56.21)    Surgeons: Maria Teresa Lal MD Responsible Provider: SUZANNE Oliva CRNA    Anesthesia Type: general ASA Status: 3          Anesthesia Type: No value filed. Norma Phase I:      Norma Phase II:      Last vitals: Reviewed and per EMR flowsheets.        Anesthesia Post Evaluation    Patient location during evaluation: bedside  Patient participation: complete - patient participated  Level of consciousness: sleepy but conscious  Pain score: 0  Airway patency: patent  Nausea & Vomiting: no nausea and no vomiting  Complications: no  Cardiovascular status: blood pressure returned to baseline  Respiratory status: acceptable, room air and spontaneous ventilation  Hydration status: euvolemic

## 2021-12-30 NOTE — ADDENDUM NOTE
Addendum  created 12/30/21 1500 by Brooke Mayer, [address] edited, Orders acknowledged in Narrator

## 2021-12-30 NOTE — ANESTHESIA PRE PROCEDURE
Department of Anesthesiology  Preprocedure Note       Name:  [facility]   Age:  49 y.o.  :  1965                                          MRN:  [id number]         Date:  2021      Surgeon: Elijah Blankenship):  Chanel Gramajo MD    Procedure: Procedure(s):  RIGHT IN SITU CUBITAL TUNNEL DECOMPRESSION    Medications prior to admission:   Prior to Admission medications    Medication Sig Start Date End Date Taking? Authorizing Provider   gabapentin (NEURONTIN) 400 MG capsule Take 1 capsule by mouth 3 times daily for 90 days. 10/21/21 1/19/22 Yes SUZANNE Ko CNP   amLODIPine (NORVASC) 5 MG tablet Take 5 mg by mouth daily   Yes Historical Provider, MD   escitalopram (LEXAPRO) 20 MG tablet Take 20 mg by mouth daily   Yes Historical Provider, MD   aspirin 81 MG tablet Take 81 mg by mouth daily   Yes Historical Provider, MD   Melatonin 10 MG CAPS Take by mouth nightly as needed    Yes Historical Provider, MD   clopidogrel (PLAVIX) 75 MG tablet Take 75 mg by mouth daily  3/12/20  Yes Historical Provider, MD   Acetaminophen (TYLENOL 8 HOUR PO) Take 500 mg by mouth as needed   Yes Historical Provider, MD   atorvastatin (LIPITOR) 40 MG tablet Take 40 mg by mouth daily   Yes Historical Provider, MD   Butalbital-Acetaminophen  MG CAPS Take 1 tablet by mouth 2 times daily as needed    Yes Historical Provider, MD   ALPRAZolam (XANAX) 0.25 MG tablet Take 0.5 mg by mouth nightly as needed for Sleep. Yes Historical Provider, MD   HYDROcodone-acetaminophen (NORCO)  MG per tablet Take 1 tablet by mouth every 6 hours as needed for Pain for up to 30 days. 21  SUZANNE Ko CNP   nitroGLYCERIN (NITROSTAT) 0.4 MG SL tablet Place 1 tablet under the tongue every 5 minutes as needed for Chest pain up to max of 3 total doses.  If no relief after 1 dose, call 911. 3/19/20   Deonte Zayas MD       Current medications:    Current Facility-Administered Medications   Medication Dose Route Frequency Provider Last Rate Last Admin    lactated ringers infusion   IntraVENous Continuous SUZANNE Jo CRNA 125 mL/hr at 12/30/21 1213 New Bag at 12/30/21 1213    lidocaine PF 1 % injection 1 mL  1 mL IntraDERmal Once PRN Desiree Martinez APRN Alfredo CRNA           Allergies:     Allergies   Allergen Reactions    Penicillins Anaphylaxis       Problem List:    Patient Active Problem List   Diagnosis Code    Other chest pain R07.89    Right carpal tunnel syndrome G56.01    Left carpal tunnel syndrome G56.02    Stroke-like symptoms R29.90    Chronic cerebrovascular accident (CVA) I69.30    Late effect of cerebrovascular accident (CVA) I69.30    Alcohol use with withdrawal ([facility] [address] F10.939    Tobacco abuse counseling Z71.6    Cigarette nicotine dependence with nicotine-induced disorder F17.219    Personal history of noncompliance with medical treatment and regimen Z91.19    Lumbar radiculopathy M54.16    Back pain with history of spinal surgery M54.9, Z98.890    Chronic right-sided low back pain with right-sided sciatica M54.41, G89.29    Pain medication agreement Z02.89    Muscle spasm of back M62.830    Spasm of thoracic back muscle M62.830    Cubital tunnel syndrome on left G56.22    Cubital tunnel syndrome on right G56.21       Past Medical History:        Diagnosis Date    Anxiety     Diverticulitis     Hx of blood clots     Hypertension     Pneumonia     Stroke (cerebrum) ([facility] [address]     2019 and 2014 tia june 2013 and feb 2021       Past Surgical History:        Procedure Laterality Date    ARM SURGERY Left 11/4/2021    LEFT IN SITU CUBITAL TUNNEL RELEASE performed by Pepe Perez MD at [facility]  2013    CARPAL TUNNEL RELEASE Right 10/22/2020    RIGHT OPEN CARPAL TUNNEL RELEASE performed by Pepe Perez MD at [address] [facility] Left 12/3/2020    LEFT OPEN CARPAL TUNNEL RELEASE performed by Pepe Perez MD at [facility]  MOUTH SURGERY      tooth cut out 2 weeks ago    SMALL INTESTINE SURGERY      TONSILLECTOMY         Social History:    Social History     Tobacco Use    Smoking status: Current Some Day Smoker     Packs/day: 1.00     Years: 40.00     Pack years: 40.00     Types: Cigarettes    Smokeless tobacco: Never Used    Tobacco comment: Cutting down   Substance Use Topics    Alcohol use: Yes     Alcohol/week: 7.0 standard drinks     Types: 7 Cans of beer per week     Comment: 1 0R 2 TIMES A WEEK                                Ready to quit: Not Answered  Counseling given: Not Answered  Comment: Cutting down      Vital Signs (Current):   Vitals:    12/30/21 1208   BP: 121/86   Pulse: 78   Resp: 16   Temp: 98.5 °F (36.9 °C)   SpO2: 94%   Weight: 250 lb (113.4 kg)   Height: 6' 1\" (1.854 m)                                              BP Readings from Last 3 Encounters:   12/30/21 121/86   11/04/21 (!) 145/88   11/04/21 (!) 149/90       NPO Status: Time of last liquid consumption: 2300                        Time of last solid consumption: 2300                        Date of last liquid consumption: 12/29/21                        Date of last solid food consumption: 12/29/21    BMI:   Wt Readings from Last 3 Encounters:   12/30/21 250 lb (113.4 kg)   11/04/21 252 lb (114.3 kg)   10/21/21 252 lb (114.3 kg)     Body mass index is 32.98 kg/m².     CBC:   Lab Results   Component Value Date    WBC 7.3 10/29/2021    RBC 4.88 10/29/2021    HGB 16.7 10/29/2021    HCT 50.8 10/29/2021    .1 10/29/2021    RDW 13.3 10/29/2021     10/29/2021       CMP:   Lab Results   Component Value Date     10/29/2021    K 4.7 10/29/2021    K 3.6 02/22/2021     10/29/2021    CO2 29 10/29/2021    BUN 19 10/29/2021    CREATININE 0.9 10/29/2021    GFRAA >59 10/29/2021    LABGLOM >60 10/29/2021    GLUCOSE 92 10/29/2021    PROT 7.1 02/22/2021    CALCIUM 9.4 10/29/2021    BILITOT 0.6 02/22/2021    ALKPHOS 84 02/22/2021    AST 34 02/22/2021    ALT 47 02/22/2021       POC Tests: No results for input(s): POCGLU, POCNA, POCK, POCCL, POCBUN, POCHEMO, POCHCT in the last 72 hours. Coags:   Lab Results   Component Value Date    PROTIME 12.9 02/22/2021    INR 0.98 02/22/2021    APTT 23.9 02/22/2021       HCG (If Applicable): No results found for: PREGTESTUR, PREGSERUM, HCG, HCGQUANT     ABGs: No results found for: PHART, PO2ART, WNW8YEZ, KAL6SML, BEART, A2RDTSQG     Type & Screen (If Applicable):  No results found for: LABABO, LABRH    Drug/Infectious Status (If Applicable):  No results found for: HIV, HEPCAB    COVID-19 Screening (If Applicable):   Lab Results   Component Value Date    COVID19 Not Detected 11/03/2021           Anesthesia Evaluation  Patient summary reviewed and Nursing notes reviewed  Airway: Mallampati: II  TM distance: >3 FB   Neck ROM: full  Mouth opening: > = 3 FB Dental: normal exam         Pulmonary:normal exam    (+) pneumonia: resolved,                             Cardiovascular:Negative CV ROS    (+) hypertension:,       ECG reviewed               Beta Blocker:  Not on Beta Blocker      ROS comment: 10/29/21 - ECG - 74 BPM   Sinus rhythm  Within normal limits as previously  Comparison Summary: No significant change  Summary: Normal ECG   Compared with:2/22/2021     Neuro/Psych:   (+) CVA:, neuromuscular disease:, psychiatric history:            GI/Hepatic/Renal: Neg GI/Hepatic/Renal ROS            Endo/Other: Negative Endo/Other ROS                    Abdominal:             Vascular: negative vascular ROS. Other Findings:             Anesthesia Plan      general     ASA 3       Induction: intravenous. MIPS: Postoperative opioids intended and Prophylactic antiemetics administered. Anesthetic plan and risks discussed with patient. Plan discussed with CRNA.                   SZUANNE Ryan - DENIS   12/30/2021

## 2022-01-19 ENCOUNTER — OFFICE VISIT (OUTPATIENT)
Dept: ENT CLINIC | Age: 57
End: 2022-01-19
Payer: COMMERCIAL

## 2022-01-19 VITALS
HEIGHT: 73 IN | SYSTOLIC BLOOD PRESSURE: 128 MMHG | DIASTOLIC BLOOD PRESSURE: 76 MMHG | WEIGHT: 250 LBS | BODY MASS INDEX: 33.13 KG/M2

## 2022-01-19 DIAGNOSIS — R04.0 EPISTAXIS: Primary | ICD-10-CM

## 2022-01-19 PROCEDURE — G8417 CALC BMI ABV UP PARAM F/U: HCPCS | Performed by: PHYSICIAN ASSISTANT

## 2022-01-19 PROCEDURE — G8427 DOCREV CUR MEDS BY ELIG CLIN: HCPCS | Performed by: PHYSICIAN ASSISTANT

## 2022-01-19 PROCEDURE — 4004F PT TOBACCO SCREEN RCVD TLK: CPT | Performed by: PHYSICIAN ASSISTANT

## 2022-01-19 PROCEDURE — 99203 OFFICE O/P NEW LOW 30 MIN: CPT | Performed by: PHYSICIAN ASSISTANT

## 2022-01-19 PROCEDURE — G8484 FLU IMMUNIZE NO ADMIN: HCPCS | Performed by: PHYSICIAN ASSISTANT

## 2022-01-19 PROCEDURE — 3017F COLORECTAL CA SCREEN DOC REV: CPT | Performed by: PHYSICIAN ASSISTANT

## 2022-01-19 RX ORDER — OXYMETAZOLINE HYDROCHLORIDE 0.05 G/100ML
SPRAY NASAL
Qty: 14 ML | Refills: 0 | Status: SHIPPED | OUTPATIENT
Start: 2022-01-19 | End: 2022-05-12

## 2022-01-19 RX ORDER — SODIUM CHLORIDE/ALOE VERA
GEL (GRAM) NASAL
Qty: 14 G | Refills: 3 | Status: SHIPPED | OUTPATIENT
Start: 2022-01-19 | End: 2022-05-12

## 2022-01-19 NOTE — LETTER
[facility]  [address] [address] [address]  Phone: [phone]  Fax: [address] [place] [address], PAAlfredoC      January 20, 2022     Patient: Jesus Brewer [facility]   MR Number: [id number]   YOB: 1965   Date of Visit: 1/19/2022       Dear Dr. Todd Madsen:    Thank you for referring Grimes Loop to me for evaluation/treatment. Below are the relevant portions of my assessment and plan of care. If you have questions, please do not hesitate to call me. I look forward to following Lana Camargo along with you.     Sincerely,        SAULO Vance PA-C    CC providers:  SUZANNE Moy CNP  [address] [place] [id number]  Via Fax: [phone]

## 2022-01-19 NOTE — PROGRESS NOTES
[facility] OTOLARYNGOLOGY/ENT  Mr. Zoey Wright is a pleasant 61-year-old  male that was referred by Ashley Biggs due to problems with epistaxis. Patient reports that on January 9 he had awakened with acute bleeding from the left nare. He denies any traumatic history and denies taking any type of nasal medications. He was able to hold pressure and subside the bleeding. Since then he has had recurrent bleeding off and on and is requesting evaluation. The patient admits to being on Plavix therapy due to having 4 prior CVAs. He also admits to having issues recently with of hypertension that has been uncontrolled. Allergies: Penicillins      Current Outpatient Medications   Medication Sig Dispense Refill    saline nasal gel (AYR) GEL Apply to the septum bilaterally with a Q-tip twice a day 14 g 3    oxymetazoline (AFRIN) 0.05 % nasal spray 3 squirts to the nose as needed for acute bleeding 14 mL 0    gabapentin (NEURONTIN) 400 MG capsule Take 1 capsule by mouth 3 times daily for 90 days. 90 capsule 2    amLODIPine (NORVASC) 5 MG tablet Take 5 mg by mouth daily      escitalopram (LEXAPRO) 20 MG tablet Take 20 mg by mouth daily      aspirin 81 MG tablet Take 81 mg by mouth daily      Melatonin 10 MG CAPS Take by mouth nightly as needed       clopidogrel (PLAVIX) 75 MG tablet Take 75 mg by mouth daily       Acetaminophen (TYLENOL 8 HOUR PO) Take 500 mg by mouth as needed      atorvastatin (LIPITOR) 40 MG tablet Take 40 mg by mouth daily      Butalbital-Acetaminophen  MG CAPS Take 1 tablet by mouth 2 times daily as needed       ALPRAZolam (XANAX) 0.25 MG tablet Take 0.5 mg by mouth nightly as needed for Sleep.  nitroGLYCERIN (NITROSTAT) 0.4 MG SL tablet Place 1 tablet under the tongue every 5 minutes as needed for Chest pain up to max of 3 total doses. If no relief after 1 dose, call 911. 25 tablet 3     No current facility-administered medications for this visit.        Past Surgical History:   Procedure Laterality Date    ARM SURGERY Left 11/4/2021    LEFT IN SITU CUBITAL TUNNEL RELEASE performed by Adri [facility]ainez MD at [facility] Right 12/30/2021    RIGHT IN SITU CUBITAL TUNNEL DECOMPRESSION performed by Adri Lainez MD at [address]  2013    CARPAL TUNNEL RELEASE Right 10/22/2020    RIGHT OPEN CARPAL TUNNEL RELEASE performed by Adri Lainez MD at [address] Left 12/3/2020    LEFT OPEN CARPAL TUNNEL RELEASE performed by Adri Lainez MD at [facility]      tooth cut out 2 weeks ago    SMALL INTESTINE SURGERY      TONSILLECTOMY         Past Medical History:   Diagnosis Date    Anxiety     Diverticulitis     Hx of blood clots     Hypertension     Nosebleed     Pneumonia     Stroke (cerebrum) ([address]     2019 and 2014 tia june 2013 and feb 2021       Family History   Problem Relation Age of Onset    Cancer Mother     Lung Cancer Mother     Kidney Cancer Mother     Brain Cancer Mother     Heart Attack Father        Social History     Tobacco Use    Smoking status: Current Some Day Smoker     Packs/day: 1.00     Years: 40.00     Pack years: 40.00     Types: Cigarettes    Smokeless tobacco: Never Used    Tobacco comment: Cutting down   Substance Use Topics    Alcohol use: Yes     Alcohol/week: 7.0 standard drinks     Types: 7 Cans of beer per week     Comment: 1 0R 2 TIMES A WEEK           REVIEW OF SYSTEMS:  all other systems reviewed and are negative  Review of Systems   Constitutional: Negative for chills and fever. HENT: Negative for congestion, dental problem, ear discharge, ear pain, facial swelling, hearing loss, postnasal drip, rhinorrhea, sinus pressure, sinus pain, sore throat, tinnitus, trouble swallowing and voice change. Eyes: Negative for photophobia and pain. Neurological: Negative for dizziness and headaches.            Comments:     PHYSICAL EXAM:    BP 128/76   Ht 6' 1\" (1.854 m)   Wt 250 lb (113.4 kg)   BMI 32.98 kg/m²   Body mass index is 32.98 kg/m². General Appearance: well developed  and well nourished  Head/ Face: normocephalic and atraumatic  Vocal Quality: good/ normal  Ears: Right Ear: External: external ears normal Otoscopy Ear Canal: canal clear Otoscopy TM: TM's normal and TM's mobile Left Ear: External: external ears normal Otoscopy Ear Canal: canal clear Otoscopy TM: TM's normal and TM's mobile  Hearing: grossly intact  Nose: The patient was noted with no evidence of a nasal septal ulceration bilaterally. The mucosal did appear to be inflamed. No evidence of nasal polyps was present. The turbinates were noted to be unremarkable. Neck: supple and adenopathy none palpable  Thyroid: normal and nodules No    Assessment & Plan:    Problem List Items Addressed This Visit     Epistaxis - Primary     History of epistaxis with no evidence of a nasal septal ulceration  Plan: I recommended treating this with saline nasal gel twice a day for 2 weeks. I also prescribed Afrin nasal spray to be used if he has recurrent bleeding. At this point the patient is follow-up with me as needed but was advised to call if he has recurrent bleeds. No orders of the defined types were placed in this encounter. Orders Placed This Encounter   Medications    saline nasal gel (AYR) GEL     Sig: Apply to the septum bilaterally with a Q-tip twice a day     Dispense:  14 g     Refill:  3    oxymetazoline (AFRIN) 0.05 % nasal spray     Sig: 3 squirts to the nose as needed for acute bleeding     Dispense:  14 mL     Refill:  0       Electronically signed by Price Krishnamurthy PA-C on 1/20/22 at 4:30 PM CST        Please note that this chart was generated using dragon dictation software. Although every effort was made to ensure the accuracy of this automated transcription, some errors in transcription may have occurred.

## 2022-01-20 PROBLEM — R04.0 EPISTAXIS: Status: ACTIVE | Noted: 2022-01-20

## 2022-01-20 ASSESSMENT — ENCOUNTER SYMPTOMS
RHINORRHEA: 0
FACIAL SWELLING: 0
SINUS PRESSURE: 0
SORE THROAT: 0
TROUBLE SWALLOWING: 0
VOICE CHANGE: 0
EYE PAIN: 0
SINUS PAIN: 0
PHOTOPHOBIA: 0

## 2022-01-24 ENCOUNTER — HOSPITAL ENCOUNTER (OUTPATIENT)
Dept: PAIN MANAGEMENT | Age: 57
Discharge: HOME | End: 2022-01-24
Payer: COMMERCIAL

## 2022-01-24 VITALS
DIASTOLIC BLOOD PRESSURE: 87 MMHG | HEART RATE: 82 BPM | HEIGHT: 73 IN | WEIGHT: 259.6 LBS | BODY MASS INDEX: 34.4 KG/M2 | SYSTOLIC BLOOD PRESSURE: 143 MMHG | TEMPERATURE: 98.3 F | RESPIRATION RATE: 18 BRPM | OXYGEN SATURATION: 93 %

## 2022-01-24 PROCEDURE — 99213 OFFICE O/P EST LOW 20 MIN: CPT

## 2022-01-24 PROCEDURE — 99213 OFFICE O/P EST LOW 20 MIN: CPT | Performed by: NURSE PRACTITIONER

## 2022-01-24 ASSESSMENT — ENCOUNTER SYMPTOMS
BOWEL INCONTINENCE: 0
CONSTIPATION: 0
BACK PAIN: 1

## 2022-01-24 ASSESSMENT — PAIN DESCRIPTION - ORIENTATION: ORIENTATION: LOWER

## 2022-01-24 ASSESSMENT — PAIN SCALES - GENERAL: PAINLEVEL_OUTOF10: 2

## 2022-01-24 ASSESSMENT — PAIN DESCRIPTION - LOCATION: LOCATION: BACK

## 2022-01-24 ASSESSMENT — PAIN DESCRIPTION - PAIN TYPE: TYPE: CHRONIC PAIN

## 2022-01-24 NOTE — PROGRESS NOTES
[facility] Physical [facility] Pain Medicine    Office Visit    Patient Name: Nancy Chacon III    MR #: [id number]    Account [de-identified]    : 1965    Age: 56 y.o. Sex: male    Date: 10/21/2021    PCP: Yasmine Head MD    Chief Complaint:   Chief Complaint   Patient presents with    Lower Back Pain       History of Present Illness: The patient is a 56 y.o. male who presents for procedure follow-up. Patient had LESI of L4/L5 on 10/26/2021 and Thoracic and Lumbar trigger points on 11/3/2021. Patient had at least 85% relief of pain from procedure(s) for at least 6 weeks and was able to increase activity after procedure. Patient received enough pain relief from injections that the patient would like to repeat the injection(s). Patient has had very minimal pain since injections. Back Pain  This is a recurrent problem. The current episode started more than 1 year ago. The problem occurs constantly. The problem has been gradually improving since onset. The pain is present in the lumbar spine and sacro-iliac. The quality of the pain is described as aching, burning and stabbing. Radiates to: right low back into buttocks posteriorly down leg to knee/calf area. Exacerbated by: general activity pain intermittently flares. Associated symptoms include leg pain. Pertinent negatives include no bladder incontinence, bowel incontinence, numbness or weakness. Screening Tools:     PE    Past PEG: 10    ORT: 0    PHQ-9: 3    Current Pain Assessment  Pain Assessment  Pain Level: 2  Pain Type: Chronic pain  Pain Location: Back  Pain Orientation: Lower    Past Visit HPI:   10/21/2021  Patient had LESI of L4-L5 on 2021. Bilateral thoracic and lumbar trigger points on 9/15/2021. Patient had at least 85% relief of pain from procedure(s) for at least 6 weeks and was able to increase activity after procedure.  Patient received enough pain relief from injections that the patient would like to repeat the injection(s). 9/2/2021  presents for imaging follow up and procedure follow up. Patient had MRI of Lumbar spine on 7/2/2021. Degenerative disc disease noted at L2-L3 and L5-S1 with loss of hydration and disc height. L2-L3 has a annular fissure with broad-based left lateral protrusion of disc moderate left-sided foraminal narrowing present L3-L4 moderate facet and ligamentous hypertrophy with mild broad-based disc bulge mild central canal stenosis and left-sided foraminal narrowing L4-L5 moderate facet ligamentous hypertrophy mild broad-based disc bulge and mild central spinal stenosis mild bilateral neural narrowing L5-S1 central disc protrusion with no evidence of central stenosis mild facet ligamentous hypertrophy    Scheduled for LESI of L4-L5 on 7/20/2021. Patient had at least 85% relief of pain from procedure(s) for at least 6 weeks and was able to increase activity after procedure. Patient received enough pain relief from injections that the patient would like to repeat the injection(s). 6/17/2021  Eight years ago, He had discectomy of left side. He did really good until now. He did have injections under fluoroscopy that did help but over time stopped. That's when he underwent surgery. Patient started workup for his low back. He has xray in Jan which indicated DDD and started PT. However, patient did have a stroke this past spring. Patient has exhausted his therapy benefits.        Employment: manual labor    Past Medical History  Past Medical History:   Diagnosis Date    Anxiety     Diverticulitis     Hx of blood clots     Hypertension     Nosebleed     Pneumonia     Stroke (cerebrum) ([facility] [address]     2019 and 2014 tia june 2013 and feb 2021       Allergies  Penicillins    Current Medications  Current Outpatient Medications   Medication Sig Dispense Refill    saline nasal gel (AYR) GEL Apply to the septum bilaterally with a Q-tip twice a day 14 g 3    oxymetazoline (AFRIN) 0.05 % nasal spray 3 squirts to the nose as needed for acute bleeding 14 mL 0    gabapentin (NEURONTIN) 400 MG capsule Take 1 capsule by mouth 3 times daily for 90 days. 90 capsule 2    amLODIPine (NORVASC) 5 MG tablet Take 5 mg by mouth daily      escitalopram (LEXAPRO) 20 MG tablet Take 20 mg by mouth daily      aspirin 81 MG tablet Take 81 mg by mouth daily      Melatonin 10 MG CAPS Take by mouth nightly as needed       clopidogrel (PLAVIX) 75 MG tablet Take 75 mg by mouth daily       Acetaminophen (TYLENOL 8 HOUR PO) Take 500 mg by mouth as needed      atorvastatin (LIPITOR) 40 MG tablet Take 40 mg by mouth daily      Butalbital-Acetaminophen  MG CAPS Take 1 tablet by mouth 2 times daily as needed       ALPRAZolam (XANAX) 0.25 MG tablet Take 0.5 mg by mouth nightly as needed for Sleep.  nitroGLYCERIN (NITROSTAT) 0.4 MG SL tablet Place 1 tablet under the tongue every 5 minutes as needed for Chest pain up to max of 3 total doses. If no relief after 1 dose, call 911. 25 tablet 3     No current facility-administered medications for this encounter. Social History    Social History     Socioeconomic History    Marital status:      Spouse name: brittany    Number of children: 0    Years of education: None    Highest education level: None   Occupational History    None   Tobacco Use    Smoking status: Current Some Day Smoker     Packs/day: 1.50     Years: 40.00     Pack years: 60.00     Types: Cigarettes    Smokeless tobacco: Never Used    Tobacco comment: Cutting down   Vaping Use    Vaping Use: Never used   Substance and Sexual Activity    Alcohol use:  Yes     Alcohol/week: 7.0 standard drinks     Types: 7 Cans of beer per week     Comment: 1 0R 2 TIMES A WEEK    Drug use: Never    Sexual activity: Yes   Other Topics Concern    None   Social History Narrative    None     Social Determinants of Health     Financial Resource Strain:     Difficulty of Paying Living Expenses: Not on file   Food Insecurity:     Worried About Running Out of Food in the Last Year: Not on file    Fabricio of Food in the Last Year: Not on file   Transportation Needs:     Lack of Transportation (Medical): Not on file    Lack of Transportation (Non-Medical): Not on file   Physical Activity:     Days of Exercise per Week: Not on file    Minutes of Exercise per Session: Not on file   Stress:     Feeling of Stress : Not on file   Social Connections:     Frequency of Communication with Friends and Family: Not on file    Frequency of Social Gatherings with Friends and Family: Not on file    Attends Christian Services: Not on file    Active Member of [address] [place] [address] [employer] Services or Organizations: Not on file    Attends Club or Organization Meetings: Not on file    Marital Status: Not on file   Intimate Partner Violence:     Fear of Current or Ex-Partner: Not on file    Emotionally Abused: Not on file    Physically Abused: Not on file    Sexually Abused: Not on file   Housing Stability:     Unable to Pay for Housing in the Last Year: Not on file    Number of [place] in the Last Year: Not on file    Unstable Housing in the Last Year: Not on file         Family History  family history includes Brain Cancer in his mother; Cancer in his mother; Heart Attack in his father; Kidney Cancer in his mother; Edward Knights in his mother. Review of Systems:  Review of Systems   Constitutional: Negative for activity change. Gastrointestinal: Negative for bowel incontinence and constipation. Genitourinary: Negative for bladder incontinence. Musculoskeletal: Positive for arthralgias, back pain, gait problem and myalgias. Negative for joint swelling, neck pain and neck stiffness. Neurological: Negative for weakness and numbness. Psychiatric/Behavioral: Positive for sleep disturbance (occasionally). Negative for agitation, self-injury and suicidal ideas. The patient is not nervous/anxious.          14 point ROS negative besides that noted in HPI    Physical exam:     Vitals:    01/24/22 0853   BP: (!) 143/87   Pulse: 82   Resp: 18   Temp: 98.3 °F (36.8 °C)   TempSrc: Temporal   SpO2: 93%   Weight: 259 lb 9.6 oz (117.8 kg)   Height: 6' 1\" (1.854 m)       Body mass index is 34.25 kg/m². Physical Exam  Vitals and nursing note reviewed. Constitutional:       General: He is not in acute distress. Appearance: He is well-developed. HENT:      Head: Normocephalic. Right Ear: External ear normal.      Left Ear: External ear normal.      Nose: Nose normal.   Eyes:      Conjunctiva/sclera: Conjunctivae normal.      Pupils: Pupils are equal, round, and reactive to light. Neck:      Vascular: No JVD. Trachea: No tracheal deviation. Cardiovascular:      Rate and Rhythm: Normal rate. Pulmonary:      Effort: Pulmonary effort is normal.   Abdominal:      General: There is no distension. Tenderness: There is no abdominal tenderness. Musculoskeletal:      Lumbar back: Spasms (tender palpable knots ie trigger points identified) and tenderness present. Positive right straight leg raise test. Negative left straight leg raise test.      Comments: No foot drop  No clonus  SI exam negative  Piriformis negative     Skin:     General: Skin is warm and dry. Neurological:      Mental Status: He is alert and oriented to person, place, and time. Sensory: No sensory deficit. Psychiatric:         Behavior: Behavior normal.         Thought Content:  Thought content normal.         Judgment: Judgment normal.         Labs:     Lab Results   Component Value Date     10/29/2021    K 4.7 10/29/2021    K 3.6 02/22/2021     10/29/2021    CO2 29 10/29/2021    BUN 19 10/29/2021    CREATININE 0.9 10/29/2021    GLUCOSE 92 10/29/2021    CALCIUM 9.4 10/29/2021        Lab Results   Component Value Date    WBC 7.3 10/29/2021    HGB 16.7 10/29/2021    HCT 50.8 10/29/2021    .1 (H) 10/29/2021     10/29/2021       Assessment: Active Problems:    Tobacco abuse counseling    Lumbar radiculopathy    Back pain with history of spinal surgery    Chronic right-sided low back pain with right-sided sciatica    Pain medication agreement    Muscle spasm of back    Spasm of thoracic back muscle  Resolved Problems:    * No resolved hospital problems. *      PLAN:  Back pain with history of spinal surgery  Chronic right-sided low back pain with right-sided sciatica  Continue medication with no refill sent at appointment see refill encounter. Patient takes very sparingly - HYDROcodone-acetaminophen (NORCO)  MG per tablet; Take 1 tablet by mouth every 6 hours as needed for Pain for up to 30 days. Intended supply: 30 days  Dispense: 90 tablet; Refill: 0      Lumbar radiculopathy  Continue medication with no refill sent at appointment see refill encounter.     - gabapentin (NEURONTIN) 400 MG capsule; Take 1 capsule by mouth 3 times daily for 90 days. Dispense: 90 capsule; Refill: 2      Continue medication with no refill sent at appointment see refill encounter.     - diclofenac (FLECTOR) 1.3 % PTCH patch; Place 1 patch onto the skin 2 times daily  Dispense: 60 patch; Refill: 5    Patient takes pain medication sparingly but he has been having worsening pain and has needed more than 2 a day on some days therefore will increase quantity to 90. Schedule the week after LESI if LESI is reschedule these need to be rescheduled as well. bilateral thoracic and lumbar trigger point injections for myofascial pain with NP     Schedule in 4 months from last injection date. Patient may call to push out if he still feels that LESI is not needed. LESI of L4-L5 under fluoroscopy with Dr. Vallie Merlin as patient had good results from previous injection. Patient on Plavix and aspirin.   Received authorization from PCP for patient to hold medication. [x] Follow up    [] 4 weeks   [x] 6-8 weeks   [] 10-12 weeks   [] 3 months  [x] Post procedure to evaluate effectiveness of treatment  [] To evaluate medications changes made at office visit. [] To review diagnostics ordered at last visit. [] To evaluate response to therapy    [x] For management of controlled substance  [x] Random UDS as indicated by ORT score or if indicated by abberent behaviors    Discussion: Discussed exam findings and plan of care with patient. Patient agreed with POC and questions were asked and answered. Activity: discussed exercise as beneficial to pain reduction, encouraged stretching exercise with a focus on torso strengthening. Goal:  Pain Management Goals of Therapy:   [] Resolution in pain  [x] Decrease in pain level  [x] Improvement in ADL's  [x] Increase in activities with less pain  [] Decrease in medication     Controlled substance monitoring:    [x] Discussed medication side effects, risk of tolerance and/or dependence, and/or alternative treatment  [] Discussed the detrimental effects of long term narcotic use in younger patients especially women of childbearing years.   [x] No signs and symptoms of potential drug abuse or diversion were identified  [] Signs of potential drug abuse or diversion were identified   [] ORT Score   [] UDS non-compliant   [] See Note  [] Random urine drug screen sent today  [x] Random urine drug screen not completed today   [] Deferred New Patient  [x] Compliant 9/2/2021  [] Not Compliant see note  [] Medication agreement with provider signed today  [x] Medication agreement with provider on file under media 6/17/2021  [] Medication regimen effective with no c/o side effects and continued   [] New patient continuing current medication while developing POC   [x] On going assessment and evaluation of medication regimen  [] Medication regimen not effective see plan for changes  [x] Karis Molina reviewed & on file under media     CC: MD Mendez Stokes, APRN - CNP, 1/24/2022 at 9:10 AM    EMR dragon/transcription disclaimer: Much of this encounter note is electronic transcription/translation of spoken language to printed tach. Electronic translation of spoken language may be erroneous, or at times, nonsensical words or phrases may be inadvertently transcribed.  Although, I have reviewed the note for such errors, some may still exist.

## 2022-01-24 NOTE — PROGRESS NOTES
Clinic Documentation      Education Provided:  [x] Review of Sharlon Enter  [] Agreement Review  [x] PEG Score Calculated [] PHQ Score Calculated [] ORT Score Calculated    [] Compliance Issues Discussed [] Cognitive Behavior Needs [x] Exercise [] Review of Test [] Financial Issues  [x] Tobacco/Alcohol Use Reviewed [x] Teaching [] New Patient [x] Picture Obtained    Physician Plan:  [] Outgoing Referral  [] Pharmacy Consult  [] Test Ordered [] Prescription Ordered/Changed   [] Obtained Test Results / Consult Notes        Complete if patient is withholding blood thinner for procedure     Blood Thinner Patient is currently taking:      [x] Plavix (Hold for 7 days)  [x] Aspirin (Hold for 5 days)     [] Pletal (Hold for 2 days)  [] Pradaxa (Hold for 3 days)    [] Effient (Hold for 7 days)  [] Xarelto (Hold for 2 days)    [] Eliquis (Hold for 2 days)  [] Brilinta (Hold for 7 days)    [] Coumadin (Hold for 5 days) - (INR needs to be drawn the day prior to procedure- INR < 2.0)    [] Aggrenox (Hold for 7 days)        [] Patient will stop medication on their own. [x] Blood Thinner Form Faxed for approval to hold.    Provider form faxed to: Dr Melly Ramos Completed by:  Electronically signed by Tanner Bunch RN on 1/24/2022 at 8:58 AM

## 2022-02-07 ENCOUNTER — LAB (OUTPATIENT)
Dept: LAB | Facility: HOSPITAL | Age: 57
End: 2022-02-07

## 2022-02-07 ENCOUNTER — TRANSCRIBE ORDERS (OUTPATIENT)
Dept: ADMINISTRATIVE | Facility: HOSPITAL | Age: 57
End: 2022-02-07

## 2022-02-07 DIAGNOSIS — L65.9 NON-SCARRING HAIR LOSS: ICD-10-CM

## 2022-02-07 DIAGNOSIS — M54.16 LUMBAR RADICULOPATHY: ICD-10-CM

## 2022-02-07 DIAGNOSIS — F41.1 GAD (GENERALIZED ANXIETY DISORDER): ICD-10-CM

## 2022-02-07 DIAGNOSIS — L65.9 NON-SCARRING HAIR LOSS: Primary | ICD-10-CM

## 2022-02-07 PROCEDURE — 36415 COLL VENOUS BLD VENIPUNCTURE: CPT

## 2022-02-07 PROCEDURE — 84436 ASSAY OF TOTAL THYROXINE: CPT

## 2022-02-07 PROCEDURE — 84443 ASSAY THYROID STIM HORMONE: CPT

## 2022-02-07 RX ORDER — GABAPENTIN 400 MG/1
400 CAPSULE ORAL 3 TIMES DAILY
Qty: 90 CAPSULE | Refills: 2 | Status: SHIPPED | OUTPATIENT
Start: 2022-02-07 | End: 2022-04-21 | Stop reason: SDUPTHER

## 2022-02-08 LAB
T4 SERPL-MCNC: 6.64 MCG/DL (ref 4.5–11.7)
TSH SERPL DL<=0.05 MIU/L-ACNC: 3.25 UIU/ML (ref 0.27–4.2)

## 2022-03-22 ENCOUNTER — TELEPHONE (OUTPATIENT)
Dept: PAIN MANAGEMENT | Age: 57
End: 2022-03-22

## 2022-03-29 ENCOUNTER — HOSPITAL ENCOUNTER (OUTPATIENT)
Dept: PAIN MANAGEMENT | Age: 57
Discharge: HOME | End: 2022-03-29
Payer: MEDICARE

## 2022-03-29 VITALS
TEMPERATURE: 96.4 F | SYSTOLIC BLOOD PRESSURE: 129 MMHG | OXYGEN SATURATION: 97 % | DIASTOLIC BLOOD PRESSURE: 89 MMHG | RESPIRATION RATE: 18 BRPM | HEART RATE: 68 BPM

## 2022-03-29 DIAGNOSIS — R52 PAIN MANAGEMENT: ICD-10-CM

## 2022-03-29 PROCEDURE — 62323 NJX INTERLAMINAR LMBR/SAC: CPT

## 2022-03-29 PROCEDURE — 3209999900 FLUORO FOR SURGICAL PROCEDURES

## 2022-03-29 PROCEDURE — 2580000003 HC RX 258

## 2022-03-29 PROCEDURE — A4216 STERILE WATER/SALINE, 10 ML: HCPCS

## 2022-03-29 PROCEDURE — 6360000002 HC RX W HCPCS

## 2022-03-29 PROCEDURE — 2500000003 HC RX 250 WO HCPCS

## 2022-03-29 RX ORDER — METHYLPREDNISOLONE ACETATE 40 MG/ML
80 INJECTION, SUSPENSION INTRA-ARTICULAR; INTRALESIONAL; INTRAMUSCULAR; SOFT TISSUE ONCE
Status: DISCONTINUED | OUTPATIENT
Start: 2022-03-29 | End: 2022-03-31 | Stop reason: HOSPADM

## 2022-03-29 RX ORDER — LIDOCAINE HYDROCHLORIDE 10 MG/ML
5 INJECTION, SOLUTION EPIDURAL; INFILTRATION; INTRACAUDAL; PERINEURAL ONCE
Status: DISCONTINUED | OUTPATIENT
Start: 2022-03-29 | End: 2022-03-31 | Stop reason: HOSPADM

## 2022-03-29 RX ORDER — SODIUM CHLORIDE 9 MG/ML
5 INJECTION INTRAVENOUS ONCE
Status: DISCONTINUED | OUTPATIENT
Start: 2022-03-29 | End: 2022-03-31 | Stop reason: HOSPADM

## 2022-03-29 ASSESSMENT — PAIN - FUNCTIONAL ASSESSMENT: PAIN_FUNCTIONAL_ASSESSMENT: 0-10

## 2022-03-29 NOTE — INTERVAL H&P NOTE
Update History & Physical    The patient's History and Physical  was reviewed with the patient and I examined the patient. There was NO CHANGE:[id number]}. The surgical site was confirmed by the patient and me. Plan: The risks, benefits, expected outcome, and alternative to the recommended procedure have been discussed with the patient. Patient understands and wants to proceed with the procedure.      Electronically signed by Zahira Mckenzie MD on 3/29/2022 at 8:29 AM

## 2022-04-20 ENCOUNTER — HOSPITAL ENCOUNTER (OUTPATIENT)
Dept: PAIN MANAGEMENT | Age: 57
Discharge: HOME | End: 2022-04-20
Payer: MEDICARE

## 2022-04-20 VITALS
RESPIRATION RATE: 18 BRPM | TEMPERATURE: 98.3 F | HEART RATE: 79 BPM | OXYGEN SATURATION: 95 % | DIASTOLIC BLOOD PRESSURE: 77 MMHG | SYSTOLIC BLOOD PRESSURE: 123 MMHG

## 2022-04-20 PROCEDURE — 20553 NJX 1/MLT TRIGGER POINTS 3/>: CPT | Performed by: NURSE PRACTITIONER

## 2022-04-20 PROCEDURE — 20553 NJX 1/MLT TRIGGER POINTS 3/>: CPT

## 2022-04-20 PROCEDURE — 2500000003 HC RX 250 WO HCPCS

## 2022-04-20 RX ORDER — BUPIVACAINE HYDROCHLORIDE 5 MG/ML
15 INJECTION, SOLUTION EPIDURAL; INTRACAUDAL ONCE
Status: DISCONTINUED | OUTPATIENT
Start: 2022-04-20 | End: 2022-04-22 | Stop reason: HOSPADM

## 2022-04-20 RX ORDER — LIDOCAINE HYDROCHLORIDE 10 MG/ML
15 INJECTION, SOLUTION EPIDURAL; INFILTRATION; INTRACAUDAL; PERINEURAL ONCE
Status: DISCONTINUED | OUTPATIENT
Start: 2022-04-20 | End: 2022-04-22 | Stop reason: HOSPADM

## 2022-04-21 DIAGNOSIS — M54.16 LUMBAR RADICULOPATHY: ICD-10-CM

## 2022-04-21 NOTE — PROCEDURES
[facility] Physical [facility] Pain [facility]    Patient Name: Emily Gray III    : 1965                    Age: 56 y.o. Sex: male    Date: 2022    Pre-op Diagnosis: Myofascial Pain/ Muscle Spasms    Post-op Diagnosis: Myofascial Pain/ Muscle Spasms    Procedure: Thoracic Trigger Point Injections    Performing Procedure: Jess Evangelista, ISABELLP - BC, VA-BC    Patient Vitals for the past 24 hrs:   BP Temp Temp src Pulse Resp SpO2   22 1011 123/77 98.3 °F (36.8 °C) Temporal 79 18 95 %       Description of Procedure:  After a brief physical assessment and failure to improve with conservative measures the patient presented for Thoracic Trigger Point Injections The indications, limitations and possible complications were discussed with the patient and the patient elected to proceed with the procedure. After voluntary, informed and signed consent obtained the patient was placed in a seated position. Appropriate time out was obtained per policy. The areas were then prepped in a sterile fashion with Chloro-Prep. The area of maximal tenderness was palpated over the bilaterally Thoracic Muscles - Erector Spinae, Upper/Mid Latissimus, Rhomboid Minor, Rhomboid Major. The skin overlying these areas was marked with a skin marker. The areas were prepped using aseptic technique with CHG prep. The skin overlying the proposed injection sites were then sprayed with Gebauer's Solution while protecting patient eyes. Each trigger point of the Thoracic Muscles - Erector Spinae, Upper/Mid Latissimus, Rhomboid Minor, Rhomboid Major was injected after negative aspiration was injected with approximately 1-2 ml of a solution of 5 ml of 1% Lidocaine Plain and 5 ml of 0.5% Marcaine Plain after negative aspiration    Toradol 0.5 ml (30mg/ml) added to each syringed.      Pre-op Diagnosis: Myofascial Pain/ Muscle Spasms    Post-op Diagnosis: Myofascial Pain/ Muscle Spasms    Procedure: Lumbar Trigger Point Injections    Performing Procedure: Orvil Pro, ACAGNP - BC; VA-BC    Patient Vitals for the past 24 hrs:   BP Temp Temp src Pulse Resp SpO2   04/20/22 1011 123/77 98.3 °F (36.8 °C) Temporal 79 18 95 %       Description of Procedure:    After a brief physical assessment and failure to improve with conservative measures the patient presented for Lumbar Trigger Point Injections The indications, limitations and possible complications were discussed with the patient and the patient elected to proceed with the procedure. After voluntary, informed and signed consent obtained the patient was placed in a seated position. Appropriate time out was obtained per policy. The area of maximal tenderness was palpated over the bilaterally Lumbar Muscles - Lower Latissimus,  Erector Spinae, Lumbar Paraspinous. The skin overlying these areas was marked with a skin marker. The areas were prepped using aseptic technique with CHG prep. The skin overlying the proposed injection sites were then sprayed with Gebauer's Solution while protecting patient eyes. Each trigger point of the Lumbar Muscles - Lower Latissimus,  Erector Spinae, Lumbar Paraspinous was injected after negative aspiration was injected with approximately 1-2 ml of a solution of 5 ml of 1% Lidocaine Plain and 5 ml of 0.5% Marcaine Plain after negative aspiration    Toradol 0.5 ml (30mg/ml) added to each syringed. Discharge: The patient tolerated the procedure well. There were no complications during the procedure and the patient was discharged home with discharge instructions. The patient has been instructed to contact the office should there be any complications or questions to arise between today and their next appointment. Plan:     Will return to the office in 6 weeks for follow up    SUZANNE Thompson CNP, 4/20/2022 at 9:28 PM

## 2022-04-22 RX ORDER — GABAPENTIN 400 MG/1
400 CAPSULE ORAL 3 TIMES DAILY
Qty: 270 CAPSULE | Refills: 0 | Status: SHIPPED | OUTPATIENT
Start: 2022-04-22 | End: 2022-05-12 | Stop reason: SDUPTHER

## 2022-04-27 ENCOUNTER — TELEPHONE (OUTPATIENT)
Dept: PAIN MANAGEMENT | Age: 57
End: 2022-04-27

## 2022-05-12 ENCOUNTER — HOSPITAL ENCOUNTER (OUTPATIENT)
Dept: PAIN MANAGEMENT | Age: 57
Discharge: HOME | End: 2022-05-12
Payer: MEDICARE

## 2022-05-12 VITALS
WEIGHT: 251 LBS | HEART RATE: 74 BPM | BODY MASS INDEX: 33.27 KG/M2 | SYSTOLIC BLOOD PRESSURE: 120 MMHG | DIASTOLIC BLOOD PRESSURE: 78 MMHG | HEIGHT: 73 IN | OXYGEN SATURATION: 97 % | RESPIRATION RATE: 16 BRPM | TEMPERATURE: 97.4 F

## 2022-05-12 DIAGNOSIS — M53.3 SACROILIAC JOINT DYSFUNCTION: ICD-10-CM

## 2022-05-12 DIAGNOSIS — M54.16 LUMBAR RADICULOPATHY: ICD-10-CM

## 2022-05-12 PROCEDURE — 99213 OFFICE O/P EST LOW 20 MIN: CPT

## 2022-05-12 PROCEDURE — 99213 OFFICE O/P EST LOW 20 MIN: CPT | Performed by: NURSE PRACTITIONER

## 2022-05-12 RX ORDER — GABAPENTIN 400 MG/1
400 CAPSULE ORAL 3 TIMES DAILY
Qty: 270 CAPSULE | Refills: 0 | Status: SHIPPED | OUTPATIENT
Start: 2022-05-12 | End: 2022-08-22 | Stop reason: SDUPTHER

## 2022-05-12 RX ORDER — ALPRAZOLAM 0.5 MG/1
1 TABLET ORAL 2 TIMES DAILY PRN
COMMUNITY
Start: 2022-04-21 | End: 2022-10-13

## 2022-05-12 RX ORDER — AMLODIPINE BESYLATE 10 MG/1
10 TABLET ORAL DAILY
COMMUNITY
Start: 2022-05-04

## 2022-05-12 ASSESSMENT — ENCOUNTER SYMPTOMS
BOWEL INCONTINENCE: 0
CONSTIPATION: 0
BACK PAIN: 1

## 2022-05-12 ASSESSMENT — PAIN DESCRIPTION - LOCATION: LOCATION: BACK

## 2022-05-12 ASSESSMENT — PAIN DESCRIPTION - PAIN TYPE: TYPE: CHRONIC PAIN

## 2022-05-12 ASSESSMENT — PAIN DESCRIPTION - ORIENTATION: ORIENTATION: LOWER

## 2022-05-12 ASSESSMENT — PAIN SCALES - GENERAL: PAINLEVEL_OUTOF10: 2

## 2022-05-12 NOTE — PROGRESS NOTES
[facility] Physical & Pain Medicine    Office Visit    Patient Name: Yamini Harper III    MR #: [id number]    Account [de-identified]    : 1965    Age: 56 y.o. Sex: male    Date: 2022    PCP: Norman Dugan MD    Chief Complaint:   Chief Complaint   Patient presents with    Back Pain       History of Present Illness: The patient is a 56 y.o. male who presents for procedure follow-up. Patient had LESI of L4/L5 on 3/29/2022 and Thoracic and Lumbar trigger points on 2022. Patient had at least 85% relief of pain from procedure(s) for at least 6 weeks and was able to increase activity after procedure. Patient received enough pain relief from injections that the patient would like to repeat the injection(s). Patient has started having pain in the left side of low back. This does not radiate. It is localized to the left side. Patient points to the left SI joint      Back Pain  This is a recurrent problem. The current episode started more than 1 year ago. The problem occurs constantly. The problem has been waxing and waning since onset. The pain is present in the lumbar spine and sacro-iliac. The quality of the pain is described as aching, burning and stabbing. Radiates to: right low back into buttocks posteriorly down leg to knee/calf area. Exacerbated by: general activity pain intermittently flares. Associated symptoms include leg pain. Pertinent negatives include no bladder incontinence, bowel incontinence, numbness or weakness. Screening Tools:     PE.3    Past PE    ORT: 0    PHQ-9: 3    Current Pain Assessment  Pain Assessment  Pain Assessment: 0-10  Pain Level: 2  Pain Location: Back  Pain Orientation: Lower  Pain Type: Chronic pain    Past Visit HPI:   10/21/2021  Patient had LESI of L4-L5 on 2021. Bilateral thoracic and lumbar trigger points on 9/15/2021.  Patient had at least 85% relief of pain from procedure(s) for at least 6 weeks and was able to increase activity after procedure. Patient received enough pain relief from injections that the patient would like to repeat the injection(s). 9/2/2021  presents for imaging follow up and procedure follow up. Patient had MRI of Lumbar spine on 7/2/2021. Degenerative disc disease noted at L2-L3 and L5-S1 with loss of hydration and disc height. L2-L3 has a annular fissure with broad-based left lateral protrusion of disc moderate left-sided foraminal narrowing present L3-L4 moderate facet and ligamentous hypertrophy with mild broad-based disc bulge mild central canal stenosis and left-sided foraminal narrowing L4-L5 moderate facet ligamentous hypertrophy mild broad-based disc bulge and mild central spinal stenosis mild bilateral neural narrowing L5-S1 central disc protrusion with no evidence of central stenosis mild facet ligamentous hypertrophy    Scheduled for LESI of L4-L5 on 7/20/2021. Patient had at least 85% relief of pain from procedure(s) for at least 6 weeks and was able to increase activity after procedure. Patient received enough pain relief from injections that the patient would like to repeat the injection(s). 6/17/2021  Eight years ago, He had discectomy of left side. He did really good until now. He did have injections under fluoroscopy that did help but over time stopped. That's when he underwent surgery. Patient started workup for his low back. He has xray in Jan which indicated DDD and started PT. However, patient did have a stroke this past spring. Patient has exhausted his therapy benefits.        Employment: manual labor    Past Medical History  Past Medical History:   Diagnosis Date    Anxiety     Diverticulitis     Hx of blood clots     Hypertension     Nosebleed     Pneumonia     Stroke (cerebrum) ([facility] [address]     2019 and 2014 tia june 2013 and feb 2021       Allergies  Penicillins    Current Medications  Current Outpatient Medications   Medication Sig Dispense Refill    ALPRAZolam (XANAX) 0.5 MG tablet Take 0.5 mg by mouth 2 times daily as needed.  amLODIPine (NORVASC) 10 MG tablet Take 10 mg by mouth daily      gabapentin (NEURONTIN) 400 MG capsule Take 1 capsule by mouth 3 times daily for 90 days. 270 capsule 0    escitalopram (LEXAPRO) 20 MG tablet Take 20 mg by mouth daily      aspirin 81 MG tablet Take 81 mg by mouth daily      Melatonin 10 MG CAPS Take by mouth nightly as needed       clopidogrel (PLAVIX) 75 MG tablet Take 75 mg by mouth daily       Acetaminophen (TYLENOL 8 HOUR PO) Take 500 mg by mouth as needed      atorvastatin (LIPITOR) 40 MG tablet Take 40 mg by mouth daily      Butalbital-Acetaminophen  MG CAPS Take 1 tablet by mouth 2 times daily as needed       nitroGLYCERIN (NITROSTAT) 0.4 MG SL tablet Place 1 tablet under the tongue every 5 minutes as needed for Chest pain up to max of 3 total doses. If no relief after 1 dose, call 911. 22 tablet 3     No current facility-administered medications for this encounter. Social History    Social History     Socioeconomic History    Marital status:      Spouse name: brittany    Number of children: 0    Years of education: None    Highest education level: None   Occupational History    None   Tobacco Use    Smoking status: Current Some Day Smoker     Packs/day: 1.50     Years: 40.00     Pack years: 60.00     Types: Cigarettes    Smokeless tobacco: Never Used    Tobacco comment: Cutting down   Vaping Use    Vaping Use: Never used   Substance and Sexual Activity    Alcohol use:  Yes     Alcohol/week: 7.0 standard drinks     Types: 7 Cans of beer per week     Comment: 1 0R 2 TIMES A WEEK    Drug use: Never    Sexual activity: Yes   Other Topics Concern    None   Social History Narrative    None     Social Determinants of Health     Financial Resource Strain:     Difficulty of Paying Living Expenses: Not on file   Food Insecurity:     Worried About Running Out of Food in the Last Year: Not on file    Fabricio of Food in the Last Year: Not on file   Transportation Needs:     Lack of Transportation (Medical): Not on file    Lack of Transportation (Non-Medical): Not on file   Physical Activity:     Days of Exercise per Week: Not on file    Minutes of Exercise per Session: Not on file   Stress:     Feeling of Stress : Not on file   Social Connections:     Frequency of Communication with Friends and Family: Not on file    Frequency of Social Gatherings with Friends and Family: Not on file    Attends Latter-day Services: Not on file    Active Member of [address] [place] [address] [employer] or Organizations: Not on file    Attends Club or Organization Meetings: Not on file    Marital Status: Not on file   Intimate Partner Violence:     Fear of Current or Ex-Partner: Not on file    Emotionally Abused: Not on file    Physically Abused: Not on file    Sexually Abused: Not on file   Housing Stability:     Unable to Pay for Housing in the Last Year: Not on file    Number of [place] in the Last Year: Not on file    Unstable Housing in the Last Year: Not on file         Family History  family history includes Brain Cancer in his mother; Cancer in his mother; Heart Attack in his father; Kidney Cancer in his mother; [place] in his mother. Review of Systems:  Review of Systems   Constitutional: Negative for activity change. Gastrointestinal: Negative for bowel incontinence and constipation. Genitourinary: Negative for bladder incontinence. Musculoskeletal: Positive for arthralgias, back pain, gait problem and myalgias. Negative for joint swelling, neck pain and neck stiffness. Neurological: Negative for weakness and numbness. Psychiatric/Behavioral: Positive for sleep disturbance (occasionally). Negative for agitation, self-injury and suicidal ideas. The patient is not nervous/anxious.          14 point ROS negative besides that noted in HPI    Physical exam:     Vitals:    05/12/22 0745   BP: 120/78   Pulse: 74   Resp: 16   Temp: 97.4 °F (36.3 °C)   TempSrc: Temporal   SpO2: 97%   Weight: 251 lb (113.9 kg)   Height: 6' 1\" (1.854 m)       Body mass index is 33.12 kg/m². Physical Exam  Vitals and nursing note reviewed. Constitutional:       General: He is not in acute distress. Appearance: He is well-developed. HENT:      Head: Normocephalic. Right Ear: External ear normal.      Left Ear: External ear normal.      Nose: Nose normal.   Eyes:      Conjunctiva/sclera: Conjunctivae normal.      Pupils: Pupils are equal, round, and reactive to light. Neck:      Vascular: No JVD. Trachea: No tracheal deviation. Cardiovascular:      Rate and Rhythm: Normal rate. Pulmonary:      Effort: Pulmonary effort is normal.   Abdominal:      General: There is no distension. Tenderness: There is no abdominal tenderness. Musculoskeletal:      Lumbar back: Spasms (tender palpable knots ie trigger points identified) and tenderness (left SI TTP ) present. Positive right straight leg raise test. Negative left straight leg raise test.      Comments: No foot drop  No clonus  SI exam negative  Piriformis negative     Skin:     General: Skin is warm and dry. Neurological:      Mental Status: He is alert and oriented to person, place, and time. Sensory: No sensory deficit. Psychiatric:         Behavior: Behavior normal.         Thought Content:  Thought content normal.         Judgment: Judgment normal.         Labs:     Lab Results   Component Value Date     10/29/2021    K 4.7 10/29/2021    K 3.6 02/22/2021     10/29/2021    CO2 29 10/29/2021    BUN 19 10/29/2021    CREATININE 0.9 10/29/2021    GLUCOSE 92 10/29/2021    CALCIUM 9.4 10/29/2021        Lab Results   Component Value Date    WBC 7.3 10/29/2021    HGB 16.7 10/29/2021    HCT 50.8 10/29/2021    .1 (H) 10/29/2021     10/29/2021       Assessment: Active Problems:    Tobacco abuse counseling    Lumbar radiculopathy    Back pain with history of spinal surgery    Chronic right-sided low back pain with right-sided sciatica    Pain medication agreement    Muscle spasm of back    Spasm of thoracic back muscle  Resolved Problems:    * No resolved hospital problems. *      PLAN:  Back pain with history of spinal surgery  Chronic right-sided low back pain with right-sided sciatica  Continue medication with no refill sent  at appointment see refill encounter. Patient takes very sparingly - has not needed since injections may call for refill  HYDROcodone-acetaminophen (NORCO)  MG per tablet; Take 1 tablet by mouth every 6 hours as needed for Pain for up to 30 days. Intended supply: 30 days  Dispense: 90 tablet; Refill: 0      Lumbar radiculopathy  Continue medication with refill sent at appointment see refill encounter.     - gabapentin (NEURONTIN) 400 MG capsule; Take 1 capsule by mouth 3 times daily for 90 days. Dispense: 90 capsule; Refill: 2    Continue medication with no refill sent at appointment see refill encounter.      - diclofenac (FLECTOR) 1.3 % PTCH patch; Place 1 patch onto the skin 2 times daily  Dispense: 60 patch; Refill: 5    Repeat bilateral thoracic and lumbar trigger point injections for myofascial pain with NP     Repeat  LESI of L4-L5 under fluoroscopy with Dr. Jovani Avery as patient had good results from previous injection. Patient on Plavix and aspirin. Received authorization from PCP for patient to hold medication. Patient continues to do home exercises for low back pain. Will schedule Left SI injection with US with NP due to new left sided localized pain. IF this does not help low back patient may need new imaging.      [x] Follow up    [] 4 weeks   [x] 6-8 weeks   [] 10-12 weeks   [] 3 months  [x] Post procedure to evaluate effectiveness of treatment  [] To evaluate medications changes made at office visit. [] To review diagnostics ordered at last visit. [] To evaluate response to therapy    [x] For management of controlled substance  [x] Random UDS as indicated by ORT score or if indicated by abberent behaviors    Discussion: Discussed exam findings and plan of care with patient. Patient agreed with POC and questions were asked and answered. Activity: discussed exercise as beneficial to pain reduction, encouraged stretching exercise with a focus on torso strengthening. Goal:  Pain Management Goals of Therapy:   [] Resolution in pain  [x] Decrease in pain level  [x] Improvement in ADL's  [x] Increase in activities with less pain  [] Decrease in medication     Controlled substance monitoring:    [x] Discussed medication side effects, risk of tolerance and/or dependence, and/or alternative treatment  [] Discussed the detrimental effects of long term narcotic use in younger patients especially women of childbearing years.   [x] No signs and symptoms of potential drug abuse or diversion were identified  [] Signs of potential drug abuse or diversion were identified   [] ORT Score   [] UDS non-compliant   [] See Note  [] Random urine drug screen sent today  [x] Random urine drug screen not completed today   [] Deferred New Patient  [x] Compliant 9/2/2021  [] Not Compliant see note  [] Medication agreement with provider signed today  [x] Medication agreement with provider on file under media 6/17/2021  [] Medication regimen effective with no c/o side effects and continued   [] New patient continuing current medication while developing POC   [x] On going assessment and evaluation of medication regimen  [] Medication regimen not effective see plan for changes  [x] Prieto Colvin reviewed & on file under media     CC:  MD Yves Alicia APRN - CNP, 5/12/2022 at 8:22 AM    EMR dragon/transcription disclaimer: Much of this encounter note is electronic transcription/translation of spoken language to printed tach. Electronic translation of spoken language may be erroneous, or at times, nonsensical words or phrases may be inadvertently transcribed.  Although, I have reviewed the note for such errors, some may still exist.

## 2022-05-12 NOTE — TELEPHONE ENCOUNTER
Requested Prescriptions     Signed Prescriptions Disp Refills    gabapentin (NEURONTIN) 400 MG capsule 270 capsule 0     Sig: Take 1 capsule by mouth 3 times daily for 90 days.      Authorizing Provider: Crystal Kathleen

## 2022-05-12 NOTE — PROGRESS NOTES
Clinic Documentation      Education Provided:  [x] Review of Jyoti Hernandez  [] Agreement Review  [x] PEG Score Calculated [] PHQ Score Calculated [] ORT Score Calculated    [] Compliance Issues Discussed [] Cognitive Behavior Needs [x] Exercise [] Review of Test [] Financial Issues  [x] Tobacco/Alcohol Use Reviewed [x] Teaching [] New Patient [] Picture Obtained    Physician Plan:  [] Outgoing Referral  [] Pharmacy Consult  [] Test Ordered [x] Prescription Ordered/Changed   [] Obtained Test Results / Consult Notes        Complete if patient is withholding blood thinner for procedure     Blood Thinner Patient is currently taking:      [x] Plavix (Hold for 7 days)  [x] Aspirin (Hold for 5 days)     [] Pletal (Hold for 2 days)  [] Pradaxa (Hold for 3 days)    [] Effient (Hold for 7 days)  [] Xarelto (Hold for 2 days)    [] Eliquis (Hold for 2 days)  [] Brilinta (Hold for 7 days)    [] Coumadin (Hold for 5 days) - (INR needs to be drawn the day prior to procedure- INR < 2.0)    [] Aggrenox (Hold for 7 days)        [] Patient will stop medication on their own. [x] Blood Thinner Form Faxed for approval to hold.    Provider form faxed to: Dr Beverley Greene Completed by:  Electronically signed by Elvia Arciniega RN on 5/12/2022 at 8:34 AM

## 2022-06-03 NOTE — ASSESSMENT & PLAN NOTE
History of epistaxis with no evidence of a nasal septal ulceration  Plan: I recommended treating this with saline nasal gel twice a day for 2 weeks. I also prescribed Afrin nasal spray to be used if he has recurrent bleeding. At this point the patient is follow-up with me as needed but was advised to call if he has recurrent bleeds. n/a

## 2022-06-29 ENCOUNTER — TELEPHONE (OUTPATIENT)
Dept: PAIN MANAGEMENT | Age: 57
End: 2022-06-29

## 2022-06-29 NOTE — TELEPHONE ENCOUNTER
Call placed to patient to remind him to hold asa and plavix x5 days starting tomorrow for LESI next Tuesday. Patient states understanding.

## 2022-07-05 ENCOUNTER — HOSPITAL ENCOUNTER (OUTPATIENT)
Dept: PAIN MANAGEMENT | Age: 57
Discharge: HOME | End: 2022-07-05
Payer: MEDICARE

## 2022-07-05 VITALS
RESPIRATION RATE: 18 BRPM | TEMPERATURE: 96.6 F | OXYGEN SATURATION: 96 % | DIASTOLIC BLOOD PRESSURE: 100 MMHG | SYSTOLIC BLOOD PRESSURE: 141 MMHG | HEART RATE: 80 BPM

## 2022-07-05 DIAGNOSIS — R52 PAIN MANAGEMENT: ICD-10-CM

## 2022-07-05 PROCEDURE — 62323 NJX INTERLAMINAR LMBR/SAC: CPT

## 2022-07-05 PROCEDURE — 6360000002 HC RX W HCPCS

## 2022-07-05 PROCEDURE — 2580000003 HC RX 258

## 2022-07-05 PROCEDURE — A4216 STERILE WATER/SALINE, 10 ML: HCPCS

## 2022-07-05 PROCEDURE — 2500000003 HC RX 250 WO HCPCS

## 2022-07-05 PROCEDURE — 3209999900 FLUORO FOR SURGICAL PROCEDURES

## 2022-07-05 RX ORDER — METHYLPREDNISOLONE ACETATE 40 MG/ML
80 INJECTION, SUSPENSION INTRA-ARTICULAR; INTRALESIONAL; INTRAMUSCULAR; SOFT TISSUE ONCE
Status: DISCONTINUED | OUTPATIENT
Start: 2022-07-05 | End: 2022-07-07 | Stop reason: HOSPADM

## 2022-07-05 RX ORDER — LIDOCAINE HYDROCHLORIDE 10 MG/ML
5 INJECTION, SOLUTION EPIDURAL; INFILTRATION; INTRACAUDAL; PERINEURAL ONCE
Status: DISCONTINUED | OUTPATIENT
Start: 2022-07-05 | End: 2022-07-07 | Stop reason: HOSPADM

## 2022-07-05 RX ORDER — SODIUM CHLORIDE 9 MG/ML
5 INJECTION INTRAVENOUS ONCE
Status: DISCONTINUED | OUTPATIENT
Start: 2022-07-05 | End: 2022-07-07 | Stop reason: HOSPADM

## 2022-07-05 ASSESSMENT — PAIN - FUNCTIONAL ASSESSMENT: PAIN_FUNCTIONAL_ASSESSMENT: 0-10

## 2022-07-05 ASSESSMENT — PAIN DESCRIPTION - LOCATION: LOCATION: BACK

## 2022-07-05 ASSESSMENT — PAIN SCALES - GENERAL: PAINLEVEL_OUTOF10: 3

## 2022-07-05 ASSESSMENT — PAIN DESCRIPTION - PAIN TYPE: TYPE: CHRONIC PAIN

## 2022-07-05 ASSESSMENT — PAIN DESCRIPTION - ORIENTATION: ORIENTATION: LOWER

## 2022-07-05 NOTE — INTERVAL H&P NOTE
Update History & Physical    The patient's History and Physical   was reviewed with the patient and I examined the patient. There was  NO CHANGE:[id number]}. The surgical site was confirmed by the patient and me. Plan: The risks, benefits, expected outcome, and alternative to the recommended procedure have been discussed with the patient. Patient understands and wants to proceed with the procedure.      Electronically signed by Adrian Stoll MD on 7/5/2022 at 11:00 AM

## 2022-07-11 ENCOUNTER — TELEPHONE (OUTPATIENT)
Dept: PAIN MANAGEMENT | Age: 57
End: 2022-07-11

## 2022-07-20 ENCOUNTER — HOSPITAL ENCOUNTER (OUTPATIENT)
Dept: PAIN MANAGEMENT | Age: 57
Discharge: HOME | End: 2022-07-20
Payer: MEDICARE

## 2022-07-20 VITALS
SYSTOLIC BLOOD PRESSURE: 131 MMHG | TEMPERATURE: 96.5 F | HEART RATE: 75 BPM | RESPIRATION RATE: 18 BRPM | DIASTOLIC BLOOD PRESSURE: 95 MMHG | OXYGEN SATURATION: 96 %

## 2022-07-20 PROCEDURE — 2500000003 HC RX 250 WO HCPCS

## 2022-07-20 PROCEDURE — 20611 DRAIN/INJ JOINT/BURSA W/US: CPT

## 2022-07-20 PROCEDURE — 20553 NJX 1/MLT TRIGGER POINTS 3/>: CPT | Performed by: NURSE PRACTITIONER

## 2022-07-20 PROCEDURE — 76942 ECHO GUIDE FOR BIOPSY: CPT | Performed by: NURSE PRACTITIONER

## 2022-07-20 PROCEDURE — 6360000002 HC RX W HCPCS

## 2022-07-20 PROCEDURE — 20553 NJX 1/MLT TRIGGER POINTS 3/>: CPT

## 2022-07-20 RX ORDER — LIDOCAINE HYDROCHLORIDE 10 MG/ML
2 INJECTION, SOLUTION EPIDURAL; INFILTRATION; INTRACAUDAL; PERINEURAL ONCE
Status: DISCONTINUED | OUTPATIENT
Start: 2022-07-20 | End: 2022-07-22 | Stop reason: HOSPADM

## 2022-07-20 RX ORDER — LIDOCAINE HYDROCHLORIDE 10 MG/ML
15 INJECTION, SOLUTION EPIDURAL; INFILTRATION; INTRACAUDAL; PERINEURAL ONCE
Status: DISCONTINUED | OUTPATIENT
Start: 2022-07-20 | End: 2022-07-22 | Stop reason: HOSPADM

## 2022-07-20 RX ORDER — BUPIVACAINE HYDROCHLORIDE 5 MG/ML
2 INJECTION, SOLUTION EPIDURAL; INTRACAUDAL ONCE
Status: DISCONTINUED | OUTPATIENT
Start: 2022-07-20 | End: 2022-07-22 | Stop reason: HOSPADM

## 2022-07-20 RX ORDER — TRIAMCINOLONE ACETONIDE 40 MG/ML
40 INJECTION, SUSPENSION INTRA-ARTICULAR; INTRAMUSCULAR ONCE
Status: DISCONTINUED | OUTPATIENT
Start: 2022-07-20 | End: 2022-07-22 | Stop reason: HOSPADM

## 2022-07-20 RX ORDER — BUPIVACAINE HYDROCHLORIDE 5 MG/ML
15 INJECTION, SOLUTION EPIDURAL; INTRACAUDAL ONCE
Status: DISCONTINUED | OUTPATIENT
Start: 2022-07-20 | End: 2022-07-22 | Stop reason: HOSPADM

## 2022-07-20 NOTE — DISCHARGE INSTRUCTIONS
[facility] Physical And Pain Medicine  Post Procedure Discharge Instructions        YOU HAVE HAD THE FOLLOWING PROCEDURE:                                  [] Occipital Nerve Blocks  [] CTS wrist injection(s)  [] Knee Injection(s)         [] Shoulder Injection(s)   [] Elbow Injection(s)     [] Botox Injection  [] Cervical Trigger Point Injections    [x] Thoracic Trigger Point Injections    [x] Lumbar Trigger Point Injections  [] Piriformis Trigger Point Injections  [x] SI Joint Injection(s)     [] Trochanteric Bursa Injection(s)       [] Ankle Injection(s)   [] Plantar Fasciitis   []  ______________  Injection(s) [] Botox []  Migraines [] Spasticity    YOU HAVE RECEIVED THE FOLLOWING MEDICATIONS IN YOUR INJECTION(s)  [x] Lidocaine [x] Bupivacaine   [] DepoMedrol (steroid) [] Decadron (steroid)  [x]  Kenalog (steroid)   [] Toradol  [] Supartz [] [place]    [] Botox        PATIENT INFORMATION:   You may experience the following symptoms after your procedure. These symptoms are normal and should not cause concern: You may have an increase in your pain. This may last 24 - 48 hours after your procedure. You may have no change in the pain that you had prior to your injection(s). You may have weakness or numbness in your affected extremity. If this occurs, this may last until numbing the medication wears off. REPORT THE FOLLOWING SYMPTOMS TO YOUR DOCTOR:  Redness, swelling or drainage at the injection site(s)  Unusual pain that interferes with your normal activities of daily living. OTHER INSTRUCTIONS:    [x] I will apply ice to the injection site(s) for at least 24 hours after the procedure. I will rotate the ice on for 20 minutes and off for 20 minutes for at least 24 hours. [x] I will not apply heat for at least 48 hours and I will not take a hot bath or shower for at least 24 hours.      [x] I understand that if Lidocaine or Bupivacaine was used in my injection(s) that the injection site(s) will be numb for a few hours after the procedure    [x] I understand if a steroid was used it will take effect in 3 - 7 days. I understand that as the numbing medication wears off, the pain may return until the steroids start working. [x] I understand that today I will rest for the next 24 hours and drink plenty of water. [] For Botox for Migraines please do not wear anything constricting around the forehead for 7-10 days post injection. Examples headband, hats, or bandana    [] For Botox for Spasticity start therapy for the affected limb in two weeks. [] Additional instructions: ______________________________________________ ___________________________________________________________________    Sedation:  Was oral sedation given? [] Yes  [x] No    I understand that if I took an oral nerve calming medication I will not drive for  [] 24 hours after taking the medication.     [x] I have received a copy of my discharge instructions and understand the above instructions to the best of my knowledge    Patient Discharged:  [x] Home  [] Hospital  [] Other  ____________________________________________    Via:  [x] Ambulatory  [] Wheelchair   [] Stretcher [] EMS       Accompanied By:   [] Significant other  [] Family Member  [] Friend   [] Hospital Staff  []  Ambulance Staff  [x] Other_______________________________________________    Plan:  [] Will return to the office in   [] 1 month   [] 3 months for:  [] Procedure Follow-up  [x] Office Visit   [x] Planned Procedure      Patient Signature: _____________________________________________________    Staff Signature: _______________________________________________________        If you have questions, problems or concerns you may call [phone] and follow the prompts    MONTY Contreras, [facility]-BC

## 2022-07-20 NOTE — PROCEDURES
[facility] Physical & Pain Medicine    Patient Name: Brandon Srinivasan III    : 1965    Age: 56 y.o. Sex: male    Date: 2022    Pre-op Diagnosis: left  Sacroiliac Joint(s) Dysfunction/ Sacroiliitis    Post-op Diagnosis: left  Sacroiliac Joint(s) Dysfunction/ Sacroliliits    Procedure: Ultrasound Guided Injection of  left Sacroiliac Joint(s)     Performing Procedure:  MONTY Evans BC  Patient Vitals for the past 24 hrs:   BP Temp Temp src Pulse Resp SpO2   22 0820 (!) 131/95 (!) 96.5 °F (35.8 °C) Temporal 75 18 96 %       left  Sacroiliac Joint(s)     Description of Procedure:    After voluntary, informed and signed consent obtained the patient was placed in a prone position. Appropriate time out was obtained per policy. The Sacroiliac Joint(s) was palpated for area of maximal tenderness. The area was prepped in an aseptic fashion with CHG swab. The ultrasound transducer was used to confirm the appropriate location. The skin was sprayed with Gebauer's Solution. Under aseptic technique and direct ultrasound visualization a 22 gauge 3 inch spinal needle was introduced into the Sacroiliac Joint(s). After a negative aspiration, a solution of 2 ml of 0.5% Marcaine Plain and 2 ml of 1% Lidocaine Plain and 1 ml of Kenalog (40 mg/ml) was injected into the Sacroiliac Joint(s). The needle was withdrawn and a sterile dressing applied. If this was a bilateral procedure, the same steps were followed on the opposite side. Discharge: The patient tolerated the procedure well. There were no complications during the procedure and the patient was discharged home with discharge instructions. The patient has been instructed to contact the office should there be any complications or questions to arise between today and their next appointment.     Description of Procedure:    Pre-op Diagnosis: Myofascial Pain/ Muscle Spasms    Post-op Diagnosis: Myofascial Pain/ Muscle the patient presented for Lumbar Trigger Point Injections The indications, limitations and possible complications were discussed with the patient and the patient elected to proceed with the procedure. After voluntary, informed and signed consent obtained the patient was placed in a seated position. Appropriate time out was obtained per policy. The area of maximal tenderness was palpated over the bilaterally Lumbar Muscles - Lower Latissimus,  Erector Spinae, Lumbar Paraspinous. The skin overlying these areas was marked with a skin marker. The areas were prepped using aseptic technique with CHG prep. The skin overlying the proposed injection sites were then sprayed with Gebauer's Solution while protecting patient eyes. Each trigger point of the Lumbar Muscles - Lower Latissimus,  Erector Spinae, Lumbar Paraspinous was injected after negative aspiration was injected with approximately 1-2 ml of a solution of 5 ml of 1% Lidocaine Plain and 5 ml of 0.5% Marcaine Plain after negative aspiration    Discharge: The patient tolerated the procedure well. There were no complications during the procedure and the patient was discharged home with discharge instructions. The patient has been instructed to contact the office should there be any complications or questions to arise between today and their next appointment. Plan:     Will return to the office in 6 weeks for follow up    SUZANNE Callahan CNP, 7/20/2022 at 9:06 AM

## 2022-08-22 DIAGNOSIS — M54.16 LUMBAR RADICULOPATHY: ICD-10-CM

## 2022-08-22 RX ORDER — GABAPENTIN 400 MG/1
400 CAPSULE ORAL 3 TIMES DAILY
Qty: 270 CAPSULE | Refills: 0 | Status: SHIPPED | OUTPATIENT
Start: 2022-08-22 | End: 2022-10-13 | Stop reason: SDUPTHER

## 2022-10-12 ENCOUNTER — OFFICE VISIT (OUTPATIENT)
Dept: CARDIOLOGY CLINIC | Age: 57
End: 2022-10-12
Payer: MEDICARE

## 2022-10-12 VITALS
HEIGHT: 73 IN | DIASTOLIC BLOOD PRESSURE: 84 MMHG | HEART RATE: 90 BPM | BODY MASS INDEX: 32.74 KG/M2 | SYSTOLIC BLOOD PRESSURE: 132 MMHG | WEIGHT: 247 LBS | OXYGEN SATURATION: 96 %

## 2022-10-12 DIAGNOSIS — E78.5 DYSLIPIDEMIA: ICD-10-CM

## 2022-10-12 DIAGNOSIS — I10 ESSENTIAL HYPERTENSION: Primary | ICD-10-CM

## 2022-10-12 PROCEDURE — G8427 DOCREV CUR MEDS BY ELIG CLIN: HCPCS | Performed by: NURSE PRACTITIONER

## 2022-10-12 PROCEDURE — 93000 ELECTROCARDIOGRAM COMPLETE: CPT | Performed by: NURSE PRACTITIONER

## 2022-10-12 PROCEDURE — 3017F COLORECTAL CA SCREEN DOC REV: CPT | Performed by: NURSE PRACTITIONER

## 2022-10-12 PROCEDURE — G8484 FLU IMMUNIZE NO ADMIN: HCPCS | Performed by: NURSE PRACTITIONER

## 2022-10-12 PROCEDURE — G8417 CALC BMI ABV UP PARAM F/U: HCPCS | Performed by: NURSE PRACTITIONER

## 2022-10-12 PROCEDURE — 99214 OFFICE O/P EST MOD 30 MIN: CPT | Performed by: NURSE PRACTITIONER

## 2022-10-12 PROCEDURE — 4004F PT TOBACCO SCREEN RCVD TLK: CPT | Performed by: NURSE PRACTITIONER

## 2022-10-12 ASSESSMENT — ENCOUNTER SYMPTOMS
SHORTNESS OF BREATH: 0
CHEST TIGHTNESS: 0
WHEEZING: 0
SORE THROAT: 0
COUGH: 0

## 2022-10-12 NOTE — PROGRESS NOTES
[facility] Cardiology   Established Patient Office Visit   Caroline Truong. [address] [facility]  [phone]        OFFICE VISIT:  10/12/2022    [facility] - : 1965    Reason For Visit:  Cha Teixeira is a 56 y.o. male who is here for 1 Year Follow Up    1. Essential hypertension    2. Dyslipidemia      Patient with a history of hypertension, hyperlipidemia and CVA. Presents to clinic today for routine follow-up. Patient denies any complaints of chest pain, pressure or tightness. There is no shortness of breath, orthopnea or PND. Patient denies any lightheadedness, dizziness or syncope. 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 Able to do his activities without any difficulties or complications. DATA:     6[facility]10/2020 [facility]. Summary impressions:    Normal ejection fraction normal perfusion study no evidence of    ischemia or infarction. Signed by Dr Jessica Sanchez on 2020 4:26 PM         6/10/2020 2D echo      Summary   Normal left ventricular size and function. Mild concentric left ventricular hypertrophy. Left ventricular ejection fraction is estimated at 60%. Trace mitral regurg.       Signature      ----------------------------------------------------------------   Electronically signed by Kathie Vidal MD(Interpreting   physician) on 2020 08:31 PM   ----------------------------------------------------------------       Subjective    Brooke Saez is a 56 y.o. male with the [facility]:    Patient Active Problem List    Diagnosis Date Noted    Other chest pain 2020    Sacroiliac joint dysfunction 2022    Epistaxis 2022    Cubital tunnel syndrome on right 2021    Cubital tunnel syndrome on left 2021    Muscle spasm of back 09/15/2021    Spasm of thoracic back muscle 09/15/2021    Pain medication agreement 2021    Lumbar radiculopathy 2021 Date    ARM SURGERY Left 11/4/2021    LEFT IN SITU CUBITAL TUNNEL RELEASE performed by Radha Goodman MD at [address] Right 12/30/2021    RIGHT IN SITU CUBITAL TUNNEL DECOMPRESSION performed by Radha Goodman MD at [address]  2013    CARPAL TUNNEL RELEASE Right 10/22/2020    RIGHT OPEN CARPAL TUNNEL RELEASE performed by Radha Goodman MD at [address] Left 12/3/2020    LEFT OPEN CARPAL TUNNEL RELEASE performed by Radha Goodman MD at [facility]      tooth cut out 2 weeks ago    SMALL INTESTINE SURGERY      TONSILLECTOMY       Family History   Problem Relation Age of Onset    Cancer Mother     Lung Cancer Mother     Kidney Cancer Mother     Brain Cancer Mother     Heart Attack Father      Social History     Tobacco Use    Smoking status: Some Days     Packs/day: 1.50     Years: 40.00     Pack years: 60.00     Types: Cigarettes    Smokeless tobacco: Never    Tobacco comments:     Cutting down   Substance Use Topics    Alcohol use: Yes     Alcohol/week: 7.0 standard drinks     Types: 7 Cans of beer per week     Comment: 1 0R 2 TIMES A WEEK          Review of Systems:    Review of Systems   Constitutional:  Negative for activity change, chills, diaphoresis, fatigue and fever. HENT:  Negative for congestion and sore throat. Respiratory:  Negative for cough, chest tightness, shortness of breath and wheezing. Cardiovascular:  Negative for chest pain, palpitations and leg swelling. Neurological:  Negative for dizziness, syncope, light-headedness and headaches. Psychiatric/Behavioral:  Negative for confusion. The patient is not nervous/anxious.        Objective:    /84   Pulse 90   Ht 6' 1\" (1.854 m)   Wt 247 lb (112 kg)   SpO2 96%   BMI 32.59 kg/m²     GENERAL - well developed and well nourished, conversant  HEENT -  PERRLA, Hearing appears normal, gentleman lids are normal.  External inspection of ears and nose appear normal.  NECK - no thyromegaly, no JVD, trachea is in the midline  CARDIOVASCULAR - PMI is in the mid line clavicular position, Normal S1 and S2 with no systolic murmur. No S3 or S4    PULMONARY - no respiratory distress. No wheezes or rales. Lungs are clear to ausculation, normal respiratory effort. ABDOMEN  - soft, non tender, no rebound  MUSCULOSKELETAL  - range of motion of the upper and lower extermites appears normal and equal and is without pain   EXTREMITIES - no significant edema   NEUROLOGIC - gait and station are normal  SKIN - turgor is normal, can warm and dry. PSYCHIATRIC - normal mood and affect, alert and orientated x 3,      ASSESSMENT:    ALL THE CARDIOLOGY PROBLEMS ARE LISTED ABOVE; HOWEVER, THE FOLLOWING SPECIFIC CARDIAC PROBLEMS / CONDITIONS WERE ADDRESSED AND TREATED DURING THE OFFICE VISIT TODAY:                                                                                            MEDICAL DECISION MAKING             Cardiac Specific Problem / Diagnosis  Discussion and Data Reviewed Diagnostic Procedures Ordered Management Options Selected           1. Hypertension  Stable   Review and summation of old records:    Blood pressure 132/84. O2 sat 96%. EKG in the office today showing sinus rhythm with a heart rate of 90 bpm.  He is on aspirin 81 mg a day. Norvasc 10 mg daily. Yes Continue current medications:     Yes           2. Dyslipidemia  Stable   Patient is on Lipitor 40 mg daily. No Continue current medications: Yes                                     Orders Placed This Encounter   Procedures    EKG 12 lead     Order Specific Question:   Reason for Exam?     Answer: Other       No orders of the defined types were placed in this encounter. Discussed with patient. Return in about 1 year (around 10/12/2023) for Yearly with me .     I greatly appreciate the opportunity to meet [facility] and your confidence in allowing me to participate in his cardiovascular care. Arin Mckeon, SUZANNE - DHEERAJ  10/12/2022 1:08 PM CDT                    This dictation was generated by voice recognition computer software. Although all attempts are made to edit dictation for accuracy, there may be errors in the transcription that are not intended.

## 2022-10-13 ENCOUNTER — HOSPITAL ENCOUNTER (OUTPATIENT)
Dept: PAIN MANAGEMENT | Age: 57
Discharge: HOME | End: 2022-10-13
Payer: MEDICARE

## 2022-10-13 VITALS
OXYGEN SATURATION: 96 % | HEART RATE: 80 BPM | DIASTOLIC BLOOD PRESSURE: 85 MMHG | HEIGHT: 73 IN | TEMPERATURE: 97.7 F | WEIGHT: 227 LBS | BODY MASS INDEX: 30.09 KG/M2 | RESPIRATION RATE: 16 BRPM | SYSTOLIC BLOOD PRESSURE: 134 MMHG

## 2022-10-13 DIAGNOSIS — M54.16 LUMBAR RADICULOPATHY: ICD-10-CM

## 2022-10-13 PROCEDURE — 99215 OFFICE O/P EST HI 40 MIN: CPT

## 2022-10-13 RX ORDER — BUTALBITAL, ACETAMINOPHEN AND CAFFEINE 300; 40; 50 MG/1; MG/1; MG/1
1 CAPSULE ORAL PRN
COMMUNITY
Start: 2022-09-09

## 2022-10-13 RX ORDER — GABAPENTIN 400 MG/1
400 CAPSULE ORAL 3 TIMES DAILY
Qty: 270 CAPSULE | Refills: 0 | Status: SHIPPED | OUTPATIENT
Start: 2022-10-13 | End: 2023-01-11

## 2022-10-13 RX ORDER — ALPRAZOLAM 1 MG/1
1 TABLET ORAL 2 TIMES DAILY
COMMUNITY
Start: 2022-09-08

## 2022-10-13 RX ORDER — LEVOTHYROXINE SODIUM 0.03 MG/1
1 TABLET ORAL DAILY
COMMUNITY
Start: 2022-10-06

## 2022-10-13 ASSESSMENT — PAIN DESCRIPTION - ORIENTATION: ORIENTATION: LOWER

## 2022-10-13 ASSESSMENT — PAIN SCALES - GENERAL: PAINLEVEL_OUTOF10: 3

## 2022-10-13 ASSESSMENT — PAIN DESCRIPTION - PAIN TYPE: TYPE: CHRONIC PAIN

## 2022-10-13 ASSESSMENT — ENCOUNTER SYMPTOMS
CONSTIPATION: 0
BOWEL INCONTINENCE: 0

## 2022-10-13 ASSESSMENT — PAIN DESCRIPTION - LOCATION: LOCATION: BACK

## 2022-10-13 NOTE — PROGRESS NOTES
Clinic Documentation      Education Provided:  [x] Review of [facility]  [x] Agreement Review  [x] PEG Score Calculated [x] PHQ Score Calculated [x] ORT Score Calculated    [] Compliance Issues Discussed [] Cognitive Behavior Needs [x] Exercise [] Review of Test [] Financial Issues  [x] Tobacco/Alcohol Use Reviewed [x] Teaching [] New Patient [] Picture Obtained    Physician Plan:  [] Outgoing Referral  [] Pharmacy Consult  [x] Test Ordered [x] Prescription Ordered/Changed   [] Obtained Test Results / Consult Notes        Complete if patient is withholding blood thinner for procedure     Blood Thinner Patient is currently taking:      [x] Plavix (Hold for 7 days)  [x] Aspirin (Hold for 5 days)     [] Pletal (Hold for 2 days)  [] Pradaxa (Hold for 3 days)    [] Effient (Hold for 7 days)  [] Xarelto (Hold for 2 days)    [] Eliquis (Hold for 2 days)  [] Brilinta (Hold for 7 days)    [] Coumadin (Hold for 5 days) - (INR needs to be drawn the day prior to procedure- INR < 2.0)    [] Aggrenox (Hold for 7 days)        [] Patient will stop medication on their own. [x] Blood Thinner Form Faxed for approval to hold.    Provider form faxed to: Dr Elaine Pichardo Completed by:  Electronically signed by Yessica Davenport RN on 10/13/2022 at 11:19 AM

## 2022-10-13 NOTE — PROGRESS NOTES
[facility] Physical & Pain Medicine    Office Visit    Patient Name: Merissa Sung    MR #: [id number]    Account [de-identified]    : 1965    Age: 56 y.o. Sex: male    Date: 10/13/2022    PCP: Sarah Caebllo MD    Chief Complaint:   Chief Complaint   Patient presents with    Back Pain       History of Present Illness: The patient is a 56 y.o. male who presents for procedure follow-up. Patient had LESI of L4/L5 on 2022. Patient had at least 85% relief of pain from procedure(s) for at least 6 weeks and was able to increase activity after procedure. Patient received enough pain relief from injections that the patient would like to repeat the injection(s). Patient had bilateral Thoracic and Lumbar trigger points on and Left SI injection on 2022. Patient had at least 85% relief of pain from procedure(s) for at least 6 weeks and was able to increase activity after procedure. Patient received enough pain relief from injections that the patient would like to repeat the injection(s). Back Pain  This is a recurrent problem. The current episode started more than 1 year ago. The problem occurs constantly. The problem has been waxing and waning since onset. The pain is present in the lumbar spine and sacro-iliac. The quality of the pain is described as aching, burning and stabbing. Radiates to: right low back into buttocks posteriorly down leg to knee/calf area. Exacerbated by: general activity pain intermittently flares. Associated symptoms include leg pain. Pertinent negatives include no bladder incontinence, bowel incontinence, numbness or weakness. Screening Tools:     PE.3    Past PE    ORT: 0    PHQ-9: 3    Current Pain Assessment  Pain Assessment  Pain Assessment: 0-10  Pain Level: 3  Pain Location: Back  Pain Orientation: Lower  Pain Type: Chronic pain    Past Visit HPI:   10/21/2021  Patient had LESI of L4-L5 on 2021.   Bilateral thoracic and lumbar trigger points on 9/15/2021. Patient had at least 85% relief of pain from procedure(s) for at least 6 weeks and was able to increase activity after procedure. Patient received enough pain relief from injections that the patient would like to repeat the injection(s). 9/2/2021  presents for imaging follow up and procedure follow up. Patient had MRI of Lumbar spine on 7/2/2021. Degenerative disc disease noted at L2-L3 and L5-S1 with loss of hydration and disc height. L2-L3 has a annular fissure with broad-based left lateral protrusion of disc moderate left-sided foraminal narrowing present L3-L4 moderate facet and ligamentous hypertrophy with mild broad-based disc bulge mild central canal stenosis and left-sided foraminal narrowing L4-L5 moderate facet ligamentous hypertrophy mild broad-based disc bulge and mild central spinal stenosis mild bilateral neural narrowing L5-S1 central disc protrusion with no evidence of central stenosis mild facet ligamentous hypertrophy    Scheduled for LESI of L4-L5 on 7/20/2021. Patient had at least 85% relief of pain from procedure(s) for at least 6 weeks and was able to increase activity after procedure. Patient received enough pain relief from injections that the patient would like to repeat the injection(s). 6/17/2021  Eight years ago, He had discectomy of left side. He did really good until now. He did have injections under fluoroscopy that did help but over time stopped. That's when he underwent surgery. Patient started workup for his low back. He has xray in Jan which indicated DDD and started PT. However, patient did have a stroke this past spring. Patient has exhausted his therapy benefits.        Employment: manual labor    Past Medical History  Past Medical History:   Diagnosis Date    Anxiety     Diverticulitis     Hx of blood clots     Hypertension     Nosebleed     Pneumonia     Stroke (cerebrum) ([facility] [address]     2019 and 2014 tia june 2013 and feb 2021 Allergies  Penicillins    Current Medications  Current Outpatient Medications   Medication Sig Dispense Refill    ALPRAZolam (XANAX) 1 MG tablet Take 1 mg by mouth 2 times daily. levothyroxine (SYNTHROID) 25 MCG tablet Take 1 tablet by mouth daily      butalbital-APAP-caffeine -40 MG CAPS per capsule Take 1 capsule by mouth as needed      gabapentin (NEURONTIN) 400 MG capsule Take 1 capsule by mouth 3 times daily for 90 days. 270 capsule 0    amLODIPine (NORVASC) 10 MG tablet Take 10 mg by mouth daily      escitalopram (LEXAPRO) 20 MG tablet Take 20 mg by mouth daily      aspirin 81 MG tablet Take 81 mg by mouth daily      Melatonin 10 MG CAPS Take by mouth nightly as needed       clopidogrel (PLAVIX) 75 MG tablet Take 75 mg by mouth daily       Acetaminophen (TYLENOL 8 HOUR PO) Take 500 mg by mouth as needed      atorvastatin (LIPITOR) 40 MG tablet Take 40 mg by mouth daily      nitroGLYCERIN (NITROSTAT) 0.4 MG SL tablet Place 1 tablet under the tongue every 5 minutes as needed for Chest pain up to max of 3 total doses. If no relief after 1 dose, call 911. 25 tablet 3     No current facility-administered medications for this encounter. Social History    Social History     Socioeconomic History    Marital status:      Spouse name: None    Number of children: 0    Years of education: None    Highest education level: None   Tobacco Use    Smoking status: Every Day     Packs/day: 1.50     Years: 40.00     Pack years: 60.00     Types: Cigarettes    Smokeless tobacco: Never    Tobacco comments:     Cutting down   Vaping Use    Vaping Use: Never used   Substance and Sexual Activity    Alcohol use:  Yes     Alcohol/week: 7.0 standard drinks     Types: 7 Cans of beer per week     Comment: 1 0R 2 TIMES A WEEK    Drug use: Never    Sexual activity: Yes         Family History  family history includes Brain Cancer in his mother; Cancer in his mother; Heart Attack in his father; Kidney Cancer in his mother; Sarah Steel in his mother. Review of Systems:  Review of Systems   Constitutional:  Negative for activity change. Gastrointestinal:  Negative for bowel incontinence and constipation. Genitourinary:  Negative for bladder incontinence. Musculoskeletal:  Positive for arthralgias, back pain, gait problem and myalgias. Negative for joint swelling, neck pain and neck stiffness. Neurological:  Negative for weakness and numbness. Psychiatric/Behavioral:  Positive for sleep disturbance (occasionally). Negative for agitation, self-injury and suicidal ideas. The patient is not nervous/anxious. 14 point ROS negative besides that noted in HPI    Physical exam:     Vitals:    10/13/22 1048   BP: 134/85   Pulse: 80   Resp: 16   Temp: 97.7 °F (36.5 °C)   TempSrc: Temporal   SpO2: 96%   Weight: 227 lb (103 kg)   Height: 6' 1\" (1.854 m)       Body mass index is 29.95 kg/m². Physical Exam  Vitals and nursing note reviewed. Constitutional:       General: He is not in acute distress. Appearance: He is well-developed. HENT:      Head: Normocephalic. Right Ear: External ear normal.      Left Ear: External ear normal.      Nose: Nose normal.   Eyes:      Conjunctiva/sclera: Conjunctivae normal.      Pupils: Pupils are equal, round, and reactive to light. Neck:      Vascular: No JVD. Trachea: No tracheal deviation. Cardiovascular:      Rate and Rhythm: Normal rate. Pulmonary:      Effort: Pulmonary effort is normal.   Abdominal:      General: There is no distension. Tenderness: There is no abdominal tenderness. Musculoskeletal:      Lumbar back: Spasms (tender palpable knots ie trigger points identified) and tenderness (left SI TTP ) present. Positive right straight leg raise test. Negative left straight leg raise test.      Comments: No foot drop  No clonus  SI exam negative  Piriformis negative     Skin:     General: Skin is warm and dry.    Neurological:      Mental Status: He is alert and oriented to person, place, and time. Sensory: No sensory deficit. Gait: Gait abnormal (use a cane). Psychiatric:         Behavior: Behavior normal.         Thought Content: Thought content normal.         Judgment: Judgment normal.       Labs:     Lab Results   Component Value Date/Time     10/29/2021 12:00 PM    K 4.7 10/29/2021 12:00 PM    K 3.6 02/22/2021 06:10 AM     10/29/2021 12:00 PM    CO2 29 10/29/2021 12:00 PM    BUN 19 10/29/2021 12:00 PM    CREATININE 0.9 10/29/2021 12:00 PM    GLUCOSE 92 10/29/2021 12:00 PM    CALCIUM 9.4 10/29/2021 12:00 PM        Lab Results   Component Value Date    WBC 7.3 10/29/2021    HGB 16.7 10/29/2021    HCT 50.8 10/29/2021    .1 (H) 10/29/2021     10/29/2021       Assessment:                                                                                                                                        Active Problems:    Sacroiliac joint dysfunction    Encounter for monitoring opioid maintenance therapy    Lumbar radiculopathy    Back pain with history of spinal surgery    Chronic right-sided low back pain with right-sided sciatica    Pain medication agreement    Muscle spasm of back    Spasm of thoracic back muscle  Resolved Problems:    * No resolved hospital problems. *      PLAN:  Back pain with history of spinal surgery  Chronic right-sided low back pain with right-sided sciatica  Continue medication with no refill sent  at appointment see refill encounter. Patient takes very sparingly - has not needed since injections may call for refill  HYDROcodone-acetaminophen (NORCO)  MG per tablet; Take 1 tablet by mouth every 6 hours as needed for Pain for up to 30 days. Intended supply: 30 days  Dispense: 90 tablet; Refill: 0      Lumbar radiculopathy  - gabapentin (NEURONTIN) 400 MG capsule; Take 1 capsule by mouth 3 times daily for 90 days. Dispense: 270 capsule;  Refill: 0      Refill at office appointment    Treatments:    Schedule bilateral thoracic and lumbar trigger point injections for myofascial pain with NP     Schedule  LESI of L4-L5 under fluoroscopy with Dr. Lina Rodriguez as patient had good results from previous injection. Patient on Plavix and aspirin. Received authorization from PCP for patient to hold medication. Patient continues to do home exercises for low back pain. UDS today due to controlled substance agreement. [x] Follow up    [] 4 weeks   [x] 6-8 weeks   [] 10-12 weeks   [] 3 months  [x] Post procedure to evaluate effectiveness of treatment  [] To evaluate medications changes made at office visit. [] To review diagnostics ordered at last visit. [] To evaluate response to therapy    [x] For management of controlled substance  [x] Random UDS as indicated by ORT score or if indicated by abberent behaviors    Discussion: Discussed exam findings and plan of care with patient. Patient agreed with POC and questions were asked and answered. Activity: discussed exercise as beneficial to pain reduction, encouraged stretching exercise with a focus on torso strengthening. Goal:  Pain Management Goals of Therapy:   [] Resolution in pain  [x] Decrease in pain level  [x] Improvement in ADL's  [x] Increase in activities with less pain  [] Decrease in medication     Controlled substance monitoring:    [x] Discussed medication side effects, risk of tolerance and/or dependence, and/or alternative treatment  [] Discussed the detrimental effects of long term narcotic use in younger patients especially women of childbearing years.   [x] No signs and symptoms of potential drug abuse or diversion were identified  [] Signs of potential drug abuse or diversion were identified   [] ORT Score   [] UDS non-compliant   [] See Note  [x] Random urine drug screen sent today  [] Random urine drug screen not completed today   [] Deferred New Patient  [] Compliant 9/2/2021  [] Not Compliant see note  [x] Medication agreement with provider signed today  [] Medication agreement with provider on file under media   [] Medication regimen effective with no c/o side effects and continued   [] New patient continuing current medication while developing POC   [x] On going assessment and evaluation of medication regimen  [] Medication regimen not effective see plan for changes  [x] Bhumi Rico reviewed & on file under media     CC:  MD Ceasar Tate, APRN - CNP, 11/20/2022 at 4:32 PM    EMR dragon/transcription disclaimer: Much of this encounter note is electronic transcription/translation of spoken language to printed tach. Electronic translation of spoken language may be erroneous, or at times, nonsensical words or phrases may be inadvertently transcribed.  Although, I have reviewed the note for such errors, some may still exist.

## 2022-10-13 NOTE — TELEPHONE ENCOUNTER
1. Lumbar radiculopathy  - gabapentin (NEURONTIN) 400 MG capsule; Take 1 capsule by mouth 3 times daily for 90 days. Dispense: 270 capsule;  Refill: 0     Refill at office appointment

## 2022-10-26 ENCOUNTER — TELEPHONE (OUTPATIENT)
Dept: PAIN MANAGEMENT | Age: 57
End: 2022-10-26

## 2022-10-26 NOTE — TELEPHONE ENCOUNTER
Call placed to patient to remind him to hold asa and plavix x5 days for LESI next Tuesday. Patient states understanding. Medication hold approved by Dr Lico Fall.

## 2022-11-01 ENCOUNTER — HOSPITAL ENCOUNTER (OUTPATIENT)
Dept: PAIN MANAGEMENT | Age: 57
Discharge: HOME | End: 2022-11-01
Payer: MEDICARE

## 2022-11-01 VITALS
SYSTOLIC BLOOD PRESSURE: 139 MMHG | OXYGEN SATURATION: 99 % | HEART RATE: 76 BPM | DIASTOLIC BLOOD PRESSURE: 92 MMHG | RESPIRATION RATE: 18 BRPM | TEMPERATURE: 96.2 F

## 2022-11-01 DIAGNOSIS — R52 PAIN MANAGEMENT: ICD-10-CM

## 2022-11-01 PROCEDURE — 2580000003 HC RX 258

## 2022-11-01 PROCEDURE — 62323 NJX INTERLAMINAR LMBR/SAC: CPT

## 2022-11-01 PROCEDURE — A4216 STERILE WATER/SALINE, 10 ML: HCPCS

## 2022-11-01 PROCEDURE — 2500000003 HC RX 250 WO HCPCS

## 2022-11-01 PROCEDURE — 6360000002 HC RX W HCPCS

## 2022-11-01 RX ORDER — METHYLPREDNISOLONE ACETATE 80 MG/ML
80 INJECTION, SUSPENSION INTRA-ARTICULAR; INTRALESIONAL; INTRAMUSCULAR; SOFT TISSUE ONCE
Status: DISCONTINUED | OUTPATIENT
Start: 2022-11-01 | End: 2022-11-03 | Stop reason: HOSPADM

## 2022-11-01 RX ORDER — LIDOCAINE HYDROCHLORIDE 10 MG/ML
5 INJECTION, SOLUTION EPIDURAL; INFILTRATION; INTRACAUDAL; PERINEURAL ONCE
Status: DISCONTINUED | OUTPATIENT
Start: 2022-11-01 | End: 2022-11-03 | Stop reason: HOSPADM

## 2022-11-01 RX ORDER — SODIUM CHLORIDE 9 MG/ML
5 INJECTION INTRAVENOUS ONCE
Status: DISCONTINUED | OUTPATIENT
Start: 2022-11-01 | End: 2022-11-03 | Stop reason: HOSPADM

## 2022-11-01 ASSESSMENT — PAIN DESCRIPTION - DESCRIPTORS: DESCRIPTORS: ACHING;BURNING

## 2022-11-01 NOTE — INTERVAL H&P NOTE
Update History & Physical    The patient's History and Physical  was reviewed with the patient and I examined the patient. There was no change. The surgical site was confirmed by the patient and me. Plan: The risks, benefits, expected outcome, and alternative to the recommended procedure have been discussed with the patient. Patient understands and wants to proceed with the procedure.      Electronically signed by Ayaan Carrillo MD on 11/1/2022 at 12:03 PM

## 2022-11-20 PROBLEM — Z51.81 ENCOUNTER FOR MONITORING OPIOID MAINTENANCE THERAPY: Status: ACTIVE | Noted: 2022-11-20

## 2022-11-20 PROBLEM — Z79.891 ENCOUNTER FOR MONITORING OPIOID MAINTENANCE THERAPY: Status: ACTIVE | Noted: 2022-11-20

## 2022-11-20 ASSESSMENT — ENCOUNTER SYMPTOMS: BACK PAIN: 1

## 2022-11-22 ENCOUNTER — TELEPHONE (OUTPATIENT)
Dept: PAIN MANAGEMENT | Age: 57
End: 2022-11-22

## 2022-11-22 NOTE — TELEPHONE ENCOUNTER
Sending discharge letter per provider. Aberrant uds results. Patient given discharge letter on 11/16/22.

## 2023-01-17 ENCOUNTER — HOSPITAL ENCOUNTER (OUTPATIENT)
Dept: PAIN MANAGEMENT | Age: 58
Discharge: HOME | End: 2023-01-17
Payer: MEDICARE

## 2023-01-17 VITALS
DIASTOLIC BLOOD PRESSURE: 94 MMHG | WEIGHT: 231 LBS | HEIGHT: 73 IN | OXYGEN SATURATION: 95 % | SYSTOLIC BLOOD PRESSURE: 139 MMHG | RESPIRATION RATE: 16 BRPM | BODY MASS INDEX: 30.62 KG/M2 | TEMPERATURE: 97.9 F | HEART RATE: 98 BPM

## 2023-01-17 DIAGNOSIS — M54.16 LUMBAR RADICULOPATHY: Primary | ICD-10-CM

## 2023-01-17 DIAGNOSIS — G89.29 CHRONIC RIGHT-SIDED LOW BACK PAIN WITH RIGHT-SIDED SCIATICA: ICD-10-CM

## 2023-01-17 DIAGNOSIS — M54.41 CHRONIC RIGHT-SIDED LOW BACK PAIN WITH RIGHT-SIDED SCIATICA: ICD-10-CM

## 2023-01-17 PROCEDURE — 99215 OFFICE O/P EST HI 40 MIN: CPT

## 2023-01-17 RX ORDER — CYCLOBENZAPRINE HCL 5 MG
5 TABLET ORAL 2 TIMES DAILY PRN
COMMUNITY

## 2023-01-17 RX ORDER — GABAPENTIN 400 MG/1
400 CAPSULE ORAL 3 TIMES DAILY
Qty: 90 CAPSULE | Refills: 2 | Status: SHIPPED | OUTPATIENT
Start: 2023-01-17 | End: 2023-04-17

## 2023-01-17 ASSESSMENT — PAIN DESCRIPTION - LOCATION: LOCATION: BACK

## 2023-01-17 ASSESSMENT — PAIN SCALES - GENERAL: PAINLEVEL_OUTOF10: 5

## 2023-01-17 ASSESSMENT — ENCOUNTER SYMPTOMS
BOWEL INCONTINENCE: 0
CONSTIPATION: 0
BACK PAIN: 1

## 2023-01-17 ASSESSMENT — PAIN DESCRIPTION - ORIENTATION: ORIENTATION: LOWER

## 2023-01-17 ASSESSMENT — PAIN DESCRIPTION - PAIN TYPE: TYPE: CHRONIC PAIN

## 2023-01-17 NOTE — PROGRESS NOTES
Pain agreement reviewed with patient and signed per patient. No questions voiced and patient states understanding of agreement. Copy given to patient.

## 2023-01-17 NOTE — PROGRESS NOTES
Clinic Documentation      Education Provided:  [x] Review of [facility]  [x] Agreement Review  [x] PEG Score Calculated [x] PHQ Score Calculated [x] ORT Score Calculated    [] Compliance Issues Discussed [] Cognitive Behavior Needs [x] Exercise [] Review of Test [] Financial Issues  [x] Tobacco/Alcohol Use Reviewed [x] Teaching [] New Patient [] Picture Obtained    Physician Plan:  [] Outgoing Referral  [] Pharmacy Consult  [] Test Ordered [x] Prescription Ordered/Changed   [] Obtained Test Results / Consult Notes        Complete if patient is withholding blood thinner for procedure     Blood Thinner Patient is currently taking:      [x] Plavix (Hold for 7 days)  [] Aspirin (Hold for 5 days)     [] Pletal (Hold for 2 days)  [] Pradaxa (Hold for 3 days)    [] Effient (Hold for 7 days)  [] Xarelto (Hold for 2 days)    [] Eliquis (Hold for 2 days)  [] Brilinta (Hold for 7 days)    [] Coumadin (Hold for 5 days) - (INR needs to be drawn the day prior to procedure- INR < 2.0)    [] Aggrenox (Hold for 7 days)        [] Patient will stop medication on their own. [x] Blood Thinner Form Faxed for approval to hold.    Provider form faxed to: Dr Alberto Lino Completed by:  Electronically signed by Jairo Ocampo RN on 1/17/2023 at 3:12 PM

## 2023-01-17 NOTE — PROGRESS NOTES
[facility] Physical & Pain Medicine    Office Visit    Patient Name: Cynthia Browning    MR #: [id number]    Account [de-identified]    : 1965    Age: 66 y.o. Sex: male    Date: 10/13/2022    PCP: Derek Peck MD    Chief Complaint:   Chief Complaint   Patient presents with    Back Pain     5/10       History of Present Illness: The patient is a 66 y.o. male who presents for procedure follow-up. Patient had LESI of L4/L5 on 2022. Patient had at least 85% relief of pain from procedure(s) for at least 6 weeks and was able to increase activity after procedure. Patient received enough pain relief from injections that the patient would like to repeat the injection(s). Patient had bilateral Thoracic and Lumbar trigger points on and Left SI injection on 2022. Patient had at least 85% relief of pain from procedure(s) for at least 6 weeks and was able to increase activity after procedure. Patient received enough pain relief from injections that the patient would like to repeat the injection(s). Back Pain  This is a recurrent problem. The current episode started more than 1 year ago. The problem occurs constantly. The problem has been waxing and waning since onset. The pain is present in the lumbar spine and sacro-iliac. The quality of the pain is described as aching, burning and stabbing. Radiates to: right low back into buttocks posteriorly down leg to knee/calf area. Exacerbated by: general activity pain intermittently flares. Associated symptoms include leg pain. Pertinent negatives include no bladder incontinence, bowel incontinence, numbness or weakness. Screening Tools:     PE.3    Past PE    ORT: 0    PHQ-9: 3    Current Pain Assessment  Pain Assessment  Pain Assessment: 0-10  Pain Level: 5  Pain Location: Back  Pain Orientation: Lower  Pain Type: Chronic pain    Past Visit HPI:   10/21/2021  Patient had LESI of L4-L5 on 2021.   Bilateral thoracic and lumbar trigger points on 9/15/2021. Patient had at least 85% relief of pain from procedure(s) for at least 6 weeks and was able to increase activity after procedure. Patient received enough pain relief from injections that the patient would like to repeat the injection(s). 9/2/2021  presents for imaging follow up and procedure follow up. Patient had MRI of Lumbar spine on 7/2/2021. Degenerative disc disease noted at L2-L3 and L5-S1 with loss of hydration and disc height. L2-L3 has a annular fissure with broad-based left lateral protrusion of disc moderate left-sided foraminal narrowing present L3-L4 moderate facet and ligamentous hypertrophy with mild broad-based disc bulge mild central canal stenosis and left-sided foraminal narrowing L4-L5 moderate facet ligamentous hypertrophy mild broad-based disc bulge and mild central spinal stenosis mild bilateral neural narrowing L5-S1 central disc protrusion with no evidence of central stenosis mild facet ligamentous hypertrophy    Scheduled for LESI of L4-L5 on 7/20/2021. Patient had at least 85% relief of pain from procedure(s) for at least 6 weeks and was able to increase activity after procedure. Patient received enough pain relief from injections that the patient would like to repeat the injection(s). 6/17/2021  Eight years ago, He had discectomy of left side. He did really good until now. He did have injections under fluoroscopy that did help but over time stopped. That's when he underwent surgery. Patient started workup for his low back. He has xray in Jan which indicated DDD and started PT. However, patient did have a stroke this past spring. Patient has exhausted his therapy benefits.        Employment: manual labor    Past Medical History  Past Medical History:   Diagnosis Date    Anxiety     Diverticulitis     Hx of blood clots     Hypertension     Nosebleed     Pneumonia     Stroke (cerebrum) ([facility] [address]     2019 and 2014 tia june 2013 and feb 2021 Allergies  Penicillins    Current Medications  Current Outpatient Medications   Medication Sig Dispense Refill    cyclobenzaprine (FLEXERIL) 5 MG tablet Take 5 mg by mouth 2 times daily as needed for Muscle spasms      levothyroxine (SYNTHROID) 25 MCG tablet Take 1 tablet by mouth daily      butalbital-APAP-caffeine -40 MG CAPS per capsule Take 1 capsule by mouth as needed      gabapentin (NEURONTIN) 400 MG capsule Take 1 capsule by mouth 3 times daily for 90 days. 270 capsule 0    amLODIPine (NORVASC) 10 MG tablet Take 10 mg by mouth daily      escitalopram (LEXAPRO) 20 MG tablet Take 20 mg by mouth daily      aspirin 81 MG tablet Take 81 mg by mouth daily      Melatonin 10 MG CAPS Take by mouth nightly as needed       clopidogrel (PLAVIX) 75 MG tablet Take 75 mg by mouth daily       Acetaminophen (TYLENOL 8 HOUR PO) Take 500 mg by mouth as needed      atorvastatin (LIPITOR) 40 MG tablet Take 40 mg by mouth daily      nitroGLYCERIN (NITROSTAT) 0.4 MG SL tablet Place 1 tablet under the tongue every 5 minutes as needed for Chest pain up to max of 3 total doses. If no relief after 1 dose, call 911. 25 tablet 3     No current facility-administered medications for this encounter.        Social History    Social History     Socioeconomic History    Marital status:      Spouse name: None    Number of children: 0    Years of education: None    Highest education level: None   Tobacco Use    Smoking status: Every Day     Packs/day: 1.50     Years: 40.00     Pack years: 60.00     Types: Cigarettes    Smokeless tobacco: Never    Tobacco comments:     Cutting down   Vaping Use    Vaping Use: Never used   Substance and Sexual Activity    Alcohol use: Not Currently     Comment: quit drinking dec 2022    Drug use: Not Currently     Types: Cocaine     Comment: one time thing nov 2022    Sexual activity: Yes         Family History  family history includes Brain Cancer in his mother; Cancer in his mother; Heart Attack in his father; Kidney Cancer in his mother; Maxim Brown in his mother. Review of Systems:  Review of Systems   Constitutional:  Negative for activity change. Gastrointestinal:  Negative for bowel incontinence and constipation. Genitourinary:  Negative for bladder incontinence. Musculoskeletal:  Positive for arthralgias, back pain, gait problem and myalgias. Negative for joint swelling, neck pain and neck stiffness. Neurological:  Negative for weakness and numbness. Psychiatric/Behavioral:  Positive for sleep disturbance (occasionally). Negative for agitation, self-injury and suicidal ideas. The patient is not nervous/anxious. 14 point ROS negative besides that noted in HPI    Physical exam:     Vitals:    01/17/23 1410   BP: (!) 139/94   Pulse: 98   Resp: 16   Temp: 97.9 °F (36.6 °C)   TempSrc: Temporal   SpO2: 95%   Weight: 231 lb (104.8 kg)   Height: 6' 1\" (1.854 m)       Body mass index is 30.48 kg/m². Physical Exam  Vitals and nursing note reviewed. Constitutional:       General: He is not in acute distress. Appearance: He is well-developed. HENT:      Head: Normocephalic. Right Ear: External ear normal.      Left Ear: External ear normal.      Nose: Nose normal.   Eyes:      Conjunctiva/sclera: Conjunctivae normal.      Pupils: Pupils are equal, round, and reactive to light. Neck:      Vascular: No JVD. Trachea: No tracheal deviation. Cardiovascular:      Rate and Rhythm: Normal rate. Pulmonary:      Effort: Pulmonary effort is normal.   Abdominal:      General: There is no distension. Tenderness: There is no abdominal tenderness. Musculoskeletal:      Lumbar back: Spasms (tender palpable knots ie trigger points identified) and tenderness (left SI TTP ) present.  Positive right straight leg raise test. Negative left straight leg raise test.      Comments: No foot drop  No clonus  SI exam negative  Piriformis negative     Skin:     General: Skin is warm and dry. Neurological:      Mental Status: He is alert and oriented to person, place, and time. Sensory: No sensory deficit. Gait: Gait abnormal (use a cane). Psychiatric:         Behavior: Behavior normal.         Thought Content: Thought content normal.         Judgment: Judgment normal.       Labs:     Lab Results   Component Value Date/Time     10/29/2021 12:00 PM    K 4.7 10/29/2021 12:00 PM    K 3.6 02/22/2021 06:10 AM     10/29/2021 12:00 PM    CO2 29 10/29/2021 12:00 PM    BUN 19 10/29/2021 12:00 PM    CREATININE 0.9 10/29/2021 12:00 PM    GLUCOSE 92 10/29/2021 12:00 PM    CALCIUM 9.4 10/29/2021 12:00 PM        Lab Results   Component Value Date    WBC 7.3 10/29/2021    HGB 16.7 10/29/2021    HCT 50.8 10/29/2021    .1 (H) 10/29/2021     10/29/2021       Assessment:                                                                                                                                        Active Problems:    Sacroiliac joint dysfunction    Encounter for monitoring opioid maintenance therapy  Resolved Problems:    * No resolved hospital problems. *      PLAN:  Back pain with history of spinal surgery  Chronic right-sided low back pain with right-sided sciatica  Continue medication with no refill sent  at appointment see refill encounter. Patient takes very sparingly - has not needed since injections may call for refill  HYDROcodone-acetaminophen (NORCO)  MG per tablet; Take 1 tablet by mouth every 6 hours as needed for Pain for up to 30 days. Intended supply: 30 days  Dispense: 90 tablet; Refill: 0      Lumbar radiculopathy  - gabapentin (NEURONTIN) 400 MG capsule; Take 1 capsule by mouth 3 times daily for 90 days. Dispense: 270 capsule;  Refill: 0      Refill at office appointment    Treatments:    Schedule bilateral thoracic and lumbar trigger point injections for myofascial pain with NP     Schedule  LESI of L4-L5 under fluoroscopy with  Keith Ramirez as patient had good results from previous injection. Patient on Plavix and aspirin. Received authorization from PCP for patient to hold medication. Patient continues to do home exercises for low back pain. UDS today due to controlled substance agreement. [x] Follow up    [] 4 weeks   [x] 6-8 weeks   [] 10-12 weeks   [] 3 months  [x] Post procedure to evaluate effectiveness of treatment  [] To evaluate medications changes made at office visit. [] To review diagnostics ordered at last visit. [] To evaluate response to therapy    [x] For management of controlled substance  [x] Random UDS as indicated by ORT score or if indicated by abberent behaviors    Discussion: Discussed exam findings and plan of care with patient. Patient agreed with POC and questions were asked and answered. Activity: discussed exercise as beneficial to pain reduction, encouraged stretching exercise with a focus on torso strengthening. Goal:  Pain Management Goals of Therapy:   [] Resolution in pain  [x] Decrease in pain level  [x] Improvement in ADL's  [x] Increase in activities with less pain  [] Decrease in medication     Controlled substance monitoring:    [x] Discussed medication side effects, risk of tolerance and/or dependence, and/or alternative treatment  [] Discussed the detrimental effects of long term narcotic use in younger patients especially women of childbearing years.   [x] No signs and symptoms of potential drug abuse or diversion were identified  [] Signs of potential drug abuse or diversion were identified   [] ORT Score   [] UDS non-compliant   [] See Note  [x] Random urine drug screen sent today  [] Random urine drug screen not completed today   [] Deferred New Patient  [] Compliant 9/2/2021  [] Not Compliant see note  [x] Medication agreement with provider signed today  [] Medication agreement with provider on file under media   [] Medication regimen effective with no c/o side effects and continued   [] New patient continuing current medication while developing POC   [x] On going assessment and evaluation of medication regimen  [] Medication regimen not effective see plan for changes  [x] Martine Payne reviewed & on file under media     CC:  MD Rosina Soliz, APRN - CNP, 1/17/2023 at 2:53 PM    EMR dragon/transcription disclaimer: Much of this encounter note is electronic transcription/translation of spoken language to printed tach. Electronic translation of spoken language may be erroneous, or at times, nonsensical words or phrases may be inadvertently transcribed.  Although, I have reviewed the note for such errors, some may still exist.

## 2023-02-01 ENCOUNTER — HOSPITAL ENCOUNTER (OUTPATIENT)
Dept: PAIN MANAGEMENT | Age: 58
Discharge: HOME | End: 2023-02-01
Payer: MEDICARE

## 2023-02-01 VITALS
OXYGEN SATURATION: 98 % | DIASTOLIC BLOOD PRESSURE: 90 MMHG | RESPIRATION RATE: 18 BRPM | HEART RATE: 90 BPM | SYSTOLIC BLOOD PRESSURE: 118 MMHG | TEMPERATURE: 96.9 F

## 2023-02-01 PROCEDURE — 2500000003 HC RX 250 WO HCPCS

## 2023-02-01 PROCEDURE — 20553 NJX 1/MLT TRIGGER POINTS 3/>: CPT

## 2023-02-01 PROCEDURE — 20553 NJX 1/MLT TRIGGER POINTS 3/>: CPT | Performed by: NURSE PRACTITIONER

## 2023-02-01 RX ORDER — BUPIVACAINE HYDROCHLORIDE 5 MG/ML
15 INJECTION, SOLUTION EPIDURAL; INTRACAUDAL ONCE
Status: DISCONTINUED | OUTPATIENT
Start: 2023-02-01 | End: 2023-02-03 | Stop reason: HOSPADM

## 2023-02-01 RX ORDER — LIDOCAINE HYDROCHLORIDE 10 MG/ML
15 INJECTION, SOLUTION EPIDURAL; INFILTRATION; INTRACAUDAL; PERINEURAL ONCE
Status: DISCONTINUED | OUTPATIENT
Start: 2023-02-01 | End: 2023-02-03 | Stop reason: HOSPADM

## 2023-02-01 NOTE — PROCEDURES
[facility] Physical & Pain Medicine    Patient Name: Javi Espitia    : 1965                    Age: 66 y.o. Sex: male    Date: 2023    Pre-op Diagnosis: Myofascial Pain/ Muscle Spasms    Post-op Diagnosis: Myofascial Pain/ Muscle Spasms    Procedure: Thoracic Trigger Point Injections    Performing Procedure: Sebastian Kelly, ACAGNP - BC, VA-BC    Patient Vitals for the past 24 hrs:   BP Temp Temp src Pulse Resp SpO2   23 1312 (!) 118/90 96.9 °F (36.1 °C) Temporal 90 18 98 %       Description of Procedure:  After a brief physical assessment and failure to improve with conservative measures the patient presented for Thoracic Trigger Point Injections The indications, limitations and possible complications were discussed with the patient and the patient elected to proceed with the procedure. After voluntary, informed and signed consent obtained the patient was placed in a seated position. Appropriate time out was obtained per policy. The areas were then prepped in a sterile fashion with Chloro-Prep. The area of maximal tenderness was palpated over the bilaterally Thoracic Muscles - Erector Spinae, Upper/Mid Latissimus, Rhomboid Minor, Rhomboid Major. The skin overlying these areas was marked with a skin marker. The areas were prepped using aseptic technique with CHG prep. The skin overlying the proposed injection sites were then sprayed with Gebauer's Solution while protecting patient eyes.  Each trigger point of the Thoracic Muscles - Erector Spinae, Upper/Mid Latissimus, Rhomboid Minor, Rhomboid Major was injected after negative aspiration was injected with approximately 1-2 ml of a solution of 5 ml of 1% Lidocaine Plain and 5 ml of 0.5% Marcaine Plain after negative aspiration    Pre-op Diagnosis: Myofascial Pain/ Muscle Spasms    Post-op Diagnosis: Myofascial Pain/ Muscle Spasms    Procedure: Lumbar Trigger Point Injections    Performing Procedure: Sebastian Kelly, ACAGNP - BC; VA-BC    Patient Vitals for the past 24 hrs:   BP Temp Temp src Pulse Resp SpO2   02/01/23 1312 (!) 118/90 96.9 °F (36.1 °C) Temporal 90 18 98 %       Description of Procedure:    After a brief physical assessment and failure to improve with conservative measures the patient presented for Lumbar Trigger Point Injections The indications, limitations and possible complications were discussed with the patient and the patient elected to proceed with the procedure. After voluntary, informed and signed consent obtained the patient was placed in a seated position. Appropriate time out was obtained per policy. The area of maximal tenderness was palpated over the bilaterally Lumbar Muscles - Lower Latissimus,  Erector Spinae, Lumbar Paraspinous. The skin overlying these areas was marked with a skin marker. The areas were prepped using aseptic technique with CHG prep. The skin overlying the proposed injection sites were then sprayed with Gebauer's Solution while protecting patient eyes. Each trigger point of the Lumbar Muscles - Lower Latissimus,  Erector Spinae, Lumbar Paraspinous was injected after negative aspiration was injected with approximately 1-2 ml of a solution of 5 ml of 1% Lidocaine Plain and 5 ml of 0.5% Marcaine Plain after negative aspiration    Discharge: The patient tolerated the procedure well. There were no complications during the procedure and the patient was discharged home with discharge instructions. The patient has been instructed to contact the office should there be any complications or questions to arise between today and their next appointment. Plan:     Will return to the office in 6 weeks for follow up    Claudeen Penton, APRN - CNP, 2/1/2023 at 2:15 PM

## 2023-02-01 NOTE — PROGRESS NOTES
Spoke with patient today at procedure appointment to remind him to hold asa and plavix x5 days starting tomorrow for LESI next Tuesday. Patient states understanding. Medication hold approved by Dr Denisha Anderson.

## 2023-02-01 NOTE — PROGRESS NOTES
Procedure:  Level of Consciousness: [x]Alert [x]Oriented []Disoriented []Lethargic  Anxiety Level: [x]Calm []Anxious []Depressed []Other  Skin: [x]Warm [x]Dry []Cool []Moist []Intact []Other  Cardiovascular: []Palpitations: [x]Never []Occasionally []Frequently  Chest Pain: [x]No []Yes  Respiratory:  [x]Unlabored []Labored []Cough ([] Productive []Unproductive)  HCG Required: [x]No []Yes   Results: []Negative []Positive  Knowledge Level:        [x]Patient/Other verbalized understanding of pre-procedure instructions. [x]Assessment of post-op care needs (transportation, responsible caregiver)        [x]Able to discuss health care problems and how to deal with it.   Factors that Affect Teaching:        Language Barrier: [x]No []Yes - why:        Hearing Loss:        [x]No []Yes            Corrective Device:  []Yes [x]No        Vision Loss:           []No [x]Yes            Corrective Device:  [x]Yes []No        Memory Loss:       [x]No []Yes            []Short Term []Long Term  Motivational Level:  [x]Asks Questions                  []Extremely Anxious       [x]Seems Interested               []Seems Uninterested                  []Denies need for Education  Risk for Injury:  [x]Patient oriented to person, place and time  []History of frequent falls/loss of balance  Nutritional:  []Change in appetite   []Weight Gain   []Weight Loss  Functional:  []Requires assistance with ADL's

## 2023-02-07 ENCOUNTER — HOSPITAL ENCOUNTER (OUTPATIENT)
Dept: PAIN MANAGEMENT | Age: 58
Discharge: HOME | End: 2023-02-07
Payer: MEDICARE

## 2023-02-07 VITALS
DIASTOLIC BLOOD PRESSURE: 109 MMHG | HEART RATE: 86 BPM | OXYGEN SATURATION: 98 % | SYSTOLIC BLOOD PRESSURE: 148 MMHG | TEMPERATURE: 97.6 F | RESPIRATION RATE: 18 BRPM

## 2023-02-07 DIAGNOSIS — R52 PAIN MANAGEMENT: ICD-10-CM

## 2023-02-07 PROCEDURE — 2580000003 HC RX 258

## 2023-02-07 PROCEDURE — 2500000003 HC RX 250 WO HCPCS

## 2023-02-07 PROCEDURE — 6360000002 HC RX W HCPCS

## 2023-02-07 PROCEDURE — A4216 STERILE WATER/SALINE, 10 ML: HCPCS

## 2023-02-07 PROCEDURE — 62323 NJX INTERLAMINAR LMBR/SAC: CPT

## 2023-02-07 RX ORDER — SODIUM CHLORIDE 9 MG/ML
5 INJECTION INTRAVENOUS ONCE
Status: DISCONTINUED | OUTPATIENT
Start: 2023-02-07 | End: 2023-02-09 | Stop reason: HOSPADM

## 2023-02-07 RX ORDER — METHYLPREDNISOLONE ACETATE 80 MG/ML
80 INJECTION, SUSPENSION INTRA-ARTICULAR; INTRALESIONAL; INTRAMUSCULAR; SOFT TISSUE ONCE
Status: DISCONTINUED | OUTPATIENT
Start: 2023-02-07 | End: 2023-02-09 | Stop reason: HOSPADM

## 2023-02-07 RX ORDER — LIDOCAINE HYDROCHLORIDE 10 MG/ML
5 INJECTION, SOLUTION EPIDURAL; INFILTRATION; INTRACAUDAL; PERINEURAL ONCE
Status: DISCONTINUED | OUTPATIENT
Start: 2023-02-07 | End: 2023-02-09 | Stop reason: HOSPADM

## 2023-02-07 ASSESSMENT — PAIN - FUNCTIONAL ASSESSMENT: PAIN_FUNCTIONAL_ASSESSMENT: 0-10

## 2023-02-07 NOTE — INTERVAL H&P NOTE
Update History & Physical    The patient's History and Physical  was reviewed with the patient and I examined the patient. There was no change. The surgical site was confirmed by the patient and me. Plan: The risks, benefits, expected outcome, and alternative to the recommended procedure have been discussed with the patient. Patient understands and wants to proceed with the procedure.      Electronically signed by Sulma Valencia MD on 2/7/2023 at 11:42 AM

## 2023-02-21 ENCOUNTER — APPOINTMENT (OUTPATIENT)
Dept: MRI IMAGING | Age: 58
End: 2023-02-21
Payer: MEDICARE

## 2023-02-21 ENCOUNTER — APPOINTMENT (OUTPATIENT)
Dept: CT IMAGING | Age: 58
End: 2023-02-21
Payer: MEDICARE

## 2023-02-21 ENCOUNTER — APPOINTMENT (OUTPATIENT)
Dept: GENERAL RADIOLOGY | Age: 58
End: 2023-02-21
Payer: MEDICARE

## 2023-02-21 ENCOUNTER — HOSPITAL ENCOUNTER (OUTPATIENT)
Age: 58
Setting detail: OBSERVATION
Discharge: HOME | End: 2023-02-22
Attending: PEDIATRICS
Payer: MEDICARE

## 2023-02-21 DIAGNOSIS — I95.9 HYPOTENSION, UNSPECIFIED HYPOTENSION TYPE: ICD-10-CM

## 2023-02-21 DIAGNOSIS — R29.90 STROKE-LIKE SYMPTOMS: Primary | ICD-10-CM

## 2023-02-21 DIAGNOSIS — R93.89 ABNORMAL CHEST X-RAY: ICD-10-CM

## 2023-02-21 PROBLEM — R26.89 IMBALANCE: Status: ACTIVE | Noted: 2023-02-21

## 2023-02-21 PROBLEM — F80.81 STUTTERING: Status: ACTIVE | Noted: 2023-02-21

## 2023-02-21 PROBLEM — R53.1 WEAKNESS: Status: ACTIVE | Noted: 2023-02-21

## 2023-02-21 PROBLEM — Z86.73 HISTORY OF STROKE: Status: ACTIVE | Noted: 2023-02-21

## 2023-02-21 PROBLEM — Z72.0 TOBACCO USE: Status: ACTIVE | Noted: 2023-02-21

## 2023-02-21 PROBLEM — F10.90 ALCOHOL USE: Status: ACTIVE | Noted: 2023-02-21

## 2023-02-21 PROBLEM — G45.9 TIA (TRANSIENT ISCHEMIC ATTACK): Status: ACTIVE | Noted: 2023-02-21

## 2023-02-21 PROBLEM — I10 ESSENTIAL HYPERTENSION: Status: ACTIVE | Noted: 2023-02-21

## 2023-02-21 PROBLEM — Z86.73 HISTORY OF TIA (TRANSIENT ISCHEMIC ATTACK): Status: ACTIVE | Noted: 2023-02-21

## 2023-02-21 PROBLEM — Z78.9 ALCOHOL USE: Status: ACTIVE | Noted: 2023-02-21

## 2023-02-21 LAB
ALBUMIN SERPL-MCNC: 4.3 G/DL (ref 3.5–5.2)
ALP BLD-CCNC: 90 U/L (ref 40–130)
ALT SERPL-CCNC: 23 U/L (ref 5–41)
AMPHETAMINE SCREEN, URINE: NEGATIVE
ANION GAP SERPL CALCULATED.3IONS-SCNC: 11 MMOL/L (ref 7–19)
ANION GAP SERPL CALCULATED.3IONS-SCNC: 8 MMOL/L (ref 7–19)
APTT: 29.9 SEC (ref 26–36.2)
AST SERPL-CCNC: 20 U/L (ref 5–40)
BARBITURATE SCREEN URINE: POSITIVE
BASOPHILS ABSOLUTE: 0 K/UL (ref 0–0.2)
BASOPHILS RELATIVE PERCENT: 0.4 % (ref 0–1)
BENZODIAZEPINE SCREEN, URINE: NEGATIVE
BILIRUB SERPL-MCNC: 0.4 MG/DL (ref 0.2–1.2)
BUN BLDV-MCNC: 17 MG/DL (ref 6–20)
BUN BLDV-MCNC: 19 MG/DL (ref 6–20)
BUPRENORPHINE URINE: NEGATIVE
CALCIUM SERPL-MCNC: 8.7 MG/DL (ref 8.6–10)
CALCIUM SERPL-MCNC: 9.3 MG/DL (ref 8.6–10)
CANNABINOID SCREEN URINE: NEGATIVE
CHLORIDE BLD-SCNC: 101 MMOL/L (ref 98–111)
CHLORIDE BLD-SCNC: 107 MMOL/L (ref 98–111)
CHOLESTEROL, TOTAL: 131 MG/DL (ref 160–199)
CO2: 25 MMOL/L (ref 22–29)
CO2: 27 MMOL/L (ref 22–29)
COCAINE METABOLITE SCREEN URINE: NEGATIVE
CREAT SERPL-MCNC: 0.7 MG/DL (ref 0.5–1.2)
CREAT SERPL-MCNC: 0.8 MG/DL (ref 0.5–1.2)
D DIMER: <0.27 UG/ML FEU (ref 0–0.48)
EKG P AXIS: 38 DEGREES
EKG P-R INTERVAL: 150 MS
EKG Q-T INTERVAL: 374 MS
EKG QRS DURATION: 104 MS
EKG QTC CALCULATION (BAZETT): 426 MS
EKG T AXIS: 42 DEGREES
EOSINOPHILS ABSOLUTE: 0.1 K/UL (ref 0–0.6)
EOSINOPHILS RELATIVE PERCENT: 1 % (ref 0–5)
ETHANOL: <10 MG/DL (ref 0–0.08)
GFR SERPL CREATININE-BSD FRML MDRD: >60 ML/MIN/{1.73_M2}
GFR SERPL CREATININE-BSD FRML MDRD: >60 ML/MIN/{1.73_M2}
GLUCOSE BLD-MCNC: 103 MG/DL (ref 74–109)
GLUCOSE BLD-MCNC: 104 MG/DL
GLUCOSE BLD-MCNC: 104 MG/DL (ref 70–99)
GLUCOSE BLD-MCNC: 104 MG/DL (ref 74–109)
HBA1C MFR BLD: 5.8 % (ref 4–6)
HCT VFR BLD CALC: 48.4 % (ref 42–52)
HDLC SERPL-MCNC: 40 MG/DL (ref 55–121)
HEMOGLOBIN: 16.3 G/DL (ref 14–18)
IMMATURE GRANULOCYTES #: 0 K/UL
INR BLD: 0.97 (ref 0.88–1.18)
LACTIC ACID: 0.8 MMOL/L (ref 0.5–1.9)
LDL CHOLESTEROL CALCULATED: 43 MG/DL
LV EF: 58 %
LVEF MODALITY: NORMAL
LYMPHOCYTES ABSOLUTE: 2.7 K/UL (ref 1.1–4.5)
LYMPHOCYTES RELATIVE PERCENT: 29.6 % (ref 20–40)
Lab: ABNORMAL
MAGNESIUM: 2.2 MG/DL (ref 1.6–2.6)
MCH RBC QN AUTO: 33.5 PG (ref 27–31)
MCHC RBC AUTO-ENTMCNC: 33.7 G/DL (ref 33–37)
MCV RBC AUTO: 99.6 FL (ref 80–94)
METHADONE SCREEN, URINE: NEGATIVE
METHAMPHETAMINE, URINE: NEGATIVE
MONOCYTES ABSOLUTE: 0.5 K/UL (ref 0–0.9)
MONOCYTES RELATIVE PERCENT: 5.3 % (ref 0–10)
NEUTROPHILS ABSOLUTE: 5.8 K/UL (ref 1.5–7.5)
NEUTROPHILS RELATIVE PERCENT: 63.4 % (ref 50–65)
OPIATE SCREEN URINE: NEGATIVE
OXYCODONE URINE: NEGATIVE
PDW BLD-RTO: 14.1 % (ref 11.5–14.5)
PERFORMED ON: ABNORMAL
PHENCYCLIDINE SCREEN URINE: NEGATIVE
PHOSPHORUS: 4.2 MG/DL (ref 2.5–4.5)
PLATELET # BLD: 227 K/UL (ref 130–400)
PMV BLD AUTO: 8.3 FL (ref 9.4–12.4)
POTASSIUM REFLEX MAGNESIUM: 4.2 MMOL/L (ref 3.5–5)
POTASSIUM REFLEX MAGNESIUM: 4.4 MMOL/L (ref 3.5–5)
PROPOXYPHENE SCREEN: NEGATIVE
PROTHROMBIN TIME: 12.8 SEC (ref 12–14.6)
RBC # BLD: 4.86 M/UL (ref 4.7–6.1)
SARS-COV-2, NAAT: NOT DETECTED
SODIUM BLD-SCNC: 139 MMOL/L (ref 136–145)
SODIUM BLD-SCNC: 140 MMOL/L (ref 136–145)
TOTAL PROTEIN: 7.2 G/DL (ref 6.6–8.7)
TRICYCLIC, URINE: NEGATIVE
TRIGL SERPL-MCNC: 239 MG/DL (ref 0–149)
TROPONIN: <0.01 NG/ML (ref 0–0.03)
TSH REFLEX FT4: 3.57 UIU/ML (ref 0.35–5.5)
WBC # BLD: 9.2 K/UL (ref 4.8–10.8)

## 2023-02-21 PROCEDURE — 83036 HEMOGLOBIN GLYCOSYLATED A1C: CPT

## 2023-02-21 PROCEDURE — 83605 ASSAY OF LACTIC ACID: CPT

## 2023-02-21 PROCEDURE — 83735 ASSAY OF MAGNESIUM: CPT

## 2023-02-21 PROCEDURE — G0378 HOSPITAL OBSERVATION PER HR: HCPCS

## 2023-02-21 PROCEDURE — 85610 PROTHROMBIN TIME: CPT

## 2023-02-21 PROCEDURE — 85730 THROMBOPLASTIN TIME PARTIAL: CPT

## 2023-02-21 PROCEDURE — 99223 1ST HOSP IP/OBS HIGH 75: CPT | Performed by: PSYCHIATRY & NEUROLOGY

## 2023-02-21 PROCEDURE — 93306 TTE W/DOPPLER COMPLETE: CPT

## 2023-02-21 PROCEDURE — 97530 THERAPEUTIC ACTIVITIES: CPT

## 2023-02-21 PROCEDURE — 85025 COMPLETE CBC W/AUTO DIFF WBC: CPT

## 2023-02-21 PROCEDURE — 82077 ASSAY SPEC XCP UR&BREATH IA: CPT

## 2023-02-21 PROCEDURE — 97165 OT EVAL LOW COMPLEX 30 MIN: CPT

## 2023-02-21 PROCEDURE — 6360000004 HC RX CONTRAST MEDICATION: Performed by: PEDIATRICS

## 2023-02-21 PROCEDURE — 96360 HYDRATION IV INFUSION INIT: CPT

## 2023-02-21 PROCEDURE — 84484 ASSAY OF TROPONIN QUANT: CPT

## 2023-02-21 PROCEDURE — 84100 ASSAY OF PHOSPHORUS: CPT

## 2023-02-21 PROCEDURE — 93005 ELECTROCARDIOGRAM TRACING: CPT | Performed by: PEDIATRICS

## 2023-02-21 PROCEDURE — 72141 MRI NECK SPINE W/O DYE: CPT

## 2023-02-21 PROCEDURE — 80053 COMPREHEN METABOLIC PANEL: CPT

## 2023-02-21 PROCEDURE — 82962 GLUCOSE BLOOD TEST: CPT

## 2023-02-21 PROCEDURE — 92610 EVALUATE SWALLOWING FUNCTION: CPT

## 2023-02-21 PROCEDURE — 2580000003 HC RX 258: Performed by: PEDIATRICS

## 2023-02-21 PROCEDURE — 85379 FIBRIN DEGRADATION QUANT: CPT

## 2023-02-21 PROCEDURE — 92522 EVALUATE SPEECH PRODUCTION: CPT

## 2023-02-21 PROCEDURE — 71045 X-RAY EXAM CHEST 1 VIEW: CPT

## 2023-02-21 PROCEDURE — 80306 DRUG TEST PRSMV INSTRMNT: CPT

## 2023-02-21 PROCEDURE — 99285 EMERGENCY DEPT VISIT HI MDM: CPT

## 2023-02-21 PROCEDURE — 70450 CT HEAD/BRAIN W/O DYE: CPT

## 2023-02-21 PROCEDURE — 6370000000 HC RX 637 (ALT 250 FOR IP): Performed by: HOSPITALIST

## 2023-02-21 PROCEDURE — 84443 ASSAY THYROID STIM HORMONE: CPT

## 2023-02-21 PROCEDURE — 87635 SARS-COV-2 COVID-19 AMP PRB: CPT

## 2023-02-21 PROCEDURE — 96372 THER/PROPH/DIAG INJ SC/IM: CPT

## 2023-02-21 PROCEDURE — 70496 CT ANGIOGRAPHY HEAD: CPT

## 2023-02-21 PROCEDURE — 94760 N-INVAS EAR/PLS OXIMETRY 1: CPT

## 2023-02-21 PROCEDURE — 97161 PT EVAL LOW COMPLEX 20 MIN: CPT

## 2023-02-21 PROCEDURE — 93010 ELECTROCARDIOGRAM REPORT: CPT | Performed by: INTERNAL MEDICINE

## 2023-02-21 PROCEDURE — 36415 COLL VENOUS BLD VENIPUNCTURE: CPT

## 2023-02-21 PROCEDURE — 6360000002 HC RX W HCPCS: Performed by: HOSPITALIST

## 2023-02-21 PROCEDURE — 70551 MRI BRAIN STEM W/O DYE: CPT

## 2023-02-21 PROCEDURE — 87040 BLOOD CULTURE FOR BACTERIA: CPT

## 2023-02-21 PROCEDURE — 80061 LIPID PANEL: CPT

## 2023-02-21 RX ORDER — ONDANSETRON 4 MG/1
4 TABLET, ORALLY DISINTEGRATING ORAL EVERY 8 HOURS PRN
Status: DISCONTINUED | OUTPATIENT
Start: 2023-02-21 | End: 2023-02-22 | Stop reason: HOSPADM

## 2023-02-21 RX ORDER — ATORVASTATIN CALCIUM 40 MG/1
80 TABLET, FILM COATED ORAL NIGHTLY
Status: DISCONTINUED | OUTPATIENT
Start: 2023-02-21 | End: 2023-02-22 | Stop reason: HOSPADM

## 2023-02-21 RX ORDER — NITROGLYCERIN 0.4 MG/1
0.4 TABLET SUBLINGUAL EVERY 5 MIN PRN
Status: DISCONTINUED | OUTPATIENT
Start: 2023-02-21 | End: 2023-02-22 | Stop reason: HOSPADM

## 2023-02-21 RX ORDER — 0.9 % SODIUM CHLORIDE 0.9 %
1000 INTRAVENOUS SOLUTION INTRAVENOUS ONCE
Status: COMPLETED | OUTPATIENT
Start: 2023-02-21 | End: 2023-02-21

## 2023-02-21 RX ORDER — GABAPENTIN 400 MG/1
400 CAPSULE ORAL 3 TIMES DAILY
Status: DISCONTINUED | OUTPATIENT
Start: 2023-02-21 | End: 2023-02-22 | Stop reason: HOSPADM

## 2023-02-21 RX ORDER — MELATONIN 10 MG
10 CAPSULE ORAL NIGHTLY
Status: DISCONTINUED | OUTPATIENT
Start: 2023-02-21 | End: 2023-02-22 | Stop reason: HOSPADM

## 2023-02-21 RX ORDER — CLOPIDOGREL BISULFATE 75 MG/1
75 TABLET ORAL DAILY
Status: DISCONTINUED | OUTPATIENT
Start: 2023-02-21 | End: 2023-02-22 | Stop reason: HOSPADM

## 2023-02-21 RX ORDER — BUTALBITAL, ACETAMINOPHEN AND CAFFEINE 50; 325; 40 MG/1; MG/1; MG/1
1 TABLET ORAL EVERY 4 HOURS PRN
Status: DISCONTINUED | OUTPATIENT
Start: 2023-02-21 | End: 2023-02-22 | Stop reason: HOSPADM

## 2023-02-21 RX ORDER — LEVOTHYROXINE SODIUM 0.03 MG/1
25 TABLET ORAL
Status: DISCONTINUED | OUTPATIENT
Start: 2023-02-21 | End: 2023-02-22 | Stop reason: HOSPADM

## 2023-02-21 RX ORDER — ESCITALOPRAM OXALATE 10 MG/1
20 TABLET ORAL DAILY
Status: DISCONTINUED | OUTPATIENT
Start: 2023-02-21 | End: 2023-02-22 | Stop reason: HOSPADM

## 2023-02-21 RX ORDER — ASPIRIN 300 MG/1
300 SUPPOSITORY RECTAL DAILY
Status: DISCONTINUED | OUTPATIENT
Start: 2023-02-21 | End: 2023-02-22 | Stop reason: HOSPADM

## 2023-02-21 RX ORDER — LABETALOL HYDROCHLORIDE 5 MG/ML
10 INJECTION, SOLUTION INTRAVENOUS EVERY 10 MIN PRN
Status: DISCONTINUED | OUTPATIENT
Start: 2023-02-21 | End: 2023-02-22 | Stop reason: HOSPADM

## 2023-02-21 RX ORDER — ENOXAPARIN SODIUM 100 MG/ML
30 INJECTION SUBCUTANEOUS 2 TIMES DAILY
Status: DISCONTINUED | OUTPATIENT
Start: 2023-02-21 | End: 2023-02-22 | Stop reason: HOSPADM

## 2023-02-21 RX ORDER — POLYETHYLENE GLYCOL 3350 17 G/17G
17 POWDER, FOR SOLUTION ORAL DAILY PRN
Status: DISCONTINUED | OUTPATIENT
Start: 2023-02-21 | End: 2023-02-22 | Stop reason: HOSPADM

## 2023-02-21 RX ORDER — MECOBALAMIN 5000 MCG
10 TABLET,DISINTEGRATING ORAL NIGHTLY PRN
Status: DISCONTINUED | OUTPATIENT
Start: 2023-02-21 | End: 2023-02-22 | Stop reason: HOSPADM

## 2023-02-21 RX ORDER — ASPIRIN 81 MG/1
81 TABLET ORAL DAILY
Status: DISCONTINUED | OUTPATIENT
Start: 2023-02-21 | End: 2023-02-22 | Stop reason: HOSPADM

## 2023-02-21 RX ORDER — CALCIUM CARBONATE 200(500)MG
500 TABLET,CHEWABLE ORAL 3 TIMES DAILY PRN
Status: DISCONTINUED | OUTPATIENT
Start: 2023-02-21 | End: 2023-02-22 | Stop reason: HOSPADM

## 2023-02-21 RX ORDER — ONDANSETRON 2 MG/ML
4 INJECTION INTRAMUSCULAR; INTRAVENOUS EVERY 6 HOURS PRN
Status: DISCONTINUED | OUTPATIENT
Start: 2023-02-21 | End: 2023-02-22 | Stop reason: HOSPADM

## 2023-02-21 RX ORDER — CYCLOBENZAPRINE HCL 10 MG
5 TABLET ORAL 2 TIMES DAILY PRN
Status: DISCONTINUED | OUTPATIENT
Start: 2023-02-21 | End: 2023-02-22 | Stop reason: HOSPADM

## 2023-02-21 RX ADMIN — GABAPENTIN 400 MG: 400 CAPSULE ORAL at 15:13

## 2023-02-21 RX ADMIN — Medication 10 MG: at 20:14

## 2023-02-21 RX ADMIN — ASPIRIN 81 MG: 81 TABLET, COATED ORAL at 09:56

## 2023-02-21 RX ADMIN — ENOXAPARIN SODIUM 30 MG: 100 INJECTION SUBCUTANEOUS at 09:56

## 2023-02-21 RX ADMIN — GABAPENTIN 400 MG: 400 CAPSULE ORAL at 20:15

## 2023-02-21 RX ADMIN — CLOPIDOGREL BISULFATE 75 MG: 75 TABLET ORAL at 09:56

## 2023-02-21 RX ADMIN — BUTALBITAL, ACETAMINOPHEN, AND CAFFEINE 1 TABLET: 50; 325; 40 TABLET ORAL at 05:21

## 2023-02-21 RX ADMIN — ENOXAPARIN SODIUM 30 MG: 100 INJECTION SUBCUTANEOUS at 20:15

## 2023-02-21 RX ADMIN — SODIUM CHLORIDE 1000 ML: 9 INJECTION, SOLUTION INTRAVENOUS at 04:47

## 2023-02-21 RX ADMIN — GABAPENTIN 400 MG: 400 CAPSULE ORAL at 09:56

## 2023-02-21 RX ADMIN — LEVOTHYROXINE SODIUM 25 MCG: 25 TABLET ORAL at 05:20

## 2023-02-21 RX ADMIN — IOPAMIDOL 90 ML: 755 INJECTION, SOLUTION INTRAVENOUS at 01:14

## 2023-02-21 RX ADMIN — ATORVASTATIN CALCIUM 80 MG: 40 TABLET, FILM COATED ORAL at 20:14

## 2023-02-21 RX ADMIN — ESCITALOPRAM OXALATE 20 MG: 10 TABLET ORAL at 09:59

## 2023-02-21 ASSESSMENT — ENCOUNTER SYMPTOMS
DIARRHEA: 0
SHORTNESS OF BREATH: 0
NAUSEA: 0
COUGH: 0

## 2023-02-21 NOTE — CARE COORDINATION
Case Management Assessment  Initial Evaluation    Date/Time of Evaluation: 2/21/2023 11:09 AM  Assessment Completed by: ISABELLA Chan    If patient is discharged prior to next notation, then this note serves as note for discharge by case management. Patient Name: Jean Carlos Patton                   YOB: 1965  Diagnosis: TIA (transient ischemic attack) [G45.9]  Stroke-like symptoms [R29.90]                   Date / Time: 2/21/2023 12:48 AM    Patient Admission Status: Observation   Readmission Risk (Low < 19, Mod (19-27), High > 27): No data recorded  Current PCP: Ludin Ba MD  PCP verified by CM? Yes    Chart Reviewed: Yes      History Provided by: Patient  Patient Orientation: Alert and Oriented, Person, Place, Situation, Self    Patient Cognition: Alert    Hospitalization in the last 30 days (Readmission):  No    If yes, Readmission Assessment in  Navigator will be completed. Advance Directives:      Code Status: Full Code   Patient's Primary Decision Maker is: Legal Next of Kin      Discharge Planning:    Patient lives with: Spouse/Significant Other Type of Home: House  Primary Care Giver: Self  Patient Support Systems include: Spouse/Significant Other   Current Financial resources:    Current community resources:    Current services prior to admission:              Current DME:              Type of Home Care services:       ADLS  Prior functional level: Independent in ADLs/IADLs  Current functional level: Independent in ADLs/IADLs    PT AM-PAC:   /24  OT AM-PAC:   /24    Family can provide assistance at DC: Yes  Would you like Case Management to discuss the discharge plan with any other family members/significant others, and if so, who? No  Plans to Return to Present Housing: Yes  Other Identified Issues/Barriers to RETURNING to current housing: Pt did not identify any barriers or issues for returning home.   Potential Assistance needed at discharge:              Potential DME: Patient expects to discharge to: House  Plan for transportation at discharge: Family    Financial    Payor: Ramona Ballard / Plan: Monico Gitelman / Product Type: *No Product type* /     Does insurance require precert for SNF: Yes    Potential assistance Purchasing Medications: No  Meds-to-Beds request:        [address] [facility] [address]  Phone: [phone] Fax: [phone]      Notes:    Factors facilitating achievement of predicted outcomes: Motivated, Cooperative, Pleasant, and Sense of humor    Barriers to discharge: N/A    Additional Case Management Notes:   Pt reports that he plans to go home with his spouse Lizzy. Pt reports he uses a single point cane but no other DME. Pt has no preference when it comes to DME. Pt reports he received the Covid-19 vaccine. Pt reports he had the Moderna and no boosters. Pt reports he does not need home health at this time. 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 Pt has no other questions or concerns. The Plan for Transition of Care is related to the following treatment goals of TIA (transient ischemic attack) [G45.9]  Stroke-like symptoms [J25.78]    IF APPLICABLE: The Patient and/or patient representative Deana Ray and his family were provided with a choice of provider and agrees with the discharge plan. Freedom of choice list with basic dialogue that supports the patient's individualized plan of care/goals and shares the quality data associated with the providers was provided to: Patient   Patient Representative Name:   self    The Patient and/or Patient Representative Agree with the Discharge Plan?  Yes    ISABELLA Tai  Case Management Department  Electronically signed by ISABELLA Tai on 2/21/2023 at 11:09 AM

## 2023-02-21 NOTE — H&P
[facility] History and Physical    Patient Information:  Patient: Jocelyn Corrales  MRN: [id number]   Acct: [de-identified]  YOB: 1965  Admit Date: 2/21/2023      Date of Service: 2/21/2023  Primary Care Physician: Mark Buchanan MD  Advance Directive: Full Code  Health Care Proxy: his first choice is his ex-wife, Mrs. Sheldon Heath, [phone]         SUBJECTIVE:    Chief Complaint   Patient presents with    Altered Mental Status     Stroke alert. Speech changes. History of stroke. Last known well approximately 2345     HPI:  Mr. Jocelyn Corrales is a pleasant and humerous 66year old  [place] gentleman from [place]. He has presented to the [facility] ED for \"altered mental status\" as per triage and trasnient dysarthria and right hand  weakness as per patient and ED RN team. He had last been well at a quarter to midnight. He has a history of a CVA before and several TIAs, he has slight weakness in the LUE and weakness in the LLE for which he uses a cane. He has a rather extensive history but this was all at outside hospitals. Will attempt to gather records. Despie stated deficits from reported CVA he appears to have no functional deficits to my neuro exam and seems to have intact cognition. Review of Systems:   Review of Systems   Constitutional:  Negative for chills, diaphoresis, fatigue and fever. HENT:  Negative for sneezing. Respiratory:  Negative for cough and shortness of breath. Cardiovascular:  Negative for chest pain. Gastrointestinal:  Negative for diarrhea and nausea. Genitourinary:  Negative for dysuria. Neurological:  Positive for weakness, light-headedness and headaches (atleast half the days over the last 30 days). Negative for syncope. Psychiatric/Behavioral:  Negative for confusion.       Past Medical History:   Diagnosis Date    Anxiety     Class 1 obesity     Diverticulitis     Hx of blood clots     Hypertension     Nosebleed 2022 Pneumonia 1988    Stroke (cerebrum) ([facility] [address] 02/2021    TIA (transient ischemic attack) 2013    TIA (transient ischemic attack) 2014    TIA (transient ischemic attack) 2019     Past Psychiatric History:  As per above    Past Surgical History:   Procedure Laterality Date    ARM SURGERY Left 11/04/2021    LEFT IN SITU CUBITAL TUNNEL RELEASE performed by Tucker Toro MD at [address] Right 12/30/2021    RIGHT IN SITU CUBITAL TUNNEL DECOMPRESSION performed by Tucker Toro MD at [address]  2013    CARPAL TUNNEL RELEASE Right 10/22/2020    RIGHT OPEN CARPAL TUNNEL RELEASE performed by Tucker Toro MD at [address] Left 12/03/2020    LEFT OPEN CARPAL TUNNEL RELEASE performed by Tucker Toro MD at [facility]  2018    MOUTH SURGERY  2021    SMALL INTESTINE SURGERY  2018    TONSILLECTOMY  1984    at age 18     Social History       Tobacco History       Smoking Status  Every Day Smoking Frequency  1.50 packs/day for 42.00 years (63.00 pk-yrs) Smoking Tobacco Type  Cigarettes      Smokeless Tobacco Use  Never      Tobacco Comments  Started at age 17, had average use of 1.5 PPD, NOW smoking 1PPD              Alcohol History       Alcohol Use Status  Not Currently Comment  quit drinking dec 2022, had been at 60 beers a week              Drug Use       Drug Use Status  Not Currently Types  Cocaine, Marijuana (Roberto Tan) Comment  one time thing nov 2022, smoked MJ until 1993              Sexual Activity       Sexually Active  Yes Comment  \"no known kids\"             CODE STATUS: Full Code  HEALTH CARE PROXY: his first choice is his ex-wife, Mrs. Lizzy Huff, [phone]  AMBULATES: uses a cane for left sided weakness s/p CVA  DOMICILED: has no stairs in the home, has 1 step to enter the home, has 3 dogs in the home     Family History   Problem Relation Age of Onset    Cancer Mother         \"bladdr cancer twice\"    Lung Cancer Mother         SMOKED x 16 years at 1 PPD    Kidney Cancer Mother     Brain Cancer Mother     Heart Attack Father         smoked cigars and pipes    Suicide Sister     Drug Abuse Sister      Allergies   Allergen Reactions    Penicillins      \"I have no idea, that was when I was in 4th grade, I broke out in a rash or something but my Mom caught it early and they just determined that I was allergic to penicillin\"     Home Medications:  Prior to Admission medications    Medication Sig Start Date End Date Taking? Authorizing Provider   cyclobenzaprine (FLEXERIL) 5 MG tablet Take 5 mg by mouth 2 times daily as needed for Muscle spasms    Historical Provider, MD   gabapentin (NEURONTIN) 400 MG capsule Take 1 capsule by mouth 3 times daily for 90 days. Max Daily Amount: 1,200 mg 1/17/23 4/17/23  Shirlene Bound, APRN - CNP   levothyroxine (SYNTHROID) 25 MCG tablet Take 1 tablet by mouth daily 10/6/22   Historical Provider, MD   butalbital-APAP-caffeine -40 MG CAPS per capsule Take 1 capsule by mouth as needed 9/9/22   Historical Provider, MD   amLODIPine (NORVASC) 10 MG tablet Take 10 mg by mouth daily 5/4/22   Historical Provider, MD   escitalopram (LEXAPRO) 20 MG tablet Take 20 mg by mouth daily    Historical Provider, MD   aspirin 81 MG tablet Take 81 mg by mouth daily    Historical Provider, MD   Melatonin 10 MG CAPS Take by mouth nightly as needed     Historical Provider, MD   clopidogrel (PLAVIX) 75 MG tablet Take 75 mg by mouth daily  3/12/20   Historical Provider, MD   Acetaminophen (TYLENOL 8 HOUR PO) Take 500 mg by mouth as needed    Historical Provider, MD   atorvastatin (LIPITOR) 40 MG tablet Take 40 mg by mouth daily    Historical Provider, MD   nitroGLYCERIN (NITROSTAT) 0.4 MG SL tablet Place 1 tablet under the tongue every 5 minutes as needed for Chest pain up to max of 3 total doses.  If no relief after 1 dose, call 911. 3/19/20   Jane Walker MD         OBJECTIVE:    Vitals:    02/21/23 0752   BP: 107/76   Pulse: 75 Resp:    Temp: 97.2 °F (36.2 °C)   SpO2: 93%   Breathing room air    /76   Pulse 75   Temp 97.2 °F (36.2 °C) (Temporal)   Resp 18   Ht 6' 1\" (1.854 m)   Wt 231 lb 2 oz (104.8 kg)   SpO2 93%   BMI 30.49 kg/m²     No intake or output data in the 24 hours ending 02/21/23 0842    Physical Exam  Vitals reviewed. Constitutional:       General: He is not in acute distress. Appearance: Normal appearance. He is normal weight. He is not ill-appearing or toxic-appearing. HENT:      Head: Normocephalic and atraumatic. Nose: No congestion or rhinorrhea. Eyes:      General:         Right eye: No discharge. Left eye: No discharge. Neck:      Comments: Trachea appears midline, neck appears supple   Cardiovascular:      Rate and Rhythm: Normal rate and regular rhythm. Heart sounds: No murmur heard. No friction rub. No gallop. Pulmonary:      Effort: Pulmonary effort is normal. No respiratory distress. Breath sounds: No stridor. No wheezing, rhonchi or rales. Chest:      Chest wall: No tenderness. Abdominal:      General: Bowel sounds are normal.      Tenderness: There is no abdominal tenderness. There is no guarding or rebound. Musculoskeletal:         General: No tenderness or signs of injury. Cervical back: Neck supple. Right lower leg: No edema. Left lower leg: No edema. Skin:     General: Skin is warm. Coloration: Skin is not jaundiced or pale. Findings: No bruising, erythema, lesion or rash. Comments: nondiaphoretic   Neurological:      Mental Status: He is alert. He is disoriented.       Comments: Has intact 5/5 strength that is bilaterally symmetric in all of the muscle groups test in the legs (leg raising, foot extension, foot flexion) in the arms (, pushing, pulling, abduction of arms at elbows, and shoulder shrugs) and of turning head to and away, tongue was midline, EOMI, eyes accommodated, audition b/l symmetric to light clap, and further the gross touch of exam was bilaterally symmetric as per pt between shins and forearms and cheeks, no dysarthria was noted   Psychiatric:         Mood and Affect: Mood normal.         Behavior: Behavior normal.       LABORATORY DATA:    CBC:   Recent Labs     02/21/23 0056   WBC 9.2   HGB 16.3   HCT 48.4        BMP:   Recent Labs     02/21/23 0056 02/21/23  0708    140   K 4.4 4.2    107   CO2 27 25   BUN 17 19   CREATININE 0.8 0.7   CALCIUM 9.3 8.7   PHOS 4.2  --      Hepatic Profile:   Recent Labs     02/21/23 0056   AST 20   ALT 23   BILITOT 0.4   ALKPHOS 90     Coag Panel:   Recent Labs     02/21/23 0056   INR 0.97   PROTIME 12.8   APTT 29.9     Cardiac Enzymes:   Recent Labs     02/21/23 0056   TROPONINI <0.01     A1C:   Recent Labs     02/21/23 0056   LABA1C 5.8     TSH: Invalid input(s): LABTSH  Lipid Panel: pending    Urinalysis:   Lab Results   Component Value Date/Time    NITRU Negative 02/22/2021 06:40 AM    BLOODU Negative 02/22/2021 06:40 AM    SPECGRAV 1.027 02/22/2021 06:40 AM    GLUCOSEU Negative 02/22/2021 06:40 AM     IMAGING:  CT HEAD WO CONTRAST  Result Date: 2/21/2023  No evidence of acute intracranial pathology. Communications:02/21/23 01:40 Call Doctor Regarding Stroke, called Dr. Ibis Recio on 02/21 01:39 (-06:00)Electronically signed by Dennys Vilchis MD on 02-21-23 at [address] [place] [address]  Result Date: 2/21/2023  Findings concerning for bronchitis, which may be of infectious or inflammatory etiologies. Electronically signed by Dennys Vilchis MD on 02-21-23 at [facility]  Result Date: 2/21/2023  Negative CT angiogram of the head._______________________________________________EXAM:  CT Angiography Neck With Intravenous ContrastCLINICAL HISTORY:   Reason for exam: Stroke Symptoms. TECHNIQUE:  Routine carotid CT angiography protocol was performed with intravenous contrast.  NASCET criteria using the distal ICAs for comparison were used for evaluation of stenoses. MIP reconstructed images were created and reviewed. COMPARISON:  None. FINDINGS: VASCULATURE:  Right common carotid artery:  Unremarkable. No occlusion or significant stenosis. No dissection. Right internal carotid artery:  Unremarkable. Extracranial segment is patent with no occlusion or significant stenosis. No dissection. Right external carotid artery:  Unremarkable. No occlusion. Right vertebral artery:  Unremarkable. No occlusion or significant stenosis. No dissection. Left common carotid artery:  Unremarkable. No occlusion or significant stenosis. No dissection. Left internal carotid artery:  Unremarkable. Extracranial segment is patent with no occlusion or significant stenosis. No dissection. Left external carotid artery:  Unremarkable. No occlusion. Left vertebral artery:  Unremarkable. No occlusion or significant stenosis. No dissection. NECK:  Bones/joints: Moderate disc degeneration at C5-6 with a critical spinal canal stenosis. Soft tissues:  Unremarkable. Lung apices: Foot is concerned for bronchitis with pneumonitis. CAROTID STENOSIS REFERENCE USING NASCET CRITERIA:  % ICA stenosis = (1 - narrowest ICA diameter/diameter of distal cervical ICA) x 100. Mild - <50% stenosis. Moderate - 50-69% stenosis. Severe - 70-94% stenosis. Near occlusion - 95-99% stenosis. Occluded - 100% stenosis. IMPRESSION:     Negative CT angiogram of the neck. Critical spinal canal stenosis at C5-6. Recommend MRI of the cervical spine to evaluate for myelopathy. Communications:02/21/23 01:40 Call Doctor Regarding Stroke, called Dr. Lesly Kruger on 02/21 01:39 (-06:00)Electronically signed by Baldo Cartagena MD on 02-21-23 at 3088        ASESSMENTS & PLANS:    TIA Rule Out:  \"place\" to [facility] under hospitalist team  Consultant from neuro has already seen patient  PT/OT/SpeechTherapy as per protocol  NPO until/unless passes RN swallow screen, then Cardiac Diet with diabetic modifier for now  HbA1c, TSH, Lipid Panel all to be added on  Fall Precuations  Bed Alarm  ASA and Statin as per protocol  Defer on MRI to Neuro  Labetalol PRN for now, will allow permissive HTN  TTE in AM to look for thromboembolic causes  Carotid US to look for stenotic disease NOT indicated as CTA head and neck was already done    Chronic Medical Problems:  Continue home regimen as indicated   atorvastatin  80 mg Oral Nightly    melatonin  10 mg Oral Nightly    levothyroxine  25 mcg Oral QAM AC    gabapentin  400 mg Oral TID    escitalopram  20 mg Oral Daily    clopidogrel  75 mg Oral Daily   and Aspirin 81mg PO QDay    Supportive and Prophylactic Txx:  DVT PPx: lovenox SQ  GI (PUD) PPx: not indicated  PT: as per above  ADULT DIET; Regular  nitroGLYCERIN, melatonin, polyethylene glycol, calcium carbonate, cyclobenzaprine, butalbital-acetaminophen-caffeine, ondansetron **OR** ondansetron, perflutren lipid microspheres, labetalol      Care time of 60 minutes  Pt seen/examined and admitted to inpatient status. Inpatient status is used for patients with an expected LOS extending past two midnights due to medical therapy and or critical care needs, otherwise patients are placed to OBServation status. Signed:  Electronically signed by Jose Reed MD on 2/21/23 at 9:00 AM CST.

## 2023-02-21 NOTE — ED NOTES
Went in to send pts urine down. Pt speech back to baseline. Asked pt how his weakness was. Pt able to move all 4 extremities without difficulty. Dr. Julius Odom notified.       Kelley Goldstein RN  02/21/23 1559

## 2023-02-21 NOTE — PROGRESS NOTES
Physical Therapy  Facility/Department: [facility] ONCOLOGY UNIT  Physical Therapy Initial Assessment    Name: Cheo Ivan  : 1965  MRN: [id number]  Date of Service: 2023    Discharge Recommendations:  Continue to assess pending progress, Home with assist PRN          Patient Diagnosis(es): The encounter diagnosis was Stroke-like symptoms. Past Medical History:  has a past medical history of Anxiety, Class 1 obesity, Diverticulitis, Hx of blood clots, Hypertension, Nosebleed, Pneumonia, Stroke (cerebrum) ([facility] [address] TIA (transient ischemic attack), TIA (transient ischemic attack), and TIA (transient ischemic attack). Past Surgical History:  has a past surgical history that includes Tonsillectomy (); back surgery (); Small intestine surgery (); Carpal tunnel release (Right, 10/22/2020); Carpal tunnel release (Left, 2020); Colonoscopy (); Mouth surgery (); Arm Surgery (Left, 2021); and Arm Surgery (Right, 2021).     Assessment   Assessment: Pt demostrates being independent and does not need skilled PT in this setting  Treatment Diagnosis: TIA  Therapy Prognosis: Excellent  Decision Making: Low Complexity  Requires PT Follow-Up: Yes  Activity Tolerance  Activity Tolerance: Patient tolerated evaluation without incident;Patient tolerated treatment well     Plan   Physcial Therapy Plan  General Plan: Discharge with evaluation only  Safety Devices  Type of Devices: Call light within reach, Left in bed     Restrictions        Subjective   General  Chart Reviewed: Yes  Patient assessed for rehabilitation services?: Yes  Additional Pertinent Hx: hx of CVA/TIA  Diagnosis: TIA  Follows Commands: Within Functional Limits  Subjective  Subjective: Pt reports no pain and agrees to work with therapy         Social/Functional History  Social/Functional History  Lives With: Spouse  Type of Home: House  Home Layout: One level  Home Access: Stairs to enter with rails  Entrance Stairs - Number of Steps: 1  Entrance Stairs - Rails: Left  Bathroom Shower/Tub: Tub/Shower unit  Bathroom Toilet: Standard  Bathroom Equipment: Shower chair  Bathroom Accessibility: Accessible  Home Equipment: Cane  Receives Help From: Family  ADL Assistance: Independent  Homemaking Assistance: Independent  Homemaking Responsibilities: Yes  Ambulation Assistance: Independent  Transfer Assistance: Independent  Active : Yes  Mode of Transportation: Truck  Occupation: On disability  Vision/Hearing  Vision  Vision: Within Functional Limits  Hearing  Hearing: Within functional limits    Cognition   Orientation  Overall Orientation Status: Within Normal Limits  Cognition  Overall Cognitive Status: WFL     Objective   Heart Rate: 75  Heart Rate Source: Monitor  BP: 107/76  BP Location: Left lower arm  MAP (Calculated): 86  Resp: 16  SpO2: 93 %  O2 Device: None (Room air)     Observation/Palpation  Posture: Good  Gross Assessment  Coordination: Within functional limits  Tone: Normal  Sensation: Intact     AROM RLE (degrees)  RLE AROM: WNL  AROM LLE (degrees)  LLE AROM : WNL  Strength RLE  Strength RLE: WNL  Comment: 5/5  Strength LLE  Strength LLE: WNL  Comment: 5/5           Bed mobility  Supine to Sit: Independent  Transfers  Sit to Stand: Independent  Stand to Sit: Independent  Ambulation  WB Status: FWB  Ambulation  Surface: Level tile  Device: Single point cane  Assistance: Stand by assistance;Contact guard assistance  Gait Deviations: None  Distance: 200'  Comments: CGA FOR EVAL PURPSOSES BUT APPEARS SAFE TO CONTINUE WITH IND AMBULATION     Balance  Posture: Good  Sitting - Static: Good  Sitting - Dynamic: Good  Standing - Static: Good  Standing - Dynamic: Good                Goals  Short Term Goals  Time Frame for Short Term Goals: EVAL AND TX X 1  Short Term Goal 1: eval for safety to return home after DC - met       Education  Patient Education  Education Given To: Patient  Education Method: Demonstration;Verbal  Barriers to Learning: None  Education Outcome: Verbalized understanding      Therapy Time   Individual Concurrent Group Co-treatment   Time In           Time Out           Minutes                   Juany Hairston PT     Electronically signed by Juany Hairston PT on 2/21/2023 at 11:20 AM

## 2023-02-21 NOTE — CONSULTS
[facility] Neurology Consult        Patient:   Ming Diaz  MR#:    [id number]  Account Number:                   [id number]      Room:    01/01-A   YOB: 1965  Date of Progress Note: 2/21/2023  Time of Note                           2:31 AM  Attending Physician:  Savannah Smith MD  Consulting Physician:   Victorino Jackson M.D.        [address]:  weakness/difficulty speaking        HISTORY OF PRESENT ILLNESS:   This 66 y.o. male with a past medical history significant for multiple TIA's/stroke, HTN, smoker, alcohol use is seen for evaluation of sudden onset generalized weakness and difficulty speaking. The patient was in bed watching TV and noted his head \"bobbing around\". He got up to walk and was stumbling. His ex wife noted him not in bed and noted home bouncing around and brought him in ER. As per ER physician and nursing speech was stuttering and somewhat slowed but he was joking and jovial. He had bilateral  weakness and was unable to lift his legs against gravity. By the time I arrived at bedside he indicates he is back to normal . Has some residual chronic weakness on the left from previous strokes which always affect his left side according to him. He denies any stressors but his wife indicates she is planning on checking herself into mental health. The patient indicates he was just in alcohol rehab in December and is doing better. 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 REVIEW OF SYSTEMS:  Constitutional - No fever or chills. No diaphoresis or significant fatigue. HENT -  No tinnitus or significant hearing loss. Eyes - no sudden vision change or eye pain  Respiratory - no significant shortness of breath or cough  Cardiovascular - no chest pain No palpitations or significant leg swelling  Gastrointestinal - no abdominal swelling or pain. Genitourinary - No difficulty urinating, dysuria  Musculoskeletal - no back pain or myalgia.   Skin - no color change or rash  Neurologic - No seizures. No lateralizing weakness. Hematologic - no easy bruising or excessive bleeding. Psychiatric - no severe anxiety or nervousness. All other review of systems are negative. Past Medical History:      Diagnosis Date    Anxiety     Diverticulitis     Hx of blood clots     Hypertension     Nosebleed     Pneumonia     Stroke (cerebrum) ([address]     2019 and 2014 tia june 2013 and feb 2021       Past Surgical History:      Procedure Laterality Date    ARM SURGERY Left 11/4/2021    LEFT IN SITU CUBITAL TUNNEL RELEASE performed by Amrit Carranza MD at [address] Right 12/30/2021    RIGHT IN SITU CUBITAL TUNNEL DECOMPRESSION performed by Amrit Carranza MD at [address]  2013    CARPAL TUNNEL RELEASE Right 10/22/2020    RIGHT OPEN CARPAL TUNNEL RELEASE performed by Amrit Carranza MD at [address] Left 12/3/2020    LEFT OPEN CARPAL TUNNEL RELEASE performed by Amrit Carranza MD at [facility]      tooth cut out 2 weeks ago    [address]         Medications in Hospital:    No current facility-administered medications for this encounter. Current Outpatient Medications:     cyclobenzaprine (FLEXERIL) 5 MG tablet, Take 5 mg by mouth 2 times daily as needed for Muscle spasms, Disp: , Rfl:     gabapentin (NEURONTIN) 400 MG capsule, Take 1 capsule by mouth 3 times daily for 90 days.  Max Daily Amount: 1,200 mg, Disp: 90 capsule, Rfl: 2    levothyroxine (SYNTHROID) 25 MCG tablet, Take 1 tablet by mouth daily, Disp: , Rfl:     butalbital-APAP-caffeine -40 MG CAPS per capsule, Take 1 capsule by mouth as needed, Disp: , Rfl:     amLODIPine (NORVASC) 10 MG tablet, Take 10 mg by mouth daily, Disp: , Rfl:     escitalopram (LEXAPRO) 20 MG tablet, Take 20 mg by mouth daily, Disp: , Rfl:     aspirin 81 MG tablet, Take 81 mg by mouth daily, Disp: , Rfl:     Melatonin 10 MG CAPS, Take by mouth nightly as needed , Disp: , Rfl:     clopidogrel (PLAVIX) 75 MG tablet, Take 75 mg by mouth daily , Disp: , Rfl:     Acetaminophen (TYLENOL 8 HOUR PO), Take 500 mg by mouth as needed, Disp: , Rfl:     atorvastatin (LIPITOR) 40 MG tablet, Take 40 mg by mouth daily, Disp: , Rfl:     nitroGLYCERIN (NITROSTAT) 0.4 MG SL tablet, Place 1 tablet under the tongue every 5 minutes as needed for Chest pain up to max of 3 total doses. If no relief after 1 dose, call 911., Disp: 25 tablet, Rfl: 3    Allergies:  Penicillins    Social History:   TOBACCO:   reports that he has been smoking cigarettes. He has a 60.00 pack-year smoking history. He has never used smokeless tobacco.  ETOH:   reports that he does not currently use alcohol. Family History:       Problem Relation Age of Onset    Cancer Mother     Lung Cancer Mother     Kidney Cancer Mother     Brain Cancer Mother     Heart Attack Father            Physical Exam:    Vitals: BP (!) 93/55   Pulse 78   Temp 98.2 °F (36.8 °C) (Oral)   Resp (!) 9   Wt 234 lb 3.2 oz (106.2 kg)   SpO2 92%   BMI 30.90 kg/m²     Constitutional - well developed, well nourished. Eyes - conjunctiva normal.  Pupils not tested  Ear, nose, throat -hearing  intact to finger rub No scars, masses, or lesions over external nose or ears, no atrophy of tongue  Neck-symmetric, no masses noted, no jugular vein distension  Respiration- chest wall appears symmetric, good expansion,   normal effort without use of accessory muscles  Musculoskeletal - no significant wasting of muscles noted, no bony deformities  Extremities-no clubbing, cyanosis or edema  Skin - warm, dry, and intact. No rash, erythema, or pallor.   Psychiatric - mood, affect, and behavior appear normal.      Neurological exam  Awake, alert, fluent oriented x 3 appropriate affect  Attention and concentration appear appropriate  Recent and remote memory appears unremarkable  Speech normal without dysarthria  No clear issues with language of fund of knowledge    Cranial Nerve Exam   CN II- Visual fields grossly unremarkable  CN III, IV,VI-EOMI, No nystagmus, conjugate eye movements, no ptosis  CN V-sensation intact to LT over face  CN VII-no facial assymetry  CN VIII-Hearing  intact to finger rub  CN IX and X- Palate not tested  CN XI-not test shoulder shrug  CN XII-Tongue midline with no fasciculations or fibrillations    Motor Exam  V/V throughout upper and lower extremities bilaterally, no cogwheeling, normal tone  Except some giveway weakness left leg    Sensory Exam  Sensation intact to light touch and temperature upper and lower extremities bilaterally    Reflexes   Not tested    Tremors- no tremors in hands or head noted    Gait  Not tested    Coordination  Finger to nose-unremarkable        CBC:   Recent Labs     02/21/23 0056   WBC 9.2   HGB 16.3          BMP:    Recent Labs     02/21/23 0056 02/21/23 0057     --    K 4.4  --      --    CO2 27  --    BUN 17  --    CREATININE 0.8  --    GLUCOSE 104 104       Hepatic:   Recent Labs     02/21/23 0056   AST 20   ALT 23   BILITOT 0.4   ALKPHOS 90       Lipids: No results for input(s): CHOL, HDL in the last 72 hours. Invalid input(s): LDLCALCU    INR:   Recent Labs     02/21/23 0056   INR 0.97     CT HEAD WO CONTRAST [[id number]]    Resulted: 02/21/23 0029    Updated: 02/21/23 0142    Narrative:     ADDENDUM:02/21/23 01:40 Call Doctor Regarding Stroke, called Dr. Jennifer Ordonez on 02/21 01:39 (-06:00)Patient: Biju Ortez  Time Out: 01:29Exam(s): CT HEAD Without Contrast EXAM:  CT Head Without Intravenous ContrastCLINICAL HISTORY:   Reason for exam: Stroke   Symptoms. TECHNIQUE:  Axial computed tomography images of the head/brain without intravenous contrast.COMPARISON:Comparison made to prior head CT from February 22, 2021. FINDINGS:  Brain:  Unremarkable. No hemorrhage. No significant white matter disease. No edema. Ventricles:  Unremarkable.   No ventriculomegaly. Bones/joints:  Unremarkable. No acute fracture. Soft tissues:  Unremarkable. Sinuses:  Unremarkable as visualized. No acute sinusitis. Mastoid air cells:  Unremarkable as visualized. No    mastoid effusion. Impression:     No evidence of acute intracranial pathology. Communications:02/21/23 01:40 Call Doctor Regarding Stroke, called Dr. Lesly Kruger on 02/21 01:39 (-06:00)Electronically signed by Baldo Cartagena MD on 02-21-23 at 0401 [facility] [[id number]]    Resulted: 02/21/23 0035    Updated: 02/21/23 0141    Narrative:     ADDENDUM:02/21/23 01:40 Call Doctor Regarding Stroke, called Dr. Lesly Kruger on 02/21 01:39 (-06:00)Patient: Mony Briseno  Time Out: 01:35Exam(s): CTA HEAD With Contrast, CTA NECK With Contrast EXAM:  CT Angiography Head With Intravenous ContrastCLINICAL   HISTORY:   Reason for exam: Stroke Symptoms. TECHNIQUE:  Axial computed tomographic angiography images of the head with intravenous contrast.  MIP reconstructed images were created and reviewed. COMPARISON:  No relevant prior studies available. FINDINGS:     Right internal carotid artery:  No acute findings. Intracranial segment is patent with no significant stenosis. No aneurysm. Right anterior cerebral artery:  Unremarkable. No occlusion or significant stenosis. No aneurysm. Right middle cerebral   artery:  Unremarkable. No occlusion or significant stenosis. No aneurysm. Right posterior cerebral artery:  Unremarkable. No occlusion or significant stenosis. No aneurysm. Right vertebral artery:  Unremarkable as visualized. Left internal carotid    artery:  No acute findings. Intracranial segment is patent with no significant stenosis. No aneurysm. Left anterior cerebral artery:  Unremarkable. No occlusion or significant stenosis. No aneurysm. Left middle cerebral artery:  Unremarkable. No   occlusion or significant stenosis. No aneurysm. Left posterior cerebral artery:  Unremarkable.   No occlusion or significant stenosis. No aneurysm. Left vertebral artery:  Unremarkable as visualized. Basilar artery:  Unremarkable. No occlusion or   significant stenosis. No aneurysm. Impression:     Negative CT angiogram of the head._______________________________________________EXAM:  CT Angiography Neck With Intravenous ContrastCLINICAL HISTORY:   Reason for exam: Stroke Symptoms. TECHNIQUE:  Routine carotid CT angiography protocol   was performed with intravenous contrast.  NASCET criteria using the distal ICAs for comparison were used for evaluation of stenoses. MIP reconstructed images were created and reviewed. COMPARISON:  None. FINDINGS: VASCULATURE:  Right common carotid   artery:  Unremarkable. No occlusion or significant stenosis. No dissection. Right internal carotid artery:  Unremarkable. Extracranial segment is patent with no occlusion or significant stenosis. No dissection. Right external carotid artery:     Unremarkable. No occlusion. Right vertebral artery:  Unremarkable. No occlusion or significant stenosis. No dissection. Left common carotid artery:  Unremarkable. No occlusion or significant stenosis. No dissection. Left internal carotid artery:    Unremarkable. Extracranial segment is patent with no occlusion or significant stenosis. No dissection. Left external carotid artery:  Unremarkable. No occlusion. Left vertebral artery:  Unremarkable. No occlusion or significant stenosis. No   dissection. NECK:  Bones/joints: Moderate disc degeneration at C5-6 with a critical spinal canal stenosis. Soft tissues:  Unremarkable. Lung apices: Foot is concerned for bronchitis with pneumonitis. CAROTID STENOSIS REFERENCE USING NASCET CRITERIA:  %   ICA stenosis = (1 - narrowest ICA diameter/diameter of distal cervical ICA) x 100. Mild - <50% stenosis. Moderate - 50-69% stenosis. Severe - 70-94% stenosis. Near occlusion - 95-99% stenosis. Occluded - 100% stenosis. IMPRESSION: Negative CT   angiogram of the neck. Critical spinal canal stenosis at C5-6. Recommend MRI of the cervical spine to evaluate for myelopathy. Communications:02/21/23 01:40 Call Doctor Regarding Stroke, called Dr. Kelechi Dove on 02/21 01:39 (-06:00)Electronically signed by   Verona Moritz, MD on 02-21-23 at 5270       Assessment and Plan     Generalized weakness/stuttering speech/ \"bobbing head\"  Suspect psychogenic etiology- TIA is in the differential    Exam  Awake, alert, oriented, fluent  Conjugate eye movements, symmetric facies  Mild giveway weakness left leg which is chronic   No dysmetria     CT head/CTA head and  neck unremarkable     Plan  Check MRI brain/2D echo/telemetry/HbA1C/fasting lipids    Continue ASA/Plavix/statin  Smoking cessation    PT to mobilize      Supportive care      Please feel free to call with any questions   [phone] (cell phone)    Dr Carter Ball [place] certified in Neurology  Board Certified in [email or web address]  Fellowship Trained in [email or web address]    EMR Dragon/transcription disclaimer:Significant part of this  encounter note is electronic transcription/translation of spoken language to printed text. The electronic translation of spoken language may be erroneous, or at times, nonsensical words or phrases may be inadvertently transcribed.  Although I have reviewed the note for such errors, some may still exist.

## 2023-02-21 NOTE — PROGRESS NOTES
Pharmacy Consult      Ming Diaz is a 66 y.o. male for whom pharmacy has been consulted to review this patient's medication profile to evaluate IV medications and change all base solutions to 0.9% sodium chloride if possible based on compatibility and product availability. Patient Active Problem List   Diagnosis    Other chest pain    Right carpal tunnel syndrome    Left carpal tunnel syndrome    Stroke-like symptoms    Chronic cerebrovascular accident (CVA)    Late effect of cerebrovascular accident (CVA)    Alcohol use with withdrawal ([facility] [address]    Tobacco abuse counseling    Cigarette nicotine dependence with nicotine-induced disorder    Personal history of noncompliance with medical treatment and regimen    Lumbar radiculopathy    Back pain with history of spinal surgery    Chronic right-sided low back pain with right-sided sciatica    Pain medication agreement    Muscle spasm of back    Spasm of thoracic back muscle    Cubital tunnel syndrome on left    Cubital tunnel syndrome on right    Epistaxis    Sacroiliac joint dysfunction    Encounter for monitoring opioid maintenance therapy    Weakness    Stuttering    Imbalance    Alcohol use    Tobacco use    Essential hypertension    History of stroke    History of TIA (transient ischemic attack)    TIA (transient ischemic attack)       Allergies:  Penicillins     Recent Labs     02/21/23  0056   CREATININE 0.8       Ht/Wt:   Ht Readings from Last 1 Encounters:   01/17/23 6' 1\" (1.854 m)        Wt Readings from Last 1 Encounters:   02/21/23 234 lb 3.2 oz (106.2 kg)         Estimated Creatinine Clearance: 132 mL/min (based on SCr of 0.8 mg/dL). Assessment/Plan:    No changes needed at this time    Thank you for the consult. Will continue to follow.     Electronically signed by Jennifer Kumar, [facility] on 2/21/2023 at 5:01 AM

## 2023-02-21 NOTE — PLAN OF CARE
Problem: ABCDS Injury Assessment  Goal: Absence of physical injury  Outcome: Progressing     Problem: Chronic Conditions and Co-morbidities  Goal: Patient's chronic conditions and co-morbidity symptoms are monitored and maintained or improved  Outcome: Progressing

## 2023-02-21 NOTE — CARE COORDINATION
Patient Contact Information:    Marisol Ramesh [phone] (home)   Telephone Information:   Mobile [phone]     Above information verified? [x]   Yes  []   No      Emergency Contacts:    Extended Emergency Contact Information  Primary Emergency Contact: [facility]  Address: [address] [place] [address], [address] Phone: [phone]  Mobile Phone: [phone]  Relation: Spouse  Preferred language: English   needed? No  Secondary Emergency Contact: cara gunn  Home Phone: [phone]  Relation: Parent      Have you been vaccinated for COVID-19 (SARS-CoV-2)? [x]   Yes per pt 2 vaccine unsure of   []   No                   Pharmacy:    [address] [place] [address], [address] [place] [address]  Phone: [phone] Fax: [phone]    Patient Deficits:    []   Yes   [x]   No      NIH Stroke Scale  Interval: Reassessment  Level of Consciousness (1a): Alert  LOC Questions (1b): Answers both correctly  LOC Commands (1c): Performs both tasks correctly  Best Gaze (2): Normal  Visual (3): No visual loss  Facial Palsy (4): Normal symmetrical movement  Motor Arm, Left (5a): No drift  Motor Arm, Right (5b): No drift  Motor Leg, Left (6a): No drift  Motor Leg, Right (6b): No drift  Limb Ataxia (7): Absent  Sensory (8): Normal  Best Language (9): No aphasia  Dysarthria (10): Normal  Extinction and Inattention (11): No abnormality  Total: 0    Ailyn Coma Scale  Eye Opening: Spontaneous  Best Verbal Response: Oriented  Best Motor Response: Obeys commands  Ailyn Coma Scale Score: 15    Patient Deficits: None noted. Pt stated, initially s/s \"slurred speech, trouble lifting his L arm\" Pt reports, he came right into the ER    Nursing will provide Stroke education and provide Stroke booklet. Pt plans to return home w/spouse.  Pt reports, last CVA in 2019. At which pointe, he retired.    Electronically signed by Sara Garrett RN on 2/21/2023 at 4:55 PM

## 2023-02-21 NOTE — ED NOTES
Patient placed on cardiac monitor, continuous pulse oximeter, and NIBP monitor. Monitor alarms on.          Muna Cardona RN  02/21/23 0547

## 2023-02-21 NOTE — PROGRESS NOTES
Occupational Therapy  Facility/Department: [facility] 4 ONCOLOGY UNIT  Occupational Therapy Initial Assessment    Name: Ming Diaz  : 1965  MRN: [id number]  Date of Service: 2023    Discharge Recommendations:  Home with assist PRN          Patient Diagnosis(es): The encounter diagnosis was Stroke-like symptoms. Past Medical History:  has a past medical history of Anxiety, Class 1 obesity, Diverticulitis, Hx of blood clots, Hypertension, Nosebleed, Pneumonia, Stroke (cerebrum) ([facility] [address] [facility] TIA (transient ischemic attack), TIA (transient ischemic attack), and TIA (transient ischemic attack). Past Surgical History:  has a past surgical history that includes Tonsillectomy (); back surgery (); Small intestine surgery (); Carpal tunnel release (Right, 10/22/2020); Carpal tunnel release (Left, 2020); Colonoscopy (); Mouth surgery (); Arm Surgery (Left, 2021); and Arm Surgery (Right, 2021). Assessment   Assessment: OT eval only - no f/u OT recommended. Pt demos mod I with functional mobility and ADL, no UE deficits noted. No environmental barriers identified to d/c home.   REQUIRES OT FOLLOW-UP: Yes  Activity Tolerance  Activity Tolerance: Patient Tolerated treatment well        Plan   Occupational Therapy Plan  Additional Comments: eval only     Restrictions       Subjective   General  Patient assessed for rehabilitation services?: Yes     Social/Functional History  Social/Functional History  Type of Home: House  Home Layout: One level  Home Access: Stairs to enter with rails  Entrance Stairs - Number of Steps: 1  Bathroom Shower/Tub: Tub/Shower unit  Bathroom Toilet: Standard  Bathroom Equipment: Shower chair  Home Equipment: Cane  ADL Assistance: Independent  Homemaking Assistance: Independent  Ambulation Assistance: Independent  Transfer Assistance: Independent       Objective   Heart Rate: 75  Heart Rate Source: Monitor  BP: 107/76  BP Location: Left lower arm  MAP (Calculated): 86  Resp: 16  SpO2: 93 %  O2 Device: None (Room air)          Observation/Palpation  Posture: Good  Safety Devices  Type of Devices: Call light within reach; Left in bed     Toilet Transfers  Toilet - Technique: Ambulating (cane)  Equipment Used: Raised toilet seat without rails  Toilet Transfer: Modified independent (per clinical observation of prerequisite skills)  AROM: Within functional limits  Strength: Within functional limits  Coordination: Within functional limits  Tone: Normal  Sensation: Intact  ADL  Feeding: Independent  Grooming: Independent  UE Bathing: Independent  LE Bathing: Independent  UE Dressing: Independent  LE Dressing: Independent  Toileting: Independent     Activity Tolerance  Activity Tolerance: Patient tolerated evaluation without incident;Patient tolerated treatment well  Bed mobility  Supine to Sit: Independent  Transfers  Sit to stand: Independent  Stand to sit:  Independent  Vision  Vision: Impaired  Vision Exceptions: Wears glasses for reading  Hearing  Hearing: Within functional limits  Cognition  Overall Cognitive Status: WFL  Orientation  Overall Orientation Status: Within Normal Limits                     LUE AROM (degrees)  LUE AROM : WFL  RUE AROM (degrees)  RUE AROM : WFL                       Goals  Eval only          Cira Mendenhall OT  Electronically signed by Cira Mendenhall OT on 2/21/2023 at 10:57 AM

## 2023-02-21 NOTE — ED PROVIDER NOTES
[facility] 4 ONCOLOGY UNIT  eMERGENCY dEPARTMENT eNCOUnter      Pt Name: Nerissa Bond  MRN: [id number]  [place] 1965  Date of evaluation: 2/21/2023  Provider: Meggan Smith MD    CHIEF COMPLAINT       Chief Complaint   Patient presents with    Altered Mental Status     Stroke alert. Speech changes. History of stroke. Last known well approximately 2345         HISTORY OF PRESENT ILLNESS   (Location/Symptom, Timing/Onset,Context/Setting, Quality, Duration, Modifying Factors, Severity)  Note limiting factors. Nerissa Bond is a 66 y.o. male who presents to the emergency department with altered mental status. Wife gives limited history as patient is unable. Wife states speech is slurred. Last known well 11:45pm.  Wife states that patient has history of stroke and multiple TIAs. Symptoms were sudden onset when speech became slurred and patient was confused. Wife called 911. EMS transported patient and Accucheck was 96. Wife is unaware of any recent symptoms. Patient was in normal state of health prior to onset. Patient has no residual neurologic symptoms from prior stroke. HPI  . NursingNotes were reviewed. REVIEW OF SYSTEMS    (2-9 systems for level 4, 10 or more for level 5)     Review of Systems   Unable to perform ROS: Mental status change   Neurological:  Positive for speech difficulty and weakness.           PAST MEDICALHISTORY     Past Medical History:   Diagnosis Date    Anxiety     Class 1 obesity     Diverticulitis     Hx of blood clots     Hypertension     Nosebleed 2022    Pneumonia 1988    Stroke (cerebrum) ([facility] [address] 02/2021    TIA (transient ischemic attack) 2013    TIA (transient ischemic attack) 2014    TIA (transient ischemic attack) 2019         SURGICAL HISTORY       Past Surgical History:   Procedure Laterality Date    ARM SURGERY Left 11/04/2021    LEFT IN SITU CUBITAL TUNNEL RELEASE performed by Liza Cornejo MD at [address] Right 12/30/2021    RIGHT IN SITU CUBITAL TUNNEL DECOMPRESSION performed by Reynaldo Johns MD at [address]  2013    CARPAL TUNNEL RELEASE Right 10/22/2020    RIGHT OPEN CARPAL TUNNEL RELEASE performed by Reynaldo Johns MD at [address] Left 12/03/2020    LEFT OPEN CARPAL TUNNEL RELEASE performed by Reynaldo Johns MD at [address] [facility]  2018    MOUTH SURGERY  2021    SMALL INTESTINE SURGERY  2018    TONSILLECTOMY  1984    at age 18         CURRENT MEDICATIONS     Discharge Medication List as of 2/22/2023 11:22 AM        CONTINUE these medications which have NOT CHANGED    Details   cyclobenzaprine (FLEXERIL) 5 MG tablet Take 5 mg by mouth 2 times daily as needed for Muscle spasmsHistorical Med      gabapentin (NEURONTIN) 400 MG capsule Take 1 capsule by mouth 3 times daily for 90 days. Max Daily Amount: 1,200 mg, Disp-90 capsule, R-2Normal      levothyroxine (SYNTHROID) 25 MCG tablet Take 1 tablet by mouth dailyHistorical Med      butalbital-APAP-caffeine -40 MG CAPS per capsule Take 1 capsule by mouth as neededHistorical Med      amLODIPine (NORVASC) 10 MG tablet Take 10 mg by mouth dailyHistorical Med      escitalopram (LEXAPRO) 20 MG tablet Take 20 mg by mouth dailyHistorical Med      aspirin 81 MG tablet Take 81 mg by mouth dailyHistorical Med      Melatonin 10 MG CAPS Take by mouth nightly as needed Historical Med      clopidogrel (PLAVIX) 75 MG tablet Take 75 mg by mouth daily Historical Med      atorvastatin (LIPITOR) 40 MG tablet Take 40 mg by mouth dailyHistorical Med      nitroGLYCERIN (NITROSTAT) 0.4 MG SL tablet Place 1 tablet under the tongue every 5 minutes as needed for Chest pain up to max of 3 total doses.  If no relief after 1 dose, call 911., Disp-25 tablet, R-3Normal             ALLERGIES     Penicillins    FAMILY HISTORY       Family History   Problem Relation Age of Onset    Cancer Mother         \"bladdr cancer twice\"    Lung Cancer Mother         SMOKED x 16 years at 3 PPD    Kidney Cancer Mother     Brain Cancer Mother     Heart Attack Father         smoked cigars and pipes    Suicide Sister     Drug Abuse Sister           SOCIAL HISTORY       Social History     Socioeconomic History    Marital status:      Spouse name: None    Number of children: 0    Years of education: None    Highest education level: None   Occupational History    Occupation: [employer] for 33 years     Comment: Medically Disabled by a CVA   Tobacco Use    Smoking status: Every Day     Packs/day: 1.50     Years: 42.00     Pack years: 63.00     Types: Cigarettes    Smokeless tobacco: Never    Tobacco comments:     Started at age 17, had average use of 1.5 PPD, NOW smoking 1PPD   Vaping Use    Vaping Use: Never used   Substance and Sexual Activity    Alcohol use: Not Currently     Comment: quit drinking dec 2022, had been at 60 beers a week    Drug use: Not Currently     Types: Cocaine, Marijuana (Weed)     Comment: one time thing nov 2022, smoked MJ until 1993    Sexual activity: Yes     Comment: \"no known kids\"   Social History Narrative    CODE STATUS: Full Code    HEALTH CARE PROXY: his first choice is his ex-wife, Mrs. Shawn Howe, [phone]    AMBULATES: uses a cane for left sided weakness s/p CVA    DOMICILED: has no stairs in the home, has 1 step to enter the home, has 3 dogs in the home       SCREENINGS   NIH Stroke Scale  Interval: Reassessment  Level of Consciousness (1a): Alert  LOC Questions (1b): Answers both correctly  LOC Commands (1c): Performs both tasks correctly  Best Gaze (2): Normal  Visual (3): No visual loss  Facial Palsy (4): Normal symmetrical movement  Motor Arm, Left (5a): No drift  Motor Arm, Right (5b): No drift  Motor Leg, Left (6a): No drift  Motor Leg, Right (6b):  No drift  Limb Ataxia (7): Absent  Sensory (8): Normal  Best Language (9): No aphasia  Dysarthria (10): Normal  Extinction and Inattention (11): No abnormality  Total: 0[place]asgow Coma Scale  Eye Opening: Spontaneous  Best Verbal Response: Oriented  Best Motor Response: Obeys commands  [place] Coma Scale Score: 15        PHYSICAL EXAM    (up to 7 for level 4, 8 or more for level 5)     ED Triage Vitals   BP Temp Temp Source Heart Rate Resp SpO2 Height Weight   02/21/23 0051 02/21/23 0112 02/21/23 0112 02/21/23 0051 02/21/23 0051 02/21/23 0051 -- 02/21/23 0112   134/80 98.2 °F (36.8 °C) Oral 94 17 97 %  234 lb 3.2 oz (106.2 kg)       Physical Exam  Vitals and nursing note reviewed. Constitutional:       Appearance: He is ill-appearing. Comments: Patient appears confused with slurred speech. SBP in 90s. HENT:      Head: Normocephalic and atraumatic. Right Ear: External ear normal.      Left Ear: External ear normal.      Nose: Nose normal. No congestion or rhinorrhea. Mouth/Throat:      Mouth: Mucous membranes are moist.      Pharynx: Oropharynx is clear. No oropharyngeal exudate or posterior oropharyngeal erythema. Eyes:      General: No scleral icterus. Conjunctiva/sclera: Conjunctivae normal.      Pupils: Pupils are equal, round, and reactive to light. Cardiovascular:      Rate and Rhythm: Normal rate and regular rhythm. Pulses: Normal pulses. Heart sounds: Normal heart sounds. Pulmonary:      Effort: Pulmonary effort is normal. No respiratory distress. Breath sounds: Normal breath sounds. No stridor. No wheezing, rhonchi or rales. Abdominal:      General: Bowel sounds are normal. There is no distension. Palpations: Abdomen is soft. Tenderness: There is no abdominal tenderness. There is no guarding or rebound. Musculoskeletal:         General: No tenderness or deformity. Cervical back: Neck supple. No rigidity. Right lower leg: No edema. Left lower leg: No edema. Skin:     General: Skin is warm and dry. Capillary Refill: Capillary refill takes less than 2 seconds. Coloration: Skin is not jaundiced.    Neurological:      Mental Status: He is alert. He is confused. GCS: GCS eye subscore is 4. GCS verbal subscore is 5. GCS motor subscore is 6. Cranial Nerves: Dysarthria present. No facial asymmetry. Motor: Weakness (generalized. Nonfocal.) present. No seizure activity. Coordination: Coordination normal (coordinated movements). Comments: Patient slowed and generalized weakness. Patient has difficulty following simple commands. Speech is slurred. Psychiatric:         Mood and Affect: Mood is anxious. Behavior: Behavior normal.         Cognition and Memory: Cognition is impaired. DIAGNOSTIC RESULTS     EKG: All EKG's areinterpreted by the Emergency Department Physician who either signs or Co-signs this chart in the absence of a cardiologist.    EKG dated 2/21/2023 at 00 50 7 AM: Normal sinus rhythm, rate 90. Artifact present. RSR prime in V1 may be normal variant. QRS 98 QTc 420. RADIOLOGY:  Non-plain film images such as CT, Ultrasound and MRI are read by the radiologist. Plain radiographic images are visualized and preliminarily interpreted bythe emergency physician with the below findings:                                           CTA HEAD NECK W CONTRAST   Final Result      XR CHEST PORTABLE   Final Result   Findings concerning for bronchitis, which may be of infectious or inflammatory etiologies. Electronically signed by Pooja Arteaga MD on 02-21-23 at [address] [place] [address]   Final Result   No evidence of acute intracranial pathology. Communications:02/21/23 01:40 Call Doctor Regarding Stroke, called Dr. Meir Shelby on 02/21 01:39 (-06:00)Electronically signed by Pooja Arteaga MD on 02-21-23 at [address]  Order: [id number]  Status: Edited Result - FINAL    Visible to patient: Yes (not seen)    Next appt: 04/06/2023 at 11:20 AM in Pain Management SUZANNE Garcia CNP)    0 Result Notes  Details    Reading Physician Reading Date Result Priority Andrés Browne MD  [phone] 2/21/2023      Narrative & Impression  ADDENDUM:02/21/23 01:40 Call Doctor Regarding Stroke, called Dr. Jeris Bernheim on 02/21 01:39 (-06:00)Patient: Arabella Quinones  Time Out: 01:35Exam(s): CTA HEAD With Contrast, CTA NECK With Contrast EXAM:  CT Angiography Head With Intravenous ContrastCLINICAL   HISTORY:   Reason for exam: Stroke Symptoms. TECHNIQUE:  Axial computed tomographic angiography images of the head with intravenous contrast.  MIP reconstructed images were created and reviewed. COMPARISON:  No relevant prior studies available. FINDINGS:    Right internal carotid artery:  No acute findings. Intracranial segment is patent with no significant stenosis. No aneurysm. Right anterior cerebral artery:  Unremarkable. No occlusion or significant stenosis. No aneurysm. Right middle cerebral   artery:  Unremarkable. No occlusion or significant stenosis. No aneurysm. Right posterior cerebral artery:  Unremarkable. No occlusion or significant stenosis. No aneurysm. Right vertebral artery:  Unremarkable as visualized. Left internal carotid   artery:  No acute findings. Intracranial segment is patent with no significant stenosis. No aneurysm. Left anterior cerebral artery:  Unremarkable. No occlusion or significant stenosis. No aneurysm. Left middle cerebral artery:  Unremarkable. No   occlusion or significant stenosis. No aneurysm. Left posterior cerebral artery:  Unremarkable. No occlusion or significant stenosis. No aneurysm. Left vertebral artery:  Unremarkable as visualized. Basilar artery:  Unremarkable. No occlusion or   significant stenosis. No aneurysm. IMPRESSION:  Negative CT angiogram of the head._______________________________________________EXAM:  CT Angiography Neck With Intravenous ContrastCLINICAL HISTORY:   Reason for exam: Stroke Symptoms. TECHNIQUE:  Routine carotid CT angiography protocol   was performed with intravenous contrast.  NASCET criteria using the distal ICAs for comparison were used for evaluation of stenoses. MIP reconstructed images were created and reviewed. COMPARISON:  None. FINDINGS: VASCULATURE:  Right common carotid   artery:  Unremarkable. No occlusion or significant stenosis. No dissection. Right internal carotid artery:  Unremarkable. Extracranial segment is patent with no occlusion or significant stenosis. No dissection. Right external carotid artery:    Unremarkable. No occlusion. Right vertebral artery:  Unremarkable. No occlusion or significant stenosis. No dissection. Left common carotid artery:  Unremarkable. No occlusion or significant stenosis. No dissection. Left internal carotid artery:    Unremarkable. Extracranial segment is patent with no occlusion or significant stenosis. No dissection. Left external carotid artery:  Unremarkable. No occlusion. Left vertebral artery:  Unremarkable. No occlusion or significant stenosis. No   dissection. NECK:  Bones/joints: Moderate disc degeneration at C5-6 with a critical spinal canal stenosis. Soft tissues:  Unremarkable. Lung apices: Foot is concerned for bronchitis with pneumonitis. CAROTID STENOSIS REFERENCE USING NASCET CRITERIA:  %   ICA stenosis = (1 - narrowest ICA diameter/diameter of distal cervical ICA) x 100. Mild - <50% stenosis. Moderate - 50-69% stenosis. Severe - 70-94% stenosis. Near occlusion - 95-99% stenosis. Occluded - 100% stenosis. IMPRESSION:     Negative CT   angiogram of the neck. Critical spinal canal stenosis at C5-6. Recommend MRI of the cervical spine to evaluate for myelopathy. Communications:02/21/23 01:40 Call Doctor Regarding Stroke, called Dr. Lety Sheehan on 02/21 01:39 (-06:00)Electronically signed by   Abi Castillo MD on 02-21-23 at [phone]           Exam Ended: 02/21/23 01:15 CST Last Resulted: 02/21/23 00:35 CST                      LABS:  Labs Reviewed   CBC WITH AUTO DIFFERENTIAL - Abnormal; Notable for the following components: Result Value    MCV 99.6 (*)     MCH 33.5 (*)     MPV 8.3 (*)     All other components within normal limits   DRUG SCRN, BUPRENORPHINE - Abnormal; Notable for the following components:    Barbiturate Screen, Ur POSITIVE (*)     All other components within normal limits   LIPID PANEL - Abnormal; Notable for the following components:    Cholesterol, Total 131 (*)     Triglycerides 239 (*)     HDL 40 (*)     All other components within normal limits   BASIC METABOLIC PANEL W/ REFLEX TO MG FOR LOW K - Abnormal; Notable for the following components:    Glucose 113 (*)     All other components within normal limits   CBC WITH AUTO DIFFERENTIAL - Abnormal; Notable for the following components:    RBC 4.58 (*)     .2 (*)     MCH 34.1 (*)     MPV 8.5 (*)     Neutrophils % 49.7 (*)     Lymphocytes % 40.7 (*)     All other components within normal limits   POCT GLUCOSE - Abnormal; Notable for the following components:    POC Glucose 104 (*)     All other components within normal limits   POCT GLUCOSE - Normal   CULTURE, BLOOD 1    Narrative:     ORDER#: [id number]                          ORDERED BY: ROSIO GUSTAFSON  SOURCE: Blood Antecubital-Rig              COLLECTED:  02/21/23 00:35  ANTIBIOTICS AT [place].:                      RECEIVED :  02/21/23 01:46   CULTURE, BLOOD 2    Narrative:     ORDER#: [id number]                          ORDERED BY: ROSIO Cole  SOURCE: Blood Hand, Right                  COLLECTED:  02/21/23 01:41  ANTIBIOTICS AT [place].:                      RECEIVED :  02/21/23 01:46   COVID-19, RAPID   COMPREHENSIVE METABOLIC PANEL W/ REFLEX TO MG FOR LOW K   TROPONIN   PROTIME-INR   ETHANOL   LACTIC ACID   TSH WITH REFLEX TO FT4   HEMOGLOBIN A1C   MAGNESIUM   PHOSPHORUS   D-DIMER, QUANTITATIVE   APTT   BASIC METABOLIC PANEL W/ REFLEX TO MG FOR LOW K    Narrative:     Collection has been rescheduled by [facility] at 02/21/2023 05:08 Reason: Per   Nichelle flanagan for 0730  giving iv bolis and wants it to finish. All other labs were within normal range or not returned as of this dictation. EMERGENCY DEPARTMENT COURSE and DIFFERENTIAL DIAGNOSIS/MDM:   Vitals:    Vitals:    02/22/23 0142 02/22/23 0512 02/22/23 0759 02/22/23 0920   BP: 114/83 117/76 (!) 123/92    Pulse: 66 72 77 78   Resp: 18 18 20 18   Temp: 97.8 °F (36.6 °C) 97.3 °F (36.3 °C) 97.5 °F (36.4 °C)    TempSrc: Temporal  Temporal    SpO2: 96% 96% 99% 93%   Weight:       Height:           MDM  65-year-old male with history of stroke and multiple TIAs in the past presents with sudden onset of slurred speech and confusion. 911 established that patient's Accu-Chek was 96 and this was not a product of hypoglycemia. Patient arrived and code stroke was called. Patient went to CT for CT of head and CTA of head and neck. Dr. Author Romero was contacted. Just prior to Dr. Juan Ashby arrival patient's symptoms resolved completely. Patient was speaking clearly and thought processes were restored. Dr. Author Romero examined the patient in the emergency department and no TN kinase was indicated due to rapid improvement. Patient was noted to be mildly hypotensive throughout emergency department stay. Labs, EKG, and radiology results reviewed. Patient's urine drug screen was positive for barbiturate. Patient is noted to take several medications which may be sedating or cause slurred speech, including Fioricet, Flexeril, gabapentin, and melatonin. These medications were taken last on 2/20/2023. Evidence of bronchitis seen on radiology. Patient denies fever or recent cough. Patient discussed with Dr. Sangeeta Mcginnis, hospitalist, who admits patient for further evaluation and treatment. CONSULTS:  IP consult Neurology- Dr Nneka Johns:  Unless otherwise noted below, none     Procedures    FINAL IMPRESSION      1. Stroke-like symptoms    2. Hypotension, unspecified hypotension type    3.  Abnormal chest x-ray          DISPOSITION/PLAN   DISPOSITION Admitted 02/21/2023 03:04:58 AM           (Please note that portions of this note were completed with a voice recognition program.  Efforts were made to edit thedictations but occasionally words are mis-transcribed.)    Isaiah Phillips MD (electronically signed)  Attending Emergency Physician          Isaiah Phillips MD  02/26/23 [id number]

## 2023-02-21 NOTE — ED NOTES
[facility] called in a code stroke at [place] [id number]. Patient 66 yr old male. LKW was an hour ago at approximately 2340. Blood sugar was 96 with EMS.      Dona Booth  02/21/23 0050

## 2023-02-21 NOTE — PLAN OF CARE
Patient admitted this a.m.  Writer assumes care of patient this AM.  Patient seen and examined.  Doing well.  No new complaints.  CTA Head/Neck with reported Critical spinal canal stenosis at C5-6.  Recommend MRI of the cervical spine to evaluate for myelopathy  Continue current work-up and management including MRI brain, echocardiogram, as well as recommended MRI of cervical spine.  Neurology on board

## 2023-02-21 NOTE — PROGRESS NOTES
Speech Language Pathology  Facility/Department: [facility] 4 ONCOLOGY UNIT   CLINICAL BEDSIDE SWALLOW EVALUATION  INITIAL EVALUATION OF SPEECH PRODUCTION    NAME: Ming Diaz  : 1965  MRN: [id number]    ADMISSION DATE: 2023  ADMITTING DIAGNOSIS: has Other chest pain; Right carpal tunnel syndrome; Left carpal tunnel syndrome; Stroke-like symptoms; Chronic cerebrovascular accident (CVA); Late effect of cerebrovascular accident (CVA); Alcohol use with withdrawal ([facility] [address] Tobacco abuse counseling; Cigarette nicotine dependence with nicotine-induced disorder; Personal history of noncompliance with medical treatment and regimen; Lumbar radiculopathy; Back pain with history of spinal surgery; Chronic right-sided low back pain with right-sided sciatica; Pain medication agreement; Muscle spasm of back; Spasm of thoracic back muscle; Cubital tunnel syndrome on left; Cubital tunnel syndrome on right; Epistaxis; Sacroiliac joint dysfunction; Encounter for monitoring opioid maintenance therapy; Weakness; Stuttering; Imbalance; Alcohol use; Tobacco use; Essential hypertension; History of stroke; History of TIA (transient ischemic attack); and TIA (transient ischemic attack) on their problem list.    Date of Eval: 2023  Evaluating Therapist: ESTRELLA Dominguez    Clinical Impression  Speech assessment completed on this date. Mildly dysarthria is present as characterized by mildly decreased labial ROM, strength, and coordination. Mild dysfluency also noted. Disfluencies increase when discussing speech production during therapy. Patient reports he has a mild stutter at baseline. Clinical Bedside Swallow Evaluation completed on this date. Oral prep reveals mildly decreased rotary jaw movement with ice chips and regular solids. Oral transit timing ranges from 1-2 seconds with  ice chips, puree consistencies, regular solids, and thin liquids via consecutive sips side of cup.  Minimum residue noted with regular solids and cleared independently with liquid wash. Liquid wash was effective in clearing minimal oral residue. Laryngeal movement observed to be consistently timely and mildly decreased for swallow airway protection. Even so, no overt s/s of aspiration/penetration observed with ice chips, puree consistencies, regular solids, and thin liquids via consecutive sips side of cup. Would recommend continuation of least restrictive diet of regular solids with thin liquids. Medications whole with H2O. Could possibly benefit from full speech/language/cognitive evaluation. Please monitor/notify SLP for the following: changes in lung sounds, spikes in temperature, and/or difficulties with swallowing. SLP will continue to follow and treat. Recent Chest Xray: ( Date 2/21/23 )    Impression   Findings concerning for bronchitis, which may be of infectious or inflammatory etiologies. Electronically signed by Tasha Pendleton MD on 02-21-23 at 9978       MRI BRAIN ORDERED    Current Diet level:  Current Diet : Regular      Reason for Referral  Jean Carlos Patton was referred for a bedside swallow evaluation to assess the efficiency of his swallow function, identify signs and symptoms of aspiration and make recommendations regarding safe dietary consistencies, effective compensatory strategies, and safe eating environment. Treatment Plan  Requires SLP Intervention: Yes     D/C Recommendations: Ongoing speech therapy is recommended during this hospitalization       Recommended Diet and Intervention  Regular solids  Thin liquids  Recommended Form of Meds: Whole with water  Recommendations: Therapeutic feeds with SLP only; Dysphagia treatment  Therapeutic Interventions: Pharyngeal exercises;Diet tolerance monitoring; Therapeutic PO trials with SLP;Oral care;Oral motor exercises; Patient/Family education    Compensatory Swallowing Strategies  Compensatory Swallowing Strategies : Alternate solids and liquids;Eat/Feed slowly; No straws;Upright as possible for all oral intake;Remain upright for 30-45 minutes after meals;Small bites/sips    Treatment/Goals  Short-term Goals  Goal 1: Pt will tolerate regular solids with thin liquids with minimal overt s/s of aspiration/penetration during hospitalization. Goal 2: Pt will demonstrate awareness of general aspiration precautions. Goal 3: Frequent reassessment of speech production. Goal 4: Full speech/language/cognitive evaluation. Goal 5: Pt will participate in swallowing reassessments to ensure safest diet consistencies. Long-term Goals  Goal 1: Pt will tolerate least restrictive diet with minimal overt s/s of aspiration/penetration during hospitalization. General  Chart Reviewed: Yes  Behavior/Cognition: Alert; Cooperative  Temperature Spikes Noted: No  Respiratory Status: Room air  O2 Device: None (Room air)  Communication Observation:  (Reports he has a stutter at baseline. Hx dysarthria noted as pt reports he has had multiple strokes and recieved out patient speech therapy services at [facility] ([facility]  Follows Directions: Simple  Dentition: Adequate  Patient Positioning: Upright in bed  Baseline Vocal Quality: Normal  Volitional Swallow:  (WFL)  Prior Dysphagia History: Denies hx dysphagia with solids, liquids, and medications. Consistencies Administered: Regular;Pureed;Thin;Ice Chips    Vision/Hearing  Vision  Vision Exceptions: Wears glasses for reading  Hearing  Hearing: Within functional limits    Oral Motor Deficits  Oral/Motor  Oral Hygiene: Clean    Education  Patient Education Response: Verbalizes understanding     Communication Observation  Speech assessment completed on this date. Mildly dysarthria is present as characterized by mildly decreased labial ROM, strength, and coordination. Even so, clinician ranks speech intelligibility approximately 100% intelligible with context known and background noise present. Disfluencies increase when discussing during therapy.  Patient reports he has a mild stutter at baseline. Oral Mechanism Examination:  Labial retraction: decreased to R   Labial protrusion: decreased to R  Labial compression: inconsistent   Labial coordination: inconsistent  Lingual extension: full  Lingual elevation: full  Bilateral lingual movement: adequate  Lingual strength: adequate  Lingual coordination: timely    Swallowing:   Clinical bedside swallow evaluation completed on this date. Pt alert, cooperative, and upright in bed. No family present during evaluation. Communication Observation:  (Reports he has a stutter at baseline. Hx dysarthria noted as pt reports he has had multiple strokes and recieved out patient speech therapy services at [facility]    Prior Dysphagia History: Denies hx dysphagia with solids, liquids, and medications. Consistencies Administered: ice chips, puree consistencies, regular solids, and thin liquids via consecutive sips side of cup    Oral Prep: Mildly decreased rotary jaw movement with ice chips and regular solids. Oral Transit: Timing ranges from 1-2 seconds with  ice chips, puree consistencies, regular solids, and thin liquids via consecutive sips side of cup. Minimum residue noted with regular solids and cleared independently with liquid wash. Liquid wash was effective in clearing minimal oral residue. Laryngeal movement: consistently timely and mildly decreased for swallow airway protection with ice chips, puree consistencies, regular solids, and thin liquids via consecutive sips side of cup. S/S of aspiration: Even so, no overt s/s of aspiration/penetration observed with ice chips, puree consistencies, regular solids, and thin liquids via consecutive sips side of cup. Would recommend continuation of least restrictive diet of regular solids with thin liquids. Medications whole with H2O. Could possibly benefit from full speech/language/cognitive evaluation.  Please monitor/notify SLP for the following: changes in lung sounds, spikes in temperature, and/or difficulties with swallowing. SLP will continue to follow and treat.         Electronically signed by ESTRELLA Hrenandez on 2/21/2023 at 11:32 AM

## 2023-02-22 VITALS
OXYGEN SATURATION: 93 % | DIASTOLIC BLOOD PRESSURE: 92 MMHG | HEART RATE: 78 BPM | TEMPERATURE: 97.5 F | WEIGHT: 231.13 LBS | RESPIRATION RATE: 18 BRPM | BODY MASS INDEX: 30.63 KG/M2 | HEIGHT: 73 IN | SYSTOLIC BLOOD PRESSURE: 123 MMHG

## 2023-02-22 LAB
ANION GAP SERPL CALCULATED.3IONS-SCNC: 11 MMOL/L (ref 7–19)
BASOPHILS ABSOLUTE: 0 K/UL (ref 0–0.2)
BASOPHILS RELATIVE PERCENT: 0.5 % (ref 0–1)
BUN BLDV-MCNC: 15 MG/DL (ref 6–20)
CALCIUM SERPL-MCNC: 9.2 MG/DL (ref 8.6–10)
CHLORIDE BLD-SCNC: 106 MMOL/L (ref 98–111)
CO2: 25 MMOL/L (ref 22–29)
CREAT SERPL-MCNC: 0.9 MG/DL (ref 0.5–1.2)
EOSINOPHILS ABSOLUTE: 0.1 K/UL (ref 0–0.6)
EOSINOPHILS RELATIVE PERCENT: 1.2 % (ref 0–5)
GFR SERPL CREATININE-BSD FRML MDRD: >60 ML/MIN/{1.73_M2}
GLUCOSE BLD-MCNC: 113 MG/DL (ref 74–109)
HCT VFR BLD CALC: 46.8 % (ref 42–52)
HEMOGLOBIN: 15.6 G/DL (ref 14–18)
IMMATURE GRANULOCYTES #: 0 K/UL
LYMPHOCYTES ABSOLUTE: 3 K/UL (ref 1.1–4.5)
LYMPHOCYTES RELATIVE PERCENT: 40.7 % (ref 20–40)
MCH RBC QN AUTO: 34.1 PG (ref 27–31)
MCHC RBC AUTO-ENTMCNC: 33.3 G/DL (ref 33–37)
MCV RBC AUTO: 102.2 FL (ref 80–94)
MONOCYTES ABSOLUTE: 0.6 K/UL (ref 0–0.9)
MONOCYTES RELATIVE PERCENT: 7.6 % (ref 0–10)
NEUTROPHILS ABSOLUTE: 3.7 K/UL (ref 1.5–7.5)
NEUTROPHILS RELATIVE PERCENT: 49.7 % (ref 50–65)
PDW BLD-RTO: 14.1 % (ref 11.5–14.5)
PLATELET # BLD: 196 K/UL (ref 130–400)
PMV BLD AUTO: 8.5 FL (ref 9.4–12.4)
POTASSIUM REFLEX MAGNESIUM: 4.5 MMOL/L (ref 3.5–5)
RBC # BLD: 4.58 M/UL (ref 4.7–6.1)
SODIUM BLD-SCNC: 142 MMOL/L (ref 136–145)
WBC # BLD: 7.4 K/UL (ref 4.8–10.8)

## 2023-02-22 PROCEDURE — 94760 N-INVAS EAR/PLS OXIMETRY 1: CPT

## 2023-02-22 PROCEDURE — 6370000000 HC RX 637 (ALT 250 FOR IP): Performed by: HOSPITALIST

## 2023-02-22 PROCEDURE — 6360000002 HC RX W HCPCS: Performed by: HOSPITALIST

## 2023-02-22 PROCEDURE — G0378 HOSPITAL OBSERVATION PER HR: HCPCS

## 2023-02-22 PROCEDURE — 85025 COMPLETE CBC W/AUTO DIFF WBC: CPT

## 2023-02-22 PROCEDURE — 80048 BASIC METABOLIC PNL TOTAL CA: CPT

## 2023-02-22 PROCEDURE — 36415 COLL VENOUS BLD VENIPUNCTURE: CPT

## 2023-02-22 PROCEDURE — 96372 THER/PROPH/DIAG INJ SC/IM: CPT

## 2023-02-22 PROCEDURE — 99233 SBSQ HOSP IP/OBS HIGH 50: CPT | Performed by: PSYCHIATRY & NEUROLOGY

## 2023-02-22 RX ADMIN — ENOXAPARIN SODIUM 30 MG: 100 INJECTION SUBCUTANEOUS at 08:46

## 2023-02-22 RX ADMIN — LEVOTHYROXINE SODIUM 25 MCG: 25 TABLET ORAL at 05:28

## 2023-02-22 RX ADMIN — GABAPENTIN 400 MG: 400 CAPSULE ORAL at 08:46

## 2023-02-22 RX ADMIN — CLOPIDOGREL BISULFATE 75 MG: 75 TABLET ORAL at 08:46

## 2023-02-22 RX ADMIN — ESCITALOPRAM OXALATE 20 MG: 10 TABLET ORAL at 08:46

## 2023-02-22 RX ADMIN — ASPIRIN 81 MG: 81 TABLET, COATED ORAL at 08:46

## 2023-02-22 NOTE — PROGRESS NOTES
Unable to get Tele monitor at this time as there are no boxes available at this moment. Tele was told to notify me if there are any boxes available. Will continue to monitor pt.

## 2023-02-22 NOTE — PROGRESS NOTES
Patient:   Werner Guzman  MR#:    [id number]   Room:    300/809-06   YOB: 1965  Date of Progress Note: 2/22/2023  Time of Note                           7:39 AM  Consulting Physician:   Shankar Saldaña M.D. Attending Physician:  Rox Rothman MD     Chief complaint weakness/difficulty speaking      S:This 66 y.o. male  with a past medical history significant for multiple TIA's/stroke, HTN, smoker, alcohol use is seen for evaluation of sudden onset generalized weakness and difficulty speaking. The patient was in bed watching TV and noted his head \"bobbing around\". He got up to walk and was stumbling. His ex wife noted him not in bed and noted home bouncing around and brought him in ER. As per ER physician and nursing speech was stuttering and somewhat slowed but he was joking and jovial. He had bilateral  weakness and was unable to lift his legs against gravity. By the time I arrived at bedside he indicates he was back to normal. Has some residual chronic weakness on the left from previous strokes which always affect his left side according to him. He denies any stressors but his wife indicates she is planning on checking herself into mental health. The patient indicates he was just in alcohol rehab in December and is doing better. 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 No acute issues overnight. Feels well overall.     REVIEW OF SYSTEMS:  Constitutional: No fevers No chills  Neck:No stiffness  Respiratory: No shortness of breath  Cardiovascular: No chest pain No palpitations  Gastrointestinal: No abdominal pain    Genitourinary: No Dysuria  Neurological: No headache, no confusion    Past Medical History:      Diagnosis Date    Anxiety     Class 1 obesity     Diverticulitis     Hx of blood clots     Hypertension     Nosebleed 2022    Pneumonia 1988    Stroke (cerebrum) ([facility] [address] 02/2021    TIA (transient ischemic attack) 2013    TIA (transient ischemic attack) 2014    TIA (transient ischemic attack) 2019       Past Surgical History:      Procedure Laterality Date    ARM SURGERY Left 11/04/2021    LEFT IN SITU CUBITAL TUNNEL RELEASE performed by Shazia Llamas MD at [address] Right 12/30/2021    RIGHT IN SITU CUBITAL TUNNEL DECOMPRESSION performed by Shazia Llamas MD at [address]  2013    CARPAL TUNNEL RELEASE Right 10/22/2020    RIGHT OPEN CARPAL TUNNEL RELEASE performed by Shazia Llamas MD at [address] Left 12/03/2020    LEFT OPEN CARPAL TUNNEL RELEASE performed by Shazia Llamas MD at [facility]  2018    [address] SURGERY  2021    SMALL INTESTINE SURGERY  2018    TONSILLECTOMY  1984    at age 18       Medications in Hospital:      Current Facility-Administered Medications:     atorvastatin (LIPITOR) tablet 80 mg, 80 mg, Oral, Nightly, Katerina Jessica MD, 80 mg at 02/21/23 2014    nitroGLYCERIN (NITROSTAT) SL tablet 0.4 mg, 0.4 mg, SubLINGual, Q5 Min PRN, Katerina Jessica MD    melatonin capsule 10 mg, 10 mg, Oral, Nightly, Katerina Jessica MD, 10 mg at 02/21/23 2014    melatonin disintegrating tablet 10 mg, 10 mg, Oral, Nightly PRN, Katerina Jessica MD    polyethylene glycol (GLYCOLAX) packet 17 g, 17 g, Oral, Daily PRN, Katerina Jessica MD    calcium carbonate (TUMS) chewable tablet 500 mg, 500 mg, Oral, TID PRN, Katerina Jessica MD    levothyroxine (SYNTHROID) tablet 25 mcg, 25 mcg, Oral, QAM AC, Katerina Jessica MD, 25 mcg at 02/22/23 4380    gabapentin (NEURONTIN) capsule 400 mg, 400 mg, Oral, TID, Katerina Jessica MD, 400 mg at 02/21/23 2015    escitalopram (LEXAPRO) tablet 20 mg, 20 mg, Oral, Daily, Katerina Jessica MD, 20 mg at 02/21/23 0959    cyclobenzaprine (FLEXERIL) tablet 5 mg, 5 mg, Oral, BID PRN, Katerina Jessica MD    clopidogrel (PLAVIX) tablet 75 mg, 75 mg, Oral, Daily, Katerina Jessica MD, 75 mg at 02/21/23 0956    butalbital-acetaminophen-caffeine (FIORICET, ESGIC) per tablet 1 tablet, 1 tablet, Oral, Q4H PRN, Leotha Abt Hardik Mckenna MD, 1 tablet at 02/21/23 0521    ondansetron (ZOFRAN-ODT) disintegrating tablet 4 mg, 4 mg, Oral, Q8H PRN **OR** ondansetron (ZOFRAN) injection 4 mg, 4 mg, IntraVENous, Q6H PRN, Eunice Garza MD    enoxaparin Sodium (LOVENOX) injection 30 mg, 30 mg, SubCUTAneous, BID, Eunice Garza MD, 30 mg at 02/21/23 2015    aspirin EC tablet 81 mg, 81 mg, Oral, Daily, 81 mg at 02/21/23 0956 **OR** aspirin suppository 300 mg, 300 mg, Rectal, Daily, Eunice Garza MD    perflutren lipid microspheres (DEFINITY) injection 1.5 mL, 1.5 mL, IntraVENous, ONCE PRN, Eunice Garza MD    labetalol (NORMODYNE;TRANDATE) injection 10 mg, 10 mg, IntraVENous, Q10 Min PRN, Eunice Garza MD    Allergies:  Penicillins    Social History:   TOBACCO:   reports that he has been smoking cigarettes. He has a 63.00 pack-year smoking history. He has never used smokeless tobacco.  ETOH:   reports that he does not currently use alcohol. Family History:       Problem Relation Age of Onset    Cancer Mother         \"bladdr cancer twice\"    Lung Cancer Mother         SMOKED x 16 years at 3 PPD    Kidney Cancer Mother     Brain Cancer Mother     Heart Attack Father         smoked cigars and pipes    Suicide Sister     Drug Abuse Sister          PHYSICAL EXAM:  /76   Pulse 72   Temp 97.3 °F (36.3 °C)   Resp 18   Ht 6' 1\" (1.854 m)   Wt 231 lb 2 oz (104.8 kg)   SpO2 96%   BMI 30.49 kg/m²     Constitutional - well developed, well nourished. Eyes - conjunctiva normal.   Ear, nose, throat - No scars, masses, or lesions over external nose or ears, no atrophy of tongue  Neck-symmetric, no masses noted, no jugular vein distension  Respiration- chest wall appears symmetric, good expansion,   normal effort without use of accessory muscles  Musculoskeletal - no significant wasting of muscles noted, no bony deformities  Extremities-no clubbing, cyanosis or edema  Skin - warm, dry, and intact. No rash, erythema, or pallor.   Psychiatric - mood, affect, and behavior appear normal.      Neurological exam  Awake, alert, fluent oriented appropriate affect  Attention and concentration appear appropriate  Recent and remote memory appears unremarkable  Speech normal without dysarthria  No clear issues with language of fund of knowledge     Cranial Nerve Exam     CN III, IV,VI-EOMI, No nystagmus, conjugate eye movements, no ptosis    CN VII-no facial assymetry       Motor Exam  antigravity throughout upper and lower extremities bilaterally      Tremors- no tremors in hands or head noted     Gait  Not tested     Nursing/pcp notes, imaging,labs and vitals reviewed. PT,OT and/or speech notes reviewed    Lab Results   Component Value Date    WBC 7.4 02/22/2023    HGB 15.6 02/22/2023    HCT 46.8 02/22/2023    .2 (H) 02/22/2023     02/22/2023     Lab Results   Component Value Date     02/22/2023    K 4.5 02/22/2023     02/22/2023    CO2 25 02/22/2023    BUN 15 02/22/2023    CREATININE 0.9 02/22/2023    GLUCOSE 113 (H) 02/22/2023    CALCIUM 9.2 02/22/2023    PROT 7.2 02/21/2023    LABALBU 4.3 02/21/2023    BILITOT 0.4 02/21/2023    ALKPHOS 90 02/21/2023    AST 20 02/21/2023    ALT 23 02/21/2023    LABGLOM >60 02/22/2023    GFRAA >59 10/29/2021     Lab Results   Component Value Date    INR 0.97 02/21/2023    INR 0.98 02/22/2021    PROTIME 12.8 02/21/2023    PROTIME 12.9 02/22/2021     MRI CERVICAL SPINE WO CONTRAST [[id number]]    Resulted: 02/21/23 2152    Updated: 02/21/23 1559    Narrative:     Examination: MRI cervical spine without IV contrast   Clinical history: CTA head/neck with reported critical spinal canal stenosis at   C5-C6. Comparison: CTA of the head and neck from the same day   Technique: Multiplanar multisequence MR images of the cervical spine were   obtained without administration of IV gadolinium contrast.   Findings:   Cervical spine alignment appears unremarkable. The craniocervical and   atlantoaxial relationships are normal.There are Modic type I degenerative   endplate changes at I0-J6. Otherwise, marrow signal is unremarkable. Vertebral   body heights are normal.Cervical discs are desiccated. No cervical cord signal   abnormality is seen. The paraspinal soft tissues appear unremarkable. Limited   visualization of the intracranial structures is unremarkable. C2-C3: No significant spinal canal or neural foraminal stenosis. C3-C4: Uncovertebral and facet arthropathy contribute to moderate LEFT and mild   to moderate RIGHT neural foraminal stenosis. Spinal canal appears patent. C4-C5: Uncovertebral and facet arthropathy contribute to moderate bilateral   neural foraminal stenosis. Spinal canal is not significantly narrowed. C5-C6: There is a posterior disc osteophyte complex with bilateral uncovertebral   and facet arthropathy. There is mild effacement of the anterior thecal sac with   mild spinal canal narrowing (thecal sac measures approximately 9 mm AP). There   is moderate to severe RIGHT and mild LEFT neural foraminal stenosis. C6-C7: There is a LEFT paramedian disc protrusion which effaces the LEFT   anterolateral thecal sac. Spinal canal is mildly narrowed (thecal sac measures   approximately 9 mm AP). Neural foramina appear patent. C7-T1: No significant spinal canal or neural foraminal stenosis. Impression:     Impression:   Cervical spondylosis with mild spinal canal narrowing at C5-C6 and C6-C7. Multilevel neural foraminal narrowing is also present. Please see above for   additional details.    Echo Complete [[id number]]    Collected: 02/21/23 8502    Updated: 02/21/23 1442    Narrative:     Transthoracic Echocardiography Report (TTE)      Demographics        Patient Name   Nereida Oliver  Date of Study           02/21/2023        MRN            [id number]         Gender                  Male        Date of Birth  1965     Room Number             MHL-0417        Age            66 year(s)        Height:        68 sahra      Referring Physician     Elizabeth Tipton MD        Weight:        234.01 pounds  Sonographer             Karrie Duff        BSA:           2.3 m^2        Interpreting Physician  Yuliya Mac MD        BMI:           30.87 kg/m^2       Procedure     Type of Study        TTE procedure:ECHO NO CONTRAST WITH DOP/COLR. Study Location: Echo Lab   Technical Quality: Adequate visualization     Patient Status: Inpatient     Contrast Medium: Bubble Study. HR: 72 bpm BP: 111/78 mmHg     Indications:Fatigue/weakness and TIA. Conclusions        Summary    Structurally normal mitral valve with normal leaflet mobility. No evidence    of mitral valve stenosis or mild mitral regurgitation. Aortic valve appears to be tricuspid. Structurally normal aortic valve. No significant aortic regurgitation or stenosis is noted. Tricuspid valve is structurally normal.    No evidence of tricuspid regurgitation. Normal left ventricular size with preserved LV function and an estimated    ejection fraction of approximately 55-60%. No evidence of left ventricular mass or thrombus noted. Normal left ventricular wall thickness. No regional wall motion abnormalities.         Signature        ----------------------------------------------------------------    Electronically signed by Yuliya Mac MD(Interpreting    physician) on 02/21/2023 02:47 PM    ----------------------------------------------------------------                RECORD REVIEW: Previous medical records, medications were reviewed at today's visit    IMPRESSION:   Generalized weakness/stuttering speech/ \"bobbing head\"  Suspect psychogenic etiology- TIA is in the differential     Initial exam  Awake, alert, oriented, fluent  Conjugate eye movements, symmetric facies  Mild giveway weakness left leg which is chronic   No dysmetria      CT head/CTA head and  neck unremarkable      MRI brain pending-my read-no acute changes noted  2D echo-left ventricular ejection fraction 55 to 60%/normal left atrium/no PFO  Telemetry  HbA1C 5.8  LDL 43     Continue ASA/Plavix/statin  Smoking cessation     PT to mobilize        Supportive care    Okay to DC from neuro standpoint  Follow-up as needed    [address] [place]  Dr Jigar Kraft

## 2023-02-22 NOTE — PLAN OF CARE
Problem: ABCDS Injury Assessment  Goal: Absence of physical injury  2/22/2023 1038 by Oni Landry RN  Outcome: Completed  2/22/2023 0417 by Nghia Cartagena RN  Outcome: Progressing     Problem: Chronic Conditions and Co-morbidities  Goal: Patient's chronic conditions and co-morbidity symptoms are monitored and maintained or improved  2/22/2023 1038 by Oni Landry RN  Outcome: Completed  2/22/2023 0417 by Nghia Cartagena RN  Outcome: Progressing

## 2023-02-22 NOTE — DISCHARGE SUMMARY
[address] [place] [address]  DEPARTMENT OF HOSPITALIST MEDICINE    DISCHARGE SUMMARY:        Sanket Huff  :  1965  MRN:  [id number]    Admit date:  2023  Discharge date:  2023    Admitting Physician:  No admitting provider for patient encounter.    Advance Directive: Prior    Consults Made:   IP CONSULT TO PHARMACY  PHARMACY TO CHANGE BASE FLUIDS  IP CONSULT TO NEUROLOGY      Primary Care Physician:  Gene Sellers MD    Discharge Diagnoses:  Principal Problem:    TIA (transient ischemic attack)  Active Problems:    Weakness    Stuttering    Imbalance    Alcohol use    Tobacco use    Essential hypertension    History of stroke    History of TIA (transient ischemic attack)    Tobacco abuse counseling  Resolved Problems:    * No resolved hospital problems. *          Pertinent Labs:  CBC with DIFF:  Recent Labs     23  0300   WBC 9.2 7.4   RBC 4.86 4.58*   HGB 16.3 15.6   HCT 48.4 46.8   MCV 99.6* 102.2*   MCH 33.5* 34.1*   MCHC 33.7 33.3   RDW 14.1 14.1    196   MPV 8.3* 8.5*   NEUTOPHILPCT 63.4 49.7*   LYMPHOPCT 29.6 40.7*   MONOPCT 5.3 7.6   EOSRELPCT 1.0 1.2   BASOPCT 0.4 0.5   NEUTROABS 5.8 3.7   LYMPHSABS 2.7 3.0   MONOSABS 0.50 0.60   EOSABS 0.10 0.10   BASOSABS 0.00 0.00       CMP/BMP:  Recent Labs     23  0057 23  0708 23  0300     --  140 142   K 4.4  --  4.2 4.5     --  107 106   CO2 27  --  25 25   ANIONGAP 11  --  8 11   GLUCOSE 104 104 103 113*   BUN 17  --  19 15   CREATININE 0.8  --  0.7 0.9   LABGLOM >60  --  >60 >60   CALCIUM 9.3  --  8.7 9.2   PROT 7.2  --   --   --    LABALBU 4.3  --   --   --    BILITOT 0.4  --   --   --    ALKPHOS 90  --   --   --    ALT 23  --   --   --    AST 20  --   --   --          CRP:  No results for input(s): CRP in the last 72 hours.  Sed Rate:  No results for input(s): SEDRATE in the last 72 hours.      HgBA1c:  No components found for: HGBA1C  FLP:    Lab  Results   Component Value Date/Time    TRIG 239 02/21/2023 12:56 AM    HDL 40 02/21/2023 12:56 AM    LDLCALC 43 02/21/2023 12:56 AM     TSH:    Lab Results   Component Value Date/Time    TSH 2.280 02/04/2021 01:53 PM     Troponin T:   Recent Labs     02/21/23 0056   TROPONINI <0.01     Pro-BNP: No results for input(s): BNP in the last 72 hours. INR:   Recent Labs     02/21/23 0056   INR 0.97     ABGs: No results found for: PHART, PO2ART, SKN3EWM  UA:No results for input(s): NITRITE, COLORU, PHUR, LABCAST, WBCUA, RBCUA, MUCUS, TRICHOMONAS, YEAST, BACTERIA, CLARITYU, SPECGRAV, LEUKOCYTESUR, UROBILINOGEN, BILIRUBINUR, BLOODU, GLUCOSEU, AMORPHOUS in the last 72 hours. Invalid input(s): Judeth Plate      Culture Results:    No results for input(s): CXSURG in the last 720 hours. Blood Culture Recent:   Recent Labs     02/21/23  0035   BC No Growth to date. Any change in status will be called. Cultures:   No results for input(s): CULTURE in the last 72 hours. Recent Labs     02/21/23 0035 02/21/23  0141   BC No Growth to date. Any change in status will be called. --    BLOODCULT2  --  No Growth to date. Any change in status will be called. No results for input(s): CXSURG in the last 72 hours. Recent Labs     02/21/23 0056   MG 2.2   PHOS 4.2     Recent Labs     02/21/23 0056   AST 20   ALT 23   BILITOT 0.4   ALKPHOS 90           Significant Diagnostic Studies:   CT HEAD WO CONTRAST    Result Date: 2/21/2023  ADDENDUM:02/21/23 01:40 Call Doctor Regarding Stroke, called Dr. Adan Eastman on 02/21 01:39 (-06:00)Patient: Johnny Mistry  Time Out: 01:29Exam(s): CT HEAD Without Contrast EXAM:  CT Head Without Intravenous ContrastCLINICAL HISTORY:   Reason for exam: Stroke Symptoms. TECHNIQUE:  Axial computed tomography images of the head/brain without intravenous contrast.COMPARISON:Comparison made to prior head CT from February 22, 2021. FINDINGS:  Brain:  Unremarkable. No hemorrhage.   No significant white matter disease. No edema. Ventricles:  Unremarkable. No ventriculomegaly. Bones/joints:  Unremarkable. No acute fracture. Soft tissues:  Unremarkable. Sinuses:  Unremarkable as visualized. No acute sinusitis. Mastoid air cells:  Unremarkable as visualized. No  mastoid effusion. No evidence of acute intracranial pathology. Communications:02/21/23 01:40 Call Doctor Regarding Stroke, called Dr. Kiko Artis on 02/21 01:39 (-06:00)Electronically signed by Zhang Gonzalez MD on 02-21-23 at [address] [place] [address]    Result Date: 2/21/2023  Examination: MRI cervical spine without IV contrast Clinical history: CTA head/neck with reported critical spinal canal stenosis at C5-C6. Comparison: CTA of the head and neck from the same day Technique: Multiplanar multisequence MR images of the cervical spine were obtained without administration of IV gadolinium contrast. Findings: Cervical spine alignment appears unremarkable. The craniocervical and atlantoaxial relationships are normal.There are Modic type I degenerative endplate changes at I8-L3. Otherwise, marrow signal is unremarkable. Vertebral body heights are normal.Cervical discs are desiccated. No cervical cord signal abnormality is seen. The paraspinal soft tissues appear unremarkable. Limited visualization of the intracranial structures is unremarkable. C2-C3: No significant spinal canal or neural foraminal stenosis. C3-C4: Uncovertebral and facet arthropathy contribute to moderate LEFT and mild to moderate RIGHT neural foraminal stenosis. Spinal canal appears patent. C4-C5: Uncovertebral and facet arthropathy contribute to moderate bilateral neural foraminal stenosis. Spinal canal is not significantly narrowed. C5-C6: There is a posterior disc osteophyte complex with bilateral uncovertebral and facet arthropathy. There is mild effacement of the anterior thecal sac with mild spinal canal narrowing (thecal sac measures approximately 9 mm AP). There is moderate to severe RIGHT and mild LEFT neural foraminal stenosis. C6-C7: There is a LEFT paramedian disc protrusion which effaces the LEFT anterolateral thecal sac. Spinal canal is mildly narrowed (thecal sac measures approximately 9 mm AP). Neural foramina appear patent. C7-T1: No significant spinal canal or neural foraminal stenosis. Impression: Cervical spondylosis with mild spinal canal narrowing at C5-C6 and C6-C7. Multilevel neural foraminal narrowing is also present. Please see above for additional details. XR CHEST PORTABLE    Result Date: 2/21/2023  Patient: George Lala  Time Out: 03:43Exam(s): FILM CXR 1 VIEW EXAM:  XR Chest, 1 ViewCLINICAL HISTORY:   Reason for exam: Code Stroke. TECHNIQUE:  Frontal view of the chest.COMPARISON:Comparison made to prior CT angiogram of the neck from the same day. FINDINGS:  Lungs: Moderate to heavy peribronchial thickening of the central bronchi. No consolidation. Pleural space:  Unremarkable. No pneumothorax. Heart:  Unremarkable. No cardiomegaly. Mediastinum:  Unremarkable. Bones/joints:  Unremarkable. Findings concerning for bronchitis, which may be of infectious or inflammatory etiologies. Electronically signed by Tasha Pendleton MD on 02-21-23 at [address] [place] [address]    Result Date: 2/21/2023  ADDENDUM:02/21/23 01:40 Call Doctor Regarding Stroke, called Dr. Byran Galvan on 02/21 01:39 (-06:00)Patient: George Lala  Time Out: 01:35Exam(s): CTA HEAD With Contrast, CTA NECK With Contrast EXAM:  CT Angiography Head With Intravenous ContrastCLINICAL HISTORY:   Reason for exam: Stroke Symptoms. TECHNIQUE:  Axial computed tomographic angiography images of the head with intravenous contrast.  MIP reconstructed images were created and reviewed. COMPARISON:  No relevant prior studies available. FINDINGS:  Right internal carotid artery:  No acute findings. Intracranial segment is patent with no significant stenosis. No aneurysm.   Right anterior cerebral artery: Unremarkable. No occlusion or significant stenosis. No aneurysm. Right middle cerebral artery:  Unremarkable. No occlusion or significant stenosis. No aneurysm. Right posterior cerebral artery:  Unremarkable. No occlusion or significant stenosis. No aneurysm. Right vertebral artery:  Unremarkable as visualized. Left internal carotid  artery:  No acute findings. Intracranial segment is patent with no significant stenosis. No aneurysm. Left anterior cerebral artery:  Unremarkable. No occlusion or significant stenosis. No aneurysm. Left middle cerebral artery:  Unremarkable. No occlusion or significant stenosis. No aneurysm. Left posterior cerebral artery:  Unremarkable. No occlusion or significant stenosis. No aneurysm. Left vertebral artery:  Unremarkable as visualized. Basilar artery:  Unremarkable. No occlusion or significant stenosis. No aneurysm. Negative CT angiogram of the head._______________________________________________EXAM:  CT Angiography Neck With Intravenous ContrastCLINICAL HISTORY:   Reason for exam: Stroke Symptoms. TECHNIQUE:  Routine carotid CT angiography protocol was performed with intravenous contrast.  NASCET criteria using the distal ICAs for comparison were used for evaluation of stenoses. MIP reconstructed images were created and reviewed. COMPARISON:  None. FINDINGS: VASCULATURE:  Right common carotid artery:  Unremarkable. No occlusion or significant stenosis. No dissection. Right internal carotid artery:  Unremarkable. Extracranial segment is patent with no occlusion or significant stenosis. No dissection. Right external carotid artery:  Unremarkable. No occlusion. Right vertebral artery:  Unremarkable. No occlusion or significant stenosis. No dissection. Left common carotid artery:  Unremarkable. No occlusion or significant stenosis. No dissection. Left internal carotid artery:  Unremarkable.   Extracranial segment is patent with no occlusion or significant stenosis.  No dissection.  Left external carotid artery:  Unremarkable.  No occlusion.  Left vertebral artery:  Unremarkable.  No occlusion or significant stenosis.  No dissection. NECK:  Bones/joints: Moderate disc degeneration at C5-6 with a critical spinal canal stenosis.Soft tissues:  Unremarkable.  Lung apices: Foot is concerned for bronchitis with pneumonitis. CAROTID STENOSIS REFERENCE USING NASCET CRITERIA:  % ICA stenosis = (1 - narrowest ICA diameter/diameter of distal cervical ICA) x 100.  Mild - <50% stenosis.  Moderate - 50-69% stenosis.  Severe - 70-94% stenosis.  Near occlusion - 95-99% stenosis.  Occluded - 100% stenosis.IMPRESSION:     Negative CT angiogram of the neck.Critical spinal canal stenosis at C5-6.  Recommend MRI of the cervical spine to evaluate for myelopathy.Communications:02/21/23 01:40 Call Doctor Regarding Stroke, called Dr. Trevino on 02/21 01:39 (-06:00)Electronically signed by Rosalie Aleman MD on 02-21-23 at 0135    2D echocardiogram (02/201/2023)   Summary   Structurally normal mitral valve with normal leaflet mobility. No evidence   of mitral valve stenosis or mild mitral regurgitation.   Aortic valve appears to be tricuspid.   Structurally normal aortic valve.   No significant aortic regurgitation or stenosis is noted.   Tricuspid valve is structurally normal.   No evidence of tricuspid regurgitation.   Normal left ventricular size with preserved LV function and an estimated   ejection fraction of approximately 55-60%.   No evidence of left ventricular mass or thrombus noted.   Normal left ventricular wall thickness.   No regional wall motion abnormalities.      Signature   ----------------------------------------------------------------   Electronically signed by Matt Hyatt MD(Interpreting   physician) on 02/21/2023 02:47 PM   ----------------------------------------------------------------        Hospital Course:   As per Initial admission HPI 2/21/2023,  quoted below;  \"Mr. Sudhir Freire is a pleasant and humerous 66year old  [place] gentleman from [place]. He has presented to the [facility] ED for \"altered mental status\" as per triage and trasnient dysarthria and right hand  weakness as per patient and ED RN team. He had last been well at a quarter to midnight. He has a history of a CVA before and several TIAs, he has slight weakness in the LUE and weakness in the LLE for which he uses a cane. He has a rather extensive history but this was all at outside hospitals. Will attempt to gather records. Trisha stated deficits from reported CVA he appears to have no functional deficits to my neuro exam and seems to have intact cognition. \"      Generalized weakness  Stuttering speech  TIA versus psychogenic etiology  MRI brain without contrast (01/21/2023): No acute or significant intracranial abnormality  CT Cervical Spine WO Con:  Impression: Cervical spondylosis with mild spinal canal narrowing at C5-C6 and C6-C7. Multilevel neural foraminal narrowing is also present. 2D echocardiogram as noted above  Followed by neurology  Further work-up and management, as per neurology. Emphasized smoking cessation  Continued aspirin, clopidogrel, and statin. Okay for discharge from neurology standpoint  Follow-up outpatient as needed    Continued management for other chronic medical conditions    Physical Exam:  Vital Signs: BP (!) 123/92   Pulse 78   Temp 97.5 °F (36.4 °C) (Temporal)   Resp 18   Ht 6' 1\" (1.854 m)   Wt 231 lb 2 oz (104.8 kg)   SpO2 93%   BMI 30.49 kg/m²   Physical Exam  Vitals and nursing note reviewed. Constitutional:       General: He is not in acute distress. Appearance: Normal appearance. He is obese. He is not ill-appearing, toxic-appearing or diaphoretic. HENT:      Head: Normocephalic and atraumatic. Right Ear: External ear normal.      Left Ear: External ear normal.      Nose: Nose normal. No congestion or rhinorrhea. Mouth/Throat:      Mouth: Mucous membranes are moist.      Pharynx: Oropharynx is clear. No oropharyngeal exudate or posterior oropharyngeal erythema. Eyes:      General: No scleral icterus. Right eye: No discharge. Left eye: No discharge. Extraocular Movements: Extraocular movements intact. Conjunctiva/sclera: Conjunctivae normal.      Pupils: Pupils are equal, round, and reactive to light. Cardiovascular:      Rate and Rhythm: Normal rate and regular rhythm. Pulses: Normal pulses. Heart sounds: Normal heart sounds. No murmur heard. No friction rub. No gallop. Pulmonary:      Effort: Pulmonary effort is normal. No respiratory distress. Breath sounds: Normal breath sounds. No stridor. No wheezing, rhonchi or rales. Chest:      Chest wall: No tenderness. Abdominal:      General: Bowel sounds are normal. There is no distension. Palpations: Abdomen is soft. Tenderness: There is no abdominal tenderness. There is no guarding or rebound. Musculoskeletal:         General: No swelling, tenderness, deformity or signs of injury. Normal range of motion. Cervical back: Normal range of motion and neck supple. No rigidity. No muscular tenderness. Right lower leg: No edema. Left lower leg: No edema. Skin:     General: Skin is warm and dry. Capillary Refill: Capillary refill takes less than 2 seconds. Coloration: Skin is not jaundiced or pale. Findings: No bruising, erythema, lesion or rash. Neurological:      General: No focal deficit present. Mental Status: He is alert and oriented to person, place, and time. Cranial Nerves: No cranial nerve deficit. Sensory: No sensory deficit. Motor: No weakness. Coordination: Coordination normal.   Psychiatric:         Mood and Affect: Mood normal.         Behavior: Behavior normal.         Thought Content:  Thought content normal.         Judgment: Judgment normal. Discharge Medications:        Medication List        CONTINUE taking these medications      amLODIPine 10 MG tablet  Commonly known as: NORVASC     aspirin 81 MG tablet     atorvastatin 40 MG tablet  Commonly known as: LIPITOR     butalbital-APAP-caffeine -40 MG Caps per capsule     clopidogrel 75 MG tablet  Commonly known as: PLAVIX     cyclobenzaprine 5 MG tablet  Commonly known as: FLEXERIL     escitalopram 20 MG tablet  Commonly known as: LEXAPRO     gabapentin 400 MG capsule  Commonly known as: Neurontin  Take 1 capsule by mouth 3 times daily for 90 days. Max Daily Amount: 1,200 mg     levothyroxine 25 MCG tablet  Commonly known as: SYNTHROID     melatonin 10 MG Caps capsule     nitroGLYCERIN 0.4 MG SL tablet  Commonly known as: Nitrostat  Place 1 tablet under the tongue every 5 minutes as needed for Chest pain up to max of 3 total doses. If no relief after 1 dose, call 911. STOP taking these medications      TYLENOL 8 HOUR PO                Discharge Instructions: Follow up with Nita Hernandez MD in 7 days. Take medications as directed. Resume activity as tolerated.       Recommended Follow Up:  Miguelangel Smith MD  [address] [facility] [phone]    Call  As needed    Nita Hernandez MD  [address] [facility] [address] [facility] [phone]    Go on 3/1/2023  Hospital Follow up @ 1:20 pm    Nita Hernandez MD  [address] [facility] [address] [facility]  [phone]          Followup Appointments Scheduled at Time of Discharge:  Future Appointments   Date Time Provider Arjun Renee   4/6/2023 11:20 AM SUZANNE Glass CNP [facility]   10/12/2023  1:15 PM SUZANNE Kee NP N [facility] Cardio [facility]          Diet: No diet orders on file        DISCHARGE STATUS:    Condition on Discharge: stable    Disposition: Patient is medically stable and will be discharged Home      Extended Emergency Contact Information  Primary Emergency Contact: [facility]  Address: [address]           [place], [address] [facility] of [address] [place] [address] Phone: [phone]  Mobile Phone: [phone]  Relation: Spouse  Preferred language: English   needed? No  Secondary Emergency Contact: cara gunn  [place] Phone: [phone]  Relation: Parent         Time Spent on discharge is   35 mins  in the examination, evaluation, counseling and review of medications and discharge plan. Electronically signed by   Sarah Patel MD, MD,   Internal Medicine Hospitalist   2/22/2023 5:55 PM      Thank you Tatum Vee MD for the opportunity to be involved in this patient's care. If you have any questions or concerns please feel free to contact me at [phone]        EMR Dragon/Transcription disclaimer:   Much of this encounter note is an electronic transcription/translation of spoken language to printed text.  The electronic translation of spoken language may permit erroneous, or at times, nonsensical words or phrases to be inadvertently transcribed; although attempts have made to review the note for such errors, some may still exist.

## 2023-02-23 ENCOUNTER — CARE COORDINATION (OUTPATIENT)
Dept: CASE MANAGEMENT | Age: 58
End: 2023-02-23

## 2023-02-23 NOTE — CARE COORDINATION
[address] Care Transitions Initial Follow Up Call    Call within 2 business days of discharge: Yes    Patient Current Location:  [facility] Transition Nurse contacted the patient by telephone to perform post hospital discharge assessment. Verified name and  with patient as identifiers. Provided introduction to self, and explanation of the Care Transition Nurse role. Patient: Becka Rodriguez Patient : 1965   MRN: [id number]  Reason for Admission: TIA  Discharge Date: 23 RARS: No data recorded    Last Discharge  Street       Date Complaint Diagnosis Description Type Department Provider    23 Altered Mental Status Stroke-like symptoms ED to Hosp-Admission (Discharged) (ADMITTED) [facility] Kwasi Browne MD; [facility]. .. Was this an external facility discharge? No     Challenges to be reviewed by the provider   Additional needs identified to be addressed with provider: No  none               Method of communication with provider: none. Care Transition Nurse reviewed discharge instructions, medical action plan, and red flags with patient who verbalized understanding. The patient was given an opportunity to ask questions and does not have any further questions or concerns at this time. Were discharge instructions available to patient? Yes. Reviewed appropriate site of care based on symptoms and resources available to patient including:   PCP  Specialist  Urgent care clinics  When to call [address] The patient agrees to contact the PCP office for questions related to their healthcare. Advance Care Planning:   Does patient have an Advance Directive: not on file; education provided. Advance Care Planning   Healthcare Decision Maker:  Patient does not have a current health care decision maker listed and is not able to name one at this time.       Medication reconciliation was performed with patient, who verbalizes understanding of administration of home medications. Medications reviewed, 1111F entered: N/A    Was patient discharged with a pulse oximeter? no    Non-face-to-face services provided:  Reviewed encounter information for continuity of care prior to follow up Care Transitions Coordination phone call - chart notes, consults, progress notes, test results, med list, appointments, AVS, other information. Offered patient enrollment in the Remote Patient Monitoring (RPM) program for in-home monitoring: NA.    Care Transitions 24 Hour Call    Do you have a copy of your discharge instructions?: Yes  Do you have all of your prescriptions and are they filled?: Yes  Have you been contacted by a [address]?: No  Have you scheduled your follow up appointment?: Yes  How are you going to get to your appointment?: Car - drive self  Do you feel like you have everything you need to keep you well at home?: Yes  Care Transitions Interventions         Discussed follow-up appointments. Follow Up  Future Appointments   Date Time Provider Arjun Renee   4/6/2023 11:20 AM SUZANNE Freitas CNP [facility]   10/12/2023  1:15 PM SUZANNE Medina NP N [facility] Cardio [facility]     PCP: Dr. Tano Santiago, 3/1/2023, 1:20 pm    Placed a call to the patient for initial contact following discharge from the hospital 2/22/2023. He reported that he is doing fine this morning. He denied any of the symptoms that brought him into the hospital.  He denied any residual effects from what occurred. He said he slept well last night. He has been up for awhile this morning, has had a good breakfast and is doing ok. He said he is getting around ok and is planning to be \"at work\" this afternoon at 1 pm.  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 He said he is aware of his hospital follow up appointment with his PCP.   He has all of his medications and is taking them as ordered, no new ones ordered upon discharge. He is not aware of any other needs at this time. Informed/reminded of Care Transitions Coordination process, purpose, future calls, etc and is agreeable with appropriate follow up. Ensured to have CTN contact information to be used as needed. Encouraged to call for new/ongoing issues, questions, concerns, etc.  Encouraged to make contact with PCP as indicated for any further needs as well. Given the opportunity to ask questions and answered appropriately.   CTN to follow up as indicated in a few days for assessment of new/ongoing symptoms, management of self care needs in the home environment, teaching needs as indicated, etc.    Judge Mckeon, RN   Care Transitions Nurse  [phone]    Judge Mckeon RN

## 2023-02-26 LAB
BLOOD CULTURE, ROUTINE: NORMAL
CULTURE, BLOOD 2: NORMAL

## 2023-03-02 ENCOUNTER — CARE COORDINATION (OUTPATIENT)
Dept: CASE MANAGEMENT | Age: 58
End: 2023-03-02

## 2023-03-02 NOTE — CARE COORDINATION
[facility] Care Transitions Follow Up Call    Patient Current Location:  [facility] Transition Nurse contacted the patient by telephone to follow up. Patient: Rey Schmitz  Patient : 1965   MRN: [id number]  Discharge Date: 23 RARS: No data recorded    Needs to be reviewed by the provider   Additional needs identified to be addressed with provider: No  none       Method of communication with provider: none. Addressed changes since last contact: Improving, stable. Discussed follow-up appointments. Has seen PCP. Follow Up  Future Appointments   Date Time Provider Arjun Renee   2023 11:20 AM SUZANNE Rao CNP [facility]   10/12/2023  1:15 PM SUZANNE Lerma NP [facility]       Care Transitions Subsequent and Final Call    Subsequent and Final Calls  Do you have any ongoing symptoms?: No  Have your medications changed?: No  Do you have any questions related to your medications?: No  Do you currently have any active services?: No  Do you have any needs or concerns that I can assist you with?: No  Identified Barriers: None  Care Transitions Interventions  Other Interventions:         Placed a call to the patient for ongoing follow up. He reported that he is doing great. He said that he went to see his PCP yesterday. Oliva Adams that went very well. He said that he is not having any residual effects of this incident. His speech is clear, no swallowing issues. No gait or strength issues. He is getting around well. 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 He is taking all of his meds as ordered. He is eating well, drinking well. No issues reported. Does not foresee a need for any further calls. CTN will sign off. Patient to call PCP and others as indicated.      Isabella Munoz RN CTN  [phone]

## 2023-03-20 ENCOUNTER — TELEPHONE (OUTPATIENT)
Dept: NEUROLOGY | Age: 58
End: 2023-03-20

## 2023-03-20 NOTE — TELEPHONE ENCOUNTER
Called and spoke with patient about getting him scheduled an appointment with Dr. Jessica Green. Patient is aware of the appointment time/date.

## 2023-04-06 ENCOUNTER — HOSPITAL ENCOUNTER (OUTPATIENT)
Dept: PAIN MANAGEMENT | Age: 58
Discharge: HOME | End: 2023-04-06
Payer: MEDICARE

## 2023-04-06 VITALS
HEIGHT: 73 IN | HEART RATE: 93 BPM | RESPIRATION RATE: 16 BRPM | WEIGHT: 240 LBS | OXYGEN SATURATION: 95 % | BODY MASS INDEX: 31.81 KG/M2 | TEMPERATURE: 97.6 F | SYSTOLIC BLOOD PRESSURE: 128 MMHG | DIASTOLIC BLOOD PRESSURE: 85 MMHG

## 2023-04-06 PROCEDURE — 99213 OFFICE O/P EST LOW 20 MIN: CPT

## 2023-04-06 PROCEDURE — 99213 OFFICE O/P EST LOW 20 MIN: CPT | Performed by: NURSE PRACTITIONER

## 2023-04-06 ASSESSMENT — PAIN DESCRIPTION - LOCATION: LOCATION: BACK

## 2023-04-06 ASSESSMENT — ENCOUNTER SYMPTOMS
BOWEL INCONTINENCE: 0
BACK PAIN: 1
CONSTIPATION: 0

## 2023-04-06 ASSESSMENT — PAIN DESCRIPTION - ORIENTATION: ORIENTATION: LOWER

## 2023-04-06 ASSESSMENT — PAIN DESCRIPTION - PAIN TYPE: TYPE: CHRONIC PAIN

## 2023-04-06 ASSESSMENT — PAIN SCALES - GENERAL: PAINLEVEL_OUTOF10: 7

## 2023-04-06 NOTE — PROGRESS NOTES
Clinic Documentation      Education Provided:  [x] Review of Farrah Britton  [] Agreement Review  [x] PEG Score Calculated [] PHQ Score Calculated [] ORT Score Calculated    [] Compliance Issues Discussed [] Cognitive Behavior Needs [x] Exercise [] Review of Test [] Financial Issues  [x] Tobacco/Alcohol Use Reviewed [x] Teaching [] New Patient [] Picture Obtained    Physician Plan:  [] Outgoing Referral  [] Pharmacy Consult  [] Test Ordered [x] Prescription Ordered/Changed   [] Obtained Test Results / Consult Notes        Complete if patient is withholding blood thinner for procedure     Blood Thinner Patient is currently taking:      [x] Plavix (Hold for 7 days)  [x] Aspirin (Hold for 5 days)     [] Pletal (Hold for 2 days)  [] Pradaxa (Hold for 3 days)    [] Effient (Hold for 7 days)  [] Xarelto (Hold for 2 days)    [] Eliquis (Hold for 2 days)  [] Brilinta (Hold for 7 days)    [] Coumadin (Hold for 5 days) - (INR needs to be drawn the day prior to procedure- INR < 2.0)    [] Aggrenox (Hold for 7 days)        [] Patient will stop medication on their own. [x] Blood Thinner Form Faxed for approval to hold.    Provider form faxed to: Dr Nasima Campa Completed by:  Electronically signed by Nirav Gomez RN on 4/6/2023 at 11:43 AM
diversion were identified  [] Signs of potential drug abuse or diversion were identified   [] ORT Score   [] UDS non-compliant   [] See Note  [] Random urine drug screen sent today  [x] Random urine drug screen not completed today   [] Deferred New Patient  [x] Compliant 1/17/2023  [x] Not Compliant see note  [] Medication agreement with provider signed today  [x] Medication agreement with provider on file under media 1/17/2023  [] Medication regimen effective with no c/o side effects and continued   [] New patient continuing current medication while developing POC   [x] On going assessment and evaluation of medication regimen  [] Medication regimen not effective see plan for changes  [x] Bogdan Garcia reviewed & on file under media     CC:  MD Sonam Haney, SUZANNE - CNP, 4/6/2023 at 11:38 AM    EMR dragon/transcription disclaimer: Much of this encounter note is electronic transcription/translation of spoken language to printed tach. Electronic translation of spoken language may be erroneous, or at times, nonsensical words or phrases may be inadvertently transcribed.  Although, I have reviewed the note for such errors, some may still exist.

## 2023-04-12 DIAGNOSIS — M54.41 CHRONIC RIGHT-SIDED LOW BACK PAIN WITH RIGHT-SIDED SCIATICA: Primary | ICD-10-CM

## 2023-04-12 DIAGNOSIS — G89.29 CHRONIC RIGHT-SIDED LOW BACK PAIN WITH RIGHT-SIDED SCIATICA: Primary | ICD-10-CM

## 2023-04-17 RX ORDER — METHYLPREDNISOLONE 4 MG/1
TABLET ORAL
Qty: 1 KIT | Refills: 0 | Status: SHIPPED | OUTPATIENT
Start: 2023-04-17 | End: 2023-04-18

## 2023-05-03 ENCOUNTER — TELEPHONE (OUTPATIENT)
Dept: PAIN MANAGEMENT | Age: 58
End: 2023-05-03

## 2023-05-03 NOTE — TELEPHONE ENCOUNTER
Call placed to patient to remind him to hold asa and plavix x5 days starting tomorrow for LESI next Tuesday. Patient states understanding. Medication hold approved by Dr Og Hodges.

## 2023-05-04 ENCOUNTER — OFFICE VISIT (OUTPATIENT)
Dept: NEUROLOGY | Age: 58
End: 2023-05-04
Payer: MEDICARE

## 2023-05-04 VITALS
HEART RATE: 88 BPM | BODY MASS INDEX: 31.81 KG/M2 | WEIGHT: 240 LBS | HEIGHT: 73 IN | SYSTOLIC BLOOD PRESSURE: 134 MMHG | DIASTOLIC BLOOD PRESSURE: 88 MMHG

## 2023-05-04 DIAGNOSIS — G45.9 TIA (TRANSIENT ISCHEMIC ATTACK): ICD-10-CM

## 2023-05-04 DIAGNOSIS — Z86.73 HISTORY OF TIA (TRANSIENT ISCHEMIC ATTACK): Primary | ICD-10-CM

## 2023-05-04 DIAGNOSIS — I10 ESSENTIAL HYPERTENSION: ICD-10-CM

## 2023-05-04 DIAGNOSIS — R53.1 WEAKNESS: ICD-10-CM

## 2023-05-04 DIAGNOSIS — Z78.9 ALCOHOL USE: ICD-10-CM

## 2023-05-04 DIAGNOSIS — Z86.73 HISTORY OF STROKE: ICD-10-CM

## 2023-05-04 PROCEDURE — 3079F DIAST BP 80-89 MM HG: CPT | Performed by: PSYCHIATRY & NEUROLOGY

## 2023-05-04 PROCEDURE — G8427 DOCREV CUR MEDS BY ELIG CLIN: HCPCS | Performed by: PSYCHIATRY & NEUROLOGY

## 2023-05-04 PROCEDURE — G8417 CALC BMI ABV UP PARAM F/U: HCPCS | Performed by: PSYCHIATRY & NEUROLOGY

## 2023-05-04 PROCEDURE — 3075F SYST BP GE 130 - 139MM HG: CPT | Performed by: PSYCHIATRY & NEUROLOGY

## 2023-05-04 PROCEDURE — 99214 OFFICE O/P EST MOD 30 MIN: CPT | Performed by: PSYCHIATRY & NEUROLOGY

## 2023-05-04 PROCEDURE — 1036F TOBACCO NON-USER: CPT | Performed by: PSYCHIATRY & NEUROLOGY

## 2023-05-04 PROCEDURE — 3017F COLORECTAL CA SCREEN DOC REV: CPT | Performed by: PSYCHIATRY & NEUROLOGY

## 2023-05-04 NOTE — PROGRESS NOTES
Chief Complaint   Patient presents with    New Patient     Referred by Dr. Preston Mullins but pt was seen in the hospital in Feb for TIA        Gil Rodriguez is a 66y.o. year old male with a past medical history significant for multiple TIA's/stroke, HTN, smoker, alcohol use was seen hospital in February 2023 for evaluation of sudden onset generalized weakness and difficulty speaking. The patient was in bed watching TV and noted his head \"bobbing around\". He got up to walk and was stumbling. His ex wife noted him not in bed and noted home bouncing around and brought him in ER. As per ER physician and nursing speech was stuttering and somewhat slowed but he was joking and jovial. He had bilateral  weakness and was unable to lift his legs against gravity. By the time I arrived at bedside he indicates he was back to normal. Has some residual chronic weakness on the left from previous strokes which always affect his left side according to him. He denied any stressors but his wife indicates she was planning on checking herself into mental health. The patient indicated he was just in alcohol rehab in December and was doing better. 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 Psychogenic etiology was suspected. His MRI of the brain was unremarkable along with a CTA of the head and neck and 2D echocardiogram. No new issues since.      Active Ambulatory Problems     Diagnosis Date Noted    Other chest pain 04/23/2020    Right carpal tunnel syndrome 10/22/2020    Left carpal tunnel syndrome 12/03/2020    Stroke-like symptoms 02/22/2021    Chronic cerebrovascular accident (CVA) 02/22/2021    Late effect of cerebrovascular accident (CVA) 02/22/2021    Alcohol use with withdrawal ([facility] [address] 02/22/2021    Tobacco abuse counseling 02/22/2021    Cigarette nicotine dependence with nicotine-induced disorder 02/22/2021    Personal history of noncompliance with medical treatment and regimen 02/22/2021    Lumbar radiculopathy 06/17/2021

## 2023-05-09 ENCOUNTER — HOSPITAL ENCOUNTER (OUTPATIENT)
Dept: PAIN MANAGEMENT | Age: 58
Discharge: HOME | End: 2023-05-09
Payer: MEDICARE

## 2023-05-09 VITALS
TEMPERATURE: 97.2 F | RESPIRATION RATE: 18 BRPM | HEART RATE: 89 BPM | DIASTOLIC BLOOD PRESSURE: 87 MMHG | SYSTOLIC BLOOD PRESSURE: 134 MMHG | OXYGEN SATURATION: 98 %

## 2023-05-09 DIAGNOSIS — R52 PAIN MANAGEMENT: ICD-10-CM

## 2023-05-09 PROCEDURE — 6360000002 HC RX W HCPCS

## 2023-05-09 PROCEDURE — 2580000003 HC RX 258

## 2023-05-09 PROCEDURE — A4216 STERILE WATER/SALINE, 10 ML: HCPCS

## 2023-05-09 PROCEDURE — 2500000003 HC RX 250 WO HCPCS

## 2023-05-09 PROCEDURE — 62323 NJX INTERLAMINAR LMBR/SAC: CPT

## 2023-05-09 RX ORDER — LIDOCAINE HYDROCHLORIDE 10 MG/ML
5 INJECTION, SOLUTION EPIDURAL; INFILTRATION; INTRACAUDAL; PERINEURAL ONCE
Status: DISCONTINUED | OUTPATIENT
Start: 2023-05-09 | End: 2023-05-11 | Stop reason: HOSPADM

## 2023-05-09 RX ORDER — SODIUM CHLORIDE 9 MG/ML
5 INJECTION INTRAVENOUS ONCE
Status: DISCONTINUED | OUTPATIENT
Start: 2023-05-09 | End: 2023-05-11 | Stop reason: HOSPADM

## 2023-05-09 RX ORDER — METHYLPREDNISOLONE ACETATE 80 MG/ML
80 INJECTION, SUSPENSION INTRA-ARTICULAR; INTRALESIONAL; INTRAMUSCULAR; SOFT TISSUE ONCE
Status: DISCONTINUED | OUTPATIENT
Start: 2023-05-09 | End: 2023-05-11 | Stop reason: HOSPADM

## 2023-05-09 ASSESSMENT — PAIN DESCRIPTION - DESCRIPTORS: DESCRIPTORS: ACHING;BURNING

## 2023-05-09 ASSESSMENT — PAIN - FUNCTIONAL ASSESSMENT
PAIN_FUNCTIONAL_ASSESSMENT: PREVENTS OR INTERFERES SOME ACTIVE ACTIVITIES AND ADLS
PAIN_FUNCTIONAL_ASSESSMENT: NONE - DENIES PAIN

## 2023-05-09 NOTE — INTERVAL H&P NOTE
Update History & Physical    The patient's History and Physical  was reviewed with the patient and I examined the patient. There was no change. The surgical site was confirmed by the patient and me. Plan: The risks, benefits, expected outcome, and alternative to the recommended procedure have been discussed with the patient. Patient understands and wants to proceed with the procedure.      Electronically signed by Corinne Calvillo MD on 5/9/2023 at 11:21 AM

## 2023-05-17 ENCOUNTER — HOSPITAL ENCOUNTER (OUTPATIENT)
Dept: PAIN MANAGEMENT | Age: 58
Discharge: HOME | End: 2023-05-17
Payer: MEDICARE

## 2023-05-17 VITALS
DIASTOLIC BLOOD PRESSURE: 88 MMHG | SYSTOLIC BLOOD PRESSURE: 133 MMHG | TEMPERATURE: 97.5 F | OXYGEN SATURATION: 97 % | HEART RATE: 95 BPM | RESPIRATION RATE: 18 BRPM

## 2023-05-17 PROCEDURE — 2500000003 HC RX 250 WO HCPCS

## 2023-05-17 PROCEDURE — 20553 NJX 1/MLT TRIGGER POINTS 3/>: CPT

## 2023-05-17 RX ORDER — BUPIVACAINE HYDROCHLORIDE 5 MG/ML
15 INJECTION, SOLUTION EPIDURAL; INTRACAUDAL ONCE
Status: DISCONTINUED | OUTPATIENT
Start: 2023-05-17 | End: 2023-05-19 | Stop reason: HOSPADM

## 2023-05-17 RX ORDER — LIDOCAINE HYDROCHLORIDE 10 MG/ML
15 INJECTION, SOLUTION EPIDURAL; INFILTRATION; INTRACAUDAL; PERINEURAL ONCE
Status: DISCONTINUED | OUTPATIENT
Start: 2023-05-17 | End: 2023-05-19 | Stop reason: HOSPADM

## 2023-05-17 NOTE — PROCEDURES
[facility] Physical & Pain Medicine    Patient Name: Armida Fritz    : 1965                    Age: 66 y.o. Sex: male    Date: May 17, 2023    Pre-op Diagnosis: Myofascial Pain/ Muscle Spasms    Post-op Diagnosis: Myofascial Pain/ Muscle Spasms    Procedure: Thoracic Trigger Point Injections    Performing Procedure: MONTY Flaherty - BC, VA-BC    Patient Vitals for the past 24 hrs:   BP Temp Temp src Pulse Resp SpO2   23 0753 133/88 97.5 °F (36.4 °C) Temporal 95 18 97 %       Description of Procedure:  After a brief physical assessment and failure to improve with conservative measures the patient presented for Thoracic Trigger Point Injections The indications, limitations and possible complications were discussed with the patient and the patient elected to proceed with the procedure. After voluntary, informed and signed consent obtained the patient was placed in a seated position. Appropriate time out was obtained per policy. The areas were then prepped in a sterile fashion with Chloro-Prep. The area of maximal tenderness was palpated over the bilaterally Thoracic Muscles - Erector Spinae, Upper/Mid Latissimus, Rhomboid Minor, Rhomboid Major. The skin overlying these areas was marked with a skin marker. The areas were prepped using aseptic technique with CHG prep. The skin overlying the proposed injection sites were then sprayed with Gebauer's Solution while protecting patient eyes.  Each trigger point of the Thoracic Muscles - Erector Spinae, Upper/Mid Latissimus, Rhomboid Minor, Rhomboid Major was injected after negative aspiration was injected with approximately 1-2 ml of a solution of 5 ml of 1% Lidocaine Plain and 5 ml of 0.5% Marcaine Plain after negative aspiration    Pre-op Diagnosis: Myofascial Pain/ Muscle Spasms    Post-op Diagnosis: Myofascial Pain/ Muscle Spasms    Procedure: Lumbar Trigger Point Injections    Performing Procedure: MONTY Flaherty

## 2023-05-17 NOTE — PROGRESS NOTES
Procedure:  Level of Consciousness: [x]Alert [x]Oriented []Disoriented []Lethargic  Anxiety Level: [x]Calm []Anxious []Depressed []Other  Skin: [x]Warm [x]Dry []Cool []Moist []Intact []Other  Cardiovascular: [x]Palpitations: [x]Never []Occasionally []Frequently  Chest Pain: [x]No []Yes  Respiratory:  [x]Unlabored []Labored []Cough ([] Productive []Unproductive)  HCG Required: [x]No []Yes   Results: []Negative []Positive  Knowledge Level:        [x]Patient/Other verbalized understanding of pre-procedure instructions. [x]Assessment of post-op care needs (transportation, responsible caregiver)        [x]Able to discuss health care problems and how to deal with it.   Factors that Affect Teaching:        Language Barrier: [x]No []Yes - why:        Hearing Loss:        [x]No []Yes            Corrective Device:  []Yes[x] No        Vision Loss:           [x]No []Yes            Corrective Device:  []Yes [x]No        Memory Loss:       [x]No []Yes            []Short Term []Long Term  Motivational Level:  [x]Asks Questions                  []Extremely Anxious       [x]Seems Interested               []Seems Uninterested                  []Denies need for Education  Risk for Injury:  [x]Patient oriented to person, place and time  []History of frequent falls/loss of balance  Nutritional:  []Change in appetite   []Weight Gain   []Weight Loss  Functional:  []Requires assistance with ADL's

## 2023-05-17 NOTE — DISCHARGE INSTRUCTIONS
will be numb for a few hours after the procedure    [] I understand if a steroid was used it will take effect in 3 - 7 days. I understand that as the numbing medication wears off, the pain may return until the steroids start working. [x] I understand that today I will rest for the next 24 hours and drink plenty of water. [] For Botox for Migraines please do not wear anything constricting around the forehead for 7-10 days post injection. Examples headband, hats, or bandana    [] For Botox for Spasticity start therapy for the affected limb in two weeks. [] Additional instructions: ______________________________________________ ___________________________________________________________________    Sedation:  Was oral sedation given? [] Yes  [x] No    I understand that if I took an oral nerve calming medication I will not drive for  [] 24 hours after taking the medication.     [x] I have received a copy of my discharge instructions and understand the above instructions to the best of my knowledge    Patient Discharged:  [x] Home  [] Hospital  [] Other  ____________________________________________    Via:  [x] Ambulatory  [] Wheelchair   [] Stretcher [] EMS       Accompanied By:   [] Significant other  [] Family Member  [] Friend   [] Hospital Staff  []  Ambulance Staff  [] Other_______________________________________________    Plan:  [x] Will return to the office in   [] 1 month   [] 3 months for:  [] Procedure Follow-up  [] Office Visit   [] Planned Procedure      Patient Signature: _____________________________________________________    Staff Signature: _______________________________________________________        If you have questions, problems or concerns you may call [phone] and follow the prompts    MONTY Garcia, VA-BC

## 2023-05-23 ENCOUNTER — TELEPHONE (OUTPATIENT)
Dept: PAIN MANAGEMENT | Age: 58
End: 2023-05-23

## 2023-05-30 DIAGNOSIS — M54.41 CHRONIC RIGHT-SIDED LOW BACK PAIN WITH RIGHT-SIDED SCIATICA: ICD-10-CM

## 2023-05-30 DIAGNOSIS — M54.16 LUMBAR RADICULOPATHY: ICD-10-CM

## 2023-05-30 DIAGNOSIS — G89.29 CHRONIC RIGHT-SIDED LOW BACK PAIN WITH RIGHT-SIDED SCIATICA: ICD-10-CM

## 2023-05-30 RX ORDER — GABAPENTIN 400 MG/1
400 CAPSULE ORAL 3 TIMES DAILY
Qty: 90 CAPSULE | Refills: 2 | Status: SHIPPED | OUTPATIENT
Start: 2023-05-30 | End: 2023-08-28

## 2023-07-03 ENCOUNTER — HOSPITAL ENCOUNTER (INPATIENT)
Age: 58
LOS: 3 days | Discharge: HOME | DRG: 881 | End: 2023-07-07
Attending: PEDIATRICS | Admitting: PSYCHIATRY & NEUROLOGY
Payer: MEDICARE

## 2023-07-03 DIAGNOSIS — R45.851 DEPRESSION WITH SUICIDAL IDEATION: Primary | ICD-10-CM

## 2023-07-03 DIAGNOSIS — F32.A DEPRESSION WITH SUICIDAL IDEATION: Primary | ICD-10-CM

## 2023-07-03 LAB
ALBUMIN SERPL-MCNC: 4.7 G/DL (ref 3.5–5.2)
ALP SERPL-CCNC: 86 U/L (ref 40–130)
ALT SERPL-CCNC: 18 U/L (ref 5–41)
AMPHET UR QL SCN: NEGATIVE
ANION GAP SERPL CALCULATED.3IONS-SCNC: 16 MMOL/L (ref 7–19)
AST SERPL-CCNC: 15 U/L (ref 5–40)
BARBITURATES UR QL SCN: NEGATIVE
BASOPHILS # BLD: 0 K/UL (ref 0–0.2)
BASOPHILS NFR BLD: 0.4 % (ref 0–1)
BENZODIAZ UR QL SCN: NEGATIVE
BILIRUB SERPL-MCNC: 0.5 MG/DL (ref 0.2–1.2)
BILIRUB UR QL STRIP: NEGATIVE
BUN SERPL-MCNC: 16 MG/DL (ref 6–20)
BUPRENORPHINE URINE: NEGATIVE
CALCIUM SERPL-MCNC: 9.1 MG/DL (ref 8.6–10)
CANNABINOIDS UR QL SCN: NEGATIVE
CHLORIDE SERPL-SCNC: 102 MMOL/L (ref 98–111)
CLARITY UR: CLEAR
CO2 SERPL-SCNC: 21 MMOL/L (ref 22–29)
COCAINE UR QL SCN: NEGATIVE
COLOR UR: YELLOW
CREAT SERPL-MCNC: 0.9 MG/DL (ref 0.5–1.2)
DRUG SCREEN COMMENT UR-IMP: NORMAL
EOSINOPHIL # BLD: 0 K/UL (ref 0–0.6)
EOSINOPHIL NFR BLD: 0.2 % (ref 0–5)
ERYTHROCYTE [DISTWIDTH] IN BLOOD BY AUTOMATED COUNT: 13.2 % (ref 11.5–14.5)
ETHANOLAMINE SERPL-MCNC: 187 MG/DL (ref 0–0.08)
GLUCOSE SERPL-MCNC: 139 MG/DL (ref 74–109)
GLUCOSE UR STRIP.AUTO-MCNC: NEGATIVE MG/DL
HCT VFR BLD AUTO: 46.6 % (ref 42–52)
HGB BLD-MCNC: 16.2 G/DL (ref 14–18)
HGB UR STRIP.AUTO-MCNC: NEGATIVE MG/L
IMM GRANULOCYTES # BLD: 0 K/UL
KETONES UR STRIP.AUTO-MCNC: NEGATIVE MG/DL
LEUKOCYTE ESTERASE UR QL STRIP.AUTO: NEGATIVE
LYMPHOCYTES # BLD: 2.5 K/UL (ref 1.1–4.5)
LYMPHOCYTES NFR BLD: 22.5 % (ref 20–40)
MCH RBC QN AUTO: 34.1 PG (ref 27–31)
MCHC RBC AUTO-ENTMCNC: 34.8 G/DL (ref 33–37)
MCV RBC AUTO: 98.1 FL (ref 80–94)
METHADONE UR QL SCN: NEGATIVE
METHAMPHETAMINE, URINE: NEGATIVE
MONOCYTES # BLD: 0.5 K/UL (ref 0–0.9)
MONOCYTES NFR BLD: 4.1 % (ref 0–10)
NEUTROPHILS # BLD: 7.9 K/UL (ref 1.5–7.5)
NEUTS SEG NFR BLD: 72.5 % (ref 50–65)
NITRITE UR QL STRIP.AUTO: NEGATIVE
OPIATES UR QL SCN: NEGATIVE
OXYCODONE UR QL SCN: NEGATIVE
PCP UR QL SCN: NEGATIVE
PH UR STRIP.AUTO: 5.5 [PH] (ref 5–8)
PLATELET # BLD AUTO: 233 K/UL (ref 130–400)
PMV BLD AUTO: 8 FL (ref 9.4–12.4)
POTASSIUM SERPL-SCNC: 3.9 MMOL/L (ref 3.5–5)
PROPOXYPH UR QL SCN: NEGATIVE
PROT SERPL-MCNC: 7.3 G/DL (ref 6.6–8.7)
PROT UR STRIP.AUTO-MCNC: NEGATIVE MG/DL
RBC # BLD AUTO: 4.75 M/UL (ref 4.7–6.1)
SODIUM SERPL-SCNC: 139 MMOL/L (ref 136–145)
SP GR UR STRIP.AUTO: 1 (ref 1–1.03)
TRICYCLIC, URINE: NEGATIVE
UROBILINOGEN UR STRIP.AUTO-MCNC: 0.2 E.U./DL
WBC # BLD AUTO: 10.9 K/UL (ref 4.8–10.8)

## 2023-07-03 PROCEDURE — 99285 EMERGENCY DEPT VISIT HI MDM: CPT

## 2023-07-03 PROCEDURE — 36415 COLL VENOUS BLD VENIPUNCTURE: CPT

## 2023-07-03 PROCEDURE — 85025 COMPLETE CBC W/AUTO DIFF WBC: CPT

## 2023-07-03 PROCEDURE — 81003 URINALYSIS AUTO W/O SCOPE: CPT

## 2023-07-03 PROCEDURE — 82077 ASSAY SPEC XCP UR&BREATH IA: CPT

## 2023-07-03 PROCEDURE — 80306 DRUG TEST PRSMV INSTRMNT: CPT

## 2023-07-03 PROCEDURE — 80053 COMPREHEN METABOLIC PANEL: CPT

## 2023-07-04 PROBLEM — R45.851 DEPRESSION WITH SUICIDAL IDEATION: Status: ACTIVE | Noted: 2023-07-04

## 2023-07-04 PROBLEM — F32.A DEPRESSION WITH SUICIDAL IDEATION: Status: ACTIVE | Noted: 2023-07-04

## 2023-07-04 PROBLEM — F32.A DEPRESSION, UNSPECIFIED: Status: ACTIVE | Noted: 2023-07-04

## 2023-07-04 LAB — ETHANOLAMINE SERPL-MCNC: 28 MG/DL (ref 0–0.08)

## 2023-07-04 PROCEDURE — 36415 COLL VENOUS BLD VENIPUNCTURE: CPT

## 2023-07-04 PROCEDURE — 6370000000 HC RX 637 (ALT 250 FOR IP): Performed by: PSYCHIATRY & NEUROLOGY

## 2023-07-04 PROCEDURE — 82077 ASSAY SPEC XCP UR&BREATH IA: CPT

## 2023-07-04 PROCEDURE — 6370000000 HC RX 637 (ALT 250 FOR IP): Performed by: FAMILY MEDICINE

## 2023-07-04 PROCEDURE — 1240000000 HC EMOTIONAL WELLNESS R&B

## 2023-07-04 RX ORDER — CLOPIDOGREL BISULFATE 75 MG/1
75 TABLET ORAL DAILY
Status: DISCONTINUED | OUTPATIENT
Start: 2023-07-04 | End: 2023-07-07 | Stop reason: HOSPADM

## 2023-07-04 RX ORDER — POLYETHYLENE GLYCOL 3350 17 G/17G
17 POWDER, FOR SOLUTION ORAL DAILY PRN
Status: DISCONTINUED | OUTPATIENT
Start: 2023-07-04 | End: 2023-07-07 | Stop reason: HOSPADM

## 2023-07-04 RX ORDER — HYDROXYZINE HYDROCHLORIDE 25 MG/1
25 TABLET, FILM COATED ORAL 3 TIMES DAILY PRN
Status: DISCONTINUED | OUTPATIENT
Start: 2023-07-04 | End: 2023-07-07 | Stop reason: HOSPADM

## 2023-07-04 RX ORDER — AMLODIPINE BESYLATE 10 MG/1
10 TABLET ORAL DAILY
Status: DISCONTINUED | OUTPATIENT
Start: 2023-07-04 | End: 2023-07-07 | Stop reason: HOSPADM

## 2023-07-04 RX ORDER — ESCITALOPRAM OXALATE 10 MG/1
20 TABLET ORAL DAILY
Status: DISCONTINUED | OUTPATIENT
Start: 2023-07-04 | End: 2023-07-07 | Stop reason: HOSPADM

## 2023-07-04 RX ORDER — GABAPENTIN 400 MG/1
400 CAPSULE ORAL 3 TIMES DAILY
Status: DISCONTINUED | OUTPATIENT
Start: 2023-07-04 | End: 2023-07-07 | Stop reason: HOSPADM

## 2023-07-04 RX ORDER — ACETAMINOPHEN 325 MG/1
650 TABLET ORAL EVERY 4 HOURS PRN
Status: DISCONTINUED | OUTPATIENT
Start: 2023-07-04 | End: 2023-07-07 | Stop reason: HOSPADM

## 2023-07-04 RX ORDER — ASPIRIN 81 MG/1
81 TABLET ORAL DAILY
Status: DISCONTINUED | OUTPATIENT
Start: 2023-07-04 | End: 2023-07-07 | Stop reason: HOSPADM

## 2023-07-04 RX ORDER — GAUZE BANDAGE 2" X 2"
100 BANDAGE TOPICAL DAILY
Status: DISCONTINUED | OUTPATIENT
Start: 2023-07-04 | End: 2023-07-07 | Stop reason: HOSPADM

## 2023-07-04 RX ORDER — BUPROPION HYDROCHLORIDE 150 MG/1
150 TABLET ORAL DAILY
Status: DISCONTINUED | OUTPATIENT
Start: 2023-07-04 | End: 2023-07-07 | Stop reason: HOSPADM

## 2023-07-04 RX ORDER — CYCLOBENZAPRINE HCL 10 MG
5 TABLET ORAL 2 TIMES DAILY PRN
Status: DISCONTINUED | OUTPATIENT
Start: 2023-07-04 | End: 2023-07-07 | Stop reason: HOSPADM

## 2023-07-04 RX ORDER — LEVOTHYROXINE SODIUM 0.03 MG/1
25 TABLET ORAL DAILY
Status: DISCONTINUED | OUTPATIENT
Start: 2023-07-04 | End: 2023-07-07 | Stop reason: HOSPADM

## 2023-07-04 RX ORDER — NICOTINE 21 MG/24HR
1 PATCH, TRANSDERMAL 24 HOURS TRANSDERMAL DAILY
Status: DISCONTINUED | OUTPATIENT
Start: 2023-07-04 | End: 2023-07-07 | Stop reason: HOSPADM

## 2023-07-04 RX ORDER — GABAPENTIN 400 MG/1
400 CAPSULE ORAL DAILY
Status: DISCONTINUED | OUTPATIENT
Start: 2023-07-05 | End: 2023-07-04

## 2023-07-04 RX ORDER — TRAZODONE HYDROCHLORIDE 50 MG/1
50 TABLET ORAL NIGHTLY
Status: DISCONTINUED | OUTPATIENT
Start: 2023-07-04 | End: 2023-07-07 | Stop reason: HOSPADM

## 2023-07-04 RX ORDER — GABAPENTIN 400 MG/1
400 CAPSULE ORAL 3 TIMES DAILY
Status: DISCONTINUED | OUTPATIENT
Start: 2023-07-04 | End: 2023-07-04

## 2023-07-04 RX ORDER — ATORVASTATIN CALCIUM 40 MG/1
40 TABLET, FILM COATED ORAL DAILY
Status: DISCONTINUED | OUTPATIENT
Start: 2023-07-04 | End: 2023-07-07 | Stop reason: HOSPADM

## 2023-07-04 RX ORDER — MECOBALAMIN 5000 MCG
5 TABLET,DISINTEGRATING ORAL NIGHTLY
Status: DISCONTINUED | OUTPATIENT
Start: 2023-07-04 | End: 2023-07-07 | Stop reason: HOSPADM

## 2023-07-04 RX ORDER — FOLIC ACID 1 MG/1
1 TABLET ORAL DAILY
Status: DISCONTINUED | OUTPATIENT
Start: 2023-07-04 | End: 2023-07-07 | Stop reason: HOSPADM

## 2023-07-04 RX ADMIN — AMLODIPINE BESYLATE 10 MG: 10 TABLET ORAL at 11:01

## 2023-07-04 RX ADMIN — GABAPENTIN 400 MG: 400 CAPSULE ORAL at 11:02

## 2023-07-04 RX ADMIN — LEVOTHYROXINE SODIUM 25 MCG: 25 TABLET ORAL at 11:02

## 2023-07-04 RX ADMIN — GABAPENTIN 400 MG: 400 CAPSULE ORAL at 16:38

## 2023-07-04 RX ADMIN — CLOPIDOGREL BISULFATE 75 MG: 75 TABLET ORAL at 11:01

## 2023-07-04 RX ADMIN — Medication 100 MG: at 11:02

## 2023-07-04 RX ADMIN — Medication 5 MG: at 21:03

## 2023-07-04 RX ADMIN — BUPROPION HYDROCHLORIDE 150 MG: 150 TABLET, EXTENDED RELEASE ORAL at 11:01

## 2023-07-04 RX ADMIN — GABAPENTIN 400 MG: 400 CAPSULE ORAL at 21:03

## 2023-07-04 RX ADMIN — ASPIRIN 81 MG: 81 TABLET, COATED ORAL at 11:02

## 2023-07-04 RX ADMIN — TRAZODONE HYDROCHLORIDE 50 MG: 50 TABLET ORAL at 21:03

## 2023-07-04 RX ADMIN — ATORVASTATIN CALCIUM 40 MG: 40 TABLET, FILM COATED ORAL at 11:02

## 2023-07-04 RX ADMIN — FOLIC ACID 1 MG: 1 TABLET ORAL at 11:01

## 2023-07-04 RX ADMIN — ESCITALOPRAM OXALATE 20 MG: 10 TABLET ORAL at 11:02

## 2023-07-04 SDOH — HEALTH STABILITY: PHYSICAL HEALTH: ON AVERAGE, HOW MANY DAYS PER WEEK DO YOU ENGAGE IN MODERATE TO STRENUOUS EXERCISE (LIKE A BRISK WALK)?: 3 DAYS

## 2023-07-04 SDOH — HEALTH STABILITY: PHYSICAL HEALTH: ON AVERAGE, HOW MANY MINUTES DO YOU ENGAGE IN EXERCISE AT THIS LEVEL?: 30 MIN

## 2023-07-04 SDOH — HEALTH STABILITY: MENTAL HEALTH: HOW MANY STANDARD DRINKS CONTAINING ALCOHOL DO YOU HAVE ON A TYPICAL DAY?: 5 OR 6

## 2023-07-04 SDOH — SOCIAL STABILITY: SOCIAL INSECURITY: WITHIN THE LAST YEAR, HAVE YOU BEEN AFRAID OF YOUR PARTNER OR EX-PARTNER?: NO

## 2023-07-04 SDOH — ECONOMIC STABILITY: INCOME INSECURITY: IN THE LAST 12 MONTHS, WAS THERE A TIME WHEN YOU WERE NOT ABLE TO PAY THE MORTGAGE OR RENT ON TIME?: NO

## 2023-07-04 SDOH — SOCIAL STABILITY: SOCIAL NETWORK: HOW OFTEN DO YOU ATTENT MEETINGS OF THE CLUB OR ORGANIZATION YOU BELONG TO?: MORE THAN 4 TIMES PER YEAR

## 2023-07-04 SDOH — SOCIAL STABILITY: SOCIAL INSECURITY
WITHIN THE LAST YEAR, HAVE YOU BEEN KICKED, HIT, SLAPPED, OR OTHERWISE PHYSICALLY HURT BY YOUR PARTNER OR EX-PARTNER?: NO

## 2023-07-04 SDOH — SOCIAL STABILITY: SOCIAL INSECURITY
WITHIN THE LAST YEAR, HAVE TO BEEN RAPED OR FORCED TO HAVE ANY KIND OF SEXUAL ACTIVITY BY YOUR PARTNER OR EX-PARTNER?: NO

## 2023-07-04 SDOH — SOCIAL STABILITY: SOCIAL NETWORK: IN A TYPICAL WEEK, HOW MANY TIMES DO YOU TALK ON THE PHONE WITH FAMILY, FRIENDS, OR NEIGHBORS?: THREE TIMES A WEEK

## 2023-07-04 SDOH — HEALTH STABILITY: MENTAL HEALTH: HOW OFTEN DO YOU HAVE A DRINK CONTAINING ALCOHOL?: 4 OR MORE TIMES A WEEK

## 2023-07-04 SDOH — SOCIAL STABILITY: SOCIAL INSECURITY: WITHIN THE LAST YEAR, HAVE YOU BEEN HUMILIATED OR EMOTIONALLY ABUSED IN OTHER WAYS BY YOUR PARTNER OR EX-PARTNER?: NO

## 2023-07-04 SDOH — SOCIAL STABILITY: SOCIAL NETWORK: ARE YOU MARRIED, WIDOWED, DIVORCED, SEPARATED, NEVER MARRIED, OR LIVING WITH A PARTNER?: MARRIED

## 2023-07-04 SDOH — ECONOMIC STABILITY: TRANSPORTATION INSECURITY
IN THE PAST 12 MONTHS, HAS LACK OF TRANSPORTATION KEPT YOU FROM MEETINGS, WORK, OR FROM GETTING THINGS NEEDED FOR DAILY LIVING?: NO

## 2023-07-04 SDOH — ECONOMIC STABILITY: TRANSPORTATION INSECURITY
IN THE PAST 12 MONTHS, HAS THE LACK OF TRANSPORTATION KEPT YOU FROM MEDICAL APPOINTMENTS OR FROM GETTING MEDICATIONS?: NO

## 2023-07-04 SDOH — ECONOMIC STABILITY: FOOD INSECURITY: WITHIN THE PAST 12 MONTHS, YOU WORRIED THAT YOUR FOOD WOULD RUN OUT BEFORE YOU GOT MONEY TO BUY MORE.: NEVER TRUE

## 2023-07-04 SDOH — SOCIAL STABILITY: SOCIAL NETWORK: HOW OFTEN DO YOU GET TOGETHER WITH FRIENDS OR RELATIVES?: TWICE A WEEK

## 2023-07-04 SDOH — ECONOMIC STABILITY: FOOD INSECURITY: WITHIN THE PAST 12 MONTHS, THE FOOD YOU BOUGHT JUST DIDN'T LAST AND YOU DIDN'T HAVE MONEY TO GET MORE.: NEVER TRUE

## 2023-07-04 SDOH — HEALTH STABILITY: MENTAL HEALTH
STRESS IS WHEN SOMEONE FEELS TENSE, NERVOUS, ANXIOUS, OR CAN'T SLEEP AT NIGHT BECAUSE THEIR MIND IS TROUBLED. HOW STRESSED ARE YOU?: TO SOME EXTENT

## 2023-07-04 SDOH — ECONOMIC STABILITY: INCOME INSECURITY: HOW HARD IS IT FOR YOU TO PAY FOR THE VERY BASICS LIKE FOOD, HOUSING, MEDICAL CARE, AND HEATING?: NOT HARD AT ALL

## 2023-07-04 SDOH — ECONOMIC STABILITY: HOUSING INSECURITY
IN THE LAST 12 MONTHS, WAS THERE A TIME WHEN YOU DID NOT HAVE A STEADY PLACE TO SLEEP OR SLEPT IN A SHELTER (INCLUDING NOW)?: NO

## 2023-07-04 SDOH — SOCIAL STABILITY: SOCIAL NETWORK
DO YOU BELONG TO ANY CLUBS OR ORGANIZATIONS SUCH AS CHURCH GROUPS UNIONS, FRATERNAL OR ATHLETIC GROUPS, OR SCHOOL GROUPS?: YES

## 2023-07-04 SDOH — ECONOMIC STABILITY: HOUSING INSECURITY: IN THE LAST 12 MONTHS, HOW MANY PLACES HAVE YOU LIVED?: 1

## 2023-07-04 SDOH — SOCIAL STABILITY: SOCIAL NETWORK: HOW OFTEN DO YOU ATTEND CHURCH OR RELIGIOUS SERVICES?: 1 TO 4 TIMES PER YEAR

## 2023-07-04 ASSESSMENT — PATIENT HEALTH QUESTIONNAIRE - PHQ9
SUM OF ALL RESPONSES TO PHQ9 QUESTIONS 1 & 2: 2
1. LITTLE INTEREST OR PLEASURE IN DOING THINGS: SEVERAL DAYS
2. FEELING DOWN, DEPRESSED OR HOPELESS: SEVERAL DAYS
10. IF YOU CHECKED OFF ANY PROBLEMS, HOW DIFFICULT HAVE THESE PROBLEMS MADE IT FOR YOU TO DO YOUR WORK, TAKE CARE OF THINGS AT HOME, OR GET ALONG WITH OTHER PEOPLE: SOMEWHAT DIFFICULT
SUM OF ALL RESPONSES TO PHQ QUESTIONS 1-9: 7

## 2023-07-04 ASSESSMENT — ENCOUNTER SYMPTOMS
RHINORRHEA: 0
NAUSEA: 0
EYE DISCHARGE: 0
COLOR CHANGE: 0
BACK PAIN: 0
COUGH: 0
SHORTNESS OF BREATH: 0
VOMITING: 0
ABDOMINAL PAIN: 0

## 2023-07-04 ASSESSMENT — SLEEP AND FATIGUE QUESTIONNAIRES
DO YOU HAVE DIFFICULTY SLEEPING: YES
AVERAGE NUMBER OF SLEEP HOURS: 7
DO YOU USE A SLEEP AID: YES
SLEEP PATTERN: DIFFICULTY FALLING ASLEEP;DISTURBED/INTERRUPTED SLEEP

## 2023-07-04 NOTE — PLAN OF CARE
Problem: Self Harm/Suicidality  Goal: Will have no self-injury during hospital stay  Description: INTERVENTIONS:  1. Ensure constant observer at bedside with Q15M safety checks  2. Maintain a safe environment  3. Secure patient belongings  4. Ensure family/visitors adhere to safety recommendations  5. Ensure safety tray has been added to patient's diet order  6. Every shift and PRN: Re-assess suicidal risk via Frequent Screener    7/4/2023 1320 by Jose Kelley RN  Outcome: Progressing  7/4/2023 1319 by Jose Kelley RN  Outcome: Progressing  7/4/2023 1133 by Yaritza Monroy LPC  Outcome: Progressing     Problem: Depression  Goal: Will be euthymic at discharge  Description: INTERVENTIONS:  1. Administer medication as ordered  2. Provide emotional support via 1:1 interaction with staff  3. Encourage involvement in milieu/groups/activities  4.  Monitor for social isolation  Outcome: Progressing

## 2023-07-04 NOTE — H&P
Department of Psychiatry  Attending History and Physical        CHIEF COMPLAINT:  \"I'm better\"    History obtained from: patient, chart    HISTORY OF PRESENT ILLNESS:    51-year-old white male with history of depression, alcohol abuse, stroke, hypertension, who presented to the ER with suicidal ideation. Alcohol level 187 on admission. UDS negative. Patient is observed in the dining area today. Calm and cooperative. States he is doing better. Denies suicidal ideation. States he has been sober for about 6 months until recently. He  in August.  His wife still lives with him. She recently started dating. She told patient she is going on vacation with a new boyfriend. This triggered relapse. Patient states he had a rough day yesterday. He got drunk and kept texting his ex-wife. States he still hopes that can be together again. They have no children together. Patient reports getting support from his father and [employer] members. He reports good sleep. High anxiety level. States he has been on Lexapro for many years and feels that it is no longer working. He has not been in therapy. PSYCHIATRIC HISTORY:    Diagnoses: Depression  Suicide attempts/gestures: Denies   Prior hospitalizations: Denies   Medication trials: Lexapro, Trazodone  Mental health contact: Denies  Head trauma: Denies    SUBSTANCE USE HISTORY:  Longstanding history of alcohol abuse. Reports 6 months sobriety. No history withdrawal seizures. Denies illicits. Smokes cigarettes.     Past Medical History:    Past Medical History:   Diagnosis Date    Anxiety     Class 1 obesity     Diverticulitis     Hx of blood clots     Hypertension     Nosebleed 2022    Pneumonia 1988    Stroke (cerebrum) ([address]) 02/2021    TIA (transient ischemic attack) 2013    TIA (transient ischemic attack) 2014    TIA (transient ischemic attack) 2019    TIA (transient ischemic attack) 02/2023       Past Surgical History:    Past Surgical History:

## 2023-07-04 NOTE — PLAN OF CARE
Problem: Self Harm/Suicidality  Goal: Will have no self-injury during hospital stay  Description: INTERVENTIONS:  1. Ensure constant observer at bedside with Q15M safety checks  2. Maintain a safe environment  3. Secure patient belongings  4. Ensure family/visitors adhere to safety recommendations  5. Ensure safety tray has been added to patient's diet order  6. Every shift and PRN: Re-assess suicidal risk via Frequent Screener    Outcome: Progressing     Date: 7/4/2023  Start Time: 1000  End Time:  1045  Number of Participants: 7     Type of Group: Psychotherapy     Patient's Goal: Patient will process emotions and actions and how to relate to other or their with others. Patient discussed open communication and feelings and emotions. Notes:  Patient attended process group as scheduled, patient identified a issue to work on today regarding how they will interact and relate to others. Status After Intervention:  Improved     Participation Level:  Active Listener     Participation Quality: Appropriate, Attentive, and Sharing        Speech:  normal        Thought Process/Content: Logical        Affective Functioning: Congruent        Mood: euthymic        Level of consciousness:  Alert        Response to Learning: Able to verbalize current knowledge/experience        Endings: None Reported     Modes of Intervention: Education, Support, and Socialization        Discipline Responsible: /Counselor        Signature:  Jonathan Navarro [facility]

## 2023-07-04 NOTE — PLAN OF CARE
Problem: Chronic Conditions and Co-morbidities  Goal: Patient's chronic conditions and co-morbidity symptoms are monitored and maintained or improved  Outcome: Progressing      Group Therapy Note     Date: 7/4/2023  Start Time: 1100  End Time:  1130  Number of Participants: 9     Type of Group: Psychoeducation     Wellness Binder Information  Module Name:  emotional wellness  Session Number:  1     Patient's Goal:  validation of feelings     Notes:  pt acknowledged to have feelings validated it may be necessary to share feelings with others.      Status After Intervention:  Improved     Participation Level: Interactive     Participation Quality: Appropriate, Attentive, and Sharing        Speech:  normal        Thought Process/Content: Logical        Affective Functioning: Congruent        Mood: congruent        Level of consciousness:  Alert, Oriented x4, and Attentive        Response to Learning: Able to verbalize current knowledge/experience        Endings: None Reported     Modes of Intervention: Education        Discipline Responsible: Psychoeducational Specialist        Signature:  Kei Maxwell

## 2023-07-04 NOTE — ED PROVIDER NOTES
Reviewed  Clinical lab tests: reviewed    71-year-old male presents to the emergency department with depression and suicidal ideation. Lab results reviewed. Discussed with Raj Hannah psychiatrist, who accepts patient to the behavioral health unit for further evaluation and treatment. CONSULTS:  IP CONSULT TO FAMILY MEDICINE    PROCEDURES:  Unless otherwise noted below, none     Procedures    FINAL IMPRESSION      1.  Depression with suicidal ideation          DISPOSITION/PLAN   DISPOSITION Decision To Admit 07/04/2023 03:58:41 AM               (Please note that portions of this note were completed with a voice recognition program.  Efforts were made to edit thedictations but occasionally words are mis-transcribed.)    Lisa Taylor MD (electronically signed)  Attending Emergency Physician          Lisa Taylor MD  07/04/23 5325

## 2023-07-05 PROBLEM — F10.90 ALCOHOL USE DISORDER: Status: ACTIVE | Noted: 2023-07-05

## 2023-07-05 PROBLEM — F41.9 ANXIETY DISORDER: Status: ACTIVE | Noted: 2023-07-05

## 2023-07-05 LAB
25(OH)D3 SERPL-MCNC: 44.9 NG/ML
CHOLEST SERPL-MCNC: 155 MG/DL (ref 160–199)
HBA1C MFR BLD: 5.4 % (ref 4–6)
HDLC SERPL-MCNC: 65 MG/DL (ref 55–121)
LDLC SERPL CALC-MCNC: 65 MG/DL
TRIGL SERPL-MCNC: 126 MG/DL (ref 0–149)
TSH SERPL DL<=0.005 MIU/L-ACNC: 2.93 UIU/ML (ref 0.35–5.5)
VIT B12 SERPL-MCNC: 183 PG/ML (ref 211–946)

## 2023-07-05 PROCEDURE — 84443 ASSAY THYROID STIM HORMONE: CPT

## 2023-07-05 PROCEDURE — 36415 COLL VENOUS BLD VENIPUNCTURE: CPT

## 2023-07-05 PROCEDURE — 6370000000 HC RX 637 (ALT 250 FOR IP): Performed by: FAMILY MEDICINE

## 2023-07-05 PROCEDURE — 1240000000 HC EMOTIONAL WELLNESS R&B

## 2023-07-05 PROCEDURE — 6370000000 HC RX 637 (ALT 250 FOR IP): Performed by: PSYCHIATRY & NEUROLOGY

## 2023-07-05 PROCEDURE — 83036 HEMOGLOBIN GLYCOSYLATED A1C: CPT

## 2023-07-05 PROCEDURE — 80061 LIPID PANEL: CPT

## 2023-07-05 PROCEDURE — 6360000002 HC RX W HCPCS: Performed by: FAMILY MEDICINE

## 2023-07-05 PROCEDURE — 82306 VITAMIN D 25 HYDROXY: CPT

## 2023-07-05 PROCEDURE — 82607 VITAMIN B-12: CPT

## 2023-07-05 RX ORDER — CYANOCOBALAMIN 1000 UG/ML
1000 INJECTION, SOLUTION INTRAMUSCULAR; SUBCUTANEOUS WEEKLY
Status: DISCONTINUED | OUTPATIENT
Start: 2023-07-12 | End: 2023-07-07 | Stop reason: HOSPADM

## 2023-07-05 RX ORDER — CYANOCOBALAMIN 1000 UG/ML
1000 INJECTION, SOLUTION INTRAMUSCULAR; SUBCUTANEOUS
Status: DISCONTINUED | OUTPATIENT
Start: 2023-08-09 | End: 2023-07-07 | Stop reason: HOSPADM

## 2023-07-05 RX ORDER — CYANOCOBALAMIN 1000 UG/ML
1000 INJECTION, SOLUTION INTRAMUSCULAR; SUBCUTANEOUS DAILY
Status: DISCONTINUED | OUTPATIENT
Start: 2023-07-05 | End: 2023-07-07 | Stop reason: HOSPADM

## 2023-07-05 RX ADMIN — Medication 100 MG: at 08:14

## 2023-07-05 RX ADMIN — ASPIRIN 81 MG: 81 TABLET, COATED ORAL at 08:14

## 2023-07-05 RX ADMIN — LEVOTHYROXINE SODIUM 25 MCG: 25 TABLET ORAL at 06:22

## 2023-07-05 RX ADMIN — GABAPENTIN 400 MG: 400 CAPSULE ORAL at 21:16

## 2023-07-05 RX ADMIN — CLOPIDOGREL BISULFATE 75 MG: 75 TABLET ORAL at 08:14

## 2023-07-05 RX ADMIN — CYANOCOBALAMIN 1000 MCG: 1000 INJECTION, SOLUTION INTRAMUSCULAR; SUBCUTANEOUS at 14:27

## 2023-07-05 RX ADMIN — HYDROXYZINE HYDROCHLORIDE 25 MG: 25 TABLET, FILM COATED ORAL at 21:16

## 2023-07-05 RX ADMIN — FOLIC ACID 1 MG: 1 TABLET ORAL at 08:14

## 2023-07-05 RX ADMIN — AMLODIPINE BESYLATE 10 MG: 10 TABLET ORAL at 08:14

## 2023-07-05 RX ADMIN — ATORVASTATIN CALCIUM 40 MG: 40 TABLET, FILM COATED ORAL at 08:14

## 2023-07-05 RX ADMIN — ESCITALOPRAM OXALATE 20 MG: 10 TABLET ORAL at 08:14

## 2023-07-05 RX ADMIN — GABAPENTIN 400 MG: 400 CAPSULE ORAL at 14:27

## 2023-07-05 RX ADMIN — TRAZODONE HYDROCHLORIDE 50 MG: 50 TABLET ORAL at 21:16

## 2023-07-05 RX ADMIN — BUPROPION HYDROCHLORIDE 150 MG: 150 TABLET, EXTENDED RELEASE ORAL at 08:14

## 2023-07-05 RX ADMIN — Medication 5 MG: at 21:16

## 2023-07-05 RX ADMIN — GABAPENTIN 400 MG: 400 CAPSULE ORAL at 08:14

## 2023-07-05 NOTE — H&P
HISTORY and PHYSICAL      CHIEF COMPLAINT:  SI    Reason for Admission:  SI    History Obtained From:  patient, chart    HISTORY OF PRESENT ILLNESS:      The patient is a 66 y.o. male who is admitted to the [address] unit with worsening mood issues. He has no c/o CP or SOA. He has no abdominal pain or N/V. He has no weakness or HA. He has no dysuria. He has had no fevers. Past Medical History:        Diagnosis Date    Anxiety     Class 1 obesity     Diverticulitis     Hx of blood clots     Hypertension     Nosebleed 2022    Pneumonia 1988    Stroke (cerebrum) ([address]) 02/2021    TIA (transient ischemic attack) 2013    TIA (transient ischemic attack) 2014    TIA (transient ischemic attack) 2019    TIA (transient ischemic attack) 02/2023     Past Surgical History:        Procedure Laterality Date    ARM SURGERY Left 11/04/2021    LEFT IN SITU CUBITAL TUNNEL RELEASE performed by Nathaly Sheikh MD at [address] [facility] Right 12/30/2021    RIGHT IN SITU CUBITAL TUNNEL DECOMPRESSION performed by Nathaly Sheikh MD at [address] [facility]  2013    CARPAL TUNNEL RELEASE Right 10/22/2020    RIGHT OPEN CARPAL TUNNEL RELEASE [facility] [facility] Left 12/03/2020    LEFT OPEN CARPAL TUNNEL RELEASE performed by Nathaly Sheikh MD at [address] [facility]  2018    [address] [place] [address] SURGERY  2021    SMALL INTESTINE SURGERY  2018    TONSILLECTOMY  1984    at age 18         Medications Prior to Admission:    Medications Prior to Admission: gabapentin (NEURONTIN) 400 MG capsule, Take 1 capsule by mouth 3 times daily for 90 days.  Max Daily Amount: 1,200 mg  cyclobenzaprine (FLEXERIL) 5 MG tablet, Take 1 tablet by mouth 2 times daily as needed for Muscle spasms  levothyroxine (SYNTHROID) 25 MCG tablet, Take 1 tablet by mouth daily  butalbital-APAP-caffeine -40 MG CAPS per capsule, Take 1 capsule by mouth as

## 2023-07-05 NOTE — PLAN OF CARE
Problem: Self Harm/Suicidality  Goal: Will have no self-injury during hospital stay  Outcome: Progressing   Group Therapy Note     Date: 7/5/2023  Start Time: 1000  End Time:  1030  Number of Participants: 9     Type of Group: Psychoeducation     Wellness Binder Information  Module Name:  emotional wellness  Session Number:  5     Patient's Goal:  obstacles to emotional wellness     Notes:  pt acknowledged negative thinking as an obstacle to emotional wellness.      Status After Intervention:  Improved     Participation Level: Interactive     Participation Quality: Appropriate, Attentive, and Sharing        Speech:  normal        Thought Process/Content: Logical        Affective Functioning: Congruent        Mood: congruent        Level of consciousness:  Alert, Oriented x4, and Attentive        Response to Learning: Able to verbalize current knowledge/experience        Endings: None Reported     Modes of Intervention: Education        Discipline Responsible: Psychoeducational Specialist        Signature:  Roxy Ellison

## 2023-07-05 NOTE — PLAN OF CARE
Problem: Chronic Conditions and Co-morbidities  Goal: Patient's chronic conditions and co-morbidity symptoms are monitored and maintained or improved  Outcome: Progressing     Problem: Self Harm/Suicidality  Goal: Will have no self-injury during hospital stay  7/5/2023 1450 by Ysabel Pemberton RN  Outcome: Progressing  7/5/2023 1041 by Zo Mims  Outcome: Progressing     Problem: Safety - Adult  Goal: Free from fall injury  Outcome: Progressing     Problem: Depression  Goal: Will be euthymic at discharge  Outcome: Progressing     Problem: Pain  Goal: Verbalizes/displays adequate comfort level or baseline comfort level  Outcome: Progressing

## 2023-07-06 PROCEDURE — 6370000000 HC RX 637 (ALT 250 FOR IP): Performed by: PSYCHIATRY & NEUROLOGY

## 2023-07-06 PROCEDURE — 1240000000 HC EMOTIONAL WELLNESS R&B

## 2023-07-06 PROCEDURE — 6370000000 HC RX 637 (ALT 250 FOR IP): Performed by: FAMILY MEDICINE

## 2023-07-06 PROCEDURE — 6360000002 HC RX W HCPCS: Performed by: FAMILY MEDICINE

## 2023-07-06 RX ADMIN — Medication 5 MG: at 20:34

## 2023-07-06 RX ADMIN — LEVOTHYROXINE SODIUM 25 MCG: 25 TABLET ORAL at 06:04

## 2023-07-06 RX ADMIN — ASPIRIN 81 MG: 81 TABLET, COATED ORAL at 08:29

## 2023-07-06 RX ADMIN — FOLIC ACID 1 MG: 1 TABLET ORAL at 08:29

## 2023-07-06 RX ADMIN — TRAZODONE HYDROCHLORIDE 50 MG: 50 TABLET ORAL at 20:34

## 2023-07-06 RX ADMIN — ATORVASTATIN CALCIUM 40 MG: 40 TABLET, FILM COATED ORAL at 08:29

## 2023-07-06 RX ADMIN — GABAPENTIN 400 MG: 400 CAPSULE ORAL at 08:29

## 2023-07-06 RX ADMIN — ESCITALOPRAM OXALATE 20 MG: 10 TABLET ORAL at 08:29

## 2023-07-06 RX ADMIN — BUPROPION HYDROCHLORIDE 150 MG: 150 TABLET, EXTENDED RELEASE ORAL at 08:29

## 2023-07-06 RX ADMIN — GABAPENTIN 400 MG: 400 CAPSULE ORAL at 20:34

## 2023-07-06 RX ADMIN — CLOPIDOGREL BISULFATE 75 MG: 75 TABLET ORAL at 08:29

## 2023-07-06 RX ADMIN — CYANOCOBALAMIN 1000 MCG: 1000 INJECTION, SOLUTION INTRAMUSCULAR; SUBCUTANEOUS at 08:30

## 2023-07-06 RX ADMIN — HYDROXYZINE HYDROCHLORIDE 25 MG: 25 TABLET, FILM COATED ORAL at 20:36

## 2023-07-06 RX ADMIN — GABAPENTIN 400 MG: 400 CAPSULE ORAL at 14:06

## 2023-07-06 RX ADMIN — AMLODIPINE BESYLATE 10 MG: 10 TABLET ORAL at 08:29

## 2023-07-06 RX ADMIN — Medication 100 MG: at 08:29

## 2023-07-06 NOTE — PLAN OF CARE
Problem: Chronic Conditions and Co-morbidities  Goal: Patient's chronic conditions and co-morbidity symptoms are monitored and maintained or improved  Outcome: Progressing     Problem: Self Harm/Suicidality  Goal: Will have no self-injury during hospital stay  Description: INTERVENTIONS:  1. Ensure constant observer at bedside with Q15M safety checks  2. Maintain a safe environment  3. Secure patient belongings  4. Ensure family/visitors adhere to safety recommendations  5. Ensure safety tray has been added to patient's diet order  6. Every shift and PRN: Re-assess suicidal risk via Frequent Screener    Outcome: Progressing     Problem: Safety - Adult  Goal: Free from fall injury  Outcome: Progressing     Problem: Depression  Goal: Will be euthymic at discharge  Description: INTERVENTIONS:  1. Administer medication as ordered  2. Provide emotional support via 1:1 interaction with staff  3. Encourage involvement in milieu/groups/activities  4.  Monitor for social isolation  Outcome: Progressing     Problem: Pain  Goal: Verbalizes/displays adequate comfort level or baseline comfort level  Outcome: Progressing

## 2023-07-07 VITALS
RESPIRATION RATE: 18 BRPM | TEMPERATURE: 96.8 F | DIASTOLIC BLOOD PRESSURE: 102 MMHG | SYSTOLIC BLOOD PRESSURE: 146 MMHG | HEART RATE: 76 BPM | OXYGEN SATURATION: 97 %

## 2023-07-07 PROCEDURE — 5130000000 HC BRIDGE APPOINTMENT

## 2023-07-07 PROCEDURE — 6360000002 HC RX W HCPCS: Performed by: FAMILY MEDICINE

## 2023-07-07 PROCEDURE — 6370000000 HC RX 637 (ALT 250 FOR IP): Performed by: PSYCHIATRY & NEUROLOGY

## 2023-07-07 PROCEDURE — 6370000000 HC RX 637 (ALT 250 FOR IP): Performed by: FAMILY MEDICINE

## 2023-07-07 RX ORDER — BUPROPION HYDROCHLORIDE 150 MG/1
150 TABLET ORAL DAILY
Qty: 30 TABLET | Refills: 0 | Status: SHIPPED | OUTPATIENT
Start: 2023-07-08

## 2023-07-07 RX ORDER — TRAZODONE HYDROCHLORIDE 50 MG/1
50 TABLET ORAL NIGHTLY
Qty: 30 TABLET | Refills: 0 | Status: SHIPPED | OUTPATIENT
Start: 2023-07-07

## 2023-07-07 RX ORDER — THIAMINE MONONITRATE (VIT B1) 100 MG
100 TABLET ORAL DAILY
Qty: 30 TABLET | Refills: 0 | Status: SHIPPED | OUTPATIENT
Start: 2023-07-08

## 2023-07-07 RX ORDER — FOLIC ACID 1 MG/1
1 TABLET ORAL DAILY
Qty: 30 TABLET | Refills: 0 | Status: SHIPPED | OUTPATIENT
Start: 2023-07-07

## 2023-07-07 RX ORDER — CYANOCOBALAMIN 1000 UG/ML
1000 INJECTION, SOLUTION INTRAMUSCULAR; SUBCUTANEOUS DAILY
Qty: 4 ML | Refills: 0 | Status: SHIPPED | OUTPATIENT
Start: 2023-07-08 | End: 2023-07-12

## 2023-07-07 RX ADMIN — GABAPENTIN 400 MG: 400 CAPSULE ORAL at 07:47

## 2023-07-07 RX ADMIN — ASPIRIN 81 MG: 81 TABLET, COATED ORAL at 07:43

## 2023-07-07 RX ADMIN — AMLODIPINE BESYLATE 10 MG: 10 TABLET ORAL at 07:46

## 2023-07-07 RX ADMIN — ATORVASTATIN CALCIUM 40 MG: 40 TABLET, FILM COATED ORAL at 07:47

## 2023-07-07 RX ADMIN — FOLIC ACID 1 MG: 1 TABLET ORAL at 09:46

## 2023-07-07 RX ADMIN — Medication 100 MG: at 07:47

## 2023-07-07 RX ADMIN — CLOPIDOGREL BISULFATE 75 MG: 75 TABLET ORAL at 07:48

## 2023-07-07 RX ADMIN — LEVOTHYROXINE SODIUM 25 MCG: 25 TABLET ORAL at 06:09

## 2023-07-07 RX ADMIN — CYANOCOBALAMIN 1000 MCG: 1000 INJECTION, SOLUTION INTRAMUSCULAR; SUBCUTANEOUS at 07:49

## 2023-07-07 RX ADMIN — BUPROPION HYDROCHLORIDE 150 MG: 150 TABLET, EXTENDED RELEASE ORAL at 07:47

## 2023-07-07 RX ADMIN — ESCITALOPRAM OXALATE 20 MG: 10 TABLET ORAL at 07:44

## 2023-07-07 NOTE — PLAN OF CARE
Problem: Chronic Conditions and Co-morbidities  Goal: Patient's chronic conditions and co-morbidity symptoms are monitored and maintained or improved  Outcome: Progressing   Group Therapy Note     Date: 7/7/2023  Start Time: 1100  End Time:  1130  Number of Participants: 6     Type of Group: Psychoeducation     Wellness Binder Information  Module Name:  emotional wellness  Session Number:  5     Patient's Goal:  obstacles to emotional wellness     Notes:  pt acknowledged negative thinking as an obstacle to emotional wellness.      Status After Intervention:  Improved     Participation Level: Interactive     Participation Quality: Appropriate, Attentive, and Sharing        Speech:  normal        Thought Process/Content: Logical        Affective Functioning: Congruent        Mood: congruent        Level of consciousness:  Alert, Oriented x4, and Attentive        Response to Learning: Able to verbalize current knowledge/experience        Endings: None Reported     Modes of Intervention: Education        Discipline Responsible: Psychoeducational Specialist        Signature:  Primo Kelly

## 2023-07-07 NOTE — DISCHARGE SUMMARY
Discharge Summary     Patient ID:  Perri Garza  [id number]  13 y.o.  1965    Admit date: 7/3/2023  Discharge date: 7/7/2023    Admitting Physician: Justin Morgan MD   Attending Physician: Justin Morgan MD  Discharge Provider: SUZANNE Sauceda     Admission Diagnoses: Depression with suicidal ideation [F32. A, R45.851]  Depression, unspecified [C99. A]    Discharge Diagnoses:   Depression unspecified  Anxiety unspecified  Alcohol use disorder, severe  Tobacco use disorder       Admission Condition: fair    Discharged Condition: good    Indication for Admission: depression and suicidal ideation    HPI: per attending  53-year-old white male with history of depression, alcohol abuse, stroke, hypertension, who presented to the ER with suicidal ideation. Alcohol level 187 on admission. UDS negative. Patient is observed in the dining area today. Calm and cooperative. States he is doing better. Denies suicidal ideation. States he has been sober for about 6 months until recently. He  in August.  His wife still lives with him. She recently started dating. She told patient she is going on vacation with a new boyfriend. This triggered relapse. Patient states he had a rough day yesterday. He got drunk and kept texting his ex-wife. States he still hopes that can be together again. They have no children together. Patient reports getting support from his father and [employer] members. He reports good sleep. High anxiety level. States he has been on Lexapro for many years and feels that it is no longer working. He has not been in therapy. Hospital Course:   Patient was admitted to the adult behavioral health floor and was acclimated to the floor. Labs were reviewed and physical exam was completed by Dr. Guanaco Dickerson and associates. Home medications were reconciled. OTTONIEL was obtained and reviewed. Medication changes were made and patient tolerated well with no side effects.  Bupropion was

## 2023-07-07 NOTE — PLAN OF CARE
Problem: Chronic Conditions and Co-morbidities  Goal: Patient's chronic conditions and co-morbidity symptoms are monitored and maintained or improved  7/7/2023 1227 by Lisa Albert RN  Outcome: Adequate for Discharge  7/7/2023 1132 by Amy Villar  Outcome: Progressing     Problem: Self Harm/Suicidality  Goal: Will have no self-injury during hospital stay  Description: INTERVENTIONS:  1. Ensure constant observer at bedside with Q15M safety checks  2. Maintain a safe environment  3. Secure patient belongings  4. Ensure family/visitors adhere to safety recommendations  5. Ensure safety tray has been added to patient's diet order  6. Every shift and PRN: Re-assess suicidal risk via Frequent Screener    Outcome: Adequate for Discharge     Problem: Safety - Adult  Goal: Free from fall injury  Outcome: Adequate for Discharge     Problem: Depression  Goal: Will be euthymic at discharge  Description: INTERVENTIONS:  1. Administer medication as ordered  2. Provide emotional support via 1:1 interaction with staff  3. Encourage involvement in milieu/groups/activities  4.  Monitor for social isolation  Outcome: Adequate for Discharge     Problem: Pain  Goal: Verbalizes/displays adequate comfort level or baseline comfort level  Outcome: Adequate for Discharge

## 2023-07-07 NOTE — DISCHARGE INSTR - DIET

## 2023-07-07 NOTE — DISCHARGE INSTRUCTIONS
Depression and Chronic Disease: Care Instructions  Your Care Instructions     A chronic disease is one that you have for a long time. Some chronic diseases can be controlled, but they usually cannot be cured. Depression is common in people with chronic diseases, but it often goes unnoticed. Many people have concerns about seeking treatment for a mental health problem. You may think it's a sign of weakness, or you don't want people to know about it. It's important to overcome these reasons for not seeking treatment. Treating depression or anxiety is good for your health. Follow-up care is a key part of your treatment and safety. Be sure to make and go to all appointments, and call your doctor if you are having problems. It's also a good idea to know your test results and keep a list of the medicines you take. How can you care for yourself at home? Watch for symptoms of depression  The symptoms of depression are often subtle at first. You may think they are caused by your disease rather than depression. Or you may think it is normal to be depressed when you have a chronic disease. If you are depressed you may:  Feel sad or hopeless. Feel guilty or worthless. Not enjoy the things you used to enjoy. Feel hopeless, as though life is not worth living. Have trouble thinking or remembering. Have low energy, and you may not eat or sleep well. Pull away from others. Think often about death or killing yourself. Get treatment  By treating your depression, you can feel more hopeful and have more energy. If you feel better, you may take better care of yourself, so your health may improve. Talk to your doctor if you have any changes in mood during treatment for your disease. Ask your doctor for help. Counseling, antidepressant medicine, or a combination of the two can help most people with depression. Often a combination works best. Counseling can also help you cope with having a chronic disease.   Where to get

## 2023-07-08 NOTE — FLOWSHEET NOTE
SW attempted to contact pt to follow-up with him after he discharged from the unit yesterday to see if he had any questions or concerns that needed to be addressed. SW called the contact number listed for the pt and it went to voicemail, so SW left a message for him to call back if he has any questions.

## 2023-07-11 ENCOUNTER — HOSPITAL ENCOUNTER (OUTPATIENT)
Dept: PAIN MANAGEMENT | Age: 58
Discharge: HOME | End: 2023-07-11
Payer: MEDICARE

## 2023-07-11 VITALS
WEIGHT: 238 LBS | RESPIRATION RATE: 16 BRPM | BODY MASS INDEX: 31.54 KG/M2 | SYSTOLIC BLOOD PRESSURE: 142 MMHG | TEMPERATURE: 97.8 F | HEIGHT: 73 IN | HEART RATE: 87 BPM | DIASTOLIC BLOOD PRESSURE: 93 MMHG | OXYGEN SATURATION: 97 %

## 2023-07-11 DIAGNOSIS — G89.29 CHRONIC RIGHT-SIDED LOW BACK PAIN WITH RIGHT-SIDED SCIATICA: ICD-10-CM

## 2023-07-11 DIAGNOSIS — M54.16 LUMBAR RADICULOPATHY: ICD-10-CM

## 2023-07-11 DIAGNOSIS — M54.41 CHRONIC RIGHT-SIDED LOW BACK PAIN WITH RIGHT-SIDED SCIATICA: ICD-10-CM

## 2023-07-11 PROCEDURE — 99214 OFFICE O/P EST MOD 30 MIN: CPT | Performed by: NURSE PRACTITIONER

## 2023-07-11 PROCEDURE — 99214 OFFICE O/P EST MOD 30 MIN: CPT

## 2023-07-11 RX ORDER — GABAPENTIN 400 MG/1
400 CAPSULE ORAL 3 TIMES DAILY
Qty: 90 CAPSULE | Refills: 2 | Status: SHIPPED | OUTPATIENT
Start: 2023-07-29 | End: 2023-10-27

## 2023-07-11 ASSESSMENT — PAIN DESCRIPTION - ORIENTATION: ORIENTATION: LOWER

## 2023-07-11 ASSESSMENT — PAIN SCALES - GENERAL: PAINLEVEL_OUTOF10: 4

## 2023-07-11 ASSESSMENT — PAIN DESCRIPTION - LOCATION: LOCATION: BACK

## 2023-07-11 ASSESSMENT — ENCOUNTER SYMPTOMS
BOWEL INCONTINENCE: 0
BACK PAIN: 1
CONSTIPATION: 0

## 2023-07-11 ASSESSMENT — PAIN DESCRIPTION - PAIN TYPE: TYPE: CHRONIC PAIN

## 2023-07-11 NOTE — PROGRESS NOTES
[facility]    Office Visit    Patient Name: Indiana Trinh    MR #: [id number]    Account [de-identified]    : 1965    Age: 66 y.o. Sex: male    Date: 2023    PCP: Haritha Hill MD    Chief Complaint:   Chief Complaint   Patient presents with    Back Pain     /10       History of Present Illness: The patient is a 66 y.o. male who presents to re-establish care with provider. Patient had bilateral thoracic and lumbar trigger points on 2023. Patient had at least 80-85% relief of pain from procedure(s) for at least 8 weeks. After injection, patient was able to increase activity. Patient had less pain from aggravating factors. Patient was able to decrease pain medication usage. Patient received enough pain relief from injections that the patient would like to repeat the injection(s). Patient had LESI of L4/L5 on 2023. Patient had at least 80-85% relief of pain from procedure(s) for at least 60 days. After injection, patient was able to increase activity and had less pain from aggravating factors such as flexion, extension, sitting, and standing  Patient was able to reduce pain medication yes  Patient was able to continue HEP - home exercise program which consists of exercises at least 3 times a week or as tolerated in addition patient is encouraged to stretch twice daily (upon awaking & prior to bed) yes  Patient received enough pain relief from injections that the patient would like to repeat the injection(s). Yes     Back Pain  This is a recurrent problem. The current episode started more than 1 year ago. The problem occurs constantly. The problem has been waxing and waning since onset. The pain is present in the lumbar spine and sacro-iliac. The quality of the pain is described as aching, burning and stabbing. Radiates to: right low back into buttocks posteriorly down leg to knee/calf area.  The symptoms are aggravated by bending, sitting and

## 2023-07-11 NOTE — TELEPHONE ENCOUNTER
1. Lumbar radiculopathy  - gabapentin (NEURONTIN) 400 MG capsule; Take 1 capsule by mouth 3 times daily for 90 days. Max Daily Amount: 1,200 mg  Dispense: 90 capsule; Refill: 2    2. Chronic right-sided low back pain with right-sided sciatica  - gabapentin (NEURONTIN) 400 MG capsule; Take 1 capsule by mouth 3 times daily for 90 days. Max Daily Amount: 1,200 mg  Dispense: 90 capsule; Refill: 2      Requested Prescriptions     Signed Prescriptions Disp Refills    gabapentin (NEURONTIN) 400 MG capsule 90 capsule 2     Sig: Take 1 capsule by mouth 3 times daily for 90 days.  Max Daily Amount: 1,200 mg     Authorizing Provider: Cinthia Stokes       Continue medication with refill sent at appointment yes; refill sent to medical director at appointment NA, see refill encounter dated 7/11/2023    Electronically signed by SUZANNE Delong CNP on 7/11/2023 at 10:44 AM

## 2023-07-11 NOTE — PROGRESS NOTES
Clinic Documentation      Education Provided:  [x] Review of Beauty Hammed  [] Agreement Review  [x] PEG Score Calculated [] PHQ Score Calculated [] ORT Score Calculated    [] Compliance Issues Discussed [] Cognitive Behavior Needs [x] Exercise [] Review of Test [] Financial Issues  [x] Tobacco/Alcohol Use Reviewed [x] Teaching [] New Patient [] Picture Obtained    Physician Plan:  [] Outgoing Referral  [] Pharmacy Consult  [x] Test Ordered [x] Prescription Ordered/Changed   [] Obtained Test Results / Consult Notes        Complete if patient is withholding blood thinner for procedure     Blood Thinner Patient is currently taking:      [x] Plavix (Hold for 7 days)  [x] Aspirin (Hold for 5 days)     [] Pletal (Hold for 2 days)  [] Pradaxa (Hold for 3 days)    [] Effient (Hold for 7 days)  [] Xarelto (Hold for 2 days)    [] Eliquis (Hold for 2 days)  [] Brilinta (Hold for 7 days)    [] Coumadin (Hold for 5 days) - (INR needs to be drawn the day prior to procedure- INR < 2.0)    [] Aggrenox (Hold for 7 days)        [] Patient will stop medication on their own. [x] Blood Thinner Form Faxed for approval to hold.    Provider form faxed to: Dr Melissa Toro Completed by:  Electronically signed by Huma Salas RN on 7/11/2023 at 10:51 AM

## 2023-08-09 ENCOUNTER — TELEPHONE (OUTPATIENT)
Dept: PAIN MANAGEMENT | Age: 58
End: 2023-08-09

## 2023-08-09 NOTE — TELEPHONE ENCOUNTER
Call placed to patient to remind him to hold asa and plavix x5 days starting tomorrow for LESI next Tuesday. Patient states understanding. Medication hold approved by Dr Kait Miranda.

## 2023-08-15 ENCOUNTER — HOSPITAL ENCOUNTER (OUTPATIENT)
Dept: PAIN MANAGEMENT | Age: 58
Discharge: HOME | End: 2023-08-15
Payer: MEDICARE

## 2023-08-15 VITALS
RESPIRATION RATE: 18 BRPM | OXYGEN SATURATION: 100 % | HEART RATE: 71 BPM | DIASTOLIC BLOOD PRESSURE: 93 MMHG | SYSTOLIC BLOOD PRESSURE: 153 MMHG | TEMPERATURE: 97.3 F

## 2023-08-15 DIAGNOSIS — R52 PAIN MANAGEMENT: ICD-10-CM

## 2023-08-15 PROCEDURE — 6360000002 HC RX W HCPCS

## 2023-08-15 PROCEDURE — 62323 NJX INTERLAMINAR LMBR/SAC: CPT

## 2023-08-15 PROCEDURE — A4216 STERILE WATER/SALINE, 10 ML: HCPCS

## 2023-08-15 PROCEDURE — 2500000003 HC RX 250 WO HCPCS

## 2023-08-15 PROCEDURE — 2580000003 HC RX 258

## 2023-08-15 RX ORDER — METHYLPREDNISOLONE ACETATE 80 MG/ML
80 INJECTION, SUSPENSION INTRA-ARTICULAR; INTRALESIONAL; INTRAMUSCULAR; SOFT TISSUE ONCE
Status: DISCONTINUED | OUTPATIENT
Start: 2023-08-15 | End: 2023-08-17 | Stop reason: HOSPADM

## 2023-08-15 RX ORDER — LIDOCAINE HYDROCHLORIDE 10 MG/ML
5 INJECTION, SOLUTION EPIDURAL; INFILTRATION; INTRACAUDAL; PERINEURAL ONCE
Status: DISCONTINUED | OUTPATIENT
Start: 2023-08-15 | End: 2023-08-17 | Stop reason: HOSPADM

## 2023-08-15 RX ORDER — SODIUM CHLORIDE 9 MG/ML
5 INJECTION INTRAVENOUS ONCE
Status: DISCONTINUED | OUTPATIENT
Start: 2023-08-15 | End: 2023-08-17 | Stop reason: HOSPADM

## 2023-08-15 ASSESSMENT — PAIN DESCRIPTION - DESCRIPTORS: DESCRIPTORS: ACHING;BURNING

## 2023-08-15 NOTE — INTERVAL H&P NOTE
Update History & Physical    The patient's History and Physical  was reviewed with the patient and I examined the patient. There was no change. The surgical site was confirmed by the patient and me. Plan: The risks, benefits, expected outcome, and alternative to the recommended procedure have been discussed with the patient. Patient understands and wants to proceed with the procedure.      Electronically signed by Dariela Munoz MD on 8/15/2023 at 11:54 AM

## 2023-08-21 ENCOUNTER — TELEPHONE (OUTPATIENT)
Dept: PAIN MANAGEMENT | Age: 58
End: 2023-08-21

## 2023-08-23 ENCOUNTER — HOSPITAL ENCOUNTER (OUTPATIENT)
Dept: PAIN MANAGEMENT | Age: 58
Discharge: HOME | End: 2023-08-23
Payer: MEDICARE

## 2023-08-23 VITALS
SYSTOLIC BLOOD PRESSURE: 129 MMHG | HEART RATE: 78 BPM | RESPIRATION RATE: 18 BRPM | OXYGEN SATURATION: 96 % | TEMPERATURE: 97.3 F | DIASTOLIC BLOOD PRESSURE: 91 MMHG

## 2023-08-23 PROCEDURE — 2500000003 HC RX 250 WO HCPCS

## 2023-08-23 PROCEDURE — 20553 NJX 1/MLT TRIGGER POINTS 3/>: CPT

## 2023-08-23 PROCEDURE — 6360000002 HC RX W HCPCS

## 2023-08-23 RX ORDER — BUPIVACAINE HYDROCHLORIDE 5 MG/ML
15 INJECTION, SOLUTION EPIDURAL; INTRACAUDAL ONCE
Status: DISCONTINUED | OUTPATIENT
Start: 2023-08-23 | End: 2023-08-25 | Stop reason: HOSPADM

## 2023-08-23 RX ORDER — LIDOCAINE HYDROCHLORIDE 10 MG/ML
15 INJECTION, SOLUTION EPIDURAL; INFILTRATION; INTRACAUDAL; PERINEURAL ONCE
Status: DISCONTINUED | OUTPATIENT
Start: 2023-08-23 | End: 2023-08-25 | Stop reason: HOSPADM

## 2023-08-23 NOTE — DISCHARGE INSTRUCTIONS
[address] [facility]  Post Procedure Discharge Instructions        YOU HAVE HAD THE FOLLOWING PROCEDURE:                                  [] Occipital Nerve Blocks  [] CTS wrist injection(s)  [] Knee Injection(s)         [] Shoulder Injection(s)   [] Elbow Injection(s)     [] Botox Injection  [] Cervical Trigger Point Injections    [x] Thoracic Trigger Point Injections    [x] Lumbar Trigger Point Injections  [] Piriformis Trigger Point Injections  [] SI Joint Injection(s)     [] Trochanteric Bursa Injection(s)       [] Ankle Injection(s)   [] Plantar Fasciitis   []  ______________  Injection(s) [] Botox []  Migraines [] Spasticity    YOU HAVE RECEIVED THE FOLLOWING MEDICATIONS IN YOUR INJECTION(s)  [x] Lidocaine [x] Bupivacaine   [] DepoMedrol (steroid) [] Decadron (steroid)  []  Kenalog (steroid)   [] Toradol  [] Supartz [] Harjeet Hamman    [] Botox        PATIENT INFORMATION:   You may experience the following symptoms after your procedure. These symptoms are normal and should not cause concern: You may have an increase in your pain. This may last 24 - 48 hours after your procedure. You may have no change in the pain that you had prior to your injection(s). You may have weakness or numbness in your affected extremity. If this occurs, this may last until numbing the medication wears off. REPORT THE FOLLOWING SYMPTOMS TO YOUR DOCTOR:  Redness, swelling or drainage at the injection site(s)  Unusual pain that interferes with your normal activities of daily living. OTHER INSTRUCTIONS:    [x] I will apply ice to the injection site(s) for at least 24 hours after the procedure. I will rotate the ice on for 20 minutes and off for 20 minutes for at least 24 hours. [x] I will not apply heat for at least 48 hours and I will not take a hot bath or shower for at least 24 hours.      [x] I understand that if Lidocaine or Bupivacaine was used in my injection(s) that the injection site(s)

## 2023-10-12 ENCOUNTER — TELEPHONE (OUTPATIENT)
Dept: NEUROLOGY | Age: 58
End: 2023-10-12

## 2023-10-12 ENCOUNTER — HOSPITAL ENCOUNTER (OUTPATIENT)
Dept: PREADMISSION TESTING | Age: 58
Discharge: HOME | End: 2023-10-16
Payer: MEDICARE

## 2023-10-12 ENCOUNTER — OFFICE VISIT (OUTPATIENT)
Dept: CARDIOLOGY CLINIC | Age: 58
End: 2023-10-12
Payer: MEDICARE

## 2023-10-12 VITALS
SYSTOLIC BLOOD PRESSURE: 132 MMHG | DIASTOLIC BLOOD PRESSURE: 78 MMHG | HEIGHT: 73 IN | WEIGHT: 245 LBS | BODY MASS INDEX: 32.47 KG/M2 | HEART RATE: 86 BPM | OXYGEN SATURATION: 95 %

## 2023-10-12 VITALS — HEIGHT: 73 IN | BODY MASS INDEX: 32.07 KG/M2 | WEIGHT: 242 LBS

## 2023-10-12 DIAGNOSIS — E78.5 DYSLIPIDEMIA: ICD-10-CM

## 2023-10-12 DIAGNOSIS — I10 ESSENTIAL HYPERTENSION: Primary | ICD-10-CM

## 2023-10-12 LAB
ALBUMIN SERPL-MCNC: 4.9 G/DL (ref 3.5–5.2)
ALP SERPL-CCNC: 97 U/L (ref 40–130)
ALT SERPL-CCNC: 13 U/L (ref 5–41)
ANION GAP SERPL CALCULATED.3IONS-SCNC: 15 MMOL/L (ref 7–19)
AST SERPL-CCNC: 16 U/L (ref 5–40)
BASOPHILS # BLD: 0.1 K/UL (ref 0–0.2)
BASOPHILS NFR BLD: 0.7 % (ref 0–1)
BILIRUB SERPL-MCNC: 0.9 MG/DL (ref 0.2–1.2)
BUN SERPL-MCNC: 16 MG/DL (ref 6–20)
CALCIUM SERPL-MCNC: 9.5 MG/DL (ref 8.6–10)
CHLORIDE SERPL-SCNC: 102 MMOL/L (ref 98–111)
CO2 SERPL-SCNC: 24 MMOL/L (ref 22–29)
CREAT SERPL-MCNC: 1.1 MG/DL (ref 0.5–1.2)
EOSINOPHIL # BLD: 0.2 K/UL (ref 0–0.6)
EOSINOPHIL NFR BLD: 1.8 % (ref 0–5)
ERYTHROCYTE [DISTWIDTH] IN BLOOD BY AUTOMATED COUNT: 12.7 % (ref 11.5–14.5)
GLUCOSE SERPL-MCNC: 87 MG/DL (ref 74–109)
HCT VFR BLD AUTO: 46.9 % (ref 42–52)
HGB BLD-MCNC: 15.7 G/DL (ref 14–18)
IMM GRANULOCYTES # BLD: 0 K/UL
LYMPHOCYTES # BLD: 2.9 K/UL (ref 1.1–4.5)
LYMPHOCYTES NFR BLD: 33.2 % (ref 20–40)
MCH RBC QN AUTO: 33.3 PG (ref 27–31)
MCHC RBC AUTO-ENTMCNC: 33.5 G/DL (ref 33–37)
MCV RBC AUTO: 99.6 FL (ref 80–94)
MONOCYTES # BLD: 0.7 K/UL (ref 0–0.9)
MONOCYTES NFR BLD: 7.5 % (ref 0–10)
NEUTROPHILS # BLD: 4.9 K/UL (ref 1.5–7.5)
NEUTS SEG NFR BLD: 56.6 % (ref 50–65)
PLATELET # BLD AUTO: 258 K/UL (ref 130–400)
PMV BLD AUTO: 8.2 FL (ref 9.4–12.4)
POTASSIUM SERPL-SCNC: 4.2 MMOL/L (ref 3.5–5)
PROT SERPL-MCNC: 7.3 G/DL (ref 6.6–8.7)
RBC # BLD AUTO: 4.71 M/UL (ref 4.7–6.1)
SODIUM SERPL-SCNC: 141 MMOL/L (ref 136–145)
WBC # BLD AUTO: 8.7 K/UL (ref 4.8–10.8)

## 2023-10-12 PROCEDURE — 3078F DIAST BP <80 MM HG: CPT | Performed by: NURSE PRACTITIONER

## 2023-10-12 PROCEDURE — 1036F TOBACCO NON-USER: CPT | Performed by: NURSE PRACTITIONER

## 2023-10-12 PROCEDURE — 80053 COMPREHEN METABOLIC PANEL: CPT

## 2023-10-12 PROCEDURE — 3017F COLORECTAL CA SCREEN DOC REV: CPT | Performed by: NURSE PRACTITIONER

## 2023-10-12 PROCEDURE — G8427 DOCREV CUR MEDS BY ELIG CLIN: HCPCS | Performed by: NURSE PRACTITIONER

## 2023-10-12 PROCEDURE — 99214 OFFICE O/P EST MOD 30 MIN: CPT | Performed by: NURSE PRACTITIONER

## 2023-10-12 PROCEDURE — 3075F SYST BP GE 130 - 139MM HG: CPT | Performed by: NURSE PRACTITIONER

## 2023-10-12 PROCEDURE — 93000 ELECTROCARDIOGRAM COMPLETE: CPT | Performed by: NURSE PRACTITIONER

## 2023-10-12 PROCEDURE — G8484 FLU IMMUNIZE NO ADMIN: HCPCS | Performed by: NURSE PRACTITIONER

## 2023-10-12 PROCEDURE — G8417 CALC BMI ABV UP PARAM F/U: HCPCS | Performed by: NURSE PRACTITIONER

## 2023-10-12 PROCEDURE — 85025 COMPLETE CBC W/AUTO DIFF WBC: CPT

## 2023-10-12 ASSESSMENT — ENCOUNTER SYMPTOMS
SORE THROAT: 0
COUGH: 0
CHEST TIGHTNESS: 0
WHEEZING: 0
SHORTNESS OF BREATH: 0

## 2023-10-12 NOTE — TELEPHONE ENCOUNTER
Called MANASA CABRAL for a return call regarding upcoming surgery for mutual patient. Requested a call back and left my name and call back number of [phone].

## 2023-10-12 NOTE — TELEPHONE ENCOUNTER
Per phone call from [facility] in surgery scheduling, order for surgery was received and patient is scheduled. I have not received a call back from [address], but there isn't anything on patients order to indicated needing neurological clearance at this time.

## 2023-10-12 NOTE — PROGRESS NOTES
[facility]   Established Patient Office Visit  [address] [place] [address]  [phone]        OFFICE VISIT:  10/12/2023    Stiven Prince - : 1965    Reason For Visit:  Eden Diggs is a 66 y.o. male who is here for 1 Year Follow Up    1. Essential hypertension    2. Dyslipidemia      Patient with a history of hypertension, hyperlipidemia and CVA. Patient presents to clinic today for yearly follow-up. Denies any chest pain, pressure or tightness. There is no shortness of breath, orthopnea or PND. Patient denies any lightheadedness, dizziness or syncope. 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 Able to do his activities without any difficulties or complications. DATA:     6/10/2020 Lexiscan. Summary impressions:    Normal ejection fraction normal perfusion study no evidence of    ischemia or infarction. Signed by Dr Atiya Garcia on 2020 4:26 PM         6/10/2020 2D echo      Summary   Normal left ventricular size and function. Mild concentric left ventricular hypertrophy. Left ventricular ejection fraction is estimated at 60%. Trace mitral regurg.       Signature      ----------------------------------------------------------------   Electronically signed by Ashley Toussaint MD(Interpreting   physician) on 2020 08:31 PM   ----------------------------------------------------------------       Subjective    Stiven Prince is a 66 y.o. male with the following history as recorded in [facility]:    Patient Active Problem List    Diagnosis Date Noted    Other chest pain 2020    Weakness 2023    Stuttering 2023    Imbalance 2023    Alcohol use 2023    Tobacco use 2023    Essential hypertension 2023    History of stroke 2023    History of TIA (transient ischemic attack) 2023    TIA (transient ischemic attack) 2023    Encounter for monitoring opioid

## 2023-10-12 NOTE — DISCHARGE INSTRUCTIONS
The day before surgery you will receive a phone call from the surgery nurse to let you know what time to arrive on the day of surgery. This call will usually be between 2-4 PM. If you do not receive a phone call by 4 PM the day before your surgery please call [phone] and let them know you have not received an arrival time. If your surgery is on Monday, your call will be on the Friday before your Monday surgery. The morning of surgery, you may take all your prescribed medications with a sip of water. Any exceptions to this would be listed below:  ASPIRIN AND PLAVIX AS PER YOUR PRESCRIBING PHYSICIAN. PREOPERATIVE GUIDELINES WHEN RECEIVING ANESTHESIA    Do not eat or drink anything after midnight, the night before your surgery. No gum or candy the morning of surgery. This is extremely important for your safety. Take a bath (or shower) the night before your surgery and you may brush your teeth the morning of your surgery. You will be scheduled to arrive at the hospital 2 hours before your surgery, or follow your surgeon's instructions. Dress comfortably. Wear loose clothing that will be easy to remove and comfortable for your trip home. You may wear eyeglasses or contacts but bring your cases with you as they must be remove before your surgery. Hearing aids and dentures will need to be removed before your surgery. Do not wear any jewelry, including body jewelry. All jewelry will need to be removed prior to your surgery. Do not wear fingernail polish or make-up. It is best not to bring any valuables with you. If you are to stay in the hospital overnight, bring your robe, slippers and personal toiletries that you may need. POSTOPERATIVE GUIDELINES AFTER RECEIVING ANESTHESIA    If you are to go home after your surgery, you will need a responsible adult to drive you home.      You will not be able to take public transportation after your discharge from the Operative Care Unit

## 2023-10-16 ENCOUNTER — ANESTHESIA (OUTPATIENT)
Dept: OPERATING ROOM | Age: 58
End: 2023-10-16
Payer: MEDICARE

## 2023-10-16 ENCOUNTER — ANESTHESIA EVENT (OUTPATIENT)
Dept: OPERATING ROOM | Age: 58
End: 2023-10-16
Payer: MEDICARE

## 2023-10-16 ENCOUNTER — HOSPITAL ENCOUNTER (OUTPATIENT)
Age: 58
Setting detail: OUTPATIENT SURGERY
Discharge: HOME | End: 2023-10-16
Attending: ORTHOPAEDIC SURGERY | Admitting: ORTHOPAEDIC SURGERY
Payer: MEDICARE

## 2023-10-16 VITALS
WEIGHT: 240 LBS | TEMPERATURE: 97 F | HEIGHT: 73 IN | SYSTOLIC BLOOD PRESSURE: 122 MMHG | OXYGEN SATURATION: 93 % | DIASTOLIC BLOOD PRESSURE: 83 MMHG | BODY MASS INDEX: 31.81 KG/M2 | RESPIRATION RATE: 18 BRPM | HEART RATE: 69 BPM

## 2023-10-16 DIAGNOSIS — Z98.890 S/P ARTHROSCOPY OF RIGHT SHOULDER: Primary | ICD-10-CM

## 2023-10-16 PROCEDURE — 3700000000 HC ANESTHESIA ATTENDED CARE: Performed by: ORTHOPAEDIC SURGERY

## 2023-10-16 PROCEDURE — L3650 SO 8 ABD RESTRAINT PRE OTS: HCPCS | Performed by: ORTHOPAEDIC SURGERY

## 2023-10-16 PROCEDURE — 2500000003 HC RX 250 WO HCPCS: Performed by: NURSE ANESTHETIST, CERTIFIED REGISTERED

## 2023-10-16 PROCEDURE — 3600000014 HC SURGERY LEVEL 4 ADDTL 15MIN: Performed by: ORTHOPAEDIC SURGERY

## 2023-10-16 PROCEDURE — 6360000002 HC RX W HCPCS: Performed by: ORTHOPAEDIC SURGERY

## 2023-10-16 PROCEDURE — 3700000001 HC ADD 15 MINUTES (ANESTHESIA): Performed by: ORTHOPAEDIC SURGERY

## 2023-10-16 PROCEDURE — 6360000002 HC RX W HCPCS: Performed by: NURSE ANESTHETIST, CERTIFIED REGISTERED

## 2023-10-16 PROCEDURE — A4216 STERILE WATER/SALINE, 10 ML: HCPCS | Performed by: ANESTHESIOLOGY

## 2023-10-16 PROCEDURE — C1894 INTRO/SHEATH, NON-LASER: HCPCS | Performed by: ORTHOPAEDIC SURGERY

## 2023-10-16 PROCEDURE — 7100000001 HC PACU RECOVERY - ADDTL 15 MIN: Performed by: ORTHOPAEDIC SURGERY

## 2023-10-16 PROCEDURE — 3600000004 HC SURGERY LEVEL 4 BASE: Performed by: ORTHOPAEDIC SURGERY

## 2023-10-16 PROCEDURE — 2580000003 HC RX 258: Performed by: ORTHOPAEDIC SURGERY

## 2023-10-16 PROCEDURE — 2500000003 HC RX 250 WO HCPCS: Performed by: ANESTHESIOLOGY

## 2023-10-16 PROCEDURE — 2709999900 HC NON-CHARGEABLE SUPPLY: Performed by: ORTHOPAEDIC SURGERY

## 2023-10-16 PROCEDURE — 7100000011 HC PHASE II RECOVERY - ADDTL 15 MIN: Performed by: ORTHOPAEDIC SURGERY

## 2023-10-16 PROCEDURE — A4217 STERILE WATER/SALINE, 500 ML: HCPCS | Performed by: ORTHOPAEDIC SURGERY

## 2023-10-16 PROCEDURE — 64415 NJX AA&/STRD BRCH PLXS IMG: CPT | Performed by: NURSE ANESTHETIST, CERTIFIED REGISTERED

## 2023-10-16 PROCEDURE — 6360000002 HC RX W HCPCS: Performed by: ANESTHESIOLOGY

## 2023-10-16 PROCEDURE — 7100000000 HC PACU RECOVERY - FIRST 15 MIN: Performed by: ORTHOPAEDIC SURGERY

## 2023-10-16 PROCEDURE — 2580000003 HC RX 258: Performed by: ANESTHESIOLOGY

## 2023-10-16 PROCEDURE — 7100000010 HC PHASE II RECOVERY - FIRST 15 MIN: Performed by: ORTHOPAEDIC SURGERY

## 2023-10-16 RX ORDER — SODIUM CHLORIDE 9 MG/ML
INJECTION, SOLUTION INTRAVENOUS PRN
Status: DISCONTINUED | OUTPATIENT
Start: 2023-10-16 | End: 2023-10-16 | Stop reason: HOSPADM

## 2023-10-16 RX ORDER — HYDROMORPHONE HYDROCHLORIDE 1 MG/ML
0.5 INJECTION, SOLUTION INTRAMUSCULAR; INTRAVENOUS; SUBCUTANEOUS EVERY 5 MIN PRN
Status: DISCONTINUED | OUTPATIENT
Start: 2023-10-16 | End: 2023-10-16 | Stop reason: HOSPADM

## 2023-10-16 RX ORDER — FENTANYL CITRATE 50 UG/ML
INJECTION, SOLUTION INTRAMUSCULAR; INTRAVENOUS PRN
Status: DISCONTINUED | OUTPATIENT
Start: 2023-10-16 | End: 2023-10-16 | Stop reason: SDUPTHER

## 2023-10-16 RX ORDER — SODIUM CHLORIDE 0.9 % (FLUSH) 0.9 %
5-40 SYRINGE (ML) INJECTION EVERY 12 HOURS SCHEDULED
Status: DISCONTINUED | OUTPATIENT
Start: 2023-10-16 | End: 2023-10-16 | Stop reason: HOSPADM

## 2023-10-16 RX ORDER — LIDOCAINE HYDROCHLORIDE 10 MG/ML
INJECTION, SOLUTION INFILTRATION; PERINEURAL PRN
Status: DISCONTINUED | OUTPATIENT
Start: 2023-10-16 | End: 2023-10-16 | Stop reason: SDUPTHER

## 2023-10-16 RX ORDER — SODIUM CHLORIDE 0.9 % (FLUSH) 0.9 %
5-40 SYRINGE (ML) INJECTION PRN
Status: DISCONTINUED | OUTPATIENT
Start: 2023-10-16 | End: 2023-10-16 | Stop reason: HOSPADM

## 2023-10-16 RX ORDER — HYDROMORPHONE HYDROCHLORIDE 1 MG/ML
0.25 INJECTION, SOLUTION INTRAMUSCULAR; INTRAVENOUS; SUBCUTANEOUS EVERY 5 MIN PRN
Status: DISCONTINUED | OUTPATIENT
Start: 2023-10-16 | End: 2023-10-16 | Stop reason: HOSPADM

## 2023-10-16 RX ORDER — ROPIVACAINE HYDROCHLORIDE 5 MG/ML
30 INJECTION, SOLUTION EPIDURAL; INFILTRATION; PERINEURAL ONCE
Status: DISCONTINUED | OUTPATIENT
Start: 2023-10-16 | End: 2023-10-16 | Stop reason: HOSPADM

## 2023-10-16 RX ORDER — DEXAMETHASONE SODIUM PHOSPHATE 4 MG/ML
4 INJECTION, SOLUTION INTRA-ARTICULAR; INTRALESIONAL; INTRAMUSCULAR; INTRAVENOUS; SOFT TISSUE ONCE
Status: COMPLETED | OUTPATIENT
Start: 2023-10-16 | End: 2023-10-16

## 2023-10-16 RX ORDER — ROPIVACAINE HYDROCHLORIDE 5 MG/ML
INJECTION, SOLUTION EPIDURAL; INFILTRATION; PERINEURAL
Status: COMPLETED | OUTPATIENT
Start: 2023-10-16 | End: 2023-10-16

## 2023-10-16 RX ORDER — ONDANSETRON 2 MG/ML
INJECTION INTRAMUSCULAR; INTRAVENOUS PRN
Status: DISCONTINUED | OUTPATIENT
Start: 2023-10-16 | End: 2023-10-16 | Stop reason: SDUPTHER

## 2023-10-16 RX ORDER — ONDANSETRON 4 MG/1
4 TABLET, FILM COATED ORAL DAILY PRN
Qty: 20 TABLET | Refills: 0 | Status: SHIPPED | OUTPATIENT
Start: 2023-10-16

## 2023-10-16 RX ORDER — SODIUM CHLORIDE, SODIUM LACTATE, POTASSIUM CHLORIDE, CALCIUM CHLORIDE 600; 310; 30; 20 MG/100ML; MG/100ML; MG/100ML; MG/100ML
INJECTION, SOLUTION INTRAVENOUS CONTINUOUS
Status: DISCONTINUED | OUTPATIENT
Start: 2023-10-16 | End: 2023-10-16 | Stop reason: HOSPADM

## 2023-10-16 RX ORDER — PROPOFOL 10 MG/ML
INJECTION, EMULSION INTRAVENOUS PRN
Status: DISCONTINUED | OUTPATIENT
Start: 2023-10-16 | End: 2023-10-16 | Stop reason: SDUPTHER

## 2023-10-16 RX ORDER — MIDAZOLAM HYDROCHLORIDE 2 MG/2ML
2 INJECTION, SOLUTION INTRAMUSCULAR; INTRAVENOUS
Status: COMPLETED | OUTPATIENT
Start: 2023-10-16 | End: 2023-10-16

## 2023-10-16 RX ORDER — MIDAZOLAM HYDROCHLORIDE 1 MG/ML
INJECTION INTRAMUSCULAR; INTRAVENOUS PRN
Status: DISCONTINUED | OUTPATIENT
Start: 2023-10-16 | End: 2023-10-16 | Stop reason: SDUPTHER

## 2023-10-16 RX ORDER — ROCURONIUM BROMIDE 10 MG/ML
INJECTION, SOLUTION INTRAVENOUS PRN
Status: DISCONTINUED | OUTPATIENT
Start: 2023-10-16 | End: 2023-10-16 | Stop reason: SDUPTHER

## 2023-10-16 RX ORDER — OXYCODONE AND ACETAMINOPHEN 10; 325 MG/1; MG/1
1 TABLET ORAL EVERY 6 HOURS PRN
Qty: 28 TABLET | Refills: 0 | Status: SHIPPED | OUTPATIENT
Start: 2023-10-16 | End: 2023-10-23

## 2023-10-16 RX ORDER — ONDANSETRON 2 MG/ML
4 INJECTION INTRAMUSCULAR; INTRAVENOUS
Status: DISCONTINUED | OUTPATIENT
Start: 2023-10-16 | End: 2023-10-16 | Stop reason: HOSPADM

## 2023-10-16 RX ADMIN — CEFAZOLIN 2000 MG: 2 INJECTION, POWDER, FOR SOLUTION INTRAMUSCULAR; INTRAVENOUS at 14:12

## 2023-10-16 RX ADMIN — LIDOCAINE HYDROCHLORIDE 50 MG: 10 INJECTION, SOLUTION INFILTRATION; PERINEURAL at 14:10

## 2023-10-16 RX ADMIN — PROPOFOL 200 MG: 10 INJECTION, EMULSION INTRAVENOUS at 14:10

## 2023-10-16 RX ADMIN — DEXAMETHASONE SODIUM PHOSPHATE 4 MG: 4 INJECTION INTRA-ARTICULAR; INTRALESIONAL; INTRAMUSCULAR; INTRAVENOUS; SOFT TISSUE at 11:11

## 2023-10-16 RX ADMIN — MIDAZOLAM 2 MG: 1 INJECTION INTRAMUSCULAR; INTRAVENOUS at 13:57

## 2023-10-16 RX ADMIN — ROCURONIUM BROMIDE 50 MG: 10 INJECTION, SOLUTION INTRAVENOUS at 14:10

## 2023-10-16 RX ADMIN — FAMOTIDINE 20 MG: 10 INJECTION, SOLUTION INTRAVENOUS at 11:10

## 2023-10-16 RX ADMIN — ONDANSETRON 4 MG: 2 INJECTION INTRAMUSCULAR; INTRAVENOUS at 15:00

## 2023-10-16 RX ADMIN — FENTANYL CITRATE 100 MCG: 0.05 INJECTION, SOLUTION INTRAMUSCULAR; INTRAVENOUS at 14:10

## 2023-10-16 RX ADMIN — SODIUM CHLORIDE, POTASSIUM CHLORIDE, SODIUM LACTATE AND CALCIUM CHLORIDE: 600; 310; 30; 20 INJECTION, SOLUTION INTRAVENOUS at 10:24

## 2023-10-16 RX ADMIN — MIDAZOLAM 2 MG: 1 INJECTION INTRAMUSCULAR; INTRAVENOUS at 13:33

## 2023-10-16 RX ADMIN — ROPIVACAINE HYDROCHLORIDE 20 ML: 5 INJECTION, SOLUTION EPIDURAL; INFILTRATION; PERINEURAL at 13:38

## 2023-10-16 RX ADMIN — SUGAMMADEX 200 MG: 100 INJECTION, SOLUTION INTRAVENOUS at 15:04

## 2023-10-16 ASSESSMENT — LIFESTYLE VARIABLES: SMOKING_STATUS: 1

## 2023-10-16 ASSESSMENT — PAIN DESCRIPTION - DESCRIPTORS: DESCRIPTORS: SHARP

## 2023-10-16 NOTE — ANESTHESIA PRE PROCEDURE
Department of Anesthesiology  Preprocedure Note       Name:  Gee Kyle   Age:  66 y.o.  :  1965                                          MRN:  [id number]         Date:  10/16/2023      Surgeon: Berkley Abad):  Sivakumar Mcgraw MD    Procedure: Procedure(s):  RIGHT SHOULDER ARTHROSCOPIC BICEPS TENODESIS, POSTERIOR LABRAL DEBRIDEMENT    Medications prior to admission:   Prior to Admission medications    Medication Sig Start Date End Date Taking? Authorizing Provider   gabapentin (NEURONTIN) 400 MG capsule Take 1 capsule by mouth 3 times daily for 90 days.  Max Daily Amount: 1,200 mg 7/29/23 10/27/23  Nubia Power, APRN - CNP   buPROPion (WELLBUTRIN XL) 150 MG extended release tablet Take 1 tablet by mouth daily 23   Jaye Maxin, APRN   thiamine mononitrate (THIAMINE) 100 MG tablet Take 1 tablet by mouth daily 23   Jaye Maxin, APRN   folic acid (FOLVITE) 1 MG tablet Take 1 tablet by mouth daily 23   Peri Sunshine Cuff, APRN   cyanocobalamin 1000 MCG/ML injection Inject 1 mL into the muscle daily for 4 doses  Patient taking differently: Inject 1 mL into the muscle every 14 days 7/8/23 10/16/23  Jaye Maxin, APRN   traZODone (DESYREL) 50 MG tablet Take 1 tablet by mouth nightly 23   Peri Sunshine Cuff, APRN   cyclobenzaprine (FLEXERIL) 5 MG tablet Take 1 tablet by mouth 2 times daily as needed for Muscle spasms    Jared Valles MD   levothyroxine (SYNTHROID) 25 MCG tablet Take 1 tablet by mouth daily 10/6/22   Jared Valles MD   amLODIPine (NORVASC) 10 MG tablet Take 1 tablet by mouth daily 22   Jared Valles MD   escitalopram (LEXAPRO) 20 MG tablet Take 1 tablet by mouth daily    Jared Valles MD   aspirin 81 MG tablet Take 1 tablet by mouth daily    Jared Valles MD   Melatonin 10 MG CAPS Take 1 capsule by mouth nightly as needed (SLEEP)    Jared Valles MD   clopidogrel (PLAVIX) 75 MG tablet Take 1 tablet by mouth daily

## 2023-10-16 NOTE — DISCHARGE INSTRUCTIONS
1  Keep incision clean and dry  2. May remove bandage in 24-48 hrs. (Leave glue strip in place until follow up)  3. May shower in 24-48 hrs. 4. No bath or soaking in water  5. No weight bearing operative extremity   6. Follow up with the [facility] in 2 weeks as scheduled. 7. Call the [facility] with questions/problems. 8. Apply ice to the shoulder for 20 min at a time as often as possible for first 2 days. 9. Take Percocet 10/325 one tablet every 4-6 hrs as needed, may take 2 tablets if pain not controlled. 10. Wear sling at all times except when showering.

## 2023-10-16 NOTE — DISCHARGE INSTR - DIET

## 2023-10-16 NOTE — ANESTHESIA POSTPROCEDURE EVALUATION
Department of Anesthesiology  Postprocedure Note    Patient: Maria Teresa Garcia  MRN: [id number]  YOB: 1965  Date of evaluation: 10/16/2023      Procedure Summary     Date: 10/16/23 Room / Location: [address]    Anesthesia Start: 1357 Anesthesia Stop:     Procedure: POSTERIOR LABRAL DEBRIDEMENT (Right) Diagnosis:       Tendinopathy of right biceps tendon      Rotator cuff impingement syndrome, right      (Tendinopathy of right biceps tendon [G64.986])      (Rotator cuff impingement syndrome, right [M75.41])    Surgeons: Nghia Alvarado MD Responsible Provider: SUZANNE Valencia CRNA    Anesthesia Type: general, regional ASA Status: 3          Anesthesia Type: No value filed.     Norma Phase I: Norma Score: 10    Norma Phase II:        Anesthesia Post Evaluation    Patient location during evaluation: PACU  Patient participation: complete - patient participated  Level of consciousness: sleepy but conscious  Pain score: 0  Airway patency: patent  Nausea & Vomiting: no nausea and no vomiting  Complications: no  Cardiovascular status: blood pressure returned to baseline  Respiratory status: face mask, nonlabored ventilation and spontaneous ventilation  Hydration status: euvolemic  Pain management: adequate

## 2023-10-16 NOTE — PROGRESS NOTES
CLINICAL PHARMACY NOTE: MEDS TO BEDS    Total # of Prescriptions Filled: 2   The following medications were delivered to the patient:  Discharge Medication List as of 10/16/2023  3:44 PM        START taking these medications    Details   oxyCODONE-acetaminophen (PERCOCET)  MG per tablet Take 1 tablet by mouth every 6 hours as needed for Pain for up to 7 days. Max Daily Amount: 4 tablets, Disp-28 tablet, R-0Normal      ondansetron (ZOFRAN) 4 MG tablet Take 1 tablet by mouth daily as needed for Nausea or Vomiting, Disp-20 tablet, R-0Normal               Additional Documentation:    Delivered RX to patient bedside. Paid with card.

## 2023-10-16 NOTE — ANESTHESIA PROCEDURE NOTES
Peripheral Block    Patient location during procedure: holding area  Reason for block: post-op pain management  Start time: 10/16/2023 1:38 PM  End time: 10/16/2023 1:40 PM  Staffing  Performed: anesthesiologist   Anesthesiologist: Ruba Santamaria MD  Performed by: Ruba Santamaria MD  Authorized by: Ruba Santamaria MD    Preanesthetic Checklist  Completed: patient identified, IV checked, site marked, risks and benefits discussed, surgical/procedural consents, equipment checked, pre-op evaluation, timeout performed, anesthesia consent given, oxygen available, monitors applied/VS acknowledged, fire risk safety assessment completed and verbalized and blood product R/B/A discussed and consented  Peripheral Block   Patient position: supine  Prep: ChloraPrep  Provider prep: mask and sterile gloves  Patient monitoring: continuous pulse ox and frequent blood pressure checks  Block type: Brachial plexus  Interscalene  Laterality: right  Injection technique: single-shot  Guidance: ultrasound guided  Local infiltration: lidocaine  Local infiltration: lidocaine    Needle   Needle type: Quincke   Needle gauge: 20 G  Needle localization: ultrasound guidance  Needle length: 10 cm  Assessment   Injection assessment: negative aspiration for heme, no paresthesia on injection, local visualized surrounding nerve on ultrasound and no intravascular symptoms  Paresthesia pain: none  Slow fractionated injection: yes  Hemodynamics: stable  Real-time US image taken/store: yes  Outcomes: patient tolerated procedure well and uncomplicated    Medications Administered  ropivacaine (NAROPIN) injection 0.5% - Perineural   20 mL - 10/16/2023 1:38:00 PM

## 2023-10-17 NOTE — OP NOTE
[facility] [address], [address] [place] [address]                                OPERATIVE REPORT    PATIENT NAME: Daniel Savage                      :        1965  MED REC NO:   [id number]                              ROOM:  ACCOUNT NO:   [de-identified]                           ADMIT DATE: 10/16/2023  PROVIDER:     Radhika Dawson MD    DATE OF PROCEDURE:  10/16/2023    PREOPERATIVE DIAGNOSES:  1. Right shoulder superior labral biceps complex tear. 2.  Right shoulder posterior labral tear. POSTOPERATIVE DIAGNOSES:  1. Right shoulder posterior labral tear. 2.  Right shoulder glenohumeral osteoarthritis. 3.  Right shoulder multiple loose bodies. PROCEDURE:  1. Right shoulder arthroscopic posterior labral debridement. 2.  Right shoulder arthroscopic glenohumeral chondroplasty. 3.  Right shoulder arthroscopic removal of loose bodies. SURGEON:  Radhika Dawson MD    ASSISTANT:  Sim Kingsley PA-C    ANESTHESIA:  General endotracheal anesthesia with a scalene block. ESTIMATED BLOOD LOSS:  Minimal.    COMPLICATIONS:  None. CONDITION:  Stable. BRIEF HISTORY:  This is a 82-year-old male who presented to outpatient  clinic with complaints of longstanding right shoulder pain. MRI showed  a posterior labral tear with possible extension into the superior labral  biceps complex. The patient demonstrated significant pain. He failed  conservative management including oral anti-inflammatories,  corticosteroids, and activity modification. Based on this, I elected to  take him to the operating room for the above-mentioned procedure. OPERATIVE PROCEDURE:  The patient was interviewed in the preanesthesia  area where his right shoulder was marked with a marking pen.   He was  administered scalene block by the anesthesia team.  The patient was then  taken to the operative suite where general endotracheal anesthesia was  performed by the Anesthesia team.  Kailyn Bustillos

## 2023-10-19 ENCOUNTER — HOSPITAL ENCOUNTER (OUTPATIENT)
Dept: PAIN MANAGEMENT | Age: 58
Discharge: HOME | End: 2023-10-19
Payer: MEDICARE

## 2023-10-19 VITALS
SYSTOLIC BLOOD PRESSURE: 114 MMHG | DIASTOLIC BLOOD PRESSURE: 75 MMHG | OXYGEN SATURATION: 93 % | HEIGHT: 73 IN | HEART RATE: 75 BPM | RESPIRATION RATE: 16 BRPM | TEMPERATURE: 97.6 F | BODY MASS INDEX: 32.34 KG/M2 | WEIGHT: 244 LBS

## 2023-10-19 DIAGNOSIS — M54.41 CHRONIC RIGHT-SIDED LOW BACK PAIN WITH RIGHT-SIDED SCIATICA: ICD-10-CM

## 2023-10-19 DIAGNOSIS — G89.29 CHRONIC RIGHT-SIDED LOW BACK PAIN WITH RIGHT-SIDED SCIATICA: ICD-10-CM

## 2023-10-19 DIAGNOSIS — M54.16 LUMBAR RADICULOPATHY: ICD-10-CM

## 2023-10-19 DIAGNOSIS — Z98.890 S/P ARTHROSCOPY OF RIGHT SHOULDER: ICD-10-CM

## 2023-10-19 DIAGNOSIS — G89.29 ACUTE EXACERBATION OF CHRONIC LOW BACK PAIN: Primary | ICD-10-CM

## 2023-10-19 DIAGNOSIS — M54.50 ACUTE EXACERBATION OF CHRONIC LOW BACK PAIN: Primary | ICD-10-CM

## 2023-10-19 PROCEDURE — 99213 OFFICE O/P EST LOW 20 MIN: CPT

## 2023-10-19 RX ORDER — NALOXONE HYDROCHLORIDE 4 MG/.1ML
SPRAY NASAL
COMMUNITY
Start: 2023-10-16

## 2023-10-19 RX ORDER — GABAPENTIN 400 MG/1
400 CAPSULE ORAL 3 TIMES DAILY
Qty: 90 CAPSULE | Refills: 2 | Status: SHIPPED | OUTPATIENT
Start: 2023-10-19 | End: 2024-01-17

## 2023-10-19 ASSESSMENT — ENCOUNTER SYMPTOMS
BACK PAIN: 1
BOWEL INCONTINENCE: 0
CONSTIPATION: 0

## 2023-10-19 ASSESSMENT — PAIN DESCRIPTION - PAIN TYPE: TYPE: CHRONIC PAIN

## 2023-10-19 ASSESSMENT — PAIN DESCRIPTION - LOCATION: LOCATION: BACK

## 2023-10-19 ASSESSMENT — PAIN DESCRIPTION - ORIENTATION: ORIENTATION: LOWER

## 2023-10-19 ASSESSMENT — PAIN SCALES - GENERAL: PAINLEVEL_OUTOF10: 8

## 2023-10-19 NOTE — TELEPHONE ENCOUNTER
1. Lumbar radiculopathy  - gabapentin (NEURONTIN) 400 MG capsule; Take 1 capsule by mouth 3 times daily for 90 days. Max Daily Amount: 1,200 mg  Dispense: 90 capsule; Refill: 2    2. Chronic right-sided low back pain with right-sided sciatica  - gabapentin (NEURONTIN) 400 MG capsule; Take 1 capsule by mouth 3 times daily for 90 days. Max Daily Amount: 1,200 mg  Dispense: 90 capsule; Refill: 2    3. S/P arthroscopy of right shoulder    4. Acute exacerbation of chronic low back pain      Requested Prescriptions     Pending Prescriptions Disp Refills    oxyCODONE-acetaminophen (PERCOCET)  MG per tablet 120 tablet 0     Sig: Take 1 tablet by mouth every 6 hours as needed for Pain for up to 30 days. Max Daily Amount: 4 tablets     Signed Prescriptions Disp Refills    gabapentin (NEURONTIN) 400 MG capsule 90 capsule 2     Sig: Take 1 capsule by mouth 3 times daily for 90 days. Max Daily Amount: 1,200 mg     Authorizing Provider: Janet Arciniega     Patient given a one month supply of pain medication due to acute exacerbation of low back pain and recent shoulder surgery. This will not be refilled. Patient will have injections for low back pain.      Continue medication with refill sent at appointment yes; refill sent to medical director at appointment yes, see refill encounter dated 10/19/2023    Electronically signed by SUZANNE Nettles CNP on 10/19/2023 at 9:58 AM

## 2023-10-19 NOTE — PROGRESS NOTES
Clinic Documentation      Education Provided:  [x] Review of Jas Kennedy  [] Agreement Review  [x] PEG Score Calculated [] PHQ Score Calculated [] ORT Score Calculated    [] Compliance Issues Discussed [] Cognitive Behavior Needs [x] Exercise [] Review of Test [] Financial Issues  [x] Tobacco/Alcohol Use Reviewed [x] Teaching [] New Patient [] Picture Obtained    Physician Plan:  [] Outgoing Referral  [] Pharmacy Consult  [] Test Ordered [x] Prescription Ordered/Changed   [] Obtained Test Results / Consult Notes        Complete if patient is withholding blood thinner for procedure     Blood Thinner Patient is currently taking:      [x] Plavix (Hold for 7 days)  [x] Aspirin (Hold for 5 days)     [] Pletal (Hold for 2 days)  [] Pradaxa (Hold for 3 days)    [] Effient (Hold for 7 days)  [] Xarelto (Hold for 2 days)    [] Eliquis (Hold for 2 days)  [] Brilinta (Hold for 7 days)    [] Coumadin (Hold for 5 days) - (INR needs to be drawn the day prior to procedure- INR < 2.0)    [] Aggrenox (Hold for 7 days)        [] Patient will stop medication on their own. [x] Blood Thinner Form Faxed for approval to hold.    Provider form faxed to: Dr Gina Dupree Completed by:  Electronically signed by Katie Iniguez RN on 10/19/2023 at 9:58 AM
trigger points on 9/15/2021. Patient had at least 85% relief of pain from procedure(s) for at least 6 weeks and was able to increase activity after procedure. Patient received enough pain relief from injections that the patient would like to repeat the injection(s). 9/2/2021  presents for imaging follow up and procedure follow up. Patient had MRI of Lumbar spine on 7/2/2021. Degenerative disc disease noted at L2-L3 and L5-S1 with loss of hydration and disc height. L2-L3 has a annular fissure with broad-based left lateral protrusion of disc moderate left-sided foraminal narrowing present L3-L4 moderate facet and ligamentous hypertrophy with mild broad-based disc bulge mild central canal stenosis and left-sided foraminal narrowing L4-L5 moderate facet ligamentous hypertrophy mild broad-based disc bulge and mild central spinal stenosis mild bilateral neural narrowing L5-S1 central disc protrusion with no evidence of central stenosis mild facet ligamentous hypertrophy    Scheduled for LESI of L4-L5 on 7/20/2021. Patient had at least 85% relief of pain from procedure(s) for at least 6 weeks and was able to increase activity after procedure. Patient received enough pain relief from injections that the patient would like to repeat the injection(s). 6/17/2021  Eight years ago, He had discectomy of left side. He did really good until now. He did have injections under fluoroscopy that did help but over time stopped. That's when he underwent surgery. Patient started workup for his low back. He has xray in Jan which indicated DDD and started PT. However, patient did have a stroke this past spring. Patient has exhausted his therapy benefits.      Employment: manual labor    Past Medical History  Past Medical History:   Diagnosis Date    Anxiety     Class 1 obesity     Depression 07/2023    with suicide ideation    Diverticulitis     Hx of blood clots     unknown date    Hypertension     Nosebleed 2022    Pneumonia 1988

## 2023-10-24 RX ORDER — OXYCODONE AND ACETAMINOPHEN 10; 325 MG/1; MG/1
1 TABLET ORAL EVERY 6 HOURS PRN
Qty: 120 TABLET | Refills: 0 | Status: SHIPPED | OUTPATIENT
Start: 2023-10-24 | End: 2023-11-23

## 2023-11-22 ENCOUNTER — HOSPITAL ENCOUNTER (OUTPATIENT)
Dept: PAIN MANAGEMENT | Age: 58
Discharge: HOME | End: 2023-11-22
Payer: MEDICARE

## 2023-11-22 VITALS
RESPIRATION RATE: 16 BRPM | HEART RATE: 74 BPM | OXYGEN SATURATION: 92 % | TEMPERATURE: 97.6 F | SYSTOLIC BLOOD PRESSURE: 117 MMHG | DIASTOLIC BLOOD PRESSURE: 82 MMHG

## 2023-11-22 DIAGNOSIS — M62.830 MUSCLE SPASM OF BACK: ICD-10-CM

## 2023-11-22 DIAGNOSIS — M53.3 SACROILIAC JOINT DYSFUNCTION: Primary | ICD-10-CM

## 2023-11-22 DIAGNOSIS — M62.830 SPASM OF THORACIC BACK MUSCLE: ICD-10-CM

## 2023-11-22 PROCEDURE — 20611 DRAIN/INJ JOINT/BURSA W/US: CPT

## 2023-11-22 PROCEDURE — 6360000002 HC RX W HCPCS

## 2023-11-22 PROCEDURE — 76942 ECHO GUIDE FOR BIOPSY: CPT | Performed by: NURSE PRACTITIONER

## 2023-11-22 PROCEDURE — 2500000003 HC RX 250 WO HCPCS

## 2023-11-22 PROCEDURE — 20553 NJX 1/MLT TRIGGER POINTS 3/>: CPT

## 2023-11-22 PROCEDURE — 20553 NJX 1/MLT TRIGGER POINTS 3/>: CPT | Performed by: NURSE PRACTITIONER

## 2023-11-22 RX ORDER — BUPIVACAINE HYDROCHLORIDE 5 MG/ML
15 INJECTION, SOLUTION EPIDURAL; INTRACAUDAL ONCE
Status: DISCONTINUED | OUTPATIENT
Start: 2023-11-22 | End: 2023-11-24 | Stop reason: HOSPADM

## 2023-11-22 RX ORDER — LIDOCAINE HYDROCHLORIDE 10 MG/ML
1 INJECTION, SOLUTION EPIDURAL; INFILTRATION; INTRACAUDAL; PERINEURAL ONCE
Status: DISCONTINUED | OUTPATIENT
Start: 2023-11-22 | End: 2023-11-24 | Stop reason: HOSPADM

## 2023-11-22 RX ORDER — LIDOCAINE HYDROCHLORIDE 10 MG/ML
15 INJECTION, SOLUTION EPIDURAL; INFILTRATION; INTRACAUDAL; PERINEURAL ONCE
Status: DISCONTINUED | OUTPATIENT
Start: 2023-11-22 | End: 2023-11-24 | Stop reason: HOSPADM

## 2023-11-22 RX ORDER — BUPIVACAINE HYDROCHLORIDE 5 MG/ML
1 INJECTION, SOLUTION EPIDURAL; INTRACAUDAL ONCE
Status: DISCONTINUED | OUTPATIENT
Start: 2023-11-22 | End: 2023-11-24 | Stop reason: HOSPADM

## 2023-11-22 RX ORDER — TRIAMCINOLONE ACETONIDE 40 MG/ML
40 INJECTION, SUSPENSION INTRA-ARTICULAR; INTRAMUSCULAR ONCE
Status: DISCONTINUED | OUTPATIENT
Start: 2023-11-22 | End: 2023-11-24 | Stop reason: HOSPADM

## 2023-11-22 RX ORDER — ETHYL CHLORIDE 100 %
AEROSOL, SPRAY (ML) TOPICAL PRN
Status: DISCONTINUED | OUTPATIENT
Start: 2023-11-22 | End: 2023-11-24 | Stop reason: HOSPADM

## 2023-11-22 NOTE — DISCHARGE INSTRUCTIONS
[facility]  Post Procedure Discharge Instructions        YOU HAVE HAD THE FOLLOWING PROCEDURE:                                  [] Occipital Nerve Blocks  [] CTS wrist injection(s)  [] Knee Injection(s)         [] Shoulder Injection(s)   [] Elbow Injection(s)     [] Botox Injection  [] Cervical Trigger Point Injections    [x] Thoracic Trigger Point Injections    [x] Lumbar Trigger Point Injections  [] Piriformis Trigger Point Injections  [x] SI Joint Injection(s)     [] Trochanteric Bursa Injection(s)       [] Ankle Injection(s)   [] Plantar Fasciitis   []  ______________  Injection(s) [] Botox []  Migraines [] Spasticity    YOU HAVE RECEIVED THE FOLLOWING MEDICATIONS IN YOUR INJECTION(s)  [x] Lidocaine [x] Bupivacaine   [] DepoMedrol (steroid) [] Decadron (steroid)  [x]  Kenalog (steroid)   [] Toradol  [] Supartz [] Murphy Hicks    [] Botox        PATIENT INFORMATION:   You may experience the following symptoms after your procedure. These symptoms are normal and should not cause concern: You may have an increase in your pain. This may last 24 - 48 hours after your procedure. You may have no change in the pain that you had prior to your injection(s). You may have weakness or numbness in your affected extremity. If this occurs, this may last until numbing the medication wears off. REPORT THE FOLLOWING SYMPTOMS TO YOUR DOCTOR:  Redness, swelling or drainage at the injection site(s)  Unusual pain that interferes with your normal activities of daily living. OTHER INSTRUCTIONS:    [x] I will apply ice to the injection site(s) for at least 24 hours after the procedure. I will rotate the ice on for 20 minutes and off for 20 minutes for at least 24 hours. [x] I will not apply heat for at least 48 hours and I will not take a hot bath or shower for at least 24 hours.      [x] I understand that if Lidocaine or Bupivacaine was used in my injection(s) that the injection

## 2023-12-13 ENCOUNTER — TELEPHONE (OUTPATIENT)
Dept: PAIN MANAGEMENT | Age: 58
End: 2023-12-13

## 2023-12-13 NOTE — TELEPHONE ENCOUNTER
Call placed to patient regarding his epidural injection.  Procedure was denied as more information is needed regarding past effectiveness.  Patient states that previous injection provided him with at least 50% pain relief for three months.  He also stated that his ability to perform ADL's increased to at least 50% for the three months. Making provider aware.

## 2023-12-14 ENCOUNTER — PRE-ADMISSION TESTING (OUTPATIENT)
Dept: PREADMISSION TESTING | Facility: HOSPITAL | Age: 58
End: 2023-12-14
Payer: MEDICARE

## 2023-12-14 VITALS
WEIGHT: 242.06 LBS | RESPIRATION RATE: 18 BRPM | SYSTOLIC BLOOD PRESSURE: 139 MMHG | HEIGHT: 74 IN | HEART RATE: 82 BPM | BODY MASS INDEX: 31.07 KG/M2 | DIASTOLIC BLOOD PRESSURE: 82 MMHG | OXYGEN SATURATION: 98 %

## 2023-12-14 LAB
ANION GAP SERPL CALCULATED.3IONS-SCNC: 12 MMOL/L (ref 5–15)
BUN SERPL-MCNC: 18 MG/DL (ref 6–20)
BUN/CREAT SERPL: 21.2 (ref 7–25)
CALCIUM SPEC-SCNC: 9.2 MG/DL (ref 8.6–10.5)
CHLORIDE SERPL-SCNC: 102 MMOL/L (ref 98–107)
CO2 SERPL-SCNC: 25 MMOL/L (ref 22–29)
CREAT SERPL-MCNC: 0.85 MG/DL (ref 0.76–1.27)
DEPRECATED RDW RBC AUTO: 43.9 FL (ref 37–54)
EGFRCR SERPLBLD CKD-EPI 2021: 102 ML/MIN/1.73
ERYTHROCYTE [DISTWIDTH] IN BLOOD BY AUTOMATED COUNT: 12.5 % (ref 12.3–15.4)
GLUCOSE SERPL-MCNC: 128 MG/DL (ref 65–99)
HCT VFR BLD AUTO: 45.3 % (ref 37.5–51)
HGB BLD-MCNC: 14.5 G/DL (ref 13–17.7)
MCH RBC QN AUTO: 31 PG (ref 26.6–33)
MCHC RBC AUTO-ENTMCNC: 32 G/DL (ref 31.5–35.7)
MCV RBC AUTO: 97 FL (ref 79–97)
PLATELET # BLD AUTO: 222 10*3/MM3 (ref 140–450)
PMV BLD AUTO: 8.4 FL (ref 6–12)
POTASSIUM SERPL-SCNC: 4.3 MMOL/L (ref 3.5–5.2)
RBC # BLD AUTO: 4.67 10*6/MM3 (ref 4.14–5.8)
SODIUM SERPL-SCNC: 139 MMOL/L (ref 136–145)
WBC NRBC COR # BLD AUTO: 6.5 10*3/MM3 (ref 3.4–10.8)

## 2023-12-14 PROCEDURE — 93005 ELECTROCARDIOGRAM TRACING: CPT

## 2023-12-14 PROCEDURE — 80048 BASIC METABOLIC PNL TOTAL CA: CPT

## 2023-12-14 PROCEDURE — 85027 COMPLETE CBC AUTOMATED: CPT

## 2023-12-14 PROCEDURE — 36415 COLL VENOUS BLD VENIPUNCTURE: CPT

## 2023-12-14 RX ORDER — OXYMETAZOLINE HYDROCHLORIDE 0.05 G/100ML
SPRAY NASAL
COMMUNITY
Start: 2022-01-19

## 2023-12-14 RX ORDER — VITAMIN C
1 TAB ORAL DAILY
COMMUNITY

## 2023-12-14 RX ORDER — ONDANSETRON 4 MG/1
TABLET, FILM COATED ORAL
COMMUNITY
Start: 2023-10-16

## 2023-12-14 RX ORDER — MELATONIN 10 MG
CAPSULE ORAL
COMMUNITY

## 2023-12-14 RX ORDER — AMLODIPINE BESYLATE 10 MG/1
1 TABLET ORAL DAILY
COMMUNITY
Start: 2023-10-18

## 2023-12-14 RX ORDER — AMLODIPINE BESYLATE 5 MG/1
5 TABLET ORAL
COMMUNITY

## 2023-12-14 RX ORDER — GABAPENTIN 400 MG/1
CAPSULE ORAL
COMMUNITY
Start: 2021-10-21

## 2023-12-14 RX ORDER — BUTALBITAL AND ACETAMINOPHEN 50; 300 MG/1; MG/1
1 CAPSULE ORAL
COMMUNITY

## 2023-12-14 RX ORDER — ATORVASTATIN CALCIUM 40 MG/1
1 TABLET, FILM COATED ORAL DAILY
COMMUNITY

## 2023-12-14 RX ORDER — ALPRAZOLAM 0.25 MG/1
0.5 TABLET ORAL
COMMUNITY

## 2023-12-14 RX ORDER — OXYCODONE AND ACETAMINOPHEN 10; 325 MG/1; MG/1
TABLET ORAL
COMMUNITY
Start: 2023-10-24

## 2023-12-14 RX ORDER — ASPIRIN 81 MG/1
81 TABLET ORAL DAILY
COMMUNITY

## 2023-12-14 RX ORDER — NALOXONE HYDROCHLORIDE 4 MG/.1ML
SPRAY NASAL
COMMUNITY
Start: 2023-10-16

## 2023-12-14 RX ORDER — CYANOCOBALAMIN 1000 UG/ML
INJECTION, SOLUTION INTRAMUSCULAR; SUBCUTANEOUS
COMMUNITY
Start: 2023-09-19

## 2023-12-14 RX ORDER — FOLIC ACID 1 MG/1
1 TABLET ORAL DAILY
COMMUNITY

## 2023-12-14 RX ORDER — CHOLECALCIFEROL (VITAMIN D3) 25 MCG
1 CAPSULE ORAL DAILY
COMMUNITY

## 2023-12-14 RX ORDER — LEVOTHYROXINE SODIUM 0.03 MG/1
1 TABLET ORAL DAILY
COMMUNITY
Start: 2023-11-06

## 2023-12-14 RX ORDER — CLOPIDOGREL BISULFATE 75 MG/1
1 TABLET ORAL DAILY
COMMUNITY
Start: 2023-12-12

## 2023-12-14 RX ORDER — CYCLOBENZAPRINE HCL 5 MG
1 TABLET ORAL 2 TIMES DAILY PRN
COMMUNITY
Start: 2023-11-06

## 2023-12-14 RX ORDER — BUPROPION HYDROCHLORIDE 150 MG/1
1 TABLET ORAL DAILY
COMMUNITY

## 2023-12-14 RX ORDER — TRAZODONE HYDROCHLORIDE 50 MG/1
TABLET ORAL
COMMUNITY
Start: 2023-08-08

## 2023-12-14 RX ORDER — ESCITALOPRAM OXALATE 20 MG/1
1 TABLET ORAL DAILY
COMMUNITY
Start: 2023-11-06 | End: 2023-12-14

## 2023-12-14 RX ORDER — SODIUM CHLORIDE/ALOE VERA
GEL (GRAM) NASAL
COMMUNITY
Start: 2022-01-19

## 2023-12-14 NOTE — DISCHARGE INSTRUCTIONS
Before you come to the hospital        Arrival time: AS DIRECTED BY OFFICE     YOU MAY TAKE THE FOLLOWING MEDICATION(S) THE MORNING OF SURGERY WITH A SIP OF WATER: AS MD HAS DIRECTED ; OK TO TAKE GABAPENTIN MORNING OF SURGERY WITH A SIP OF WATER            ALL OTHER HOME MEDICATION CHECK WITH YOUR PHYSICIAN (especially if   you are taking diabetes medicines or blood thinners)    Do not take any Erectile Dysfunction medications (EX: CIALIS, VIAGRA) 24 hours prior to surgery.      If you were given and instructed to use a germ- killing soap, use as directed the night before surgery and again the morning of surgery or as directed by your surgeon. (Use one-half of the bottle with each shower.)   See attached information for How to Use Chlorhexidine for Bathing if applicable.            Eating and drinking restrictions prior to scheduled arrival time    2 Hours before arrival time STOP   Drinking Clear liquids (water, black coffee-NO CREAM,  apple juice-no pulp)    Clear Liquids    Water and flavored water                                                                      Clear Fruit juices, such as cranberry juice and apple juice.  Black coffee (NO cream of any kind, including powdered).  Plain tea  Clear bouillon or broth.  Flavored gelatin.  Soda.  Gatorade or Powerade.    8 Hours before arrival time STOP   All food, full liquids, and dairy products  Full liquid examples  Juices that have pulp.  Frozen ice pops that contain fruit pieces.  Coffee with creamer  Milk.  Yogurt.    (It is extremely important that you follow these guidelines to prevent delay or cancelation of your procedure)                       MANAGING PAIN AFTER SURGERY    We know you are probably wondering what your pain will be like after surgery.  Following surgery it is unrealistic to expect you will not have pain.   Pain is how our bodies let us know that something is wrong or cautions us to be careful.  That said, our goal is to make your pain  tolerable.    Methods we may use to treat your pain include (oral or IV medications, PCAs, epidurals, nerve blocks, etc.)   While some procedures require IV pain medications for a short time after surgery, transitioning to pain medications by mouth allows for better management of pain.   Your nurse will encourage you to take oral pain medications whenever possible.  IV medications work almost immediately, but only last a short while.  Taking medications by mouth allows for a more constant level of medication in your blood stream for a longer period of time.      Once your pain is out of control it is harder to get back under control.  It is important you are aware when your next dose of pain medication is due.  If you are admitted, your nurse may write the time of your next dose on the white board in your room to help you remember.      We are interested in your pain and encourage you to inform us about aggravating factors during your visit.   Many times a simple repositioning every few hours can make a big difference.    If your physician says it is okay, do not let your pain prevent you from getting out of bed. Be sure to call your nurse for assistance prior to getting up so you do not fall.      Before surgery, please decide your tolerable pain goal.  These faces help describe the pain ratings we use on a 0-10 scale.   Be prepared to tell us your goal and whether or not you take pain or anxiety medications at home.          Preparing for Surgery  Preparing for surgery is an important part of your care. It can make things go more smoothly and help you avoid complications. The steps leading up to surgery may vary among hospitals. Follow all instructions given to you by your health care providers. Ask questions if you do not understand something. Talk about any concerns that you have.  Here are some questions to consider asking before your surgery:  If my surgery is not an emergency (is elective), when would be the  best time to have the surgery?  What arrangements do I need to make for work, home, or school?  What will my recovery be like? How long will it be before I can return to normal activities?  Will I need to prepare my home? Will I need to arrange care for me or my children?  Should I expect to have pain after surgery? What are my pain management options? Are there nonmedical options that I can try for pain?  Tell a health care provider about:  Any allergies you have.  All medicines you are taking, including vitamins, herbs, eye drops, creams, and over-the-counter medicines.  Any problems you or family members have had with anesthetic medicines.  Any blood disorders you have.  Any surgeries you have had.  Any medical conditions you have.  Whether you are pregnant or may be pregnant.  What are the risks?  The risks and complications of surgery depend on the specific procedure that you have. Discuss all the risks with your health care providers before your surgery. Ask about common surgical complications, which may include:  Infection.  Bleeding or a need for blood replacement (transfusion).  Allergic reactions to medicines.  Damage to surrounding nerves, tissues, or structures.  A blood clot.  Scarring.  Failure of the surgery to correct the problem.  Follow these instructions before the procedure:  Several days or weeks before your procedure  You may have a physical exam by your primary health care provider to make sure it is safe for you to have surgery.  You may have testing. This may include a chest X-ray, blood and urine tests, electrocardiogram (ECG), or other testing.  Ask your health care provider about:  Changing or stopping your regular medicines. This is especially important if you are taking diabetes medicines or blood thinners.  Taking medicines such as aspirin and ibuprofen. These medicines can thin your blood. Do not take these medicines unless your health care provider tells you to take them.  Taking  over-the-counter medicines, vitamins, herbs, and supplements.  Do not use any products that contain nicotine or tobacco, such as cigarettes and e-cigarettes. If you need help quitting, ask your health care provider.  Avoid alcohol.  Ask your health care provider if there are exercises you can do to prepare for surgery.  Eat a healthy diet.   Plan to have someone 18 years of age or older to take you home from the hospital. We will need to verify your ride on the morning of surgery if you are being discharged home on the same day. Tell your ride to be expecting a call from the hospital prior to your procedure.   Plan to have a responsible adult care for you for at least 24 hours after you leave the hospital or clinic. This is important.  The day before your procedure  You may be given antibiotic medicine to take by mouth to help prevent infection. Take it as told by your health care provider.  You may be asked to shower with a germ-killing soap.  Follow instructions from your health care provider about eating and drinking restrictions. This includes gum, mints and hard candy.  Pack comfortable clothes according to your procedure.   The day of your procedure  You may need to take another shower with a germ-killing soap before you leave home in the morning.  With a small sip of water, take only the medicines that you are told to take.  Remove all jewelry including rings.   Leave anything you consider valuable at home except hearing aids if needed.  You do not need to bring your home medications into the hospital.   Do not wear any makeup, nail polish, powder, deodorant, lotion, hair accessories, or anything on your skin or body except your clothes.  If you will be staying in the hospital, bring a case to hold your glasses, contacts, or dentures. You may also want to bring your robe and non-skid footwear.       (Do not use denture adhesives since you will be asked to remove them during  surgery).   If you wear oxygen at  home, bring it with you the day of surgery.  If instructed by your health care provider, bring your sleep apnea device with you on the day of your surgery (if this applies to you).  You may want to leave your suitcase and sleep apnea device in the car until after surgery.   Arrive at the hospital as scheduled.  Bring a friend or family member with you who can help to answer questions and be present while you meet with your health care provider.  At the hospital  When you arrive at the hospital:  Go to registration located at the main entrance of the hospital. You will be registered and given a beeper and a sticker sheet. Take the stickers to the Outpatient nurses desk and place in the black tray. This is to notify staff that you have arrived. Then return to the lobby to wait.   When your beeper lights up and vibrates proceed through the double doors, under the stairs, and a member of the Outpatient Surgery staff will escort you to your preoperative room.  You may have to wear compression sleeves. These help to prevent blood clots and reduce swelling in your legs.  An IV may be inserted into one of your veins.              In the operating room, you may be given one or more of the following:        A medicine to help you relax (sedative).        A medicine to numb the area (local anesthetic).        A medicine to make you fall asleep (general anesthetic).        A medicine that is injected into an area of your body to numb everything below the                      injection site (regional anesthetic).  You may be given an antibiotic through your IV to help prevent infection.  Your surgical site will be marked or identified.    Contact a health care provider if you:  Develop a fever of more than 100.4°F (38°C) or other feelings of illness during the 48 hours before your surgery.  Have symptoms that get worse.  Have questions or concerns about your surgery.  Summary  Preparing for surgery can make the procedure go more  smoothly and lower your risk of complications.  Before surgery, make a list of questions and concerns to discuss with your surgeon. Ask about the risks and possible complications.  In the days or weeks before your surgery, follow all instructions from your health care provider. You may need to stop smoking, avoid alcohol, follow eating restrictions, and change or stop your regular medicines.  Contact your surgeon if you develop a fever or other signs of illness during the few days before your surgery.  This information is not intended to replace advice given to you by your health care provider. Make sure you discuss any questions you have with your health care provider.  Document Revised: 12/21/2018 Document Reviewed: 10/23/2018  [employer] Patient Education © 2021 [employer]         How to Use Chlorhexidine Before Surgery  Chlorhexidine gluconate (CHG) is a germ-killing (antiseptic) solution that is used to clean the skin. It can get rid of the bacteria that normally live on the skin and can keep them away for about 24 hours. To clean your skin with CHG, you may be given:  A CHG solution to use in the shower or as part of a sponge bath.  A prepackaged cloth that contains CHG.  Cleaning your skin with CHG may help lower the risk for infection:  While you are staying in the intensive care unit of the hospital.  If you have a vascular access, such as a central line, to provide short-term or long-term access to your veins.  If you have a catheter to drain urine from your bladder.  If you are on a ventilator. A ventilator is a machine that helps you breathe by moving air in and out of your lungs.  After surgery.  What are the risks?  Risks of using CHG include:  A skin reaction.  Hearing loss, if CHG gets in your ears and you have a perforated eardrum.  Eye injury, if CHG gets in your eyes and is not rinsed out.  The CHG product catching fire.  Make sure that you avoid smoking and flames after applying CHG to your  skin.  Do not use CHG:  If you have a chlorhexidine allergy or have previously reacted to chlorhexidine.  On babies younger than 2 months of age.  How to use CHG solution  Use CHG only as told by your health care provider, and follow the instructions on the label.  Use the full amount of CHG as directed. Usually, this is one bottle.  During a shower    Follow these steps when using CHG solution during a shower (unless your health care provider gives you different instructions):  Start the shower.  Use your normal soap and shampoo to wash your face and hair.  Turn off the shower or move out of the shower stream.  Pour the CHG onto a clean washcloth. Do not use any type of brush or rough-edged sponge.  Starting at your neck, lather your body down to your toes. Make sure you follow these instructions:  If you will be having surgery, pay special attention to the part of your body where you will be having surgery. Scrub this area for at least 1 minute.  Do not use CHG on your head or face. If the solution gets into your ears or eyes, rinse them well with water.  Avoid your genital area.  Avoid any areas of skin that have broken skin, cuts, or scrapes.  Scrub your back and under your arms. Make sure to wash skin folds.  Let the lather sit on your skin for 1-2 minutes or as long as told by your health care provider.  Thoroughly rinse your entire body in the shower. Make sure that all body creases and crevices are rinsed well.  Dry off with a clean towel. Do not put any substances on your body afterward--such as powder, lotion, or perfume--unless you are told to do so by your health care provider. Only use lotions that are recommended by the .  Put on clean clothes or pajamas.  If it is the night before your surgery, sleep in clean sheets.     During a sponge bath  Follow these steps when using CHG solution during a sponge bath (unless your health care provider gives you different instructions):  Use your normal  soap and shampoo to wash your face and hair.  Pour the CHG onto a clean washcloth.  Starting at your neck, lather your body down to your toes. Make sure you follow these instructions:  If you will be having surgery, pay special attention to the part of your body where you will be having surgery. Scrub this area for at least 1 minute.  Do not use CHG on your head or face. If the solution gets into your ears or eyes, rinse them well with water.  Avoid your genital area.  Avoid any areas of skin that have broken skin, cuts, or scrapes.  Scrub your back and under your arms. Make sure to wash skin folds.  Let the lather sit on your skin for 1-2 minutes or as long as told by your health care provider.  Using a different clean, wet washcloth, thoroughly rinse your entire body. Make sure that all body creases and crevices are rinsed well.  Dry off with a clean towel. Do not put any substances on your body afterward--such as powder, lotion, or perfume--unless you are told to do so by your health care provider. Only use lotions that are recommended by the .  Put on clean clothes or pajamas.  If it is the night before your surgery, sleep in clean sheets.  How to use CHG prepackaged cloths  Only use CHG cloths as told by your health care provider, and follow the instructions on the label.  Use the CHG cloth on clean, dry skin.  Do not use the CHG cloth on your head or face unless your health care provider tells you to.  When washing with the CHG cloth:  Avoid your genital area.  Avoid any areas of skin that have broken skin, cuts, or scrapes.  Before surgery    Follow these steps when using a CHG cloth to clean before surgery (unless your health care provider gives you different instructions):  Using the CHG cloth, vigorously scrub the part of your body where you will be having surgery. Scrub using a back-and-forth motion for 3 minutes. The area on your body should be completely wet with CHG when you are done  scrubbing.  Do not rinse. Discard the cloth and let the area air-dry. Do not put any substances on the area afterward, such as powder, lotion, or perfume.  Put on clean clothes or pajamas.  If it is the night before your surgery, sleep in clean sheets.     For general bathing  Follow these steps when using CHG cloths for general bathing (unless your health care provider gives you different instructions).  Use a separate CHG cloth for each area of your body. Make sure you wash between any folds of skin and between your fingers and toes. Wash your body in the following order, switching to a new cloth after each step:  The front of your neck, shoulders, and chest.  Both of your arms, under your arms, and your hands.  Your stomach and groin area, avoiding the genitals.  Your right leg and foot.  Your left leg and foot.  The back of your neck, your back, and your buttocks.  Do not rinse. Discard the cloth and let the area air-dry. Do not put any substances on your body afterward--such as powder, lotion, or perfume--unless you are told to do so by your health care provider. Only use lotions that are recommended by the .  Put on clean clothes or pajamas.  Contact a health care provider if:  Your skin gets irritated after scrubbing.  You have questions about using your solution or cloth.  You swallow any chlorhexidine. Call your local poison control center (1-329.696.7380 in the [place]  Get help right away if:  Your eyes itch badly, or they become very red or swollen.  Your skin itches badly and is red or swollen.  Your hearing changes.  You have trouble seeing.  You have swelling or tingling in your mouth or throat.  You have trouble breathing.  These symptoms may represent a serious problem that is an emergency. Do not wait to see if the symptoms will go away. Get medical help right away. Call your local emergency services (020 in the [place] Do not drive yourself to the hospital.  Summary  Chlorhexidine  gluconate (CHG) is a germ-killing (antiseptic) solution that is used to clean the skin. Cleaning your skin with CHG may help to lower your risk for infection.  You may be given CHG to use for bathing. It may be in a bottle or in a prepackaged cloth to use on your skin. Carefully follow your health care provider's instructions and the instructions on the product label.  Do not use CHG if you have a chlorhexidine allergy.  Contact your health care provider if your skin gets irritated after scrubbing.  This information is not intended to replace advice given to you by your health care provider. Make sure you discuss any questions you have with your health care provider.  Document Revised: 04/17/2023 Document Reviewed: 02/28/2022  [employer] Patient Education © 2023 [employer]

## 2023-12-18 LAB
QT INTERVAL: 370 MS
QTC INTERVAL: 410 MS

## 2023-12-21 ENCOUNTER — HOSPITAL ENCOUNTER (OUTPATIENT)
Facility: HOSPITAL | Age: 58
Setting detail: HOSPITAL OUTPATIENT SURGERY
Discharge: HOME | End: 2023-12-21
Attending: ORTHOPAEDIC SURGERY | Admitting: ORTHOPAEDIC SURGERY
Payer: MEDICARE

## 2023-12-21 ENCOUNTER — APPOINTMENT (OUTPATIENT)
Dept: GENERAL RADIOLOGY | Facility: HOSPITAL | Age: 58
End: 2023-12-21
Payer: MEDICARE

## 2023-12-21 ENCOUNTER — ANESTHESIA EVENT (OUTPATIENT)
Dept: PERIOP | Facility: HOSPITAL | Age: 58
End: 2023-12-21
Payer: MEDICARE

## 2023-12-21 ENCOUNTER — ANESTHESIA (OUTPATIENT)
Dept: PERIOP | Facility: HOSPITAL | Age: 58
End: 2023-12-21
Payer: MEDICARE

## 2023-12-21 VITALS
SYSTOLIC BLOOD PRESSURE: 112 MMHG | OXYGEN SATURATION: 95 % | HEART RATE: 74 BPM | DIASTOLIC BLOOD PRESSURE: 84 MMHG | TEMPERATURE: 97.9 F | RESPIRATION RATE: 16 BRPM

## 2023-12-21 DIAGNOSIS — Z96.611 S/P REVERSE TOTAL SHOULDER ARTHROPLASTY, RIGHT: Primary | ICD-10-CM

## 2023-12-21 PROCEDURE — 76000 FLUOROSCOPY <1 HR PHYS/QHP: CPT

## 2023-12-21 PROCEDURE — 73030 X-RAY EXAM OF SHOULDER: CPT

## 2023-12-21 PROCEDURE — 25010000002 FENTANYL CITRATE (PF) 50 MCG/ML SOLUTION: Performed by: ANESTHESIOLOGY

## 2023-12-21 PROCEDURE — C1713 ANCHOR/SCREW BN/BN,TIS/BN: HCPCS | Performed by: ORTHOPAEDIC SURGERY

## 2023-12-21 PROCEDURE — 25010000002 CLINDAMYCIN PER 300 MG: Performed by: ORTHOPAEDIC SURGERY

## 2023-12-21 PROCEDURE — C1776 JOINT DEVICE (IMPLANTABLE): HCPCS | Performed by: ORTHOPAEDIC SURGERY

## 2023-12-21 PROCEDURE — C9290 INJ, BUPIVACAINE LIPOSOME: HCPCS | Performed by: ANESTHESIOLOGY

## 2023-12-21 PROCEDURE — 25810000003 LACTATED RINGERS PER 1000 ML: Performed by: ORTHOPAEDIC SURGERY

## 2023-12-21 PROCEDURE — 25010000002 MIDAZOLAM PER 1 MG: Performed by: ANESTHESIOLOGY

## 2023-12-21 PROCEDURE — 25010000002 HYDROMORPHONE PER 4 MG: Performed by: ANESTHESIOLOGY

## 2023-12-21 PROCEDURE — 0 BUPIVACAINE LIPOSOME 1.3 % SUSPENSION: Performed by: ANESTHESIOLOGY

## 2023-12-21 PROCEDURE — 25010000002 PROPOFOL 10 MG/ML EMULSION: Performed by: NURSE ANESTHETIST, CERTIFIED REGISTERED

## 2023-12-21 PROCEDURE — 25010000002 ONDANSETRON PER 1 MG: Performed by: NURSE ANESTHETIST, CERTIFIED REGISTERED

## 2023-12-21 PROCEDURE — 25010000002 BUPIVACAINE (PF) 0.5 % SOLUTION: Performed by: ANESTHESIOLOGY

## 2023-12-21 PROCEDURE — 25010000002 DEXAMETHASONE PER 1 MG: Performed by: ANESTHESIOLOGY

## 2023-12-21 PROCEDURE — 25010000002 DEXAMETHASONE PER 1 MG: Performed by: NURSE ANESTHETIST, CERTIFIED REGISTERED

## 2023-12-21 DEVICE — SCREW, LOCKING BONE, RSP, 5X30
Type: IMPLANTABLE DEVICE | Site: SHOULDER | Status: FUNCTIONAL
Brand: DJO SURGICAL

## 2023-12-21 DEVICE — BASEPLT GLEN REV RSP TI 26X30MM: Type: IMPLANTABLE DEVICE | Site: SHOULDER | Status: FUNCTIONAL

## 2023-12-21 DEVICE — 2.8MM Q-FIX ALL SUTURE ANCHOR
Type: IMPLANTABLE DEVICE | Site: SHOULDER | Status: FUNCTIONAL
Brand: Q-FIX

## 2023-12-21 DEVICE — IMPLANTABLE DEVICE: Type: IMPLANTABLE DEVICE | Site: SHOULDER | Status: FUNCTIONAL

## 2023-12-21 DEVICE — SCREW, LOCKING BONE, RSP, 5X26
Type: IMPLANTABLE DEVICE | Site: SHOULDER | Status: FUNCTIONAL
Brand: DJO SURGICAL

## 2023-12-21 DEVICE — SCREW, LOCKING BONE, RSP, 5X34
Type: IMPLANTABLE DEVICE | Site: SHOULDER | Status: FUNCTIONAL
Brand: DJO SURGICAL

## 2023-12-21 DEVICE — TOTL REV SHLDR DJO: Type: IMPLANTABLE DEVICE | Status: FUNCTIONAL

## 2023-12-21 DEVICE — DEV CONTRL TISS STRATAFIX SYMM PDS PLS SZ 0 45CM VIL: Type: IMPLANTABLE DEVICE | Site: SHOULDER | Status: FUNCTIONAL

## 2023-12-21 DEVICE — DEV CONTRL TISS STRATAFIX SPIRAL MNCRYL UD 3/0 PLS 60CM: Type: IMPLANTABLE DEVICE | Site: SHOULDER | Status: FUNCTIONAL

## 2023-12-21 RX ORDER — IBUPROFEN 600 MG/1
600 TABLET ORAL ONCE AS NEEDED
Status: DISCONTINUED | OUTPATIENT
Start: 2023-12-21 | End: 2023-12-21 | Stop reason: HOSPADM

## 2023-12-21 RX ORDER — ONDANSETRON 2 MG/ML
INJECTION INTRAMUSCULAR; INTRAVENOUS AS NEEDED
Status: DISCONTINUED | OUTPATIENT
Start: 2023-12-21 | End: 2023-12-21 | Stop reason: SURG

## 2023-12-21 RX ORDER — FLUMAZENIL 0.1 MG/ML
0.2 INJECTION INTRAVENOUS AS NEEDED
Status: DISCONTINUED | OUTPATIENT
Start: 2023-12-21 | End: 2023-12-21 | Stop reason: HOSPADM

## 2023-12-21 RX ORDER — FENTANYL CITRATE 50 UG/ML
25 INJECTION, SOLUTION INTRAMUSCULAR; INTRAVENOUS
Status: DISCONTINUED | OUTPATIENT
Start: 2023-12-21 | End: 2023-12-21 | Stop reason: HOSPADM

## 2023-12-21 RX ORDER — SODIUM CHLORIDE 0.9 % (FLUSH) 0.9 %
10 SYRINGE (ML) INJECTION EVERY 12 HOURS SCHEDULED
Status: DISCONTINUED | OUTPATIENT
Start: 2023-12-21 | End: 2023-12-21 | Stop reason: HOSPADM

## 2023-12-21 RX ORDER — MIDAZOLAM HYDROCHLORIDE 1 MG/ML
2 INJECTION INTRAMUSCULAR; INTRAVENOUS ONCE
Status: COMPLETED | OUTPATIENT
Start: 2023-12-21 | End: 2023-12-21

## 2023-12-21 RX ORDER — ONDANSETRON 4 MG/1
4 TABLET, FILM COATED ORAL EVERY 8 HOURS PRN
Qty: 20 TABLET | Refills: 0 | Status: SHIPPED | OUTPATIENT
Start: 2023-12-21

## 2023-12-21 RX ORDER — SODIUM CHLORIDE, SODIUM LACTATE, POTASSIUM CHLORIDE, CALCIUM CHLORIDE 600; 310; 30; 20 MG/100ML; MG/100ML; MG/100ML; MG/100ML
100 INJECTION, SOLUTION INTRAVENOUS CONTINUOUS PRN
Status: DISCONTINUED | OUTPATIENT
Start: 2023-12-21 | End: 2023-12-21 | Stop reason: HOSPADM

## 2023-12-21 RX ORDER — DEXAMETHASONE SODIUM PHOSPHATE 4 MG/ML
INJECTION, SOLUTION INTRA-ARTICULAR; INTRALESIONAL; INTRAMUSCULAR; INTRAVENOUS; SOFT TISSUE AS NEEDED
Status: DISCONTINUED | OUTPATIENT
Start: 2023-12-21 | End: 2023-12-21 | Stop reason: SURG

## 2023-12-21 RX ORDER — SODIUM CHLORIDE 0.9 % (FLUSH) 0.9 %
10 SYRINGE (ML) INJECTION AS NEEDED
Status: DISCONTINUED | OUTPATIENT
Start: 2023-12-21 | End: 2023-12-21 | Stop reason: HOSPADM

## 2023-12-21 RX ORDER — MIDAZOLAM HYDROCHLORIDE 1 MG/ML
1 INJECTION INTRAMUSCULAR; INTRAVENOUS
Status: DISCONTINUED | OUTPATIENT
Start: 2023-12-21 | End: 2023-12-21 | Stop reason: HOSPADM

## 2023-12-21 RX ORDER — MAGNESIUM HYDROXIDE 1200 MG/15ML
LIQUID ORAL AS NEEDED
Status: DISCONTINUED | OUTPATIENT
Start: 2023-12-21 | End: 2023-12-21 | Stop reason: HOSPADM

## 2023-12-21 RX ORDER — LIDOCAINE HYDROCHLORIDE 20 MG/ML
INJECTION, SOLUTION EPIDURAL; INFILTRATION; INTRACAUDAL; PERINEURAL AS NEEDED
Status: DISCONTINUED | OUTPATIENT
Start: 2023-12-21 | End: 2023-12-21 | Stop reason: SURG

## 2023-12-21 RX ORDER — PROPOFOL 10 MG/ML
VIAL (ML) INTRAVENOUS AS NEEDED
Status: DISCONTINUED | OUTPATIENT
Start: 2023-12-21 | End: 2023-12-21 | Stop reason: SURG

## 2023-12-21 RX ORDER — CLINDAMYCIN PHOSPHATE 900 MG/50ML
900 INJECTION, SOLUTION INTRAVENOUS ONCE
Status: COMPLETED | OUTPATIENT
Start: 2023-12-21 | End: 2023-12-21

## 2023-12-21 RX ORDER — LIDOCAINE HYDROCHLORIDE 10 MG/ML
0.5 INJECTION, SOLUTION EPIDURAL; INFILTRATION; INTRACAUDAL; PERINEURAL ONCE AS NEEDED
Status: DISCONTINUED | OUTPATIENT
Start: 2023-12-21 | End: 2023-12-21 | Stop reason: HOSPADM

## 2023-12-21 RX ORDER — DROPERIDOL 2.5 MG/ML
0.62 INJECTION, SOLUTION INTRAMUSCULAR; INTRAVENOUS ONCE AS NEEDED
Status: DISCONTINUED | OUTPATIENT
Start: 2023-12-21 | End: 2023-12-21 | Stop reason: HOSPADM

## 2023-12-21 RX ORDER — LABETALOL HYDROCHLORIDE 5 MG/ML
5 INJECTION, SOLUTION INTRAVENOUS
Status: DISCONTINUED | OUTPATIENT
Start: 2023-12-21 | End: 2023-12-21 | Stop reason: HOSPADM

## 2023-12-21 RX ORDER — SODIUM CHLORIDE, SODIUM LACTATE, POTASSIUM CHLORIDE, CALCIUM CHLORIDE 600; 310; 30; 20 MG/100ML; MG/100ML; MG/100ML; MG/100ML
9 INJECTION, SOLUTION INTRAVENOUS CONTINUOUS
Status: DISCONTINUED | OUTPATIENT
Start: 2023-12-21 | End: 2023-12-21 | Stop reason: HOSPADM

## 2023-12-21 RX ORDER — BUPIVACAINE HCL/0.9 % NACL/PF 0.125 %
PLASTIC BAG, INJECTION (ML) EPIDURAL AS NEEDED
Status: DISCONTINUED | OUTPATIENT
Start: 2023-12-21 | End: 2023-12-21 | Stop reason: SURG

## 2023-12-21 RX ORDER — SODIUM CHLORIDE 9 MG/ML
40 INJECTION, SOLUTION INTRAVENOUS AS NEEDED
Status: DISCONTINUED | OUTPATIENT
Start: 2023-12-21 | End: 2023-12-21 | Stop reason: HOSPADM

## 2023-12-21 RX ORDER — ROCURONIUM BROMIDE 10 MG/ML
INJECTION, SOLUTION INTRAVENOUS AS NEEDED
Status: DISCONTINUED | OUTPATIENT
Start: 2023-12-21 | End: 2023-12-21 | Stop reason: SURG

## 2023-12-21 RX ORDER — OXYCODONE AND ACETAMINOPHEN 10; 325 MG/1; MG/1
1 TABLET ORAL EVERY 4 HOURS PRN
Qty: 42 TABLET | Refills: 0 | Status: SHIPPED | OUTPATIENT
Start: 2023-12-21

## 2023-12-21 RX ORDER — TRANEXAMIC ACID 100 MG/ML
INJECTION, SOLUTION INTRAVENOUS AS NEEDED
Status: DISCONTINUED | OUTPATIENT
Start: 2023-12-21 | End: 2023-12-21 | Stop reason: SURG

## 2023-12-21 RX ORDER — BUPIVACAINE HYDROCHLORIDE 5 MG/ML
INJECTION, SOLUTION EPIDURAL; INTRACAUDAL
Status: COMPLETED | OUTPATIENT
Start: 2023-12-21 | End: 2023-12-21

## 2023-12-21 RX ORDER — OXYCODONE AND ACETAMINOPHEN 7.5; 325 MG/1; MG/1
1 TABLET ORAL ONCE AS NEEDED
Status: DISCONTINUED | OUTPATIENT
Start: 2023-12-21 | End: 2023-12-21 | Stop reason: HOSPADM

## 2023-12-21 RX ORDER — ONDANSETRON 2 MG/ML
4 INJECTION INTRAMUSCULAR; INTRAVENOUS
Status: DISCONTINUED | OUTPATIENT
Start: 2023-12-21 | End: 2023-12-21 | Stop reason: HOSPADM

## 2023-12-21 RX ORDER — HYDROMORPHONE HYDROCHLORIDE 1 MG/ML
0.5 INJECTION, SOLUTION INTRAMUSCULAR; INTRAVENOUS; SUBCUTANEOUS
Status: DISCONTINUED | OUTPATIENT
Start: 2023-12-21 | End: 2023-12-21 | Stop reason: HOSPADM

## 2023-12-21 RX ORDER — DEXTROSE MONOHYDRATE 25 G/50ML
12.5 INJECTION, SOLUTION INTRAVENOUS AS NEEDED
Status: DISCONTINUED | OUTPATIENT
Start: 2023-12-21 | End: 2023-12-21 | Stop reason: HOSPADM

## 2023-12-21 RX ORDER — DEXAMETHASONE SODIUM PHOSPHATE 4 MG/ML
4 INJECTION, SOLUTION INTRA-ARTICULAR; INTRALESIONAL; INTRAMUSCULAR; INTRAVENOUS; SOFT TISSUE ONCE AS NEEDED
Status: COMPLETED | OUTPATIENT
Start: 2023-12-21 | End: 2023-12-21

## 2023-12-21 RX ORDER — OXYCODONE AND ACETAMINOPHEN 10; 325 MG/1; MG/1
1 TABLET ORAL ONCE AS NEEDED
Status: COMPLETED | OUTPATIENT
Start: 2023-12-21 | End: 2023-12-21

## 2023-12-21 RX ORDER — NALOXONE HCL 0.4 MG/ML
0.04 VIAL (ML) INJECTION AS NEEDED
Status: DISCONTINUED | OUTPATIENT
Start: 2023-12-21 | End: 2023-12-21 | Stop reason: HOSPADM

## 2023-12-21 RX ORDER — FENTANYL CITRATE 50 UG/ML
50 INJECTION, SOLUTION INTRAMUSCULAR; INTRAVENOUS ONCE
Status: COMPLETED | OUTPATIENT
Start: 2023-12-21 | End: 2023-12-21

## 2023-12-21 RX ADMIN — FENTANYL CITRATE 25 MCG: 50 INJECTION, SOLUTION INTRAMUSCULAR; INTRAVENOUS at 12:27

## 2023-12-21 RX ADMIN — HYDROMORPHONE HYDROCHLORIDE 0.5 MG: 1 INJECTION, SOLUTION INTRAMUSCULAR; INTRAVENOUS; SUBCUTANEOUS at 12:34

## 2023-12-21 RX ADMIN — FENTANYL CITRATE 50 MCG: 50 INJECTION, SOLUTION INTRAMUSCULAR; INTRAVENOUS at 08:00

## 2023-12-21 RX ADMIN — Medication 100 MCG: at 10:22

## 2023-12-21 RX ADMIN — BUPIVACAINE 10 ML: 13.3 INJECTION, SUSPENSION, LIPOSOMAL INFILTRATION at 08:05

## 2023-12-21 RX ADMIN — LIDOCAINE HYDROCHLORIDE 100 MG: 20 INJECTION, SOLUTION EPIDURAL; INFILTRATION; INTRACAUDAL; PERINEURAL at 09:50

## 2023-12-21 RX ADMIN — PROPOFOL 200 MG: 10 INJECTION, EMULSION INTRAVENOUS at 09:50

## 2023-12-21 RX ADMIN — ONDANSETRON 4 MG: 2 INJECTION INTRAMUSCULAR; INTRAVENOUS at 11:35

## 2023-12-21 RX ADMIN — BUPIVACAINE HYDROCHLORIDE 10 ML: 5 INJECTION, SOLUTION EPIDURAL; INTRACAUDAL; PERINEURAL at 08:05

## 2023-12-21 RX ADMIN — ROCURONIUM BROMIDE 35 MG: 10 SOLUTION INTRAVENOUS at 09:50

## 2023-12-21 RX ADMIN — CLINDAMYCIN PHOSPHATE 900 MG: 900 INJECTION, SOLUTION INTRAVENOUS at 09:56

## 2023-12-21 RX ADMIN — DEXAMETHASONE SODIUM PHOSPHATE 4 MG: 4 INJECTION, SOLUTION INTRAMUSCULAR; INTRAVENOUS at 10:42

## 2023-12-21 RX ADMIN — FENTANYL CITRATE 25 MCG: 50 INJECTION, SOLUTION INTRAMUSCULAR; INTRAVENOUS at 12:38

## 2023-12-21 RX ADMIN — MIDAZOLAM HYDROCHLORIDE 2 MG: 1 INJECTION, SOLUTION INTRAMUSCULAR; INTRAVENOUS at 08:00

## 2023-12-21 RX ADMIN — TRANEXAMIC ACID 1000 MG: 100 INJECTION, SOLUTION INTRAVENOUS at 11:40

## 2023-12-21 RX ADMIN — SODIUM CHLORIDE, POTASSIUM CHLORIDE, SODIUM LACTATE AND CALCIUM CHLORIDE 100 ML/HR: 600; 310; 30; 20 INJECTION, SOLUTION INTRAVENOUS at 07:41

## 2023-12-21 RX ADMIN — HYDROMORPHONE HYDROCHLORIDE 0.5 MG: 1 INJECTION, SOLUTION INTRAMUSCULAR; INTRAVENOUS; SUBCUTANEOUS at 12:24

## 2023-12-21 RX ADMIN — OXYCODONE AND ACETAMINOPHEN 1 TABLET: 325; 10 TABLET ORAL at 13:01

## 2023-12-21 RX ADMIN — DEXAMETHASONE SODIUM PHOSPHATE 4 MG: 4 INJECTION INTRA-ARTICULAR; INTRALESIONAL; INTRAMUSCULAR; INTRAVENOUS; SOFT TISSUE at 08:00

## 2023-12-21 RX ADMIN — Medication 100 MCG: at 10:05

## 2023-12-21 RX ADMIN — FENTANYL CITRATE 50 MCG: 50 INJECTION, SOLUTION INTRAMUSCULAR; INTRAVENOUS at 12:53

## 2023-12-21 RX ADMIN — ROCURONIUM BROMIDE 15 MG: 10 SOLUTION INTRAVENOUS at 10:22

## 2023-12-21 RX ADMIN — TRANEXAMIC ACID 1000 MG: 100 INJECTION, SOLUTION INTRAVENOUS at 10:11

## 2023-12-21 RX ADMIN — HYDROMORPHONE HYDROCHLORIDE 0.5 MG: 1 INJECTION, SOLUTION INTRAMUSCULAR; INTRAVENOUS; SUBCUTANEOUS at 12:44

## 2023-12-21 NOTE — ANESTHESIA PREPROCEDURE EVALUATION
Anesthesia Evaluation     Patient summary reviewed   no history of anesthetic complications:   NPO Solid Status: > 8 hours             Airway   Mallampati: II  TM distance: >3 FB  Neck ROM: full  Dental      Pulmonary    (+) a smoker vape,  (-) COPD, asthma, sleep apnea  Cardiovascular   Exercise tolerance: excellent (>7 METS)    (+) hypertension, hyperlipidemia  (-) pacemaker, past MI, angina, cardiac stents      Neuro/Psych  (+) CVA  (-) seizures, TIA  GI/Hepatic/Renal/Endo    (+) obesity  (-) GERD, liver disease, no renal disease, diabetes    Musculoskeletal     Abdominal    Substance History      OB/GYN          Other                    Anesthesia Plan    ASA 3     general with block     intravenous induction     Anesthetic plan, risks, benefits, and alternatives have been provided, discussed and informed consent has been obtained with: patient.    CODE STATUS:

## 2023-12-21 NOTE — H&P
Orthopedic History and Physical    Pt Name: Renato Chavez  MRN: [id number]  YOB: 1965  Date: 12/21/2023      HPI: 58-year-old male presents today for right reverse total shoulder arthroplasty.      Past Medical/Surgical History:   Past Medical History:   Diagnosis Date    Anxiety     Arthritis     Depression     Disease of thyroid gland     Hypertension     Migraines     Pneumonia     Stroke 2013     Past Surgical History:   Procedure Laterality Date    BACK SURGERY  2013    CARPAL TUNNEL RELEASE      X3    COLON SURGERY  2018    COLONOSCOPY      MULTIPLE    TONSILLECTOMY  1984         Social History:   Social History     Socioeconomic History    Marital status:    Tobacco Use    Smoking status: Never    Smokeless tobacco: Never   Vaping Use    Vaping Use: Some days    Substances: Nicotine, Flavoring   Substance and Sexual Activity    Alcohol use: Not Currently    Drug use: Not Currently    Sexual activity: Defer            Medications: No current facility-administered medications for this encounter.    Allergies:   Allergies   Allergen Reactions    Penicillins Anaphylaxis     IN CHILDHOOD          Review of Systems   Constitutional: Negative.    HENT: Negative.     Eyes: Negative.    Respiratory: Negative.     Cardiovascular: Negative.    Gastrointestinal: Negative.    Endocrine: Negative.    Genitourinary: Negative.    Musculoskeletal: Negative.    Skin: Negative.    Allergic/Immunologic: Negative.    Neurological: Negative.    Hematological: Negative.    Psychiatric/Behavioral: Negative.            Physical Exam  Constitutional:       Appearance: He is well-developed.   HENT:      Head: Normocephalic and atraumatic.   Eyes:      Conjunctiva/sclera: Conjunctivae normal.   Pulmonary:      Effort: Pulmonary effort is normal.      Breath sounds: Normal breath sounds.   Abdominal:      Palpations: Abdomen is soft.   Musculoskeletal:         General: Tenderness present.      Cervical back: Normal range  of motion and neck supple.   Skin:     General: Skin is warm and dry.   Neurological:      Mental Status: He is alert and oriented to person, place, and time.   Psychiatric:         Behavior: Behavior normal.         Thought Content: Thought content normal.         Judgment: Judgment normal.         Ortho Exam:  Right shoulder tenderness palpation decreased range of motion.      Imaging:  Imaging Results (Last 72 Hours)       ** No results found for the last 72 hours. **            Labs:   Lab Results (last 24 hours)       ** No results found for the last 24 hours. **            Impression: Right shoulder glenohumeral osteoarthritis with rotator cuff arthropathy.      Surgical Plan: Right reverse total shoulder arthroplasty.      Electronically signed by Lai Yeh MD on 12/21/2023 at 07:07 CST

## 2023-12-21 NOTE — DISCHARGE INSTRUCTIONS
What to expect after a Nerve Block  Nerve blocks administered to block pain affect many types of nerves, including those nerves that control movement, pain, and normal sensation.  Following a nerve block, you may notice some bruising at the site where the block was given.  You may experience sensations such as:  numbness of the affected area or limb, tingling, heaviness (that is the limb feels heavy to you), weakness or inability to move the affected arm or leg, or a feeling as if your arm or leg has “fallen asleep”.    A nerve block can last from 9-18 hours depending on the medications used.  Certain medications can last up to 72 hours, your anesthesiologist may have discussed this with you pre-op. Usually the weakness wears off first followed by the tingling and heaviness.  As the block wears off, you may begin to notice pain; however, this sequence of events may occur in any order.  Typically, you will be able to move your limb before you will feel it.  Once a nerve block begins to wear off, the effects are usually completely gone within 60 minutes.    If you experience continued side effects that you believe are block related, please call your healthcare provider.  Please see block-specific instructions below.      Instructions for any block involving the shoulder or arm  If you have had any kind of shoulder/arm block, you will go home with your arm in a sling.  Wear the sling until the block has completely worn off or as directed by your doctor.  You may be required to wear it for a longer period of time per your surgeon’s recommendations.  I you have had a shoulder/arm block; it is a good idea to sleep on a recliner with pillows under your arm.    Note:  If you have severe or prolonged shortness of breath, please seek medical assistance as soon as possible.    Protection of a “blocked” arm (limb)  After a nerve block, you cannot feel pain, pressure, or extremes of temperature in the affected limb.  And because  of this, your blocked limb is at more risk for injury.  For example, it is possible to burn your limb on an extremely hot surface without feeling it.  When resting, it is important to reposition your limb periodically to avoid prolonged pressure on it.  This may require the use of pillows and padding.  While sleeping, you should avoid rolling onto the affected limb or putting too much pressure on it.  If you have a cast or tight dressing, check the color of your fingers of the affected limb.  Call your surgeon if they look discolored (that is, dusky, dark colored)  Use caution in cold weather.  Cover your limb appropriately to protect it from the cold.  Pain Management  Your surgeon will give you a prescription for pain medication.  Begin taking this before the nerve block wears off.  Bear in mind that sometimes the block can wear off in the middle of the night.

## 2023-12-21 NOTE — ANESTHESIA POSTPROCEDURE EVALUATION
Patient: Renato Chavez    Procedure Summary       Date: 12/21/23 Room / Location:  [facility] 09 /  [facility]    Anesthesia Start: 0946 Anesthesia Stop: 1203    Procedure: RIGHT REVERSE TOTAL SHOULDER ARTHROPLASTY (Right: Shoulder) Diagnosis: (RIGHT SHOULDER PAIN)    Surgeons: Lai Yeh MD Provider: Vipul Arevalo CRNA    Anesthesia Type: general with block ASA Status: 3            Anesthesia Type: general with block    Vitals  Vitals Value Taken Time   /86 12/21/23 1330   Temp 97.9 °F (36.6 °C) 12/21/23 1330   Pulse 64 12/21/23 1330   Resp 14 12/21/23 1330   SpO2 95 % 12/21/23 1330           Post Anesthesia Care and Evaluation    Patient location during evaluation: PHASE II  Patient participation: complete - patient participated  Level of consciousness: awake and awake and alert  Pain score: 0  Pain management: adequate    Airway patency: patent  Anesthetic complications: No anesthetic complications  PONV Status: none  Cardiovascular status: acceptable  Respiratory status: acceptable  Hydration status: acceptable    Comments: Patient discharged according to acceptable Raffaele score per RN assessment. See nursing records for further information.     Blood pressure 112/84, pulse 74, temperature 97.9 °F (36.6 °C), temperature source Temporal, resp. rate 16, SpO2 95%.

## 2023-12-21 NOTE — OP NOTE
Pt Name: Renato Chavez  MRN: [id number]  YOB: 1965  Date: 12/21/2023      PREOPERATIVE DIAGNOSIS:   1.  Right shoulder primary osteoarthritis  2.  Right shoulder rotator cuff tear arthropathy    POSTOPERATIVE DIAGNOSIS:   1.  Right shoulder primary osteoarthritis  2.  Right shoulder rotator cuff tear arthropathy     PROCEDURE:    1.  Right reverse Total Shoulder Arthroplasty     SURGEON:  Lai Yeh M.D.    ASSISTANT: Adelina Hale    ANESTHESIA:  General with Block    ESTIMATED BLOOD LOSS:  Minimal.    COMPLICATIONS:  None.    CONDITION:  Stable.    IMPLANTS: DJO 16 x 48 short stem with a small shell, 36 glenosphere, P2 30 mm baseplate, four 5.0 peripheral locking screws and a +4 vitamin E polyethylene spacer for a 36 glenosphere and a large shell stem.    INDICATIONS: Patient is a 58-year-old male presents today for a right reverse total shoulder arthroplasty.  Patient has chronic end-stage arthritis with rotator cuff disease.  He has failed extensive conservative management including oral anti-inflammatories activity modification time and corticosteroid injections.  He is a point now where the pain is causing significant difficulty with actives of daily living with daily pain.      DESCRIPTION OF PROCEDURE   The patient was interviewed in the preanesthesia area, where the shoulder was  marked with a marking pen. The patient was then administered an ultrasound-guided  regional block per the anesthesia team.    The patient was taken to the operative suite, where general endotracheal anesthesia was performed by the anesthesia team. A timeout was then called to confirm the patient and the operative site as well as the planned procedure and administration2  Anc. The patient was then positioned in the beach chair position and prepped and draped in standard sterile fashion using ChloraPrep.       A standard deltopectoral incision was carried out to the shoulder, and the  cephalic vein and deltoid were  retracted laterally. An [employer] was then  placed medially beneath the conjoined tendon. The biceps tendon was then  carefully dissected out and tenotomized at the level of the pectoralis major.  I then tenodesed the distal biceps to the upper pectoralis major insertion site.      The biceps was then followed into the joint. The subscapularis was then  taken down anteriorly as the arm was carefully externally rotated and  dislocated from the shoulder.  The extra medullary proximal humeral cutting guide was then placed down the shaft of the humerus inside the incision.  I then pinned the guide for a cut in 30 degrees of retroversion.  The cut was made right at the level of the rotator cuff insertion site.  Attention was then turned to the glenoid.     A Fukuda retractor was then placed posteriorly with an anterior neck  retractor anteriorly. An inferior capsulotomy was then performed. The  superior biceps complex as well as the labrum were then removed in their  entirety. The glenoid was then carefully dissected out. A glenoid scraper  was used to remove the intraarticular cartilage.  The guide was placed in the center of the glenoid and the drill bit was used to drill my central hole.  The half-moon reamer was then placed over the tap which had been placed through the drill hole.  I then reamed the glenoid down to bleeding bone.  I screwed in the P2 DJO baseplate.  This got excellent purchase.  I then placed four 5.0 peripheral locking screws.  A 36 mm glenosphere was then placed and the setscrew was placed.    The humerus was sized for a large shell.  A rongeur was used to remove some bone at the highest point.  I then reamed up to a size 10 reamer by hand.  I then broached up to a size 16 for a 16 x 48 mm stem in 30 degrees of retroversion.  I then placed a proximal reamer over the top of the broach handle to ream out the proximal humerus for a small shell implant.  I then placed two 2.8 mm double loaded [employer] Q fix anchors into the lesser tuberosity.  The stem was then placed in 30 degrees of retroversion and malleted into position.    The shoulder was then reduced with a +4 polyethylene spacer for a 36 mm glenosphere.  The shoulder was found to be stable throughout an arc of motion with limited gapping with a shuck test and no impingement.  Intraoperative fluoroscopy demonstrated appropriate placement of implants.    The shoulder was then dislocated and the trial implant was removed.  The shoulder was then irrigated with pulsatile lavage.  Meticulous hemostasis was maintained with electrocautery.  A definitive size +4 polyethylene spacer for a 36 mm glenosphere and for a large shell humeral implant was then placed.  The shoulder was then reduced and taken through an arc of motion once again and found to be stable.    Attention then turned to the subscapularis repair.  Deltoid retractor was held by the assistant surgeon to retract the deltoid.  I then passed 4 simple sutures through the subscapularis tendon and tied it back down to the lesser tuberosity with the arm at 30 degrees of external rotation.    The incision was then closed in a layered fashion with a strata fix suture and a superglue skin closure.  The patient was placed in a shoulder immobilizer.  He awoke from anesthesia without difficulty and was transferred to the PACU in stable condition.          ________________________________  Lai Yeh MD

## 2023-12-21 NOTE — ANESTHESIA PROCEDURE NOTES
Airway  Urgency: elective    Date/Time: 12/21/2023 9:51 AM  Airway not difficult    General Information and Staff    Patient location during procedure: OR  CRNA/CAA: Vipul Arevalo CRNA    Indications and Patient Condition  Indications for airway management: airway protection    Preoxygenated: yes  Mask difficulty assessment: 1 - vent by mask    Final Airway Details  Final airway type: endotracheal airway      Successful airway: ETT  Cuffed: yes   Successful intubation technique: video laryngoscopy  Facilitating devices/methods: intubating stylet  Endotracheal tube insertion site: oral  Blade: Luevano  Blade size: 4  ETT size (mm): 7.5  Cormack-Lehane Classification: grade I - full view of glottis  Placement verified by: chest auscultation and capnometry   Cuff volume (mL): 7  Measured from: lips  ETT/EBT  to lips (cm): 21  Number of attempts at approach: 1  Assessment: lips, teeth, and gum same as pre-op and atraumatic intubation

## 2023-12-24 ENCOUNTER — NURSE TRIAGE (OUTPATIENT)
Dept: CALL CENTER | Facility: HOSPITAL | Age: 58
End: 2023-12-24
Payer: MEDICARE

## 2023-12-24 NOTE — TELEPHONE ENCOUNTER
Fever 101.4    Reverse shoulder replacement on Wednesday  Fever started today  Caller says she was unable to reach office. No prompts for on call physician.  Assisted with reaching the Ortho answering service.    Warm transfer to Ortho answering service.    Return call to caller She spoke with Dr Coleman and was told to give Tylenol.  She says she can't because he is taking Percocet. Advised Percocet has Tylenol 325 mg so she can give a Tylenol 325 mg with the Percocet. Verbalized understanding.    Reason for Disposition   Fever > 100.4 F (38.0 C)    Additional Information   Negative: Sounds like a life-threatening emergency to the triager   Negative: Chest pain   Negative: Difficulty breathing   Negative: Acting confused (e.g., disoriented, slurred speech) or excessively sleepy   Negative: Post-Op tonsil and adenoid surgery, symptoms or questions about   Negative: Surgical incision symptoms and questions   Negative: [1] Pain or burning with passing urine (urination) AND [2] male   Negative: [1] Pain or burning with passing urine (urination) AND [2] female   Negative: Constipation   Negative: New or worsening leg (calf, thigh) pain   Negative: New or worsening leg swelling   Negative: Dizziness is severe, or persists > 24 hours after surgery   Negative: Pain, redness, swelling, or pus at IV Site   Negative: Symptoms arising from use of a urinary catheter (e.g., Coude, Gonzalez)   Negative: Cast problems or questions   Negative: Medication question   Negative: [1] Widespread rash AND [2] bright red, sunburn-like   Negative: [1] SEVERE headache AND [2] after spinal (epidural) anesthesia   Negative: [1] Vomiting AND [2] persists > 4 hours   Negative: [1] Vomiting AND [2] abdomen looks much more swollen than usual   Negative: [1] Drinking very little AND [2] dehydration suspected (e.g., no urine > 12 hours, very dry mouth, very lightheaded)   Negative: Patient sounds very sick or weak to the triager   Negative: Sounds like  "a serious complication to the triager    Answer Assessment - Initial Assessment Questions  1. SYMPTOM: \"What's the main symptom you're concerned about?\" (e.g., pain, fever, vomiting)    fever  2. ONSET: \"When did *No Answer*  start?\"      *No Answer*  3. SURGERY: \"What surgery did you have?\"    Reverse shoulder replacement  4. DATE of SURGERY: \"When was the surgery?\"       Wednesday 12/20/2023  5. ANESTHESIA: \" What type of anesthesia did you have?\" (e.g., general, spinal, epidural, local)      general  6. PAIN: \"Is there any pain?\" If Yes, ask: \"How bad is it?\"  (Scale 1-10; or mild, moderate, severe)      *No Answer*  7. FEVER: \"Do you have a fever?\" If Yes, ask: \"What is your temperature, how was it measured, and when did it start?\"    *No Answer*101.4    8. VOMITING: \"Is there any vomiting?\" If Yes, ask: \"How many times?\"    Denies   9. BLEEDING: \"Is there any bleeding?\" If Yes, ask: \"How much?\" and \"Where?\"  denies  10. OTHER SYMPTOMS: \"Do you have any other symptoms?\" (e.g., drainage from wound, painful urination, constipation)      Incision is covered with bandage.    Protocols used: Post-Op Symptoms and Questions-ADULT-    "

## 2023-12-31 ENCOUNTER — HOSPITAL ENCOUNTER (EMERGENCY)
Age: 58
Discharge: HOME | End: 2023-12-31
Attending: STUDENT IN AN ORGANIZED HEALTH CARE EDUCATION/TRAINING PROGRAM
Payer: MEDICARE

## 2023-12-31 ENCOUNTER — APPOINTMENT (OUTPATIENT)
Dept: CT IMAGING | Age: 58
End: 2023-12-31
Payer: MEDICARE

## 2023-12-31 VITALS
BODY MASS INDEX: 31.32 KG/M2 | SYSTOLIC BLOOD PRESSURE: 106 MMHG | OXYGEN SATURATION: 95 % | RESPIRATION RATE: 17 BRPM | HEART RATE: 85 BPM | WEIGHT: 244 LBS | HEIGHT: 74 IN | DIASTOLIC BLOOD PRESSURE: 84 MMHG | TEMPERATURE: 98 F

## 2023-12-31 DIAGNOSIS — R00.2 PALPITATIONS: Primary | ICD-10-CM

## 2023-12-31 DIAGNOSIS — F41.9 ANXIETY: ICD-10-CM

## 2023-12-31 LAB
ALBUMIN SERPL-MCNC: 4.1 G/DL (ref 3.5–5.2)
ALP SERPL-CCNC: 92 U/L (ref 40–130)
ALT SERPL-CCNC: 10 U/L (ref 5–41)
ANION GAP SERPL CALCULATED.3IONS-SCNC: 10 MMOL/L (ref 7–19)
AST SERPL-CCNC: 10 U/L (ref 5–40)
BASOPHILS # BLD: 0 K/UL (ref 0–0.2)
BASOPHILS NFR BLD: 0.5 % (ref 0–1)
BILIRUB SERPL-MCNC: 0.4 MG/DL (ref 0.2–1.2)
BUN SERPL-MCNC: 20 MG/DL (ref 6–20)
CALCIUM SERPL-MCNC: 9 MG/DL (ref 8.6–10)
CHLORIDE SERPL-SCNC: 108 MMOL/L (ref 98–111)
CO2 SERPL-SCNC: 24 MMOL/L (ref 22–29)
CREAT SERPL-MCNC: 0.9 MG/DL (ref 0.5–1.2)
EOSINOPHIL # BLD: 0.2 K/UL (ref 0–0.6)
EOSINOPHIL NFR BLD: 1.7 % (ref 0–5)
ERYTHROCYTE [DISTWIDTH] IN BLOOD BY AUTOMATED COUNT: 13.1 % (ref 11.5–14.5)
GLUCOSE SERPL-MCNC: 122 MG/DL (ref 74–109)
HCT VFR BLD AUTO: 41.6 % (ref 42–52)
HGB BLD-MCNC: 14 G/DL (ref 14–18)
IMM GRANULOCYTES # BLD: 0 K/UL
LYMPHOCYTES # BLD: 1.7 K/UL (ref 1.1–4.5)
LYMPHOCYTES NFR BLD: 19.2 % (ref 20–40)
MCH RBC QN AUTO: 33 PG (ref 27–31)
MCHC RBC AUTO-ENTMCNC: 33.7 G/DL (ref 33–37)
MCV RBC AUTO: 98.1 FL (ref 80–94)
MONOCYTES # BLD: 0.5 K/UL (ref 0–0.9)
MONOCYTES NFR BLD: 5.7 % (ref 0–10)
NEUTROPHILS # BLD: 6.2 K/UL (ref 1.5–7.5)
NEUTS SEG NFR BLD: 72.4 % (ref 50–65)
PLATELET # BLD AUTO: 351 K/UL (ref 130–400)
PMV BLD AUTO: 8.1 FL (ref 9.4–12.4)
POTASSIUM SERPL-SCNC: 4.5 MMOL/L (ref 3.5–5)
PROT SERPL-MCNC: 7.1 G/DL (ref 6.6–8.7)
RBC # BLD AUTO: 4.24 M/UL (ref 4.7–6.1)
SODIUM SERPL-SCNC: 142 MMOL/L (ref 136–145)
TROPONIN, HIGH SENSITIVITY: 10 NG/L (ref 0–22)
TSH SERPL DL<=0.005 MIU/L-ACNC: 2.1 UIU/ML (ref 0.35–5.5)
WBC # BLD AUTO: 8.6 K/UL (ref 4.8–10.8)

## 2023-12-31 PROCEDURE — 96374 THER/PROPH/DIAG INJ IV PUSH: CPT

## 2023-12-31 PROCEDURE — 93005 ELECTROCARDIOGRAM TRACING: CPT | Performed by: STUDENT IN AN ORGANIZED HEALTH CARE EDUCATION/TRAINING PROGRAM

## 2023-12-31 PROCEDURE — 6360000002 HC RX W HCPCS: Performed by: STUDENT IN AN ORGANIZED HEALTH CARE EDUCATION/TRAINING PROGRAM

## 2023-12-31 PROCEDURE — 84484 ASSAY OF TROPONIN QUANT: CPT

## 2023-12-31 PROCEDURE — 6370000000 HC RX 637 (ALT 250 FOR IP): Performed by: STUDENT IN AN ORGANIZED HEALTH CARE EDUCATION/TRAINING PROGRAM

## 2023-12-31 PROCEDURE — 99284 EMERGENCY DEPT VISIT MOD MDM: CPT

## 2023-12-31 PROCEDURE — 85025 COMPLETE CBC W/AUTO DIFF WBC: CPT

## 2023-12-31 PROCEDURE — 36415 COLL VENOUS BLD VENIPUNCTURE: CPT

## 2023-12-31 PROCEDURE — 70450 CT HEAD/BRAIN W/O DYE: CPT

## 2023-12-31 PROCEDURE — 2580000003 HC RX 258: Performed by: STUDENT IN AN ORGANIZED HEALTH CARE EDUCATION/TRAINING PROGRAM

## 2023-12-31 PROCEDURE — 84443 ASSAY THYROID STIM HORMONE: CPT

## 2023-12-31 PROCEDURE — 80053 COMPREHEN METABOLIC PANEL: CPT

## 2023-12-31 RX ORDER — HYDROXYZINE HYDROCHLORIDE 25 MG/1
25 TABLET, FILM COATED ORAL EVERY 8 HOURS PRN
Qty: 30 TABLET | Refills: 0 | Status: SHIPPED | OUTPATIENT
Start: 2023-12-31 | End: 2024-01-10

## 2023-12-31 RX ORDER — HYDROXYZINE HYDROCHLORIDE 10 MG/1
10 TABLET, FILM COATED ORAL ONCE
Status: COMPLETED | OUTPATIENT
Start: 2023-12-31 | End: 2023-12-31

## 2023-12-31 RX ORDER — OXYCODONE AND ACETAMINOPHEN 10; 325 MG/1; MG/1
1 TABLET ORAL EVERY 4 HOURS PRN
COMMUNITY

## 2023-12-31 RX ORDER — 0.9 % SODIUM CHLORIDE 0.9 %
1000 INTRAVENOUS SOLUTION INTRAVENOUS ONCE
Status: COMPLETED | OUTPATIENT
Start: 2023-12-31 | End: 2023-12-31

## 2023-12-31 RX ORDER — LORAZEPAM 2 MG/ML
1 INJECTION INTRAMUSCULAR ONCE
Status: COMPLETED | OUTPATIENT
Start: 2023-12-31 | End: 2023-12-31

## 2023-12-31 RX ADMIN — LORAZEPAM 1 MG: 2 INJECTION INTRAMUSCULAR; INTRAVENOUS at 10:32

## 2023-12-31 RX ADMIN — HYDROXYZINE HYDROCHLORIDE 10 MG: 10 TABLET ORAL at 09:42

## 2023-12-31 RX ADMIN — SODIUM CHLORIDE 1000 ML: 9 INJECTION, SOLUTION INTRAVENOUS at 09:31

## 2023-12-31 ASSESSMENT — PAIN - FUNCTIONAL ASSESSMENT: PAIN_FUNCTIONAL_ASSESSMENT: NONE - DENIES PAIN

## 2023-12-31 NOTE — ED PROVIDER NOTES
symptoms worsen or fail to improve. Patient or guardian verbalizes understanding and is comfortable with discharge.     CONSULTS:  None    :  Unless otherwise noted below, none     Procedures    FINAL IMPRESSION      1. Palpitations    2. Anxiety          DISPOSITION/PLAN   DISPOSITION Decision To Discharge 12/31/2023 12:06:16 PM      PATIENT REFERRED TO:  Gene Sellers MD  [address] [address]  [phone]    Schedule an appointment as soon as possible for a visit in 2 days  As needed      DISCHARGE MEDICATIONS:  New Prescriptions    HYDROXYZINE HCL (ATARAX) 25 MG TABLET    Take 1 tablet by mouth every 8 hours as needed for Itching          (Please note that portions of this note were completed with a voice recognition program.  Efforts were made to edit thedictations but occasionally words are mis-transcribed.)    Mary Soto MD (electronically signed)Emergency Physician          Mary Soto MD  12/31/23 1894

## 2023-12-31 NOTE — DISCHARGE INSTR - COC
Continuity of Care Form    Patient Name: Cara Gunn   :  1965  MRN:  [id number]    Admit date:  2023  Discharge date:  ***    Code Status Order: Prior   Advance Directives:     Admitting Physician:  No admitting provider for patient encounter.  PCP: Gene Sellers MD    Discharging Nurse: ***  Discharging Hospital Unit/Room#:   Discharging Unit Phone Number: ***    Emergency Contact:   Extended Emergency Contact Information  Primary Emergency Contact: CONI GUNN  Address: [address] [place] [address]  Home Phone: [phone]  Mobile Phone: [phone]  Relation: Spouse  Preferred language: English   needed? No  Secondary Emergency Contact: cara gunn  Home Phone: [phone]  Relation: Parent    Past Surgical History:  Past Surgical History:   Procedure Laterality Date    ARM SURGERY Left 2021    LEFT IN SITU CUBITAL TUNNEL RELEASE performed by Wilbur Brand MD at [facility] OR    ARM SURGERY Right 2021    RIGHT IN SITU CUBITAL TUNNEL DECOMPRESSION performed by Wilbur Brand MD at [facility] OR    BACK SURGERY  2013    BICEPS TENDON REPAIR Right 10/16/2023    POSTERIOR LABRAL DEBRIDEMENT performed by Colt Vaughn MD at [facility] OR    CARPAL TUNNEL RELEASE Right 10/22/2020    RIGHT OPEN CARPAL TUNNEL RELEASE performed by Wilbur Brand MD at [facility] OR    CARPAL TUNNEL RELEASE Left 2020    LEFT OPEN CARPAL TUNNEL RELEASE performed by Wilbur Brand MD at [facility] OR    COLONOSCOPY  2018    MOUTH SURGERY      SHOULDER ARTHROPLASTY Right 2023    SMALL INTESTINE SURGERY  2018    diverticulitis    TONSILLECTOMY  1984    at age 19       Immunization History:   Immunization History   Administered Date(s) Administered    COVID-19, MODERNA BLUE border, Primary or Immunocompromised, (age 12y+), IM, 100 mcg/0.5mL 2021, 2021       Active Problems:  Patient Active Problem List   Diagnosis Code    Other chest pain

## 2023-12-31 NOTE — DISCHARGE INSTRUCTIONS
Use Atarax up to 3 times daily for symptoms of anxiety or panic attacks.     The examination and treatment you have received in the Emergency Department has been given on an emergency basis only.    This limited encounter is not a replacement for the comprehensive services provided by a primary care physician. We recommend follow up for further preventative and ongoing sheeba screening, especially if your symptoms persists, worsen, or change in quality or character. When calling for a follow up appointment, please remember to inform the office that you were seen in the Emergency Department and that you are requesting a follow up visit.    Again, it is very important that you return immediately if your condition worsens, any new symptoms develop, or you do not recover as expected.

## 2024-01-02 LAB
EKG P AXIS: 17 DEGREES
EKG P-R INTERVAL: 162 MS
EKG Q-T INTERVAL: 358 MS
EKG QRS DURATION: 88 MS
EKG QTC CALCULATION (BAZETT): 389 MS
EKG T AXIS: 34 DEGREES

## 2024-01-02 PROCEDURE — 93010 ELECTROCARDIOGRAM REPORT: CPT | Performed by: INTERNAL MEDICINE

## 2024-02-01 ENCOUNTER — HOSPITAL ENCOUNTER (OUTPATIENT)
Dept: GENERAL RADIOLOGY | Age: 59
Discharge: HOME | End: 2024-02-01
Payer: MEDICARE

## 2024-02-01 ENCOUNTER — HOSPITAL ENCOUNTER (OUTPATIENT)
Dept: PAIN MANAGEMENT | Age: 59
Discharge: HOME | End: 2024-02-01
Payer: MEDICARE

## 2024-02-01 VITALS
OXYGEN SATURATION: 97 % | HEART RATE: 91 BPM | RESPIRATION RATE: 18 BRPM | TEMPERATURE: 96.6 F | DIASTOLIC BLOOD PRESSURE: 93 MMHG | SYSTOLIC BLOOD PRESSURE: 143 MMHG

## 2024-02-01 DIAGNOSIS — M62.830 MUSCLE SPASM OF BACK: ICD-10-CM

## 2024-02-01 DIAGNOSIS — Z79.891 ENCOUNTER FOR MONITORING OPIOID MAINTENANCE THERAPY: ICD-10-CM

## 2024-02-01 DIAGNOSIS — M54.16 LUMBAR RADICULOPATHY: Primary | ICD-10-CM

## 2024-02-01 DIAGNOSIS — M54.16 LUMBAR RADICULOPATHY: ICD-10-CM

## 2024-02-01 DIAGNOSIS — Z51.81 ENCOUNTER FOR MONITORING OPIOID MAINTENANCE THERAPY: ICD-10-CM

## 2024-02-01 DIAGNOSIS — Z02.89 PAIN MANAGEMENT CONTRACT AGREEMENT: ICD-10-CM

## 2024-02-01 PROCEDURE — 72110 X-RAY EXAM L-2 SPINE 4/>VWS: CPT

## 2024-02-01 PROCEDURE — 99214 OFFICE O/P EST MOD 30 MIN: CPT

## 2024-02-01 PROCEDURE — 99215 OFFICE O/P EST HI 40 MIN: CPT

## 2024-02-01 RX ORDER — GABAPENTIN 600 MG/1
600 TABLET ORAL 3 TIMES DAILY
Qty: 90 TABLET | Refills: 2 | Status: SHIPPED | OUTPATIENT
Start: 2024-02-01 | End: 2024-05-01

## 2024-02-01 ASSESSMENT — ENCOUNTER SYMPTOMS
CONSTIPATION: 0
RESPIRATORY NEGATIVE: 1
EYES NEGATIVE: 1
BACK PAIN: 1
GASTROINTESTINAL NEGATIVE: 1
BOWEL INCONTINENCE: 0

## 2024-02-01 NOTE — H&P
Health Maintenance Due   Topic Date Due   • Cervical Cancer Screening HPV CO-Testing  08/04/2020   • DTaP/Tdap/Td Vaccine (2 - Td) 12/09/2020       Patient is due for the topics as listed above and wishes to proceed with them. Orders placed for Immunization(s) Dtap/Tdap/Td and Cervical cancer screening.        Vaccine Information Statement(s) was given today. This has been reviewed, questions answered, and verbal consent given by Patient for injection(s) and administration of Diphtheria/Tetanus/Pertussis (Dtap).      Patient tolerated without incident. See immunization grid for documentation.         Value Date    WBC 8.6 12/31/2023    HGB 14.0 12/31/2023    HCT 41.6 (L) 12/31/2023    MCV 98.1 (H) 12/31/2023     12/31/2023       Assessment:                                                                                                                                        Active Problems:    Encounter for monitoring opioid maintenance therapy    Lumbar radiculopathy    Pain management contract agreement    Muscle spasm of back  Resolved Problems:    * No resolved hospital problems. *      PLAN:  1. Lumbar radiculopathy  - gabapentin (NEURONTIN) 600 MG tablet; Take 1 tablet by mouth 3 times daily for 90 days. Max Daily Amount: 1,800 mg  Dispense: 90 tablet; Refill: 2    - XR LUMBAR SPINE (MIN 6 VIEWS); Future    - XR LUMBAR SPINE FLEXION AND EXTENSION ONLY; Future    - Reschedule Left Sided Lumbar Epidural L4-5 under Fluoroscopy with Dr. Rivera as radicular pain follows the L4-5 dermatome. No other procedure will be scheduled on the day of the epidural.     - HEP - Patient encouraged to continue patient specific home exercise program at least 3 times a week or as tolerated.      2. Muscle spasm of back  - Reschedule Bilateral Thoracic/Lumbar Trigger Point Injections ( 3 or more muscles) with Marisa PALACIOS, as patient has received reduction in pain from injections previously.     3. Encounter for monitoring opioid maintenance therapy  - Encounter for Opioid Maintenance Monitoring  Patient is tested according to risk of opioid misuse, office policy, state regulations and/or providers discretion  Patient risk high  UDS or Saliva collected & tested on 2/1/2024    4. Pain management contract agreement  - Pain Medication Agreement Signed  Agreement reviewed and signed 2/1/2024  Patient or Patient's Advocate verbalized understanding of agreement    [x] Follow up    [] 4 weeks   [] 6 weeks   [x] 8 weeks   [] 10 weeks   [] 3 months  [x] Post procedure to evaluate effectiveness of treatment  [] To evaluate

## 2024-02-01 NOTE — PROGRESS NOTES
Clinic Documentation      Education Provided:  [x] Review of Gabe  [x] Agreement Review  [x] PEG Score Calculated [x] PHQ Score Calculated [x] ORT Score Calculated    [] Compliance Issues Discussed [] Cognitive Behavior Needs [x] Exercise [] Review of Test [] Financial Issues  [x] Tobacco/Alcohol Use Reviewed [x] Teaching [] New Patient [] Picture Obtained    Physician Plan:  [] Outgoing Referral  [] Pharmacy Consult  [] Test Ordered [x] Prescription Ordered/Changed   [] Obtained Test Results / Consult Notes        Complete if patient is withholding blood thinner for procedure     Blood Thinner Patient is currently taking:      [] Plavix (Hold for 7 days)  [] Aspirin (Hold for 5 days)     [] Pletal (Hold for 2 days)  [] Pradaxa (Hold for 3 days)    [] Effient (Hold for 7 days)  [] Xarelto (Hold for 2 days)    [] Eliquis (Hold for 2 days)  [] Brilinta (Hold for 7 days)    [] Coumadin (Hold for 5 days) - (INR needs to be drawn the day prior to procedure- INR < 2.0)    [] Aggrenox (Hold for 7 days)        [] Patient will stop medication on their own.    [] Blood Thinner Form Faxed for approval to hold.   Provider form faxed to:     Assessment Completed by:  Electronically signed by Lauren Livingston RN on 2/1/2024 at 8:14 AM

## 2024-02-14 ENCOUNTER — TELEPHONE (OUTPATIENT)
Dept: PAIN MANAGEMENT | Age: 59
End: 2024-02-14

## 2024-02-14 NOTE — TELEPHONE ENCOUNTER
Call placed to patient to remind him to hold asa and plavix x5 days starting tomorrow for LESI next Tuesday. Patient states understanding. Medication hold approved by Dr Sellers.

## 2024-02-20 ENCOUNTER — HOSPITAL ENCOUNTER (OUTPATIENT)
Dept: PAIN MANAGEMENT | Age: 59
Discharge: HOME | End: 2024-02-20
Payer: MEDICARE

## 2024-02-20 VITALS
HEART RATE: 84 BPM | DIASTOLIC BLOOD PRESSURE: 97 MMHG | RESPIRATION RATE: 18 BRPM | SYSTOLIC BLOOD PRESSURE: 147 MMHG | OXYGEN SATURATION: 98 % | TEMPERATURE: 98.2 F

## 2024-02-20 DIAGNOSIS — R52 PAIN MANAGEMENT: ICD-10-CM

## 2024-02-20 PROCEDURE — 2500000003 HC RX 250 WO HCPCS

## 2024-02-20 PROCEDURE — A4216 STERILE WATER/SALINE, 10 ML: HCPCS

## 2024-02-20 PROCEDURE — 6360000002 HC RX W HCPCS

## 2024-02-20 PROCEDURE — 62323 NJX INTERLAMINAR LMBR/SAC: CPT

## 2024-02-20 PROCEDURE — 2580000003 HC RX 258

## 2024-02-20 RX ORDER — LIDOCAINE HYDROCHLORIDE 10 MG/ML
5 INJECTION, SOLUTION EPIDURAL; INFILTRATION; INTRACAUDAL; PERINEURAL ONCE
Status: DISCONTINUED | OUTPATIENT
Start: 2024-02-20 | End: 2024-02-22 | Stop reason: HOSPADM

## 2024-02-20 RX ORDER — SODIUM CHLORIDE 9 MG/ML
5 INJECTION, SOLUTION INTRAMUSCULAR; INTRAVENOUS; SUBCUTANEOUS ONCE
Status: DISCONTINUED | OUTPATIENT
Start: 2024-02-20 | End: 2024-02-22 | Stop reason: HOSPADM

## 2024-02-20 RX ORDER — METHYLPREDNISOLONE ACETATE 80 MG/ML
80 INJECTION, SUSPENSION INTRA-ARTICULAR; INTRALESIONAL; INTRAMUSCULAR; SOFT TISSUE ONCE
Status: DISCONTINUED | OUTPATIENT
Start: 2024-02-20 | End: 2024-02-22 | Stop reason: HOSPADM

## 2024-02-20 ASSESSMENT — PAIN - FUNCTIONAL ASSESSMENT: PAIN_FUNCTIONAL_ASSESSMENT: 0-10

## 2024-02-20 NOTE — INTERVAL H&P NOTE
Update History & Physical    The patient's History and Physical  was reviewed with the patient and I examined the patient. There was no change. The surgical site was confirmed by the patient and me.     Plan: The risks, benefits, expected outcome, and alternative to the recommended procedure have been discussed with the patient. Patient understands and wants to proceed with the procedure.     Electronically signed by Arie Rivera MD on 2/20/2024 at 9:40 AM

## 2024-02-20 NOTE — PROGRESS NOTES
Procedure:  Level of Consciousness: [x]Alert [x]Oriented []Disoriented []Lethargic  Anxiety Level: [x]Calm []Anxious []Depressed []Other  Skin: [x]Warm [x]Dry []Cool []Moist []Intact []Other  Cardiovascular: [x]Palpitations: [x]Never []Occasionally []Frequently  Chest Pain: [x]No []Yes  Respiratory:  [x]Unlabored []Labored []Cough ([] Productive []Unproductive)  HCG Required: [x]No []Yes   Results: []Negative []Positive  Knowledge Level:        [x]Patient/Other verbalized understanding of pre-procedure instructions.        [x]Assessment of post-op care needs (transportation, responsible caregiver)        [x]Able to discuss health care problems and how to deal with it.  Factors that Affect Teaching:        Language Barrier: [x]No []Yes - why:        Hearing Loss:        [x]No []Yes            Corrective Device:  []Yes [x]No        Vision Loss:           [x]No []Yes            Corrective Device:  []Yes [x]No        Memory Loss:       [x]No []Yes            []Short Term []Long Term  Motivational Level:  [x]Asks Questions                  []Extremely Anxious       [x]Seems Interested               []Seems Uninterested                  []Denies need for Education  Risk for Injury:  [x]Patient oriented to person, place and time  []History of frequent falls/loss of balance  Nutritional:  []Change in appetite   []Weight Gain   []Weight Loss  Functional:  []Requires assistance with ADL's

## 2024-02-26 ENCOUNTER — TELEPHONE (OUTPATIENT)
Dept: PAIN MANAGEMENT | Age: 59
End: 2024-02-26

## 2024-03-06 ENCOUNTER — HOSPITAL ENCOUNTER (OUTPATIENT)
Dept: PAIN MANAGEMENT | Age: 59
Discharge: HOME | End: 2024-03-06
Payer: MEDICARE

## 2024-03-06 VITALS
RESPIRATION RATE: 18 BRPM | OXYGEN SATURATION: 100 % | DIASTOLIC BLOOD PRESSURE: 83 MMHG | SYSTOLIC BLOOD PRESSURE: 135 MMHG | TEMPERATURE: 97.5 F | HEART RATE: 78 BPM

## 2024-03-06 PROCEDURE — 20553 NJX 1/MLT TRIGGER POINTS 3/>: CPT

## 2024-03-06 PROCEDURE — 20553 NJX 1/MLT TRIGGER POINTS 3/>: CPT | Performed by: NURSE PRACTITIONER

## 2024-03-06 PROCEDURE — 2500000003 HC RX 250 WO HCPCS

## 2024-03-06 PROCEDURE — 6360000002 HC RX W HCPCS

## 2024-03-06 RX ORDER — BUPIVACAINE HYDROCHLORIDE 5 MG/ML
15 INJECTION, SOLUTION EPIDURAL; INTRACAUDAL ONCE
Status: DISCONTINUED | OUTPATIENT
Start: 2024-03-06 | End: 2024-03-08 | Stop reason: HOSPADM

## 2024-03-06 RX ORDER — LIDOCAINE HYDROCHLORIDE 10 MG/ML
15 INJECTION, SOLUTION EPIDURAL; INFILTRATION; INTRACAUDAL; PERINEURAL ONCE
Status: DISCONTINUED | OUTPATIENT
Start: 2024-03-06 | End: 2024-03-08 | Stop reason: HOSPADM

## 2024-03-06 NOTE — DISCHARGE INSTRUCTIONS
will be numb for a few hours after the procedure    [] I understand if a steroid was used it will take effect in 3 - 7 days. I understand that as the numbing medication wears off, the pain may return until the steroids start working.    [x] I understand that today I will rest for the next 24 hours and drink plenty of water.    [] For Botox for Migraines please do not wear anything constricting around the forehead for 7-10 days post injection. Examples headband, hats, or bandana    [] For Botox for Spasticity start therapy for the affected limb in two weeks.    [] Additional instructions: ______________________________________________ ___________________________________________________________________    Sedation:  Was oral sedation given?    [] Yes  [x] No    I understand that if I took an oral nerve calming medication I will not drive for  [] 24 hours after taking the medication.    [] I have received a copy of my discharge instructions and understand the above instructions to the best of my knowledge    Patient Discharged:  [x] Home  [] Hospital  [] Other  ____________________________________________    Via:  [x] Ambulatory  [] Wheelchair   [] Stretcher [] EMS       Accompanied By:   [] Significant other  [] Family Member  [] Friend   [] Hospital Staff  []  Ambulance Staff  [] Other_______________________________________________    Plan:  [x] Will return to the office in   [] 1 month   [] 3 months for:  [] Procedure Follow-up  [] Office Visit   [] Planned Procedure      Patient Signature: _____________________________________________________    Staff Signature: _______________________________________________________        If you have questions, problems or concerns you may call [phone] and follow the prompts    MONTY Godfrey, VA-BC

## 2024-03-06 NOTE — PROCEDURES
[facility] Physical & Pain Medicine    Patient Name: Sanket Huff    : 1965                    Age: 59 y.o.    Sex: male    Date: 2024    Pre-op Diagnosis: Myofascial Pain/ Muscle Spasms    Post-op Diagnosis: Myofascial Pain/ Muscle Spasms    Procedure: Thoracic Trigger Point Injections    Performing Procedure: MONTY Godfrey - BC, VA-BC    Patient Vitals for the past 24 hrs:   BP Temp Temp src Pulse Resp SpO2   24 0823 135/83 97.5 °F (36.4 °C) Temporal 78 18 100 %       Description of Procedure:  After a brief physical assessment and failure to improve with conservative measures the patient presented for Thoracic Trigger Point Injections The indications, limitations and possible complications were discussed with the patient and the patient elected to proceed with the procedure.    After voluntary, informed and signed consent obtained the patient was placed in a seated position. Appropriate time out was obtained per policy.      The areas were then prepped in a sterile fashion with Chloro-Prep. The area of maximal tenderness was palpated over the bilaterally Thoracic Muscles - Erector Spinae, Upper/Mid Latissimus, Rhomboid Minor, Rhomboid Major. The skin overlying these areas was marked with a skin marker. The areas were prepped using aseptic technique with CHG prep. The skin overlying the proposed injection sites were then sprayed with Gebauer's Solution while protecting patient eyes. Each trigger point of the Thoracic Muscles - Erector Spinae, Upper/Mid Latissimus, Rhomboid Minor, Rhomboid Major was injected after negative aspiration was injected with approximately 1-2 ml of a solution of 5 ml of 1% Lidocaine Plain and 5 ml of 0.5% Marcaine Plain after negative aspiration    Pre-op Diagnosis: Myofascial Pain/ Muscle Spasms    Post-op Diagnosis: Myofascial Pain/ Muscle Spasms    Procedure: Lumbar Trigger Point Injections    Performing Procedure: Marisa Galarza

## 2024-03-12 ENCOUNTER — TELEPHONE (OUTPATIENT)
Dept: PAIN MANAGEMENT | Age: 59
End: 2024-03-12

## 2024-04-16 NOTE — PROGRESS NOTES
Daily Treatment Note  Date: 3/26/2021  Patient Name: Cary Aschoff  :  1965  MRN: [id number]       Subjective   General  Chart Reviewed: Yes  Patient assessed for rehabilitation services?: Yes  Additional Pertinent Hx: Patient has had multiple TIA and CVAs. His most recent CVA was 2 weeks ago from 3/8/21. He went to get up and use the restroom and felt his face felt \"funny\" and when he looked in the mirror he noticed it drooping. He drove himself to the ER. He reports the MRI did not show signs of a CVA and they did not administer injection, but he had another stroke by his presentation of symptoms. Family / Caregiver Present: No  Referring Practitioner: Mindi Berg MD  Diagnosis: hemiplegia, nondominant, left side G81.94  OT Visit Information  Onset Date: 21  OT Insurance Information: [facility]  Total # of Visits Approved: 20  Total # of Visits to Date: 7  Certification Period Expiration Date: 21  Progress Note Due Date: 21  Subjective  Subjective: \"I'm normally sore after therapy the next day, but it's getting better. \"  Pre Treatment Pain Screening  Intervention List: Patient able to continue with treatment  Pain Assessment  Pain Assessment: 0-10  Pain Type: Chronic pain  Pain Location: Back  Pain Orientation: Lower  Pain Descriptors: Aching  Pain Frequency: Continuous  Pain Onset: On-going  Clinical Progression: Not changed  Functional Pain Assessment: Activities are not prevented  Non-Pharmaceutical Pain Intervention(s): Distraction; Therapeutic presence; Therapeutic touch  Response to Pain Intervention: Patient Satisfied  Vital Signs  Patient Currently in Pain: Yes     Treatment Activities:    Other exercises  Other exercises?: Yes  Other exercises 1: Supine BUE dowel bar shoulder flexion 4 sets x5 reps with NMES  Other exercises 2: Supine BUE dowel shoulder abduction 4 sets x5 reps with NMES  Other exercises 3: Sidelying gravity eliminated AAROM LUE shoulder flexion 2 sets x3 reps  Other decrease his amount each day. He has not worked in 2-3 years since his CVAs. Discharge Recommendations: Continue to assess pending progress  REQUIRES OT FOLLOW UP: Yes  Timed Code Treatment Minutes: [facility]  Times per week: x2  Times per day: Daily  Plan weeks: 6  Specific instructions for Next Treatment: Plan to upgrade to green digi  on 3/26/21. Current Treatment Recommendations: Strengthening, ROM, Manual Therapy:  STM, Neuromuscular Re-education, Modalities (comment), Self-Care / ADL    Goals  Short term goals  Time Frame for Short term goals: 3 weeks  Short term goal 1: Patient to improve AROM shoulder flexion to >100 degrees. Short term goal 2: Patient to increase MMT score of elbow flexion/extension to 3+/5 for yardwork. Short term goal 3: Patient be able to open various light-moderate resistance containers with LUE hand. Short term goal 4: Patient to increase LUE  strength to 50# for fishing. Short term goal 5: Patient to be independent with home exercise program.  Long term goals  Time Frame for Long term goals : 6 weeks  Long term goal 1: Patient to improve AROM shoulder flexion to functional ROM of >130 degrees. Long term goal 2: Patient to increase MMT score of elbow flexion/extension to 4/5 for yardwork. Long term goal 3: Patient be able to open various heavy resistance containers with LUE hand. Long term goal 4: Patient to increase LUE  strength to 80# for fishing. Long term goal 5: Patient to improve LUE 9 hole peg test score to 50th percentile for age group. Patient Goals   Patient goals : \"I want to get my left arm stronger. I can't even open a jar anymore. \"    Therapy Time   Individual Concurrent Group Co-treatment   Time In 1055         Time Out 1155         Minutes 60         Timed Code Treatment Minutes: [address], OT Electronically signed by VIANEY Levy on 3/26/2021 at 11:56 AM Unable to assess

## 2024-05-02 ENCOUNTER — HOSPITAL ENCOUNTER (OUTPATIENT)
Dept: PAIN MANAGEMENT | Age: 59
Discharge: HOME | End: 2024-05-02
Payer: MEDICARE

## 2024-05-02 VITALS
BODY MASS INDEX: 31.44 KG/M2 | TEMPERATURE: 97.9 F | WEIGHT: 245 LBS | HEART RATE: 97 BPM | SYSTOLIC BLOOD PRESSURE: 134 MMHG | HEIGHT: 74 IN | DIASTOLIC BLOOD PRESSURE: 88 MMHG | OXYGEN SATURATION: 94 % | RESPIRATION RATE: 16 BRPM

## 2024-05-02 DIAGNOSIS — M54.16 LUMBAR RADICULOPATHY: ICD-10-CM

## 2024-05-02 PROCEDURE — 99213 OFFICE O/P EST LOW 20 MIN: CPT | Performed by: NURSE PRACTITIONER

## 2024-05-02 PROCEDURE — 99213 OFFICE O/P EST LOW 20 MIN: CPT

## 2024-05-02 RX ORDER — GABAPENTIN 600 MG/1
600 TABLET ORAL 3 TIMES DAILY
Qty: 90 TABLET | Refills: 2 | Status: SHIPPED | OUTPATIENT
Start: 2024-05-02 | End: 2024-07-31

## 2024-05-02 ASSESSMENT — PAIN DESCRIPTION - PAIN TYPE: TYPE: CHRONIC PAIN

## 2024-05-02 ASSESSMENT — PAIN SCALES - GENERAL: PAINLEVEL_OUTOF10: 6

## 2024-05-02 ASSESSMENT — ENCOUNTER SYMPTOMS
BACK PAIN: 1
GASTROINTESTINAL NEGATIVE: 1
RESPIRATORY NEGATIVE: 1
EYES NEGATIVE: 1
BOWEL INCONTINENCE: 0
CONSTIPATION: 0

## 2024-05-02 ASSESSMENT — PAIN DESCRIPTION - LOCATION: LOCATION: BACK

## 2024-05-02 ASSESSMENT — PAIN DESCRIPTION - ORIENTATION: ORIENTATION: LOWER

## 2024-05-02 NOTE — PROGRESS NOTES
Clinic Documentation      Education Provided:  [x] Review of Gabe  [] Agreement Review  [x] PEG Score Calculated [] PHQ Score Calculated [] ORT Score Calculated    [] Compliance Issues Discussed [] Cognitive Behavior Needs [x] Exercise [] Review of Test [] Financial Issues  [x] Tobacco/Alcohol Use Reviewed [x] Teaching [] New Patient [] Picture Obtained    Physician Plan:  [] Outgoing Referral  [] Pharmacy Consult  [] Test Ordered [x] Prescription Ordered/Changed   [] Obtained Test Results / Consult Notes        Complete if patient is withholding blood thinner for procedure     Blood Thinner Patient is currently taking:      [x] Plavix (Hold for 7 days)  [x] Aspirin (Hold for 5 days)     [] Pletal (Hold for 2 days)  [] Pradaxa (Hold for 3 days)    [] Effient (Hold for 7 days)  [] Xarelto (Hold for 2 days)    [] Eliquis (Hold for 2 days)  [] Brilinta (Hold for 7 days)    [] Coumadin (Hold for 5 days) - (INR needs to be drawn the day prior to procedure- INR < 2.0)    [] Aggrenox (Hold for 7 days)        [] Patient will stop medication on their own.    [x] Blood Thinner Form Faxed for approval to hold.   Provider form faxed to: Dr Sellers    Assessment Completed by:  Electronically signed by Kemi Rodriguez RN on 5/2/2024 at 1:49 PM

## 2024-05-02 NOTE — PROGRESS NOTES
[facility] Physical & Pain Medicine    Office Visit    Patient Name: Sanket Huff    MR #: [id number]    Account #:[id number]    : 1965    Age: 59 y.o.    Sex: male    Date: 2024    PCP: Gene Sellers MD    Chief Complaint:   Chief Complaint   Patient presents with    Back Pain     /10       History of Present Illness:   The patient is a 59 y.o. male who presents to the office for a follow up procedures and images    Patient has mild degenerative scoliosis convex to the left otherwise no significant change from MRI in     Patient had xray at last appointment due to worsening low back pain. XR of lumbar spine indicated Mild scoliosis, convex to the left.  The sacroiliac joints are intact.  The neutral lateral view shows normal curvature, alignment, and vertebral body heights.  No change in alignment between   flexion and extension.  Mild disc height loss at L2-3.  Mild multilevel osteophytosis.     IMPRESSION:  1.  Mild scoliosis, convex to the left.  2.  Normal alignment, stable between flexion and extension.  3.  Mild multilevel degenerative disc changes.    Patients had Lumbar Epidural L4-5 scheduled for 2024. Patient had at least 80-85% relief of pain from procedure(s) for at least 60 days. Patient rates his/her pain a 6 today. After the injection, patient was able to increase activity and had less pain from aggravating factors such as flexion, extension, sitting, standing, and position. Patient was able to do the following activities with less pain. transportation, shopping, preparing meals, laundry, housework, and dressing Patient was able to reduce medications used to control pain. Patient has continued HEP - home exercise program which consists of exercises at least 3 times a week or as tolerated in addition patient is encouraged to stretch twice daily (upon awaking & prior to bed). Patient received enough pain relief from the injection that the patient would like to

## 2024-05-02 NOTE — TELEPHONE ENCOUNTER
1. Lumbar radiculopathy  - gabapentin (NEURONTIN) 600 MG tablet; Take 1 tablet by mouth 3 times daily for 90 days. Max Daily Amount: 1,800 mg  Dispense: 90 tablet; Refill: 2      Requested Prescriptions     Signed Prescriptions Disp Refills    gabapentin (NEURONTIN) 600 MG tablet 90 tablet 2     Sig: Take 1 tablet by mouth 3 times daily for 90 days. Max Daily Amount: 1,800 mg     Authorizing Provider: LUZ FLETCHER       Continue medication with refill sent at appointment yes; refill sent to medical director at appointment no, see refill encounter dated 5/2/2024    Electronically signed by SUZANNE Perez CNP on 5/2/2024 at 3:14 PM

## 2024-05-16 ENCOUNTER — HOSPITAL ENCOUNTER (OUTPATIENT)
Dept: PAIN MANAGEMENT | Age: 59
Discharge: HOME | End: 2024-05-16

## 2024-05-21 ENCOUNTER — TELEPHONE (OUTPATIENT)
Dept: PAIN MANAGEMENT | Age: 59
End: 2024-05-21

## 2024-05-21 NOTE — TELEPHONE ENCOUNTER
Call placed to patient to remind him to hold asa and plavix x5 days starting Thursday for LESI next Tuesday. Voice mail left for patient. Medication hold approved by Dr Sellers.

## 2024-05-28 ENCOUNTER — HOSPITAL ENCOUNTER (OUTPATIENT)
Dept: PAIN MANAGEMENT | Age: 59
Discharge: HOME | End: 2024-05-28
Payer: MEDICARE

## 2024-05-28 VITALS
HEART RATE: 78 BPM | DIASTOLIC BLOOD PRESSURE: 98 MMHG | RESPIRATION RATE: 18 BRPM | OXYGEN SATURATION: 97 % | TEMPERATURE: 96.5 F | SYSTOLIC BLOOD PRESSURE: 155 MMHG

## 2024-05-28 DIAGNOSIS — R52 PAIN MANAGEMENT: ICD-10-CM

## 2024-05-28 PROCEDURE — 2500000003 HC RX 250 WO HCPCS

## 2024-05-28 PROCEDURE — A4216 STERILE WATER/SALINE, 10 ML: HCPCS

## 2024-05-28 PROCEDURE — 6360000002 HC RX W HCPCS

## 2024-05-28 PROCEDURE — 2580000003 HC RX 258

## 2024-05-28 PROCEDURE — 62323 NJX INTERLAMINAR LMBR/SAC: CPT

## 2024-05-28 RX ORDER — METHYLPREDNISOLONE ACETATE 80 MG/ML
80 INJECTION, SUSPENSION INTRA-ARTICULAR; INTRALESIONAL; INTRAMUSCULAR; SOFT TISSUE ONCE
Status: DISCONTINUED | OUTPATIENT
Start: 2024-05-28 | End: 2024-05-30 | Stop reason: HOSPADM

## 2024-05-28 RX ORDER — LIDOCAINE HYDROCHLORIDE 10 MG/ML
5 INJECTION, SOLUTION EPIDURAL; INFILTRATION; INTRACAUDAL; PERINEURAL ONCE
Status: DISCONTINUED | OUTPATIENT
Start: 2024-05-28 | End: 2024-05-30 | Stop reason: HOSPADM

## 2024-05-28 RX ORDER — SODIUM CHLORIDE 9 MG/ML
5 INJECTION, SOLUTION INTRAMUSCULAR; INTRAVENOUS; SUBCUTANEOUS ONCE
Status: DISCONTINUED | OUTPATIENT
Start: 2024-05-28 | End: 2024-05-30 | Stop reason: HOSPADM

## 2024-05-28 ASSESSMENT — PAIN - FUNCTIONAL ASSESSMENT: PAIN_FUNCTIONAL_ASSESSMENT: 0-10

## 2024-05-28 NOTE — INTERVAL H&P NOTE
Update History & Physical    The patient's History and Physical  was reviewed with the patient and I examined the patient. There was no change. The surgical site was confirmed by the patient and me.     Plan: The risks, benefits, expected outcome, and alternative to the recommended procedure have been discussed with the patient. Patient understands and wants to proceed with the procedure.     Electronically signed by Arie Rivera MD on 5/28/2024 at 7:59 AM

## 2024-06-13 ENCOUNTER — HOSPITAL ENCOUNTER (OUTPATIENT)
Dept: PAIN MANAGEMENT | Age: 59
Discharge: HOME | End: 2024-06-13
Payer: MEDICARE

## 2024-06-13 VITALS
SYSTOLIC BLOOD PRESSURE: 153 MMHG | RESPIRATION RATE: 18 BRPM | DIASTOLIC BLOOD PRESSURE: 95 MMHG | OXYGEN SATURATION: 96 % | TEMPERATURE: 97.7 F | HEART RATE: 89 BPM

## 2024-06-13 DIAGNOSIS — M62.830 MUSCLE SPASM OF BACK: Primary | ICD-10-CM

## 2024-06-13 PROCEDURE — 20553 NJX 1/MLT TRIGGER POINTS 3/>: CPT

## 2024-06-13 PROCEDURE — 6360000002 HC RX W HCPCS

## 2024-06-13 PROCEDURE — 2500000003 HC RX 250 WO HCPCS

## 2024-06-13 RX ORDER — BUPIVACAINE HYDROCHLORIDE 5 MG/ML
15 INJECTION, SOLUTION EPIDURAL; INTRACAUDAL ONCE
Status: DISCONTINUED | OUTPATIENT
Start: 2024-06-13 | End: 2024-06-15 | Stop reason: HOSPADM

## 2024-06-13 RX ORDER — LIDOCAINE HYDROCHLORIDE 10 MG/ML
15 INJECTION, SOLUTION EPIDURAL; INFILTRATION; INTRACAUDAL; PERINEURAL ONCE
Status: DISCONTINUED | OUTPATIENT
Start: 2024-06-13 | End: 2024-06-15 | Stop reason: HOSPADM

## 2024-06-13 NOTE — PROCEDURES
[facility] Physical & Pain Medicine    Patient Name: Sanket Huff    : 1965    Age: 59 y.o.    Sex: male    Date: 2024    Pre-op Diagnosis: Myofascial Pain/ Muscle Spasms    Post-op Diagnosis: Myofascial Pain/ Muscle Spasms    Procedure: Thoracic Trigger Point Injections    Performing Procedure: Catina PALACIOS    Patient Vitals for the past 24 hrs:   BP Temp Temp src Pulse Resp SpO2   24 1257 (!) 153/95 97.7 °F (36.5 °C) Temporal 89 18 96 %       Description of Procedure:  After a brief physical assessment and failure to improve with conservative measures the patient presented for Thoracic Trigger Point Injections The indications, limitations and possible complications were discussed with the patient and the patient elected to proceed with the procedure.    After voluntary, informed and signed consent obtained the patient was placed in a seated position. Appropriate time out was obtained per policy.      The areas were then prepped in a sterile fashion with Chloro-Prep. The area of maximal tenderness was palpated over the bilaterally Thoracic Muscles - Erector Spinae, Upper/Mid Latissimus, Rhomboid Minor, Rhomboid Major. The skin overlying these areas was marked with a skin marker. The areas were prepped using aseptic technique with CHG prep. The skin overlying the proposed injection sites were then sprayed with Gebauer's Solution while protecting patient eyes. Each trigger point of the Thoracic Muscles - Erector Spinae, Upper/Mid Latissimus, Rhomboid Minor, Rhomboid Major was injected after negative aspiration was injected with approximately 1-2 ml of a solution of 5 ml of 1% Lidocaine Plain and 5 ml of 0.5% Marcaine Plain after negative aspiration    [facility] Physical & Pain Medicine    Patient Name: Sanket Huff    : 1965    Age: 59 y.o.    Sex: male    Date: 2024    Pre-op Diagnosis: Myofascial Pain/ Muscle Spasms    Post-op Diagnosis:

## 2024-06-13 NOTE — DISCHARGE INSTRUCTIONS
[facility] Physical And Pain Medicine  Post Procedure Discharge Instructions        YOU HAVE HAD THE FOLLOWING PROCEDURE:                                  [] Occipital Nerve Blocks  [] CTS wrist injection(s)  [] Knee Injection(s)         [] Shoulder Injection(s)   [] Elbow Injection(s)     [] Botox Injection  [] Cervical Trigger Point Injections    [x] Thoracic Trigger Point Injections    [x] Lumbar Trigger Point Injections  [] Piriformis Trigger Point Injections  [] SI Joint Injection(s)     [] Trochanteric Bursa Injection(s)       [] Ankle Injection(s)   [] Plantar Fasciitis   []  ______________  Injection(s) [] Botox []  Migraines [] Spasticity    YOU HAVE RECEIVED THE FOLLOWING MEDICATIONS IN YOUR INJECTION(s)  [x] Lidocaine [x] Bupivacaine   [] DepoMedrol (steroid) [] Decadron (steroid)  []  Kenalog (steroid)   [] Toradol  [] Supartz [] Zilretta    [] Botox        PATIENT INFORMATION:   You may experience the following symptoms after your procedure. These symptoms are normal and should not cause concern:    You may have an increase in your pain. This may last 24 - 48 hours after your procedure.  You may have no change in the pain that you had prior to your injection(s).  You may have weakness or numbness in your affected extremity. If this occurs, this may last until numbing the medication wears off.     REPORT THE FOLLOWING SYMPTOMS TO YOUR DOCTOR:  Redness, swelling or drainage at the injection site(s)  Unusual pain that interferes with your normal activities of daily living.    OTHER INSTRUCTIONS:    [x] I will apply ice to the injection site(s) for at least 24 hours after the procedure. I will rotate the ice on for 20 minutes and off for 20 minutes for at least 24 hours.    [x] I will not apply heat for at least 48 hours and I will not take a hot bath or shower for at least 24 hours.     [x] I understand that if Lidocaine or Bupivacaine was used in my injection(s) that the injection site(s)

## 2024-08-06 ENCOUNTER — TRANSCRIBE ORDERS (OUTPATIENT)
Dept: ADMINISTRATIVE | Facility: HOSPITAL | Age: 59
End: 2024-08-06
Payer: MEDICARE

## 2024-08-06 DIAGNOSIS — R10.9 ABDOMINAL PAIN, UNSPECIFIED ABDOMINAL LOCATION: Primary | ICD-10-CM

## 2024-08-06 DIAGNOSIS — R51.9 NONINTRACTABLE HEADACHE, UNSPECIFIED CHRONICITY PATTERN, UNSPECIFIED HEADACHE TYPE: ICD-10-CM

## 2024-08-06 DIAGNOSIS — M54.16 LUMBAR RADICULOPATHY: ICD-10-CM

## 2024-08-06 RX ORDER — GABAPENTIN 600 MG/1
600 TABLET ORAL 3 TIMES DAILY
Qty: 90 TABLET | Refills: 2 | Status: SHIPPED | OUTPATIENT
Start: 2024-08-06 | End: 2024-11-04

## 2024-08-14 ENCOUNTER — HOSPITAL ENCOUNTER (OUTPATIENT)
Dept: PAIN MANAGEMENT | Age: 59
Discharge: HOME | End: 2024-08-14
Payer: MEDICARE

## 2024-08-14 VITALS
SYSTOLIC BLOOD PRESSURE: 142 MMHG | RESPIRATION RATE: 16 BRPM | DIASTOLIC BLOOD PRESSURE: 91 MMHG | WEIGHT: 235 LBS | HEART RATE: 92 BPM | HEIGHT: 74 IN | BODY MASS INDEX: 30.16 KG/M2 | OXYGEN SATURATION: 95 % | TEMPERATURE: 97.5 F

## 2024-08-14 DIAGNOSIS — M54.16 LUMBAR RADICULOPATHY: ICD-10-CM

## 2024-08-14 DIAGNOSIS — G89.29 CHRONIC RIGHT-SIDED LOW BACK PAIN WITH RIGHT-SIDED SCIATICA: Primary | ICD-10-CM

## 2024-08-14 DIAGNOSIS — M54.41 CHRONIC RIGHT-SIDED LOW BACK PAIN WITH RIGHT-SIDED SCIATICA: Primary | ICD-10-CM

## 2024-08-14 DIAGNOSIS — M62.830 MUSCLE SPASM OF BACK: ICD-10-CM

## 2024-08-14 PROCEDURE — 99214 OFFICE O/P EST MOD 30 MIN: CPT

## 2024-08-14 PROCEDURE — 99213 OFFICE O/P EST LOW 20 MIN: CPT

## 2024-08-14 RX ORDER — PANTOPRAZOLE SODIUM 40 MG/1
40 TABLET, DELAYED RELEASE ORAL DAILY
COMMUNITY
Start: 2024-05-29

## 2024-08-14 RX ORDER — GABAPENTIN 600 MG/1
600 TABLET ORAL 3 TIMES DAILY
Qty: 270 TABLET | Refills: 0 | Status: SHIPPED | OUTPATIENT
Start: 2024-09-07 | End: 2024-12-06

## 2024-08-14 RX ORDER — BUPROPION HYDROCHLORIDE 300 MG/1
300 TABLET ORAL DAILY
COMMUNITY
Start: 2024-07-07

## 2024-08-14 ASSESSMENT — ENCOUNTER SYMPTOMS
BACK PAIN: 1
RESPIRATORY NEGATIVE: 1
EYES NEGATIVE: 1
CONSTIPATION: 0
BOWEL INCONTINENCE: 0
GASTROINTESTINAL NEGATIVE: 1

## 2024-08-14 ASSESSMENT — PAIN SCALES - GENERAL: PAINLEVEL_OUTOF10: 6

## 2024-08-14 ASSESSMENT — PAIN DESCRIPTION - ORIENTATION: ORIENTATION: LOWER

## 2024-08-14 ASSESSMENT — PAIN DESCRIPTION - LOCATION: LOCATION: BACK

## 2024-08-14 ASSESSMENT — PAIN DESCRIPTION - PAIN TYPE: TYPE: CHRONIC PAIN

## 2024-08-14 NOTE — TELEPHONE ENCOUNTER
1. Lumbar radiculopathy      Requested Prescriptions     Pending Prescriptions Disp Refills    gabapentin (NEURONTIN) 600 MG tablet 270 tablet 0     Sig: Take 1 tablet by mouth 3 times daily for 90 days. May fill 09/07/2024 Max Daily Amount: 1,800 mg       Continue medication with refill sent at appointment yes; refill sent to medical director at appointment no, see refill encounter dated 8/14/2024    Electronically signed by SUZANNE Doan CNP on 8/14/2024 at 8:24 AM

## 2024-08-14 NOTE — PROGRESS NOTES
Clinic Documentation      Education Provided:  [x] Review of Gabe  [] Agreement Review  [x] PEG Score Calculated [] PHQ Score Calculated [] ORT Score Calculated    [] Compliance Issues Discussed [] Cognitive Behavior Needs [x] Exercise [] Review of Test [] Financial Issues  [x] Tobacco/Alcohol Use Reviewed [x] Teaching [] New Patient [] Picture Obtained    Physician Plan:  [] Outgoing Referral  [] Pharmacy Consult  [] Test Ordered [x] Prescription Ordered/Changed   [] Obtained Test Results / Consult Notes        Complete if patient is withholding blood thinner for procedure     Blood Thinner Patient is currently taking:      [x] Plavix (Hold for 7 days)  [x] Aspirin (Hold for 5 days)     [] Pletal (Hold for 2 days)  [] Pradaxa (Hold for 3 days)    [] Effient (Hold for 7 days)  [] Xarelto (Hold for 2 days)    [] Eliquis (Hold for 2 days)  [] Brilinta (Hold for 7 days)    [] Coumadin (Hold for 5 days) - (INR needs to be drawn the day prior to procedure- INR < 2.0)    [] Aggrenox (Hold for 7 days)        [] Patient will stop medication on their own.    [x] Blood Thinner Form Faxed for approval to hold.   Provider form faxed to: Dr Sellers    Assessment Completed by:  Electronically signed by Kemi Rodriguez RN on 8/14/2024 at 8:30 AM  
usage. Patient does not receive any other injections on the same day as LOU injections.    3. Muscle spasm of back  -Schedule Trigger Point Injections   [x] Thoracic Muscles  bilateral without Toradol tender palpable knots noted ie trigger points identified.   [x] Lumbar Muscles bilateral without Toradol tender palpable knots noted ie trigger points identified.     [x] Follow up    [] 4 weeks   [] 6 weeks   [] 8 weeks   [x] 10 weeks   [] 3 months  [x] Post procedure to evaluate effectiveness of treatment  [] To evaluate medications changes made at office visit.   [] To review diagnostics ordered at last visit.   [] To evaluate response to therapy    [x] For management of controlled substance  [x] Random UDS as indicated by ORT score or if indicated by abberent behaviors     Discussion: Discussed exam findings and plan of care with patient. Patient agreed with POC and questions were asked and answered.     Activity: discussed exercise as beneficial to pain reduction, encouraged stretching exercise with a focus on torso strengthening.    Goal:  Pain Management Goals of Therapy:   [] Resolution in pain  [x] Decrease in pain level  [x] Improvement in ADL's  [x] Increase in activities with less pain  [] Decrease in medication     Controlled substance monitoring:    [x] Discussed medication side effects, risk of tolerance and/or dependence, and/or alternative treatment  [] Discussed the detrimental effects of long term narcotic use in younger patients especially women of childbearing years.  [] No signs and symptoms of potential drug abuse or diversion were identified  [x] Signs of potential drug abuse or diversion were identified   [x] ORT Score   [] UDS non-compliant   [] See Note  [] Random urine drug screen sent today  [x] Random urine drug screen not completed today   [] Deferred New Patient  [x] Compliant 2/1/2024  [] Not Compliant see note  [] Medication agreement with provider signed today  [x] Medication

## 2024-08-16 ENCOUNTER — APPOINTMENT (OUTPATIENT)
Dept: CT IMAGING | Facility: HOSPITAL | Age: 59
End: 2024-08-16
Payer: MEDICARE

## 2024-08-16 ENCOUNTER — HOSPITAL ENCOUNTER (OUTPATIENT)
Dept: CT IMAGING | Facility: HOSPITAL | Age: 59
Discharge: HOME | End: 2024-08-16
Payer: MEDICARE

## 2024-08-16 DIAGNOSIS — R51.9 NONINTRACTABLE HEADACHE, UNSPECIFIED CHRONICITY PATTERN, UNSPECIFIED HEADACHE TYPE: ICD-10-CM

## 2024-08-16 DIAGNOSIS — R10.9 ABDOMINAL PAIN, UNSPECIFIED ABDOMINAL LOCATION: ICD-10-CM

## 2024-08-16 PROCEDURE — 70450 CT HEAD/BRAIN W/O DYE: CPT

## 2024-08-16 PROCEDURE — 25510000001 IOPAMIDOL 61 % SOLUTION: Performed by: NURSE PRACTITIONER

## 2024-08-16 PROCEDURE — 74177 CT ABD & PELVIS W/CONTRAST: CPT

## 2024-08-16 RX ADMIN — IOPAMIDOL 100 ML: 612 INJECTION, SOLUTION INTRAVENOUS at 08:24

## 2024-08-27 ENCOUNTER — TELEPHONE (OUTPATIENT)
Dept: PAIN MANAGEMENT | Age: 59
End: 2024-08-27

## 2024-08-27 NOTE — TELEPHONE ENCOUNTER
Call placed to patient to remind him to hold his asa and plavix x5 days starting Thursday for LESI next Tuesday. Patient states understanding. Medication hold approved by Dr Sellers.

## 2024-09-03 ENCOUNTER — HOSPITAL ENCOUNTER (OUTPATIENT)
Dept: PAIN MANAGEMENT | Age: 59
Discharge: HOME | End: 2024-09-03
Payer: MEDICARE

## 2024-09-03 VITALS
HEART RATE: 75 BPM | DIASTOLIC BLOOD PRESSURE: 88 MMHG | SYSTOLIC BLOOD PRESSURE: 126 MMHG | TEMPERATURE: 97.2 F | OXYGEN SATURATION: 98 % | RESPIRATION RATE: 18 BRPM

## 2024-09-03 DIAGNOSIS — R52 PAIN MANAGEMENT: ICD-10-CM

## 2024-09-03 PROCEDURE — 2500000003 HC RX 250 WO HCPCS

## 2024-09-03 PROCEDURE — 2580000003 HC RX 258

## 2024-09-03 PROCEDURE — 6360000002 HC RX W HCPCS

## 2024-09-03 PROCEDURE — 62323 NJX INTERLAMINAR LMBR/SAC: CPT

## 2024-09-03 RX ORDER — METHYLPREDNISOLONE ACETATE 80 MG/ML
80 INJECTION, SUSPENSION INTRA-ARTICULAR; INTRALESIONAL; INTRAMUSCULAR; SOFT TISSUE ONCE
Status: DISCONTINUED | OUTPATIENT
Start: 2024-09-03 | End: 2024-09-05 | Stop reason: HOSPADM

## 2024-09-03 RX ORDER — LIDOCAINE HYDROCHLORIDE 10 MG/ML
5 INJECTION, SOLUTION EPIDURAL; INFILTRATION; INTRACAUDAL; PERINEURAL ONCE
Status: DISCONTINUED | OUTPATIENT
Start: 2024-09-03 | End: 2024-09-05 | Stop reason: HOSPADM

## 2024-09-03 RX ORDER — SODIUM CHLORIDE 9 MG/ML
5 INJECTION, SOLUTION INTRAMUSCULAR; INTRAVENOUS; SUBCUTANEOUS ONCE
Status: DISCONTINUED | OUTPATIENT
Start: 2024-09-03 | End: 2024-09-05 | Stop reason: HOSPADM

## 2024-09-03 ASSESSMENT — PAIN - FUNCTIONAL ASSESSMENT: PAIN_FUNCTIONAL_ASSESSMENT: 0-10

## 2024-09-03 NOTE — INTERVAL H&P NOTE
Update History & Physical    The patient's History and Physical  was reviewed with the patient and I examined the patient. There was no change. The surgical site was confirmed by the patient and me.     Plan: The risks, benefits, expected outcome, and alternative to the recommended procedure have been discussed with the patient. Patient understands and wants to proceed with the procedure.     Electronically signed by Arie Rivera MD on 9/3/2024 at 7:53 AM

## 2024-09-03 NOTE — PROGRESS NOTES
Procedure:  Level of Consciousness: [x]Alert []Oriented []Disoriented []Lethargic  Anxiety Level: [x]Calm []Anxious []Depressed []Other  Skin: []Warm [x]Dry []Cool []Moist []Intact []Other  Cardiovascular: []Palpitations: []Never []Occasionally []Frequently  Chest Pain: [x]No []Yes  Respiratory:  []Unlabored []Labored []Cough ([] Productive []Unproductive)  HCG Required: [x]No []Yes   Results: []Negative []Positive  Knowledge Level:        [x]Patient/Other verbalized understanding of pre-procedure instructions.        [x]Assessment of post-op care needs (transportation, responsible caregiver)        [x]Able to discuss health care problems and how to deal with it.  Factors that Affect Teaching:        Language Barrier: [x]No []Yes - why:        Hearing Loss:        [x]No []Yes            Corrective Device:  []Yes [x]No        Vision Loss:           [x]No []Yes            Corrective Device:  []Yes [x]No        Memory Loss:       [x]No []Yes            []Short Term []Long Term  Motivational Level:  [x]Asks Questions                  []Extremely Anxious       [x]Seems Interested               []Seems Uninterested                  []Denies need for Education  Risk for Injury:  [x]Patient oriented to person, place and time  []History of frequent falls/loss of balance  Nutritional:  []Change in appetite   []Weight Gain   []Weight Loss  Functional:  []Requires assistance with ADL's

## 2024-09-09 ENCOUNTER — TELEPHONE (OUTPATIENT)
Dept: PAIN MANAGEMENT | Age: 59
End: 2024-09-09

## 2024-09-19 ENCOUNTER — HOSPITAL ENCOUNTER (OUTPATIENT)
Dept: PAIN MANAGEMENT | Age: 59
Discharge: HOME | End: 2024-09-19
Payer: MEDICARE

## 2024-09-19 VITALS — TEMPERATURE: 97.2 F | HEART RATE: 87 BPM | RESPIRATION RATE: 16 BRPM | OXYGEN SATURATION: 100 %

## 2024-09-19 DIAGNOSIS — M62.830 MUSCLE SPASM OF BACK: Primary | ICD-10-CM

## 2024-09-19 PROCEDURE — 6360000002 HC RX W HCPCS

## 2024-09-19 PROCEDURE — 2500000003 HC RX 250 WO HCPCS

## 2024-09-19 PROCEDURE — 20553 NJX 1/MLT TRIGGER POINTS 3/>: CPT

## 2024-09-19 RX ORDER — BUPIVACAINE HYDROCHLORIDE 5 MG/ML
15 INJECTION, SOLUTION EPIDURAL; INTRACAUDAL ONCE
Status: DISCONTINUED | OUTPATIENT
Start: 2024-09-19 | End: 2024-09-21 | Stop reason: HOSPADM

## 2024-09-19 RX ORDER — LIDOCAINE HYDROCHLORIDE 10 MG/ML
15 INJECTION, SOLUTION EPIDURAL; INFILTRATION; INTRACAUDAL; PERINEURAL ONCE
Status: DISCONTINUED | OUTPATIENT
Start: 2024-09-19 | End: 2024-09-21 | Stop reason: HOSPADM

## 2024-09-27 ENCOUNTER — OFFICE VISIT (OUTPATIENT)
Dept: GASTROENTEROLOGY | Age: 59
End: 2024-09-27
Payer: MEDICARE

## 2024-09-27 VITALS
BODY MASS INDEX: 30.29 KG/M2 | WEIGHT: 236 LBS | OXYGEN SATURATION: 98 % | HEIGHT: 74 IN | DIASTOLIC BLOOD PRESSURE: 80 MMHG | HEART RATE: 83 BPM | SYSTOLIC BLOOD PRESSURE: 130 MMHG

## 2024-09-27 DIAGNOSIS — R10.32 LLQ ABDOMINAL PAIN: ICD-10-CM

## 2024-09-27 DIAGNOSIS — Z90.49 HISTORY OF COLON RESECTION: Primary | ICD-10-CM

## 2024-09-27 DIAGNOSIS — K57.90 DIVERTICULOSIS: ICD-10-CM

## 2024-09-27 PROCEDURE — G8427 DOCREV CUR MEDS BY ELIG CLIN: HCPCS | Performed by: NURSE PRACTITIONER

## 2024-09-27 PROCEDURE — G8417 CALC BMI ABV UP PARAM F/U: HCPCS | Performed by: NURSE PRACTITIONER

## 2024-09-27 PROCEDURE — 3079F DIAST BP 80-89 MM HG: CPT | Performed by: NURSE PRACTITIONER

## 2024-09-27 PROCEDURE — 99204 OFFICE O/P NEW MOD 45 MIN: CPT | Performed by: NURSE PRACTITIONER

## 2024-09-27 PROCEDURE — 3017F COLORECTAL CA SCREEN DOC REV: CPT | Performed by: NURSE PRACTITIONER

## 2024-09-27 PROCEDURE — 4004F PT TOBACCO SCREEN RCVD TLK: CPT | Performed by: NURSE PRACTITIONER

## 2024-09-27 PROCEDURE — 3075F SYST BP GE 130 - 139MM HG: CPT | Performed by: NURSE PRACTITIONER

## 2024-09-27 NOTE — PROGRESS NOTES
Relation Age of Onset    Cancer Mother         \"bladdr cancer twice\"    Lung Cancer Mother         SMOKED x 16 years at 1 PPD    Kidney Cancer Mother     Brain Cancer Mother     Bone Cancer Mother     Heart Attack Father         smoked cigars and pipes    Suicide Sister     Drug Abuse Sister     Colon Cancer Neg Hx     Colon Polyps Neg Hx        Social History     Socioeconomic History    Marital status:     Number of children: 0   Occupational History    Occupation: [employer] for 33 years     Comment: Medically Disabled by a CVA   Tobacco Use    Smoking status: Former     Current packs/day: 0.00     Average packs/day: 1.5 packs/day for 42.6 years (63.1 ttl pk-yrs)     Types: Cigarettes     Start date:      Quit date: 2024     Years since quittin.1    Smokeless tobacco: Never    Tobacco comments:     Started at age 16, had average use of 1.5 PPD, NOW smoking 1PPD   Vaping Use    Vaping status: Every Day    Substances: Nicotine, Flavoring    Devices: Refillable tank   Substance and Sexual Activity    Alcohol use: Not Currently     Comment: quit drinking dec 2022, had been at 60 beers a week    Drug use: Not Currently     Types: Cocaine, Marijuana (Weed)     Comment: one time thing 2022, smoked MJ until     Sexual activity: Yes     Comment: \"no known kids\"   Social History Narrative    CODE STATUS: Full Code    HEALTH CARE PROXY: his first choice is his ex-wife, Mrs. Lizzy Huff, [phone]    AMBULATES: uses a cane for left sided weakness s/p CVA    DOMICILED: has no stairs in the home, has 1 step to enter the home, has 3 dogs in the home     Social Determinants of Health     Financial Resource Strain: Low Risk  (2023)    Overall Financial Resource Strain (CARDIA)     Difficulty of Paying Living Expenses: Not hard at all   Transportation Needs: No Transportation Needs (2023)    PRAPARE - Transportation     Lack of Transportation (Medical): No     Lack of Transportation

## 2024-10-04 ASSESSMENT — ENCOUNTER SYMPTOMS
CHOKING: 0
CONSTIPATION: 0
COUGH: 0
VOMITING: 0
DIARRHEA: 0
SHORTNESS OF BREATH: 0
ANAL BLEEDING: 0
TROUBLE SWALLOWING: 0
BLOOD IN STOOL: 0
NAUSEA: 0
ABDOMINAL DISTENTION: 0
RECTAL PAIN: 0
ABDOMINAL PAIN: 1

## 2024-10-07 ENCOUNTER — TELEPHONE (OUTPATIENT)
Dept: GASTROENTEROLOGY | Age: 59
End: 2024-10-07

## 2024-10-07 NOTE — TELEPHONE ENCOUNTER
Patient: Sanekt Huff    YOB: 1965      Clearance was received on October 7, 2024.    for Endoscopy / Colonoscopy scheduled for: [facility]    Patient may discontinue the use of Plavix for 5  days prior to the procedure.    IS Lovenox required:  no    PATIENT NOTIFIED ON:  10.7.24      Clearance scanned into media

## 2024-10-10 ENCOUNTER — OFFICE VISIT (OUTPATIENT)
Dept: CARDIOLOGY CLINIC | Age: 59
End: 2024-10-10
Payer: MEDICARE

## 2024-10-10 VITALS
HEART RATE: 84 BPM | BODY MASS INDEX: 30.8 KG/M2 | OXYGEN SATURATION: 97 % | DIASTOLIC BLOOD PRESSURE: 64 MMHG | HEIGHT: 74 IN | SYSTOLIC BLOOD PRESSURE: 128 MMHG | WEIGHT: 240 LBS

## 2024-10-10 DIAGNOSIS — E78.5 DYSLIPIDEMIA: ICD-10-CM

## 2024-10-10 DIAGNOSIS — I10 ESSENTIAL HYPERTENSION: Primary | ICD-10-CM

## 2024-10-10 PROCEDURE — G8484 FLU IMMUNIZE NO ADMIN: HCPCS | Performed by: NURSE PRACTITIONER

## 2024-10-10 PROCEDURE — 1036F TOBACCO NON-USER: CPT | Performed by: NURSE PRACTITIONER

## 2024-10-10 PROCEDURE — 99214 OFFICE O/P EST MOD 30 MIN: CPT | Performed by: NURSE PRACTITIONER

## 2024-10-10 PROCEDURE — 3078F DIAST BP <80 MM HG: CPT | Performed by: NURSE PRACTITIONER

## 2024-10-10 PROCEDURE — 3074F SYST BP LT 130 MM HG: CPT | Performed by: NURSE PRACTITIONER

## 2024-10-10 PROCEDURE — 93000 ELECTROCARDIOGRAM COMPLETE: CPT | Performed by: NURSE PRACTITIONER

## 2024-10-10 PROCEDURE — 3017F COLORECTAL CA SCREEN DOC REV: CPT | Performed by: NURSE PRACTITIONER

## 2024-10-10 PROCEDURE — G8417 CALC BMI ABV UP PARAM F/U: HCPCS | Performed by: NURSE PRACTITIONER

## 2024-10-10 PROCEDURE — G8427 DOCREV CUR MEDS BY ELIG CLIN: HCPCS | Performed by: NURSE PRACTITIONER

## 2024-10-10 ASSESSMENT — ENCOUNTER SYMPTOMS
SHORTNESS OF BREATH: 0
COUGH: 0
WHEEZING: 0
SORE THROAT: 0
CHEST TIGHTNESS: 0

## 2024-10-14 DIAGNOSIS — M17.11 PRIMARY OSTEOARTHRITIS OF RIGHT KNEE: Primary | ICD-10-CM

## 2024-10-15 ENCOUNTER — HOSPITAL ENCOUNTER (OUTPATIENT)
Age: 59
Setting detail: OUTPATIENT SURGERY
Discharge: HOME | End: 2024-10-15
Attending: INTERNAL MEDICINE | Admitting: INTERNAL MEDICINE

## 2024-10-15 ENCOUNTER — ANESTHESIA EVENT (OUTPATIENT)
Dept: OPERATING ROOM | Age: 59
End: 2024-10-15

## 2024-10-15 ENCOUNTER — APPOINTMENT (OUTPATIENT)
Dept: OPERATING ROOM | Age: 59
End: 2024-10-15
Attending: INTERNAL MEDICINE

## 2024-10-15 ENCOUNTER — HOSPITAL ENCOUNTER (OUTPATIENT)
Age: 59
Setting detail: SPECIMEN
Discharge: HOME | End: 2024-10-15
Payer: MEDICARE

## 2024-10-15 ENCOUNTER — ANESTHESIA (OUTPATIENT)
Dept: OPERATING ROOM | Age: 59
End: 2024-10-15

## 2024-10-15 VITALS
TEMPERATURE: 98 F | WEIGHT: 236 LBS | BODY MASS INDEX: 30.29 KG/M2 | DIASTOLIC BLOOD PRESSURE: 78 MMHG | HEART RATE: 70 BPM | HEIGHT: 74 IN | OXYGEN SATURATION: 100 % | SYSTOLIC BLOOD PRESSURE: 121 MMHG | RESPIRATION RATE: 18 BRPM

## 2024-10-15 PROCEDURE — 45385 COLONOSCOPY W/LESION REMOVAL: CPT

## 2024-10-15 PROCEDURE — 45384 COLONOSCOPY W/LESION REMOVAL: CPT

## 2024-10-15 PROCEDURE — G8918 PT W/O PREOP ORDER IV AB PRO: HCPCS

## 2024-10-15 PROCEDURE — G8907 PT DOC NO EVENTS ON DISCHARG: HCPCS

## 2024-10-15 PROCEDURE — 88305 TISSUE EXAM BY PATHOLOGIST: CPT

## 2024-10-15 RX ORDER — SODIUM CHLORIDE, SODIUM LACTATE, POTASSIUM CHLORIDE, CALCIUM CHLORIDE 600; 310; 30; 20 MG/100ML; MG/100ML; MG/100ML; MG/100ML
INJECTION, SOLUTION INTRAVENOUS CONTINUOUS
Status: DISCONTINUED | OUTPATIENT
Start: 2024-10-15 | End: 2024-10-15 | Stop reason: HOSPADM

## 2024-10-15 RX ORDER — LIDOCAINE HYDROCHLORIDE 10 MG/ML
INJECTION, SOLUTION INFILTRATION; PERINEURAL
Status: DISCONTINUED | OUTPATIENT
Start: 2024-10-15 | End: 2024-10-15 | Stop reason: SDUPTHER

## 2024-10-15 RX ORDER — PROPOFOL 10 MG/ML
INJECTION, EMULSION INTRAVENOUS
Status: DISCONTINUED | OUTPATIENT
Start: 2024-10-15 | End: 2024-10-15 | Stop reason: SDUPTHER

## 2024-10-15 RX ADMIN — PROPOFOL 200 MG: 10 INJECTION, EMULSION INTRAVENOUS at 11:08

## 2024-10-15 RX ADMIN — PROPOFOL 50 MG: 10 INJECTION, EMULSION INTRAVENOUS at 11:05

## 2024-10-15 RX ADMIN — SODIUM CHLORIDE, SODIUM LACTATE, POTASSIUM CHLORIDE, CALCIUM CHLORIDE: 600; 310; 30; 20 INJECTION, SOLUTION INTRAVENOUS at 09:33

## 2024-10-15 RX ADMIN — LIDOCAINE HYDROCHLORIDE 50 MG: 10 INJECTION, SOLUTION INFILTRATION; PERINEURAL at 11:05

## 2024-10-15 ASSESSMENT — LIFESTYLE VARIABLES: SMOKING_STATUS: 1

## 2024-10-15 ASSESSMENT — PAIN - FUNCTIONAL ASSESSMENT: PAIN_FUNCTIONAL_ASSESSMENT: NONE - DENIES PAIN

## 2024-10-15 NOTE — OP NOTE
Patient: Sanket Huff : 1965  Med Rec#: [id number] Acc#: [id number]   Primary Care Provider Gene Sellers MD    Date of Procedure:  10/15/2024    Endoscopist: Johanna Headley MD, MD    Referring Provider: Gene Sellers MD,     Operation Performed: Colonoscopy up to the cecum with    Hot snare resection of polyps and  Hot cautery ablation of diminutive rectal polyps    Indications:   1.  Hx of partial Colon resection/Sigmoid colectomy in 2018 due to diverticulitis this was done in [place]   2. Diverticulosis  3. LLQ abdominal pain   4.  Family history-mother with history of renal and bladder cancers  Last colonoscopy was in 2018    Anesthesia:  Sedation was administered by anesthesia who monitored the patient during the procedure.    I met with Sanket Huff prior to procedure. We discussed the procedure itself, and I have discussed the risks of endoscopy (including-- but not limited to-- pain, discomfort, bleeding potentially requiring second endoscopic procedure and/or blood transfusion, organ perforation requiring operative repair, damage to organs near the colon, infection, aspiration, cardiopulmonary/allergic reaction), benefits, indications to endoscopy. Additionally, we discussed options other than colonoscopy. The patient expressed understanding. All questions answered. The patient decided to proceed with the procedure.  Signed informed consent was placed on the chart.    Blood Loss: minimal    Withdrawal time: More than 10 minutes  Bowel Prep: Fair  with small amounts of thick solid and semisolid stool especially in the vicinity of diverticula and a moderate amount of thick, opaque liquid scattered in patchy segments throughout the colon obscuring the underlying mucosa.Lesions including polyps may have been missed.     Complications: no immediate complications    DESCRIPTION OF PROCEDURE:     A time out was performed. After written informed consent was obtained, the patient was placed

## 2024-10-15 NOTE — ANESTHESIA POSTPROCEDURE EVALUATION
Department of Anesthesiology  Postprocedure Note    Patient: Sanket Huff  MRN: [id number]  YOB: 1965  Date of evaluation: 10/15/2024    Procedure Summary       Date: 10/15/24 Room / Location: [place] [address] / [facility]    Anesthesia Start: 1102 Anesthesia Stop:     Procedures:       COLONOSCOPY DIAGNOSTIC (Abdomen)      COLONOSCOPY POLYPECTOMY ABLATION (Abdomen)      COLONOSCOPY POLYPECTOMY SNARE/BIOPSY (Abdomen) Diagnosis:       LLQ pain      History of diverticulitis      (LLQ pain [R10.32])      (History of diverticulitis [Z87.19])    Surgeons: Johanna Headley MD Responsible Provider: Rajni Looney APRN - CRNA    Anesthesia Type: general, TIVA ASA Status: 3            Anesthesia Type: No value filed.    Norma Phase I:      Norma Phase II:      Anesthesia Post Evaluation    Patient location during evaluation: bedside  Patient participation: complete - patient participated  Level of consciousness: sleepy but conscious  Pain score: 0  Airway patency: patent  Nausea & Vomiting: no nausea and no vomiting  Cardiovascular status: blood pressure returned to baseline  Respiratory status: acceptable, room air and spontaneous ventilation  Hydration status: euvolemic  Pain management: adequate    No notable events documented.

## 2024-10-15 NOTE — H&P
fill 09/07/2024 Max Daily Amount: 1,800 mg 9/7/24 12/6/24 Yes Catina Zhang APRN - FLAKITO   thiamine mononitrate (THIAMINE) 100 MG tablet Take 1 tablet by mouth daily 7/8/23  Yes Sosa Sunshine APRN   folic acid (FOLVITE) 1 MG tablet Take 1 tablet by mouth daily 7/7/23  Yes Sosa Sunshine APRN   traZODone (DESYREL) 50 MG tablet Take 1 tablet by mouth nightly 7/7/23  Yes Sosa Sunshine APRN   levothyroxine (SYNTHROID) 25 MCG tablet Take 1 tablet by mouth daily 10/6/22  Yes Jared Valles MD   amLODIPine (NORVASC) 10 MG tablet Take 1 tablet by mouth daily 5/4/22  Yes Jared Valles MD   Melatonin 10 MG CAPS Take 1 capsule by mouth nightly as needed (SLEEP)   Yes Jared Valles MD   TESTOSTERONE IM Inject into the muscle every 3 days    Jared Valles MD   cyanocobalamin 1000 MCG/ML injection Inject 1 mL into the muscle daily for 4 doses  Patient taking differently: Inject 1 mL into the muscle every 14 days 7/8/23 10/10/24  Sosa Sunshine APRN   cyclobenzaprine (FLEXERIL) 5 MG tablet Take 1 tablet by mouth 2 times daily as needed for Muscle spasms    Jared Valles MD   aspirin 81 MG tablet Take 1 tablet by mouth daily    Jared Valles MD   clopidogrel (PLAVIX) 75 MG tablet Take 1 tablet by mouth daily 3/12/20   Jared Valles MD   atorvastatin (LIPITOR) 40 MG tablet Take 1 tablet by mouth daily    Jared Valles MD   nitroGLYCERIN (NITROSTAT) 0.4 MG SL tablet Place 1 tablet under the tongue every 5 minutes as needed for Chest pain up to max of 3 total doses. If no relief after 1 dose, call 911. 3/19/20   Dorian Hyatt MD       Past Medical History:  Past Medical History:   Diagnosis Date    Anxiety     Class 1 obesity     Depression 07/2023    with suicide ideation    Diverticulitis     Hx of blood clots     unknown date    Hypertension     Nosebleed 2022    Pneumonia 1988    Stroke (cerebrum) (HCC) 2019    TIA (transient ischemic

## 2024-10-15 NOTE — DISCHARGE INSTRUCTIONS
1. Repeat colonoscopy: pending pathology -based on his family history, colonoscopy findings today and prep quality-in 3 years for colon cancer surveillance; sooner if his Rpersonal or family history as pertaining to colorectal cancer risk changes requiring an earlier exam or if the patient were to develop lower GI symptoms such as bleeding, abdominal pain, change in bowel habits or stool caliber or if the patient has anemia or unexplained weight loss in the future.   2. Await biopsy results-you will receive a letter with your results within 7-10 days    - Resume previous meds and diet  - GI clinic f/u 6-8 weeks with Ms. Sloan   -Maintain abstinence from alcohol  - Keep scheduled f/u appts with other MDs     - NO NSAIDs x 2 weeks     POST-OP ORDERS: ENDOSCOPY & COLONOSCOPY:    1. Rest today.    2. DO NOT eat or drink until wide awake; eat your usual diet today in moderate amount only.    3. DO NOT drive today.    4. Call physician if you have severe pain, vomiting, fever, rectal bleeding or black bowel movements.    5.  If a biopsy was taken or a polyp removed, you should expect to hear results in about 21 days.  If you have heard nothing from your physician by then, call the office for results.    6.  Discharge home when patient awake, vitals signs stable and tolerating liquids.    7. Call with questions or concerns [phone].    NSAIDS (Non-steroidal Anti-Inflammatory)    You have been directed by your physician to avoid any NSAID's; the following medications are a list of those to avoid. If you think that you are taking any NSAID's notify your physician.     Over The Counter    Advil                      Motrin  Nuprin                   Ibuprofen  Midol                     Aleve  Naproxen              Orudis  Aspirin                   Lisa-Marcela    Prescriptions and Generics    Cataflam              Relafen  Voltaren               Clinoril  Indocin                 Naproxen  Arthrotec

## 2024-10-17 ENCOUNTER — TELEPHONE (OUTPATIENT)
Age: 59
End: 2024-10-17

## 2024-10-17 NOTE — TELEPHONE ENCOUNTER
NPR   READY TO SCHEDULE      Certification Not Required for this Service   Hyaluronan or derivative, Durolane, for intra-articular injection, 1 mg

## 2024-10-31 ENCOUNTER — OFFICE VISIT (OUTPATIENT)
Age: 59
End: 2024-10-31
Payer: MEDICARE

## 2024-10-31 DIAGNOSIS — M17.11 PRIMARY OSTEOARTHRITIS OF RIGHT KNEE: Primary | ICD-10-CM

## 2024-10-31 PROCEDURE — 20610 DRAIN/INJ JOINT/BURSA W/O US: CPT | Performed by: PHYSICIAN ASSISTANT

## 2024-10-31 NOTE — PROGRESS NOTES
Orthopaedic Clinic Note - Established Patient    NAME:  Sanket Huff   : 1965  MRN: [id number]      10/31/2024      CHIEF COMPLAINT: Right knee pain      HISTORY OF PRESENT ILLNESS:   This is a pleasant 59-year-old comes in with complaints of right knee pain.  Patient is a history of osteoarthritis.  Patient has been preapproved for viscosupplementation.    Past Medical History:        Diagnosis Date    Anxiety     Class 1 obesity     Depression 2023    with suicide ideation    Diverticulitis     Hx of blood clots     unknown date    Hypertension     Nosebleed     Pneumonia 1988    Stroke (cerebrum) (HCC)     TIA (transient ischemic attack)     TIA (transient ischemic attack)     TIA (transient ischemic attack) 2023       Past Surgical History:        Procedure Laterality Date    ARM SURGERY Left 2021    LEFT IN SITU CUBITAL TUNNEL RELEASE [facility]    ARM SURGERY Right 2021    RIGHT IN SITU CUBITAL TUNNEL DECOMPRESSION [facility]    BACK SURGERY      BICEPS TENDON REPAIR Right 10/16/2023    POSTERIOR LABRAL DEBRIDEMENT performed [facility]t [facility]n [facility] at [facility]    CARPAL TUNNEL RELEASE Right 10/22/2020    RIGHT OPEN CARPAL TUNNEL RELEASE [facility]    CARPAL TUNNEL RELEASE Left 2020    LEFT OPEN CARPAL TUNNEL RELEASE [facility]    COLON SURGERY  2018    8 inches of colon removed, per pt    COLONOSCOPY  2018    diverticulitis    COLONOSCOPY N/A 10/15/2024    Dr Headley, (-) HP, (-) TA (-) dysplasaia, ablated 3 diminutive 2-3 mm flat sessile benign appearing rectal polyps, Op changes hx of partial sigmoid/colectomy w well healed scar at anastomosis, mod pan diverticulosis in colon, int hem Gr 2 wo bleeding, no clear cut lesions to explains symptoms, 3 year recall    COLONOSCOPY N/A 10/15/2024    Duplicate    MOUTH SURGERY

## 2024-11-26 ENCOUNTER — HOSPITAL ENCOUNTER (OUTPATIENT)
Dept: PAIN MANAGEMENT | Age: 59
Discharge: HOME | End: 2024-11-26
Payer: MEDICARE

## 2024-11-26 VITALS
SYSTOLIC BLOOD PRESSURE: 118 MMHG | WEIGHT: 238 LBS | TEMPERATURE: 98.1 F | DIASTOLIC BLOOD PRESSURE: 71 MMHG | HEIGHT: 74 IN | HEART RATE: 78 BPM | RESPIRATION RATE: 16 BRPM | OXYGEN SATURATION: 97 % | BODY MASS INDEX: 30.54 KG/M2

## 2024-11-26 DIAGNOSIS — M54.41 CHRONIC RIGHT-SIDED LOW BACK PAIN WITH RIGHT-SIDED SCIATICA: ICD-10-CM

## 2024-11-26 DIAGNOSIS — Z78.9 ALCOHOL USE: ICD-10-CM

## 2024-11-26 DIAGNOSIS — M62.830 MUSCLE SPASM OF BACK: ICD-10-CM

## 2024-11-26 DIAGNOSIS — M54.16 LUMBAR RADICULOPATHY: ICD-10-CM

## 2024-11-26 DIAGNOSIS — G89.29 CHRONIC RIGHT-SIDED LOW BACK PAIN WITH RIGHT-SIDED SCIATICA: ICD-10-CM

## 2024-11-26 DIAGNOSIS — M54.16 LUMBAR RADICULOPATHY: Primary | ICD-10-CM

## 2024-11-26 PROCEDURE — 99214 OFFICE O/P EST MOD 30 MIN: CPT

## 2024-11-26 RX ORDER — GABAPENTIN 600 MG/1
600 TABLET ORAL 3 TIMES DAILY
Qty: 270 TABLET | Refills: 0 | Status: SHIPPED | OUTPATIENT
Start: 2024-12-06 | End: 2025-03-06

## 2024-11-26 ASSESSMENT — ENCOUNTER SYMPTOMS
BOWEL INCONTINENCE: 0
GASTROINTESTINAL NEGATIVE: 1
EYES NEGATIVE: 1
RESPIRATORY NEGATIVE: 1
ABDOMINAL PAIN: 0
BACK PAIN: 1

## 2024-11-26 ASSESSMENT — PAIN DESCRIPTION - PAIN TYPE: TYPE: CHRONIC PAIN

## 2024-11-26 ASSESSMENT — PAIN DESCRIPTION - LOCATION: LOCATION: BACK

## 2024-11-26 ASSESSMENT — PAIN SCALES - GENERAL: PAINLEVEL_OUTOF10: 6

## 2024-11-26 ASSESSMENT — PAIN DESCRIPTION - ORIENTATION: ORIENTATION: LOWER

## 2024-11-26 NOTE — TELEPHONE ENCOUNTER
1. Lumbar radiculopathy      Requested Prescriptions     Pending Prescriptions Disp Refills    gabapentin (NEURONTIN) 600 MG tablet 270 tablet 0     Sig: Take 1 tablet by mouth 3 times daily for 90 days. May fill 12/6/2024 Max Daily Amount: 1,800 mg       Continue medication with refill sent at appointment yes; refill sent to medical director at appointment yes, see refill encounter dated 11/26/2024    Electronically signed by SUZANNE Tripp CNP on 11/26/2024 at 9:16 AM

## 2024-11-26 NOTE — PROGRESS NOTES
Clinic Documentation      Education Provided:  [x] Review of Gabe  [] Agreement Review  [x] PEG Score Calculated [] PHQ Score Calculated [] ORT Score Calculated    [] Compliance Issues Discussed [] Cognitive Behavior Needs [x] Exercise [] Review of Test [] Financial Issues  [x] Tobacco/Alcohol Use Reviewed [x] Teaching [] New Patient [] Picture Obtained    Physician Plan:  [] Outgoing Referral  [] Pharmacy Consult  [x] Test Ordered [x] Prescription Ordered/Changed   [] Obtained Test Results / Consult Notes        Complete if patient is withholding blood thinner for procedure     Blood Thinner Patient is currently taking:      [] Plavix (Hold for 7 days)  [] Aspirin (Hold for 5 days)     [] Pletal (Hold for 2 days)  [] Pradaxa (Hold for 3 days)    [] Effient (Hold for 7 days)  [] Xarelto (Hold for 2 days)    [] Eliquis (Hold for 2 days)  [] Brilinta (Hold for 7 days)    [] Coumadin (Hold for 5 days) - (INR needs to be drawn the day prior to procedure- INR < 2.0)    [] Aggrenox (Hold for 7 days)        [] Patient will stop medication on their own.    [] Blood Thinner Form Faxed for approval to hold.   Provider form faxed to:     Assessment Completed by:  Electronically signed by Génesis Bell MA on 11/26/2024 at 9:14 AM  
reviewed.   Constitutional:       General: He is not in acute distress.     Appearance: Normal appearance. He is not ill-appearing, toxic-appearing or diaphoretic.   HENT:      Head: Normocephalic and atraumatic.      Right Ear: External ear normal.      Left Ear: External ear normal.      Nose: Nose normal.      Mouth/Throat:      Mouth: Mucous membranes are moist.      Pharynx: Oropharynx is clear.   Eyes:      Extraocular Movements: Extraocular movements intact.      Conjunctiva/sclera: Conjunctivae normal.   Cardiovascular:      Rate and Rhythm: Normal rate and regular rhythm.   Pulmonary:      Effort: Pulmonary effort is normal.   Abdominal:      General: Abdomen is flat. Bowel sounds are normal.      Palpations: Abdomen is soft.   Genitourinary:     Comments: Declined exam  Musculoskeletal:      Cervical back: Normal and neck supple.      Thoracic back: Spasms (palpable rope like nodules along thoracic paraspinal muscles) present. No bony tenderness.      Lumbar back: Spasms (palpable rope like nodules along lumbar paraspinal muscles) present. No bony tenderness. Positive right straight leg raise test. Negative left straight leg raise test.      Comments: Negative PSIS tenderness. Negative patricks bilaterally.    Skin:     General: Skin is warm and dry.   Neurological:      General: No focal deficit present.      Mental Status: He is alert and oriented to person, place, and time. Mental status is at baseline.   Psychiatric:         Mood and Affect: Mood normal.         Behavior: Behavior normal.         Thought Content: Thought content normal.         Judgment: Judgment normal.       Assistive Devices: [none]    LABS:     Lab Results   Component Value Date/Time     12/31/2023 08:50 AM    K 4.5 12/31/2023 08:50 AM     12/31/2023 08:50 AM    CO2 24 12/31/2023 08:50 AM    BUN 20 12/31/2023 08:50 AM    CREATININE 0.9 12/31/2023 08:50 AM    GLUCOSE 122 12/31/2023 08:50 AM    CALCIUM 9.0 12/31/2023

## 2024-12-04 ENCOUNTER — TELEPHONE (OUTPATIENT)
Dept: PAIN MANAGEMENT | Age: 59
End: 2024-12-04

## 2024-12-04 ENCOUNTER — OFFICE VISIT (OUTPATIENT)
Dept: GASTROENTEROLOGY | Age: 59
End: 2024-12-04
Payer: MEDICARE

## 2024-12-04 VITALS
SYSTOLIC BLOOD PRESSURE: 130 MMHG | OXYGEN SATURATION: 99 % | WEIGHT: 236 LBS | DIASTOLIC BLOOD PRESSURE: 80 MMHG | BODY MASS INDEX: 30.29 KG/M2 | HEART RATE: 81 BPM | HEIGHT: 74 IN

## 2024-12-04 DIAGNOSIS — K21.9 GASTROESOPHAGEAL REFLUX DISEASE, UNSPECIFIED WHETHER ESOPHAGITIS PRESENT: Primary | ICD-10-CM

## 2024-12-04 PROCEDURE — 3079F DIAST BP 80-89 MM HG: CPT | Performed by: NURSE PRACTITIONER

## 2024-12-04 PROCEDURE — 1036F TOBACCO NON-USER: CPT | Performed by: NURSE PRACTITIONER

## 2024-12-04 PROCEDURE — 99213 OFFICE O/P EST LOW 20 MIN: CPT | Performed by: NURSE PRACTITIONER

## 2024-12-04 PROCEDURE — 3075F SYST BP GE 130 - 139MM HG: CPT | Performed by: NURSE PRACTITIONER

## 2024-12-04 PROCEDURE — G8484 FLU IMMUNIZE NO ADMIN: HCPCS | Performed by: NURSE PRACTITIONER

## 2024-12-04 PROCEDURE — G8417 CALC BMI ABV UP PARAM F/U: HCPCS | Performed by: NURSE PRACTITIONER

## 2024-12-04 PROCEDURE — 3017F COLORECTAL CA SCREEN DOC REV: CPT | Performed by: NURSE PRACTITIONER

## 2024-12-04 PROCEDURE — G8427 DOCREV CUR MEDS BY ELIG CLIN: HCPCS | Performed by: NURSE PRACTITIONER

## 2024-12-04 RX ORDER — PANTOPRAZOLE SODIUM 40 MG/1
40 TABLET, DELAYED RELEASE ORAL 2 TIMES DAILY
Qty: 60 TABLET | Refills: 11 | Status: SHIPPED | OUTPATIENT
Start: 2024-12-04

## 2024-12-04 RX ORDER — PANTOPRAZOLE SODIUM 40 MG/1
40 TABLET, DELAYED RELEASE ORAL DAILY
COMMUNITY
Start: 2024-11-08

## 2024-12-04 NOTE — TELEPHONE ENCOUNTER
Procedure follow-up call patient  He states,\"I did get 3 months of relief from previous injections, improve my quality of life.\"

## 2024-12-04 NOTE — PROGRESS NOTES
Subjective:     Patient ID: Sanket Huff is a 59 y.o. male  PCP: Gene Sellers MD  Referring Provider: No ref. provider found    HPI  Patient presents to the office today with the following complaints: Follow-up      Patient seen in the office today for follow up after Colonoscopy   Colonoscopy and pathology reports reviewed and discussed with the patient and all questions answered   Reports are in Epic  Denies any postprocedure complications       Today he reports he is doing well   He denies any needs at this time   Protonix works well to control his acid reflux    Assessment:     1. Gastroesophageal reflux disease, unspecified whether esophagitis present  -     pantoprazole (PROTONIX) 40 MG tablet; Take 1 tablet by mouth 2 times daily at 0800 and 1400, Disp-60 tablet, R-11Normal       Review of Systems   Constitutional:  Negative for activity change, appetite change, fatigue, fever and unexpected weight change.   HENT:  Negative for trouble swallowing.    Respiratory:  Negative for cough, choking and shortness of breath.    Cardiovascular:  Negative for chest pain.   Gastrointestinal:  Negative for abdominal distention, abdominal pain, anal bleeding, blood in stool, constipation, diarrhea, nausea, rectal pain and vomiting.   Allergic/Immunologic: Negative for food allergies.   All other systems reviewed and are negative.      Plan:   Continue protonix 40 mg daily   Follow up in 1 year or sooner if needed     Orders  No orders of the defined types were placed in this encounter.    Medications  Orders Placed This Encounter   Medications    pantoprazole (PROTONIX) 40 MG tablet     Sig: Take 1 tablet by mouth 2 times daily at 0800 and 1400     Dispense:  60 tablet     Refill:  11         Patient History:     Past Medical History:   Diagnosis Date    Anxiety     Class 1 obesity     Depression 07/2023    with suicide ideation    Diverticulitis     Hx of blood clots     unknown date    Hypertension     Nosebleed

## 2024-12-11 ASSESSMENT — ENCOUNTER SYMPTOMS
ANAL BLEEDING: 0
TROUBLE SWALLOWING: 0
SHORTNESS OF BREATH: 0
ABDOMINAL DISTENTION: 0
BLOOD IN STOOL: 0
COUGH: 0
VOMITING: 0
NAUSEA: 0
RECTAL PAIN: 0
ABDOMINAL PAIN: 0
CHOKING: 0
DIARRHEA: 0
CONSTIPATION: 0

## 2024-12-19 DIAGNOSIS — M54.16 LUMBAR RADICULOPATHY: ICD-10-CM

## 2024-12-19 DIAGNOSIS — M53.3 SACROILIAC JOINT DYSFUNCTION: ICD-10-CM

## 2024-12-19 DIAGNOSIS — G89.29 CHRONIC RIGHT-SIDED LOW BACK PAIN WITH RIGHT-SIDED SCIATICA: Primary | ICD-10-CM

## 2024-12-19 DIAGNOSIS — M54.41 CHRONIC RIGHT-SIDED LOW BACK PAIN WITH RIGHT-SIDED SCIATICA: Primary | ICD-10-CM

## 2024-12-23 ENCOUNTER — TELEPHONE (OUTPATIENT)
Dept: PAIN MANAGEMENT | Age: 59
End: 2024-12-23

## 2024-12-23 DIAGNOSIS — G89.29 CHRONIC RIGHT-SIDED LOW BACK PAIN WITH RIGHT-SIDED SCIATICA: Primary | ICD-10-CM

## 2024-12-23 DIAGNOSIS — M54.41 CHRONIC RIGHT-SIDED LOW BACK PAIN WITH RIGHT-SIDED SCIATICA: Primary | ICD-10-CM

## 2024-12-23 RX ORDER — DIAZEPAM 2 MG/1
2 TABLET ORAL ONCE
Qty: 1 TABLET | Refills: 0 | Status: SHIPPED | OUTPATIENT
Start: 2024-12-23 | End: 2024-12-23

## 2024-12-23 NOTE — TELEPHONE ENCOUNTER
Jakub Nettles, I just got Pt. Scheduled for MRI Lumbar Spine WO contrast 12/27/24 at 11:00 am. Pt. Reports that he is claustrophobic and has taken Valium 5 mg in the past prior to a MRI. Home med list updated. Pt. Informed that if Valium is taken prior to the MRI he will have to have a  and pt. Stated no problem he will have a . Pt. Uses [facility]  at [facility].Electronically signed by Natty Arce RN on 12/23/2024 at 9:43 AM

## 2024-12-27 ENCOUNTER — HOSPITAL ENCOUNTER (OUTPATIENT)
Dept: MRI IMAGING | Age: 59
Discharge: HOME | End: 2024-12-27
Payer: MEDICARE

## 2024-12-27 DIAGNOSIS — M54.41 CHRONIC RIGHT-SIDED LOW BACK PAIN WITH RIGHT-SIDED SCIATICA: ICD-10-CM

## 2024-12-27 DIAGNOSIS — M54.16 LUMBAR RADICULOPATHY: ICD-10-CM

## 2024-12-27 DIAGNOSIS — G89.29 CHRONIC RIGHT-SIDED LOW BACK PAIN WITH RIGHT-SIDED SCIATICA: ICD-10-CM

## 2024-12-27 PROCEDURE — 72148 MRI LUMBAR SPINE W/O DYE: CPT

## 2025-01-09 ENCOUNTER — HOSPITAL ENCOUNTER (OUTPATIENT)
Dept: PAIN MANAGEMENT | Age: 60
Discharge: HOME | End: 2025-01-09
Payer: MEDICARE

## 2025-01-09 VITALS
OXYGEN SATURATION: 96 % | RESPIRATION RATE: 18 BRPM | DIASTOLIC BLOOD PRESSURE: 85 MMHG | SYSTOLIC BLOOD PRESSURE: 139 MMHG | TEMPERATURE: 97.6 F | HEART RATE: 85 BPM

## 2025-01-09 DIAGNOSIS — M79.18 MYALGIA, MULTIPLE SITES: ICD-10-CM

## 2025-01-09 DIAGNOSIS — M62.830 MUSCLE SPASM OF BACK: Primary | ICD-10-CM

## 2025-01-09 PROCEDURE — 6360000002 HC RX W HCPCS

## 2025-01-09 PROCEDURE — 20553 NJX 1/MLT TRIGGER POINTS 3/>: CPT

## 2025-01-09 RX ORDER — LIDOCAINE HYDROCHLORIDE 10 MG/ML
10 INJECTION, SOLUTION EPIDURAL; INFILTRATION; INTRACAUDAL; PERINEURAL ONCE
Status: DISCONTINUED | OUTPATIENT
Start: 2025-01-09 | End: 2025-01-11 | Stop reason: HOSPADM

## 2025-01-09 RX ORDER — BUPIVACAINE HYDROCHLORIDE 5 MG/ML
10 INJECTION, SOLUTION EPIDURAL; INTRACAUDAL ONCE
Status: DISCONTINUED | OUTPATIENT
Start: 2025-01-09 | End: 2025-01-11 | Stop reason: HOSPADM

## 2025-01-09 NOTE — PROCEDURES
contact the office should there be any complications or questions to arise between today and their next appointment.    Plan:  Patient encouraged to keep previously scheduled follow up office visit.     SUZANNE Doan - CNP, 1/9/2025 at 12:51 PM

## 2025-01-09 NOTE — DISCHARGE INSTRUCTIONS
[facility] Physical And Pain Medicine  Post Procedure Discharge Instructions        YOU HAVE HAD THE FOLLOWING PROCEDURE:                                  [] Occipital Nerve Blocks  [] CTS wrist injection(s)  [] Knee Injection(s)         [] Shoulder Injection(s)   [] Elbow Injection(s)     [] Botox Injection  [] Cervical Trigger Point Injections    [x] Thoracic Trigger Point Injections    [x] Lumbar Trigger Point Injections  [] Piriformis Trigger Point Injections  [] SI Joint Injection(s)     [] Trochanteric Bursa Injection(s)       [] Ankle Injection(s)   [] Plantar Fasciitis   []  ______________  Injection(s) [] Botox []  Migraines [] Spasticity    YOU HAVE RECEIVED THE FOLLOWING MEDICATIONS IN YOUR INJECTION(s)  [x] Lidocaine [x] Bupivacaine   [] DepoMedrol (steroid) [] Decadron (steroid)  []  Kenalog (steroid)   [] Toradol  [] Supartz [] Zilretta    [] Botox        PATIENT INFORMATION:   You may experience the following symptoms after your procedure. These symptoms are normal and should not cause concern:    You may have an increase in your pain. This may last 24 - 48 hours after your procedure.  You may have no change in the pain that you had prior to your injection(s).  You may have weakness or numbness in your affected extremity. If this occurs, this may last until numbing the medication wears off.     REPORT THE FOLLOWING SYMPTOMS TO YOUR DOCTOR:  Redness, swelling or drainage at the injection site(s)  Unusual pain that interferes with your normal activities of daily living.    OTHER INSTRUCTIONS:    [x] I will apply ice to the injection site(s) for at least 24 hours after the procedure. I will rotate the ice on for 20 minutes and off for 20 minutes for at least 24 hours.    [x] I will not apply heat for at least 48 hours and I will not take a hot bath or shower for at least 24 hours.     [x] I understand that if Lidocaine or Bupivacaine was used in my injection(s) that the injection site(s)

## 2025-01-16 ENCOUNTER — TELEPHONE (OUTPATIENT)
Dept: PAIN MANAGEMENT | Age: 60
End: 2025-01-16

## 2025-01-16 NOTE — TELEPHONE ENCOUNTER
How much -percent wise- did the shot help?    80      %  What is your current pain level now?    3/10                     Has your activity level increased since the shot?   Yes

## 2025-02-04 ENCOUNTER — HOSPITAL ENCOUNTER (OUTPATIENT)
Dept: PAIN MANAGEMENT | Age: 60
Discharge: HOME | End: 2025-02-04
Payer: MEDICARE

## 2025-02-04 VITALS
HEART RATE: 77 BPM | SYSTOLIC BLOOD PRESSURE: 122 MMHG | RESPIRATION RATE: 16 BRPM | BODY MASS INDEX: 30.72 KG/M2 | HEIGHT: 74 IN | DIASTOLIC BLOOD PRESSURE: 76 MMHG | WEIGHT: 239.4 LBS | TEMPERATURE: 96.8 F | OXYGEN SATURATION: 96 %

## 2025-02-04 DIAGNOSIS — M54.16 LUMBAR RADICULOPATHY: ICD-10-CM

## 2025-02-04 PROCEDURE — 99214 OFFICE O/P EST MOD 30 MIN: CPT

## 2025-02-04 RX ORDER — GABAPENTIN 600 MG/1
600 TABLET ORAL 3 TIMES DAILY
Qty: 270 TABLET | Refills: 0 | Status: SHIPPED | OUTPATIENT
Start: 2025-02-04 | End: 2025-05-05

## 2025-02-04 ASSESSMENT — PAIN SCALES - GENERAL: PAINLEVEL_OUTOF10: 8

## 2025-02-04 ASSESSMENT — PAIN DESCRIPTION - ORIENTATION: ORIENTATION: LOWER

## 2025-02-04 ASSESSMENT — PAIN DESCRIPTION - PAIN TYPE: TYPE: CHRONIC PAIN

## 2025-02-04 ASSESSMENT — PAIN DESCRIPTION - LOCATION: LOCATION: BACK

## 2025-02-04 NOTE — PROGRESS NOTES
Clinic Documentation      Education Provided:  [x] Review of Gabe  [] Agreement Review  [x] PEG Score Calculated [x] PHQ Score Calculated [x] ORT Score Calculated    [] Compliance Issues Discussed [] Cognitive Behavior Needs [x] Exercise [] Review of Test [] Financial Issues  [x] Tobacco/Alcohol Use Reviewed [x] Teaching [] New Patient [] Picture Obtained    Physician Plan:  [] Outgoing Referral  [] Pharmacy Consult  [] Test Ordered [] Prescription Ordered/Changed   [] Obtained Test Results / Consult Notes        Complete if patient is withholding blood thinner for procedure     Blood Thinner Patient is currently taking:      [x] Plavix (Hold for 7 days)  [] Aspirin (Hold for 5 days)     [] Pletal (Hold for 2 days)  [] Pradaxa (Hold for 3 days)    [] Effient (Hold for 7 days)  [] Xarelto (Hold for 2 days)    [] Eliquis (Hold for 2 days)  [] Brilinta (Hold for 7 days)    [] Coumadin (Hold for 5 days) - (INR needs to be drawn the day prior to procedure- INR < 2.0)    [] Aggrenox (Hold for 7 days)        [] Patient will stop medication on their own.    [] Blood Thinner Form Faxed for approval to hold.   Provider form faxed to:     Assessment Completed by:  Electronically signed by LISA ROSA on 2/4/2025 at 9:28 AM

## 2025-02-04 NOTE — PROGRESS NOTES
Primary Physician: Gene Sellers MD    CHIEF COMPLAINT:low back pain    HISTORY OF PRESENT ILLNESS:  Mr. Sanket Huff is 60 y.o. male with hx of hypothyroidism, HTN/HLD, chronic anticoagulation on plavix who is seen for follow up of his chronic low back pain.     He presents today with pain that is 8/10 that radiates down the posterior thigh to the knee. He says it usually does not go past the knee. His pain is sharp and shooting.     Currently his back on the right side hurts worse than the pain in his legs, although most of the time it is 50/50 back and legs. He denies numbness or tingling. He says that he has gotten over 80% relief for 8 weeks at least from his previous L4-5 ILESI. Says he recently got denied for a repeat epidural due to not having an updated MRI. He received one in Dec. We review that in office today.    Pain History:      Location: low back with radiation into the legs      Quality: sharp      Severity: 8/10      Onset: > 1 year ago      Duration: constant, waxing/waning      Relieving Factors: rest      Exacerbating Factors: standing, walking, lifting, twisting    Red Flags:  none    Current Pain/Psych/Sleep Medications:  - bupropion 300mg q daily  -cyclobenzaprine 5mg (not taking)  -gabapentin 600mg TID  -trazodone    Prior PharmacotherapyName, dose, response  Acetaminophen  NSAIDS/steroids  Muscle Relaxants  Anti-neuropathic (oscar, AEDs)   Antidepressants (TCA, SNRI, SSRI)  Opioids  Topicals (lido, diclofenac)  Supplements (ALA, turmeric, LDN)  *NT = not tried, NE = not effective, AE = adverse effects        Opioid Risk Assessment:      Taking opioids? No      Providing Analgesia?      Improved Activity?      Aberrant behavior?      Adverse events?    Opioid Risk Tool (ORT):      Family Hx Substance Abuse (EtOH, Illicits, Rx drugs 1 each) -      Personal Hx Substance Abuse (EtOH, Illicits, Rx drugs 1 each) - hx of cocaine use, hx of alcohol use disorder      Age (16-45 years) (1) -

## 2025-02-25 NOTE — PROGRESS NOTES
Physical Therapy  Daily Treatment Note  Date: 3/5/2021  Patient Name: Raul Valencia  MRN: [id number]     :   1965    Subjective:   General  Additional Pertinent Hx: 49year old male recently referred to PT with diagnosis of low back pain, currently referred back to PT with diagnosis of left sided hemiplegia and dysarthria. He had been seen in the past at our facility for rehab following a past stroke.   Referring Practitioner: Myrtle Mortimer, MD  PT Insurance Information: [facility] (20 visits per year, no precert)  Total # of Visits Approved: 18  Total # of Visits to Date: 2  Plan of Care/Certification Expiration Date: 21  Progress Note Due Date: 21  Subjective: My back is bothering me more than anything, i found my blue band but i couldn't find my AFO  Comments: 20 visits hard max/ 2 used for   Patient Currently in Pain: Yes  Pain Level: 4     Treatment Activities:         Exercises  Exercise 1: RECOMMEND AMB FIRST**          ambulation up to 6 minutes with SBQC and blue t-band hip/knee flexor assist (shoe to anterior hip, attached to belt)  424'  in 6 MWT  Exercise 2: supine SAQ's with tapping/DTR faciliatation to quads/patellar tendon x 10 --this -------eccentric lowering x 15 with assist from therapist  Exercise 3: supine heel slides x 15--min to assist  and also resisted hip/knee ext  Exercise 4: supine lower trunk rotation stretch to right--4 reps, 15 seconds each  Exercise 5: sitting LAQ's with faciliatation (as needed)  and eccentric lowering x 15  Exercise 6: sitting h/s curls with band and facilitation as needed (yellow) x 10 and partial ROM *with facilitation  Exercise 7: sitting ball squeeze supine with manual assist x 15  Exercise 8: sitting hip abduction with band (with blocking of right leg to recruit left)  yellow  x 15  Exercise 9: sitting foot tapping DF 1 x 20 ea  Exercise 10: sitting heel raises with weights over lower thighs  5#  x 20 ea  Exercise 11: sitting marching x 10 Spoke with patient, relayed provider message and recommendations. Patient conveyed understanding and was in agreement. Patient will provide update to provider. No further questions.   ea  Exercise 12: repeated sit to stand with forced recruitment of left leg (higher surface to lower)  Exercise 13: partial squats--10 reps  Exercise 14: standing left hip flexion x 15 with AA  Exercise 15: standing left hip hike  x 10  Exercise 16: standing and marching--1\"  Exercise 17: sidestepping in bars x 3 dwn/back   Exercise 18: supine left quad sets 3\" x 20--**  Exercise 19: ambulation in parallel bars  (down & back) using only right hand and with t-band flexion assist--not today  Exercise 20: supine foot dorsiflexion/plantarflexion/eversion (DF and eversion with NMES x 10', pad just distal to tibial plateau and middle of ant tib) and PF with manual resist         Assessment:   Conditions Requiring Skilled Therapeutic Intervention  Body structures, Functions, Activity limitations: Decreased functional mobility ; Decreased ADL status; Decreased ROM; Decreased strength;Decreased balance;Decreased high-level IADLs;Decreased fine motor control;Decreased coordination;Decreased endurance; Increased pain  Assessment: Initiated POC per initial evaluation, patient requires assist with several exercises and with sitting marching is able to pick his L leg up enough to clear the floor. Has to shift to the R and slightly circumduct his LLE in order to advance it fwd with gait. Will monitor his progress and advance his routine accordingly. REQUIRES PT FOLLOW UP: Yes        Goals:  Short term goals  Time Frame for Short term goals: 6-8 weeks  Short term goal 1: Patient to be independent with HEP. Short term goal 2: Patient to be independent with resumption of use of AFO (if foot strength remains insufficient). Short term goal 3: Patient able to ambulate with roller walker with less drag of left foot. Short term goal 4: Patient to report greater ease getting in and out of vehicle.   Long term goals  Time Frame for Long term goals : 9-12 weeks  Long term goal 1: Patient able to ambulate with quad cane or single point cane 500'.  Long term goal 2: Patient able to perform sit to stand 8 x in 30 seconds with increased weight bourne through left LE. Long term goal 3: Patient to have >= 4-/5 strength in left foot for df, pf, inversion and eversion. Long term goal 4: Patient to have >=4-/5 left hip flexors, 4+/5 left knee extensors. Long term goal 5: Patient to show increased protective reactions with standing balance perturbations. (11/27: Able to accept mod perturbations in all directions, but continued delay in LLE reaction, as well as generally erratic motions.)  Patient Goals   Patient goals : Return back to normal level of function, walk without a device or 1-point cane. Plan:    Times per week: 2x per week  Plan weeks: 6-9 weeks  Specific instructions for Next Treatment: Request made by the office to seek OT eval to address weakness and limited use of left arm. He will have a consult for speech therapy 3/16/21.   Current Treatment Recommendations: Strengthening, ROM, Balance Training, Transfer Training, Endurance Training, Gait Training, Stair training, Neuromuscular Re-education, Home Exercise Program, Patient/Caregiver Education & Training, Equipment Evaluation, Education, & procurement        Therapy Time   Individual Concurrent Group Co-treatment   Time In 0801         Time Out 0901         Minutes Kaz Ortega PTA    Electronically signed by Nan Cochran PTA on 3/5/2021 at 9:21 AM

## 2025-03-05 ENCOUNTER — TELEPHONE (OUTPATIENT)
Dept: PAIN MANAGEMENT | Age: 60
End: 2025-03-05

## 2025-03-05 NOTE — TELEPHONE ENCOUNTER
Call placed to patient to remind him to hold asa and plavix x5 days starting Friday for epidural injection next Wednesday 3/12/2025. Patient states understanding. Medication hold approved by Dr Sellers.

## 2025-03-12 ENCOUNTER — HOSPITAL ENCOUNTER (OUTPATIENT)
Dept: PAIN MANAGEMENT | Age: 60
Discharge: HOME | End: 2025-03-12
Payer: MEDICARE

## 2025-03-12 VITALS
OXYGEN SATURATION: 96 % | TEMPERATURE: 96.3 F | HEART RATE: 80 BPM | DIASTOLIC BLOOD PRESSURE: 86 MMHG | SYSTOLIC BLOOD PRESSURE: 125 MMHG | RESPIRATION RATE: 16 BRPM

## 2025-03-12 DIAGNOSIS — R52 PAIN MANAGEMENT: ICD-10-CM

## 2025-03-12 PROCEDURE — 6360000002 HC RX W HCPCS

## 2025-03-12 PROCEDURE — 62323 NJX INTERLAMINAR LMBR/SAC: CPT

## 2025-03-12 RX ORDER — LIDOCAINE HYDROCHLORIDE 10 MG/ML
10 INJECTION, SOLUTION EPIDURAL; INFILTRATION; INTRACAUDAL; PERINEURAL ONCE
Status: DISCONTINUED | OUTPATIENT
Start: 2025-03-12 | End: 2025-03-14 | Stop reason: HOSPADM

## 2025-03-12 RX ORDER — DEXAMETHASONE SODIUM PHOSPHATE 10 MG/ML
10 INJECTION, SOLUTION INTRAMUSCULAR; INTRAVENOUS ONCE
Status: DISCONTINUED | OUTPATIENT
Start: 2025-03-12 | End: 2025-03-14 | Stop reason: HOSPADM

## 2025-03-12 NOTE — PROGRESS NOTES
[facility] Physical [facility] Pain Medicine  Post Procedure Discharge Instructions        YOU HAVE HAD THE FOLLOWING PROCEDURE:                                  [] Occipital Nerve Blocks  [] CTS wrist injection(s)  [] Knee Injection(s)         [] Shoulder Injection(s)   [] Elbow Injection(s)     [] Botox Injection  [] Cervical Trigger Point Injections    [] Thoracic Trigger Point Injections    [] Lumbar Trigger Point Injections  [] Piriformis Trigger Point Injections  [x] SI Joint Injection(s)     [] Trochanteric Bursa Injection(s)       [] Ankle Injection(s)   [] Plantar Fasciitis   []  ______________  Injection(s) [] Botox []  Migraines [] Spasticity    YOU HAVE RECEIVED THE FOLLOWING MEDICATIONS IN YOUR INJECTION(s)  [x] Lidocaine [x] Bupivacaine   [] DepoMedrol (steroid) [] Decadron (steroid)  [x]  Kenalog (steroid)   [] Toradol  [] Supartz [] Zilretta    [] Botox        PATIENT INFORMATION:   You may experience the following symptoms after your procedure. These symptoms are normal and should not cause concern:    You may have an increase in your pain. This may last 24 - 48 hours after your procedure.  You may have no change in the pain that you had prior to your injection(s).  You may have weakness or numbness in your affected extremity. If this occurs, this may last until numbing the medication wears off.     REPORT THE FOLLOWING SYMPTOMS TO YOUR DOCTOR:  Redness, swelling or drainage at the injection site(s)  Unusual pain that interferes with your normal activities of daily living.    OTHER INSTRUCTIONS:    [x] I will apply ice to the injection site(s) for at least 24 hours after the procedure. I will rotate the ice on for 20 minutes and off for 20 minutes for at least 24 hours.    [x] I will not apply heat for at least 48 hours and I will not take a hot bath or shower for at least 24 hours.     [x] I understand that if Lidocaine or Bupivacaine was used in my injection(s) that the injection site(s)

## 2025-03-12 NOTE — PROCEDURES
The patient's History and Physical  was reviewed with the patient and I examined the patient. There was no change. The surgical site was confirmed by the patient and me.            Plan: The risks, benefits, expected outcome, and alternative to the recommended procedure have been discussed with the patient. Patient understands and wants to proceed with the procedure.      -------------------------------------------------------------------------------------  Diagnosis  Lumbar Radicular Pain    Procedures  Right S1 Transforaminal Epidural Steroid Injection    Procedure Summary  ANTIPLATELET/ANTICOAGULANT STOP DATE: Plavix 7 days ago    CONSENT: Risks, benefits and options were explained to the patient, all questions were answered and written informed consent was obtained.    ANESTHESIA: IV Sedation: None    PROCEDURE NOTE:  A pre-procedural time out was performed to confirm the correct patient, procedure, side, and site. Standard ASA monitors were applied and oxygen via nasal cannula was provided. All proceduralists donned sterile gloves with masks and surgical hats. The patient was placed prone with pillow under the abdomen and all pressure points padded. The patient's lumbar spine was prepped in standard fashion using Chlorhexidine and draped with sterile towels. The selected neuroforamen was identified using oblique fluoroscopy and the appropriate vertebral body endplate squared. The overlying skin and subcutaneous tissue was anesthetized with 1% lidocaine. A 22 gauge 3.5 inch spinal needle with bent tip was incrementally advanced using intermittent fluoroscopy to the medial to the arcuate line of April. After gentle contact with bone on the superolateral aspect of the foramen, the needle was advanced a couple millimeters. Proper positioning was confirmed with lateral imaging. After negative aspiration of blood and cerebrospinal fluid, needle placement was confirmed with 1 ml of Omnipaque contrast using live

## 2025-03-18 ENCOUNTER — TELEPHONE (OUTPATIENT)
Dept: PAIN MANAGEMENT | Age: 60
End: 2025-03-18

## 2025-03-18 NOTE — TELEPHONE ENCOUNTER
How much did the shot help?    80-85        %   What is your current pain level now?     4/10                    Has your activity level increased since the shot?   Yes,Been able to lift items.     Electronically signed by Jesus Gutierrez RN on 3/18/2025 at 3:22 PM

## 2025-03-31 ENCOUNTER — HOSPITAL ENCOUNTER (OUTPATIENT)
Dept: ULTRASOUND IMAGING | Facility: HOSPITAL | Age: 60
Discharge: HOME | End: 2025-03-31
Payer: MEDICARE

## 2025-03-31 ENCOUNTER — HOSPITAL ENCOUNTER (OUTPATIENT)
Dept: GENERAL RADIOLOGY | Facility: HOSPITAL | Age: 60
Discharge: HOME | End: 2025-03-31
Payer: MEDICARE

## 2025-03-31 ENCOUNTER — TRANSCRIBE ORDERS (OUTPATIENT)
Dept: ADMINISTRATIVE | Facility: HOSPITAL | Age: 60
End: 2025-03-31
Payer: MEDICARE

## 2025-03-31 DIAGNOSIS — M79.661 PAIN OF RIGHT LOWER LEG: ICD-10-CM

## 2025-03-31 DIAGNOSIS — M79.605 PAIN IN LEFT LEG: ICD-10-CM

## 2025-03-31 DIAGNOSIS — R06.02 SHORTNESS OF BREATH: Primary | ICD-10-CM

## 2025-03-31 DIAGNOSIS — R22.42 LOCALIZED SWELLING, MASS AND LUMP, LEFT LOWER LIMB: ICD-10-CM

## 2025-03-31 DIAGNOSIS — M79.661 PAIN OF RIGHT LOWER LEG: Primary | ICD-10-CM

## 2025-03-31 DIAGNOSIS — M79.604 PAIN IN RIGHT LEG: ICD-10-CM

## 2025-03-31 DIAGNOSIS — M79.671 PAIN IN RIGHT FOOT: ICD-10-CM

## 2025-03-31 PROCEDURE — 73630 X-RAY EXAM OF FOOT: CPT

## 2025-03-31 PROCEDURE — 73610 X-RAY EXAM OF ANKLE: CPT

## 2025-03-31 PROCEDURE — 93970 EXTREMITY STUDY: CPT

## 2025-04-04 ENCOUNTER — HOSPITAL ENCOUNTER (OUTPATIENT)
Age: 60
Discharge: HOME | End: 2025-04-04
Attending: SURGERY
Payer: MEDICARE

## 2025-04-04 VITALS
BODY MASS INDEX: 30.67 KG/M2 | HEIGHT: 74 IN | SYSTOLIC BLOOD PRESSURE: 137 MMHG | WEIGHT: 239 LBS | DIASTOLIC BLOOD PRESSURE: 70 MMHG

## 2025-04-04 DIAGNOSIS — R06.02 SHORTNESS OF BREATH: ICD-10-CM

## 2025-04-04 PROCEDURE — 93306 TTE W/DOPPLER COMPLETE: CPT

## 2025-04-05 LAB
ECHO AO ASC DIAM: 3.4 CM
ECHO AO ASCENDING AORTA INDEX: 1.45 CM/M2
ECHO AO ROOT DIAM: 2.5 CM
ECHO AO ROOT INDEX: 1.07 CM/M2
ECHO AO SINUS VALSALVA DIAM: 3.5 CM
ECHO AO SINUS VALSALVA INDEX: 1.5 CM/M2
ECHO AO ST JNCT DIAM: 2.8 CM
ECHO AV AREA PEAK VELOCITY: 2.7 CM2
ECHO AV AREA VTI: 2.6 CM2
ECHO AV AREA/BSA PEAK VELOCITY: 1.2 CM2/M2
ECHO AV AREA/BSA VTI: 1.1 CM2/M2
ECHO AV MEAN GRADIENT: 4 MMHG
ECHO AV MEAN VELOCITY: 1 M/S
ECHO AV PEAK GRADIENT: 7 MMHG
ECHO AV PEAK VELOCITY: 1.3 M/S
ECHO AV VELOCITY RATIO: 0.77
ECHO AV VTI: 26.1 CM
ECHO BSA: 2.38 M2
ECHO EST RA PRESSURE: 3 MMHG
ECHO IVC PROX: 1.6 CM
ECHO LA AREA 2C: 21.5 CM2
ECHO LA AREA 4C: 20.6 CM2
ECHO LA DIAMETER INDEX: 1.67 CM/M2
ECHO LA DIAMETER: 3.9 CM
ECHO LA MAJOR AXIS: 5.8 CM
ECHO LA MINOR AXIS: 5.9 CM
ECHO LA TO AORTIC ROOT RATIO: 1.56
ECHO LA VOL BP: 62 ML (ref 18–58)
ECHO LA VOL MOD A2C: 64 ML (ref 18–58)
ECHO LA VOL MOD A4C: 58 ML (ref 18–58)
ECHO LA VOL/BSA BIPLANE: 26 ML/M2 (ref 16–34)
ECHO LA VOLUME INDEX MOD A2C: 27 ML/M2 (ref 16–34)
ECHO LA VOLUME INDEX MOD A4C: 25 ML/M2 (ref 16–34)
ECHO LV E' LATERAL VELOCITY: 14.3 CM/S
ECHO LV E' SEPTAL VELOCITY: 10.7 CM/S
ECHO LV EDV A2C: 149 ML
ECHO LV EDV A4C: 134 ML
ECHO LV EDV INDEX A4C: 57 ML/M2
ECHO LV EDV NDEX A2C: 64 ML/M2
ECHO LV EF PHYSICIAN: 55 %
ECHO LV EJECTION FRACTION A2C: 54 %
ECHO LV EJECTION FRACTION A4C: 55 %
ECHO LV EJECTION FRACTION BIPLANE: 54 % (ref 55–100)
ECHO LV ESV A2C: 69 ML
ECHO LV ESV A4C: 60 ML
ECHO LV ESV INDEX A2C: 29 ML/M2
ECHO LV ESV INDEX A4C: 26 ML/M2
ECHO LV FRACTIONAL SHORTENING: 37 % (ref 28–44)
ECHO LV INTERNAL DIMENSION DIASTOLE INDEX: 2.31 CM/M2
ECHO LV INTERNAL DIMENSION DIASTOLIC: 5.4 CM (ref 4.2–5.9)
ECHO LV INTERNAL DIMENSION SYSTOLIC INDEX: 1.45 CM/M2
ECHO LV INTERNAL DIMENSION SYSTOLIC: 3.4 CM
ECHO LV IVSD: 0.8 CM (ref 0.6–1)
ECHO LV MASS 2D: 167.4 G (ref 88–224)
ECHO LV MASS INDEX 2D: 71.5 G/M2 (ref 49–115)
ECHO LV POSTERIOR WALL DIASTOLIC: 0.9 CM (ref 0.6–1)
ECHO LV RELATIVE WALL THICKNESS RATIO: 0.33
ECHO LVOT AREA: 3.5 CM2
ECHO LVOT AV VTI INDEX: 0.75
ECHO LVOT DIAM: 2.1 CM
ECHO LVOT MEAN GRADIENT: 2 MMHG
ECHO LVOT PEAK GRADIENT: 4 MMHG
ECHO LVOT PEAK VELOCITY: 1 M/S
ECHO LVOT STROKE VOLUME INDEX: 29 ML/M2
ECHO LVOT SV: 67.9 ML
ECHO LVOT VTI: 19.6 CM
ECHO MV A VELOCITY: 0.63 M/S
ECHO MV AREA VTI: 3.1 CM2
ECHO MV E DECELERATION TIME (DT): 176 MS
ECHO MV E VELOCITY: 0.76 M/S
ECHO MV E/A RATIO: 1.21
ECHO MV E/E' LATERAL: 5.31
ECHO MV E/E' RATIO (AVERAGED): 6.21
ECHO MV E/E' SEPTAL: 7.1
ECHO MV LVOT VTI INDEX: 1.13
ECHO MV MAX VELOCITY: 0.8 M/S
ECHO MV MEAN GRADIENT: 1 MMHG
ECHO MV MEAN VELOCITY: 0.6 M/S
ECHO MV PEAK GRADIENT: 3 MMHG
ECHO MV VTI: 22.1 CM
ECHO RA AREA 4C: 14.1 CM2
ECHO RA END SYSTOLIC VOLUME APICAL 4 CHAMBER INDEX BSA: 15 ML/M2
ECHO RA VOLUME: 36 ML
ECHO RIGHT VENTRICULAR SYSTOLIC PRESSURE (RVSP): 25 MMHG
ECHO RV BASAL DIMENSION: 3.9 CM
ECHO RV INTERNAL DIMENSION: 3.2 CM
ECHO RV LONGITUDINAL DIMENSION: 8 CM
ECHO RV MID DIMENSION: 2.8 CM
ECHO RV TAPSE: 2.6 CM (ref 1.7–?)
ECHO TV REGURGITANT MAX VELOCITY: 2.32 M/S
ECHO TV REGURGITANT PEAK GRADIENT: 22 MMHG

## 2025-04-16 ENCOUNTER — OFFICE VISIT (OUTPATIENT)
Age: 60
End: 2025-04-16

## 2025-04-16 VITALS — BODY MASS INDEX: 30.67 KG/M2 | WEIGHT: 239 LBS | HEIGHT: 74 IN

## 2025-04-16 DIAGNOSIS — M25.871 SESAMOIDITIS OF RIGHT FOOT: ICD-10-CM

## 2025-04-16 DIAGNOSIS — M84.375A METATARSAL STRESS FRACTURE, LEFT, INITIAL ENCOUNTER: Primary | ICD-10-CM

## 2025-04-16 NOTE — PROGRESS NOTES
[facility]  [facility]  [address]  [facility] [id number] [phone]     Patient: Sanket Huff   YOB: 1965   Date: 4/16/2025   Visit Type:      History of Present Illness  Chief Complaint   Patient presents with    Foot Pain     Left Foot/New Patient         History of Present Illness  The patient is a 60-year-old male who presents today with complaints of right and left lower extremity pain.    Persistent pain in the ball of his right foot has been experienced for approximately one year. The sensation is described as similar to a bruise, although no visible bruising is present. Despite the discomfort, he continues to perform yard work and other activities that require him to be on his feet. No associated numbness or tingling in the toes is reported.  He points to the area of the sesamoid of the right foot.  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  X-rays of the right foot were completed on 3/31/2025 at [facility].    He also complains of swelling and bruising to the left foot that began about a week ago.  He denies any trauma or injury.  Complains of pain to the forefoot.  Pain with every step.  Denies any trauma.              Past Medical History:   Diagnosis Date    Anxiety     Class 1 obesity     Depression 07/2023    with suicide ideation    Diverticulitis     Hx of blood clots     unknown date    Hypertension     Nosebleed 2022    Pneumonia 1988    Stroke (cerebrum) ([facility]) 2019    TIA (transient ischemic attack) 2013    TIA (transient ischemic attack) 2014    TIA (transient ischemic attack) 02/2023      Past Surgical History:   Procedure Laterality Date    ARM SURGERY Left 11/04/2021    LEFT IN SITU CUBITAL TUNNEL RELEASE performed by Wilbur Brand MD at [facility]

## 2025-04-16 NOTE — PROGRESS NOTES
Chief Complaint  Left Foot Pain       Body Part: Left Foot/ New Patient Patient reports he has not stepped on anything or had injury. He also stated the pain has been ongoing for a year now, and thought it was a normal pain.

## 2025-05-01 ENCOUNTER — OFFICE VISIT (OUTPATIENT)
Dept: PAIN MANAGEMENT | Age: 60
End: 2025-05-01
Payer: MEDICARE

## 2025-05-01 VITALS
RESPIRATION RATE: 16 BRPM | TEMPERATURE: 97.1 F | SYSTOLIC BLOOD PRESSURE: 121 MMHG | OXYGEN SATURATION: 96 % | HEIGHT: 74 IN | BODY MASS INDEX: 30.18 KG/M2 | HEART RATE: 76 BPM | DIASTOLIC BLOOD PRESSURE: 73 MMHG | WEIGHT: 235.2 LBS

## 2025-05-01 DIAGNOSIS — M54.59 LUMBAR FACET JOINT PAIN: ICD-10-CM

## 2025-05-01 DIAGNOSIS — M54.16 LUMBAR RADICULOPATHY: ICD-10-CM

## 2025-05-01 DIAGNOSIS — M79.18 MYOFASCIAL PAIN SYNDROME: ICD-10-CM

## 2025-05-01 DIAGNOSIS — M47.817 LUMBOSACRAL SPONDYLOSIS WITHOUT MYELOPATHY: Primary | ICD-10-CM

## 2025-05-01 PROCEDURE — G8417 CALC BMI ABV UP PARAM F/U: HCPCS | Performed by: STUDENT IN AN ORGANIZED HEALTH CARE EDUCATION/TRAINING PROGRAM

## 2025-05-01 PROCEDURE — 99214 OFFICE O/P EST MOD 30 MIN: CPT | Performed by: STUDENT IN AN ORGANIZED HEALTH CARE EDUCATION/TRAINING PROGRAM

## 2025-05-01 PROCEDURE — 3078F DIAST BP <80 MM HG: CPT | Performed by: STUDENT IN AN ORGANIZED HEALTH CARE EDUCATION/TRAINING PROGRAM

## 2025-05-01 PROCEDURE — 3074F SYST BP LT 130 MM HG: CPT | Performed by: STUDENT IN AN ORGANIZED HEALTH CARE EDUCATION/TRAINING PROGRAM

## 2025-05-01 PROCEDURE — 3017F COLORECTAL CA SCREEN DOC REV: CPT | Performed by: STUDENT IN AN ORGANIZED HEALTH CARE EDUCATION/TRAINING PROGRAM

## 2025-05-01 PROCEDURE — 1036F TOBACCO NON-USER: CPT | Performed by: STUDENT IN AN ORGANIZED HEALTH CARE EDUCATION/TRAINING PROGRAM

## 2025-05-01 PROCEDURE — G8427 DOCREV CUR MEDS BY ELIG CLIN: HCPCS | Performed by: STUDENT IN AN ORGANIZED HEALTH CARE EDUCATION/TRAINING PROGRAM

## 2025-05-01 RX ORDER — LIDOCAINE 50 MG/G
1 PATCH TOPICAL DAILY PRN
Qty: 30 PATCH | Refills: 2 | Status: SHIPPED | OUTPATIENT
Start: 2025-05-01 | End: 2025-07-30

## 2025-05-01 NOTE — PROGRESS NOTES
Primary Physician: Gene Sellers MD    CHIEF COMPLAINT:low back pain    HISTORY OF PRESENT ILLNESS:  Mr. Sanket Huff is 60 y.o. male with hx of hypothyroidism, HTN/HLD, chronic anticoagulation on plavix who is seen for follow up of his chronic low back pain.     Patient presents after receiving right S1 TFESI with myself on 3/12/25. Patient initially reported at least 80-85% relief on post op 1 week one. He is still getting relief from this. He is starting to notice more pain in the posterior part of his right leg but it is not as intense as before.    Patient presents today with back pain that is 6/10.  He has pain in the back that radiates to the knees. He is interested in trying a procedure for this.    He recently saw orthopaedics who has diagnosed him with a stress fracture of the third metatarsal in the left foot. They have ordered a boot for immobilization. He has also been diagnosed with sesamoiditis of the right foot.      Pain History:      Location: low back with radiation into the legs to the knees      Quality: aching/cramping      Severity: 6/10      Onset: > 1 year ago      Duration: constant, waxing/waning      Relieving Factors: rest      Exacerbating Factors: standing, walking, lifting, twisting    Red Flags:  none    Current Pain/Psych/Sleep Medications:  - bupropion 300mg q daily  -gabapentin 600mg TID  -trazodone    Prior PharmacotherapyName, dose, response  Acetaminophen  NSAIDS/steroids  Muscle Relaxants  Anti-neuropathic (oscar, AEDs)   Antidepressants (TCA, SNRI, SSRI)  Opioids  Topicals (lido, diclofenac)  Supplements (ALA, turmeric, LDN)  *NT = not tried, NE = not effective, AE = adverse effects        Opioid Risk Assessment:      Taking opioids? No      Providing Analgesia?      Improved Activity?      Aberrant behavior?      Adverse events?    Opioid Risk Tool (ORT):      Family Hx Substance Abuse (EtOH, Illicits, Rx drugs 1 each) -      Personal Hx Substance Abuse (EtOH, Illicits, Rx 
no

## 2025-05-14 ENCOUNTER — OFFICE VISIT (OUTPATIENT)
Age: 60
End: 2025-05-14

## 2025-05-14 VITALS — WEIGHT: 235 LBS | BODY MASS INDEX: 30.16 KG/M2 | HEIGHT: 74 IN

## 2025-05-14 DIAGNOSIS — M84.375D STRESS FRACTURE OF METATARSAL BONE OF LEFT FOOT WITH ROUTINE HEALING, SUBSEQUENT ENCOUNTER: Primary | ICD-10-CM

## 2025-05-14 ASSESSMENT — ENCOUNTER SYMPTOMS
VOMITING: 0
CONSTIPATION: 0
COLOR CHANGE: 0
DIARRHEA: 0
BLOOD IN STOOL: 0
COUGH: 0
NAUSEA: 0
SHORTNESS OF BREATH: 0

## 2025-05-14 NOTE — PROGRESS NOTES
[facility]  [facility]  [address]  [facility] [id number] [phone]     Patient: Sanket Huff   YOB: 1965   Date: 5/14/2025   Visit Type:  Follow up    Body Part: Foot left        Patient states his foot feels alright.  He is ready to get out of the boot if possible.  Reports no pain today.    Review of Systems    Review of Systems   Constitutional:  Negative for appetite change, chills, fatigue and fever.   Respiratory:  Negative for cough and shortness of breath.    Cardiovascular:  Negative for chest pain, palpitations and leg swelling.   Gastrointestinal:  Negative for blood in stool, constipation, diarrhea, nausea and vomiting.   Musculoskeletal:  Negative for arthralgias, gait problem and joint swelling.   Skin:  Negative for color change.   Neurological:  Negative for weakness.

## 2025-05-14 NOTE — PROGRESS NOTES
[facility]  [facility]  [address]  [facility] [id number] [phone]     Patient: Sanket Huff   YOB: 1965   Date: 5/14/2025   Visit Type:      History of Present Illness  Chief Complaint   Patient presents with    Follow-up     Left foot         History of Present Illness  The patient presents today for a follow-up of left lower extremity pain. He was initially seen on 04/16/2025, at which time he complained of pain at the ball of the foot, specifically in the area of the sesamoid and along the distal third metatarsal. Boot immobilization was recommended.    He reports an improvement in his foot condition, with no significant pain experienced during his visit. However, he notes slight discomfort upon removal of the boot, describing it as a mild ache. Persistent tenderness in the area of the third metatarsal is exacerbated upon palpation. No pain is experienced while wearing the boot or flip-flops, but there is a minor pressure sensation akin to a bruise. He expresses curiosity about the possibility of a deep bruise, given the absence of any external signs. The knot is less pronounced than before, with slight associated pain. He believes he has managed well with the boot, despite its tendency to slow him down.    Past Medical History:   Diagnosis Date    Anxiety     Class 1 obesity     Depression 07/2023    with suicide ideation    Diverticulitis     Hx of blood clots     unknown date    Hypertension     Nosebleed 2022    Pneumonia 1988    Stroke (cerebrum) ([facility]) 2019    TIA (transient ischemic attack) 2013    TIA (transient ischemic attack) 2014    TIA (transient ischemic attack) 02/2023      Past Surgical History:   Procedure Laterality Date    ARM SURGERY Left 11/04/2021    LEFT IN SITU CUBITAL TUNNEL RELEASE performed by Wilbur Brand MD at [facility] OR    ARM SURGERY Right 12/30/2021    RIGHT IN SITU CUBITAL TUNNEL

## 2025-06-10 ENCOUNTER — OFFICE VISIT (OUTPATIENT)
Age: 60
End: 2025-06-10
Payer: MEDICARE

## 2025-06-10 VITALS — WEIGHT: 235 LBS | HEIGHT: 74 IN | BODY MASS INDEX: 30.16 KG/M2

## 2025-06-10 DIAGNOSIS — M77.12 LATERAL EPICONDYLITIS OF LEFT ELBOW: Primary | ICD-10-CM

## 2025-06-10 PROCEDURE — 3017F COLORECTAL CA SCREEN DOC REV: CPT | Performed by: ORTHOPAEDIC SURGERY

## 2025-06-10 PROCEDURE — G8417 CALC BMI ABV UP PARAM F/U: HCPCS | Performed by: ORTHOPAEDIC SURGERY

## 2025-06-10 PROCEDURE — G8427 DOCREV CUR MEDS BY ELIG CLIN: HCPCS | Performed by: ORTHOPAEDIC SURGERY

## 2025-06-10 PROCEDURE — 99213 OFFICE O/P EST LOW 20 MIN: CPT | Performed by: ORTHOPAEDIC SURGERY

## 2025-06-10 PROCEDURE — 1036F TOBACCO NON-USER: CPT | Performed by: ORTHOPAEDIC SURGERY

## 2025-06-10 PROCEDURE — 20551 NJX 1 TENDON ORIGIN/INSJ: CPT | Performed by: ORTHOPAEDIC SURGERY

## 2025-06-10 RX ORDER — BETAMETHASONE SODIUM PHOSPHATE AND BETAMETHASONE ACETATE 3; 3 MG/ML; MG/ML
6 INJECTION, SUSPENSION INTRA-ARTICULAR; INTRALESIONAL; INTRAMUSCULAR; SOFT TISSUE ONCE
Status: COMPLETED | OUTPATIENT
Start: 2025-06-10 | End: 2025-06-10

## 2025-06-10 RX ORDER — LIDOCAINE HYDROCHLORIDE 10 MG/ML
5 INJECTION, SOLUTION INFILTRATION; PERINEURAL ONCE
Status: COMPLETED | OUTPATIENT
Start: 2025-06-10 | End: 2025-06-10

## 2025-06-10 RX ADMIN — LIDOCAINE HYDROCHLORIDE 5 ML: 10 INJECTION, SOLUTION INFILTRATION; PERINEURAL at 14:19

## 2025-06-10 RX ADMIN — BETAMETHASONE SODIUM PHOSPHATE AND BETAMETHASONE ACETATE 6 MG: 3; 3 INJECTION, SUSPENSION INTRA-ARTICULAR; INTRALESIONAL; INTRAMUSCULAR; SOFT TISSUE at 14:20

## 2025-06-10 NOTE — PROGRESS NOTES
well.    Electronically signed by Wilbur Brand MD on 6/10/2025 at 1:55 PM    This dictation was generated by voice recognition computer software. Although all attempts are made to edit the dictation for accuracy, there may be errors in the transcription that are not intended.    The patient (or guardian, if applicable) and other individuals in attendance with the patient were advised that Artificial Intelligence will be utilized during this visit to record, process the conversation to generate a clinical note, and support improvement of the AI technology. The patient (or guardian, if applicable) and other individuals in attendance at the appointment consented to the use of AI, including the recording.

## 2025-06-11 ENCOUNTER — HOSPITAL ENCOUNTER (OUTPATIENT)
Dept: PAIN MANAGEMENT | Age: 60
Discharge: HOME | End: 2025-06-11
Payer: MEDICARE

## 2025-06-11 VITALS
SYSTOLIC BLOOD PRESSURE: 128 MMHG | DIASTOLIC BLOOD PRESSURE: 83 MMHG | HEART RATE: 81 BPM | RESPIRATION RATE: 18 BRPM | TEMPERATURE: 98 F | OXYGEN SATURATION: 99 %

## 2025-06-11 DIAGNOSIS — R52 PAIN MANAGEMENT: ICD-10-CM

## 2025-06-11 PROCEDURE — 64494 INJ PARAVERT F JNT L/S 2 LEV: CPT

## 2025-06-11 PROCEDURE — 64493 INJ PARAVERT F JNT L/S 1 LEV: CPT

## 2025-06-11 PROCEDURE — 64494 INJ PARAVERT F JNT L/S 2 LEV: CPT | Performed by: STUDENT IN AN ORGANIZED HEALTH CARE EDUCATION/TRAINING PROGRAM

## 2025-06-11 PROCEDURE — 6360000002 HC RX W HCPCS

## 2025-06-11 PROCEDURE — 64493 INJ PARAVERT F JNT L/S 1 LEV: CPT | Performed by: STUDENT IN AN ORGANIZED HEALTH CARE EDUCATION/TRAINING PROGRAM

## 2025-06-11 RX ORDER — BUPIVACAINE HYDROCHLORIDE 5 MG/ML
6 INJECTION, SOLUTION EPIDURAL; INTRACAUDAL; PERINEURAL ONCE
Status: DISCONTINUED | OUTPATIENT
Start: 2025-06-11 | End: 2025-06-13 | Stop reason: HOSPADM

## 2025-06-11 RX ORDER — LIDOCAINE HYDROCHLORIDE 10 MG/ML
10 INJECTION, SOLUTION EPIDURAL; INFILTRATION; INTRACAUDAL; PERINEURAL ONCE
Status: DISCONTINUED | OUTPATIENT
Start: 2025-06-11 | End: 2025-06-13 | Stop reason: HOSPADM

## 2025-06-11 ASSESSMENT — PAIN SCALES - GENERAL: PAINLEVEL_OUTOF10: 5

## 2025-06-11 NOTE — PROCEDURES
PROCEDURE NOTE  The patient's History and Physical  was reviewed with the patient and I examined the patient. There was no change. The surgical site was confirmed by the patient and me.            Plan: The risks, benefits, expected outcome, and alternative to the recommended procedure have been discussed with the patient. Patient understands and wants to proceed with the procedure.      -----------------------------------------------------------------------------------------------------------------------  Procedures  Bilateral L3-L5 Lumbar Medial Branch Blocks    Procedure Summary  CONSENT: Risks, benefits and options were explained to the patient, all questions were answered and written informed consent was obtained.      PROCEDURE NOTE:  A pre-procedural time out was performed to confirm the correct patient, procedure, side, and site. A sterile field was prepped in standard fashion using Chlorhexidine and draped with sterile towels. The selected medial branch nerves were identified using an ipsilateral oblique fluoroscopic view. The overlying skin and subcutaneous tissue was anesthetized with 1% lidocaine. A 22 gauge 3.5 inch spinal needle was advanced using intermittent fluoroscopy toward the junction of the transverse process and superior articulating process targeting bilateral L3-L5 medial branch nerves. Needle placement was confirmed with biplanar fluoroscopic imaging. The L5 dorsal ramus was targeted at the sacral ala. Following negative aspiration, 0.5 mL of .5% bupivicaine was injected at each location. The needles were re-styletted and withdrawn. The patient's skin was cleaned with alcohol and the injection sites covered with bandages.    EBL: None  COMPLICATIONS: None    The patient was monitored for several minutes prior to discharge. Vital signs remained stable throughout the procedure and in the recovery area. There were no immediate complications and the patient tolerated the procedure well. Sensory

## 2025-06-24 DIAGNOSIS — M54.16 LUMBAR RADICULOPATHY: ICD-10-CM

## 2025-06-24 RX ORDER — GABAPENTIN 600 MG/1
600 TABLET ORAL 3 TIMES DAILY
Qty: 270 TABLET | Refills: 0 | Status: SHIPPED | OUTPATIENT
Start: 2025-06-24 | End: 2025-09-22

## 2025-07-02 ENCOUNTER — HOSPITAL ENCOUNTER (OUTPATIENT)
Dept: PAIN MANAGEMENT | Age: 60
Discharge: HOME | End: 2025-07-02
Payer: MEDICARE

## 2025-07-02 VITALS
RESPIRATION RATE: 16 BRPM | SYSTOLIC BLOOD PRESSURE: 133 MMHG | OXYGEN SATURATION: 97 % | DIASTOLIC BLOOD PRESSURE: 82 MMHG | TEMPERATURE: 97.3 F | HEART RATE: 75 BPM

## 2025-07-02 DIAGNOSIS — R52 PAIN MANAGEMENT: ICD-10-CM

## 2025-07-02 PROCEDURE — 6360000002 HC RX W HCPCS

## 2025-07-02 PROCEDURE — 64494 INJ PARAVERT F JNT L/S 2 LEV: CPT | Performed by: STUDENT IN AN ORGANIZED HEALTH CARE EDUCATION/TRAINING PROGRAM

## 2025-07-02 PROCEDURE — 64494 INJ PARAVERT F JNT L/S 2 LEV: CPT

## 2025-07-02 PROCEDURE — 64493 INJ PARAVERT F JNT L/S 1 LEV: CPT

## 2025-07-02 PROCEDURE — 64493 INJ PARAVERT F JNT L/S 1 LEV: CPT | Performed by: STUDENT IN AN ORGANIZED HEALTH CARE EDUCATION/TRAINING PROGRAM

## 2025-07-02 RX ORDER — LIDOCAINE HYDROCHLORIDE 10 MG/ML
10 INJECTION, SOLUTION EPIDURAL; INFILTRATION; INTRACAUDAL; PERINEURAL ONCE
Status: DISCONTINUED | OUTPATIENT
Start: 2025-07-02 | End: 2025-07-04 | Stop reason: HOSPADM

## 2025-07-02 RX ORDER — BUPIVACAINE HYDROCHLORIDE 5 MG/ML
6 INJECTION, SOLUTION EPIDURAL; INTRACAUDAL; PERINEURAL ONCE
Status: DISCONTINUED | OUTPATIENT
Start: 2025-07-02 | End: 2025-07-04 | Stop reason: HOSPADM

## 2025-07-02 ASSESSMENT — PAIN - FUNCTIONAL ASSESSMENT: PAIN_FUNCTIONAL_ASSESSMENT: NONE - DENIES PAIN

## 2025-07-02 NOTE — PROCEDURES
The patient's History and Physical  was reviewed with the patient and I examined the patient. There was no change. The surgical site was confirmed by the patient and me.            Plan: The risks, benefits, expected outcome, and alternative to the recommended procedure have been discussed with the patient. Patient understands and wants to proceed with the procedure.      -----------------------------------------------------------------------------------------------------------------------  Diagnosis  Lumbar Medial Branch Blocks L3-L5    Procedures  Bilateral L3-L5 Lumbar Medial Branch Block    Procedure Summary    CONSENT: Risks, benefits and options were explained to the patient, all questions were answered and written informed consent was obtained.    PROCEDURE NOTE:  A pre-procedural time out was performed to confirm the correct patient, procedure, side, and site. A sterile field was prepped in standard fashion using Chlorhexidine and draped with sterile towels. The selected medial branch nerves were identified using an ipsilateral oblique fluoroscopic view. The overlying skin and subcutaneous tissue was anesthetized with 1% lidocaine. A 22 gauge 3.5 inch spinal needle was advanced using intermittent fluoroscopy toward the junction of the transverse process and superior articulating process targeting bilateral L3-L5 medial branch nerves. Needle placement was confirmed with biplanar fluoroscopic imaging. The L5 dorsal ramus was targeted at the sacral ala. Following negative aspiration, 0.5 mL of .5% bupivicaine was injected at each location. The needles were re-styletted and withdrawn. The patient's skin was cleaned with alcohol and the injection sites covered with bandages.    EBL: None  COMPLICATIONS: None      The patient was monitored for several  minutes prior to discharge. Vital signs remained stable throughout the procedure and in the recovery area. There were no immediate complications and the patient

## 2025-07-02 NOTE — PROGRESS NOTES
[facility]   [address]  Located within [facility] [address]  [phone] p  [phone]    Procedure:  Level of Consciousness: [x]Alert [x]Oriented []Disoriented []Lethargic  Anxiety Level: [x]Calm []Anxious []Depressed []Other  Skin: []Warm [x]Dry []Cool []Moist []Intact []Other  Cardiovascular: []Palpitations: [x]Never []Occasionally []Frequently  Chest Pain: [x]No []Yes  Respiratory:  [x]Unlabored []Labored []Cough ([] Productive []Unproductive)  HCG Required: [x]No []Yes   Results: []Negative []Positive  Knowledge Level:        [x]Patient/Other verbalized understanding of pre-procedure instructions.        [x]Assessment of post-op care needs (transportation, responsible caregiver)        [x]Able to discuss health care problems and how to deal with it.  Factors that Affect Teaching:        Language Barrier: [x]No []Yes - why:        Hearing Loss:        [x]No []Yes            Corrective Device:  []Yes []No        Vision Loss:           [x]No []Yes            Corrective Device:  []Yes []No        Memory Loss:       [x]No []Yes            []Short Term []Long Term  Motivational Level:  []Asks Questions                  []Extremely Anxious       []Seems Interested               [x]Seems Uninterested                  [x]Denies need for Education  Risk for Injury:  [x]Patient oriented to person, place and time  []History of frequent falls/loss of balance  Nutritional:  []Change in appetite   []Weight Gain   []Weight Loss  Functional:

## 2025-07-03 ENCOUNTER — TELEPHONE (OUTPATIENT)
Dept: PAIN MANAGEMENT | Age: 60
End: 2025-07-03

## 2025-07-03 NOTE — TELEPHONE ENCOUNTER
Placed follow up call regarding pt's #2 MBB L3-L5, L5-S1 administered on 7/2/25  How much did the shot help?   100  %   For Median Branch Blocks-    Duration of shot?      8 hours       (Goal is 6-8 hours First Injection, 2 hours 2nd injection)  What is your current pain level now? Pt's pre-procedure pain level 8/10, Pt reports post procedure paoin level at 0/10.  Pt states his pain level remained 0/10 till pt's block wore off.                          Has your activity level increased since the shot?   Yes.  Pt reports he was able to perform adls and walk without discomfort while block was in effect.  Electronically signed by Kerry Cade RN on 7/3/2025 at 1:27 PM

## 2025-08-21 ENCOUNTER — OFFICE VISIT (OUTPATIENT)
Dept: PAIN MANAGEMENT | Age: 60
End: 2025-08-21
Payer: MEDICARE

## 2025-08-21 VITALS
SYSTOLIC BLOOD PRESSURE: 129 MMHG | WEIGHT: 226.2 LBS | HEIGHT: 74 IN | BODY MASS INDEX: 29.03 KG/M2 | OXYGEN SATURATION: 97 % | TEMPERATURE: 96.8 F | RESPIRATION RATE: 16 BRPM | DIASTOLIC BLOOD PRESSURE: 76 MMHG | HEART RATE: 79 BPM

## 2025-08-21 DIAGNOSIS — M54.16 LUMBAR RADICULOPATHY: ICD-10-CM

## 2025-08-21 DIAGNOSIS — M47.817 LUMBOSACRAL SPONDYLOSIS WITHOUT MYELOPATHY: Primary | ICD-10-CM

## 2025-08-21 PROCEDURE — G8417 CALC BMI ABV UP PARAM F/U: HCPCS | Performed by: STUDENT IN AN ORGANIZED HEALTH CARE EDUCATION/TRAINING PROGRAM

## 2025-08-21 PROCEDURE — 3017F COLORECTAL CA SCREEN DOC REV: CPT | Performed by: STUDENT IN AN ORGANIZED HEALTH CARE EDUCATION/TRAINING PROGRAM

## 2025-08-21 PROCEDURE — 3078F DIAST BP <80 MM HG: CPT | Performed by: STUDENT IN AN ORGANIZED HEALTH CARE EDUCATION/TRAINING PROGRAM

## 2025-08-21 PROCEDURE — 3074F SYST BP LT 130 MM HG: CPT | Performed by: STUDENT IN AN ORGANIZED HEALTH CARE EDUCATION/TRAINING PROGRAM

## 2025-08-21 PROCEDURE — G8427 DOCREV CUR MEDS BY ELIG CLIN: HCPCS | Performed by: STUDENT IN AN ORGANIZED HEALTH CARE EDUCATION/TRAINING PROGRAM

## 2025-08-21 PROCEDURE — 99214 OFFICE O/P EST MOD 30 MIN: CPT | Performed by: STUDENT IN AN ORGANIZED HEALTH CARE EDUCATION/TRAINING PROGRAM

## 2025-08-21 PROCEDURE — 1036F TOBACCO NON-USER: CPT | Performed by: STUDENT IN AN ORGANIZED HEALTH CARE EDUCATION/TRAINING PROGRAM

## 2025-08-21 RX ORDER — GABAPENTIN 600 MG/1
600 TABLET ORAL 3 TIMES DAILY
Qty: 270 TABLET | Refills: 0 | Status: SHIPPED | OUTPATIENT
Start: 2025-09-24 | End: 2025-12-23

## (undated) DEVICE — SNARE POLYP SM W13MMXL240CM SHTH DIA2.4MM OVL FLX DISP

## (undated) DEVICE — GLOVE SURG SZ 75 L12IN FNGR THK94MIL TRNSLUC YEL LTX

## (undated) DEVICE — MAJOR BSIN SETUP PK

## (undated) DEVICE — 3M™ IOBAN™ 2 ANTIMICROBIAL INCISE DRAPE 6650EZ: Brand: IOBAN™ 2

## (undated) DEVICE — DRESSING MEPILEX BORDER FLEX LITE 5X12.5CM

## (undated) DEVICE — GLOVE SURG SZ 65 CRM LTX FREE POLYISOPRENE POLYMER BEAD ANTI

## (undated) DEVICE — GAUZE,SPONGE,FLUFF,6"X6.75",STRL,10/TRAY: Brand: MEDLINE

## (undated) DEVICE — SHEET,DRAPE,53X77,STERILE: Brand: MEDLINE

## (undated) DEVICE — DRAPE,U/ SHT,SPLIT,PLAS,STERIL: Brand: MEDLINE

## (undated) DEVICE — DRP SURG U/DRP INVISISHIELD PA 48X52IN

## (undated) DEVICE — SUTURE VCRL SZ 2-0 L18IN ABSRB VLT POLYGLACTIN 910 BRAID J105T

## (undated) DEVICE — MASK ANES AD M SZ 5 PREM TAIL VLV INFL PRT UNSCENTED HK RNG

## (undated) DEVICE — GOWN,PREVENTION PLUS,XLNG/XXLARGE,STRL: Brand: MEDLINE

## (undated) DEVICE — GLV SURG DERMASSURE GRN LF PF 8.0

## (undated) DEVICE — DRESSING,GAUZE,XEROFORM,CURAD,5"X9",ST: Brand: CURAD

## (undated) DEVICE — DRAPE,EXTREMITY,89X128,STERILE: Brand: MEDLINE

## (undated) DEVICE — CIRCUIT AD PLN SWVL WYE

## (undated) DEVICE — Q-FIX REUSABLE 2.8MM PATHFINDER OBTURATOR: Brand: Q-FIX

## (undated) DEVICE — TRAP,MUCUS SPECIMEN,40CC: Brand: MEDLINE

## (undated) DEVICE — SINGLE PORT MANIFOLD: Brand: NEPTUNE 2

## (undated) DEVICE — CANNULA NSL AD L7FT DIV O2 CO2 W/ M LUERLOCK TRMPT CONN

## (undated) DEVICE — SHOULDER STABILIZATION KIT,                                    DISPOSABLE 12 PER BOX

## (undated) DEVICE — BRUSH ENDOSCP 2 END CHN HEDGEHOG

## (undated) DEVICE — ELASTIC SHOULDER IMMOBILIZER: Brand: DEROYAL

## (undated) DEVICE — LINE GAS SAMPLING INTEGR NSL SINGLE PC O2 ETCO2 FILTERLINE

## (undated) DEVICE — SYS CLS SKIN PREMIERPRO EXOFINFUSION 22CM

## (undated) DEVICE — SUPPLEMENT DIGESTIVE H2O SOL GI-EASE

## (undated) DEVICE — OPTIFOAM GENTLE SA, POSTOP, 4X8: Brand: MEDLINE

## (undated) DEVICE — SPONGE,LAP,12"X12",XR,ST,5/PK,40PK/CS: Brand: MEDLINE

## (undated) DEVICE — TUBE ET 8MM NSL ORAL BASIC CUF INTMED MURPHY EYE RADPQ MRK

## (undated) DEVICE — BIPOLAR CORD: Brand: DEROYAL

## (undated) DEVICE — DUAL CUT SAGITTAL BLADE

## (undated) DEVICE — CHLORAPREP 26ML CLEAR

## (undated) DEVICE — CVR UNIV C/ARM

## (undated) DEVICE — SUTURE ETHLN SZ 4-0 L18IN NONABSORBABLE BLK L19MM PS-2 3/8 1667H

## (undated) DEVICE — SUTURE VCRL SZ 3-0 L27IN ABSRB UD L26MM SH 1/2 CIR J416H

## (undated) DEVICE — ENDO KIT,LOURDES HOSPITAL: Brand: MEDLINE INDUSTRIES, INC.

## (undated) DEVICE — PK SHLDR 30

## (undated) DEVICE — CLEANING SPONGE: Brand: KOALA™

## (undated) DEVICE — CLEAR-TRAC 7.0 MM X 72 MM SMOOTH                                    CANNULA, WITH DISPOSABLE OBTURATOR,                                    GREY, STERILE: Brand: CLEAR-TRAC

## (undated) DEVICE — INTRODUCER TUBE COUDE TIP AD 15 FRX70 CM CALIB POLYETH DISP

## (undated) DEVICE — SPLINT ORTH W5XL30IN PLSTR OF PARIS LO EXOTHERM SMOOTH

## (undated) DEVICE — C-ARM: Brand: UNBRANDED

## (undated) DEVICE — DRESSING FOAM W4XL4IN SIL FACE BORD ADH PD SUP ABSRB COR

## (undated) DEVICE — SURGICAL PROCEDURE PACK LOWER EXTREMITY LOURDES HOSP

## (undated) DEVICE — ADAPTER CLEANING PORPOISE CLEANING

## (undated) DEVICE — HANDPIECE SET WITH HIGH FLOW TIP AND SUCTION TUBE: Brand: INTERPULSE

## (undated) DEVICE — GLOVE SURG SZ 8 CRM LTX FREE POLYISOPRENE POLYMER BEAD ANTI

## (undated) DEVICE — T-MAX DISPOSABLE FACE MASK 8 PER BOX

## (undated) DEVICE — BLADE LARYNSCP STERILE SZ 3 TI DISP SPECTRM DVM

## (undated) DEVICE — 4-PORT MANIFOLD: Brand: NEPTUNE 2

## (undated) DEVICE — IMMOBILIZER ORTH CONTACT CLOSURE STRP LG 18X9 IN PROCARE

## (undated) DEVICE — TRAP FLD MINIVAC MEGADYNE 100ML

## (undated) DEVICE — ADHESIVE SKIN CLOSURE WND 8.661X1.5 IN 22 CM LIQUIBAND SECUR

## (undated) DEVICE — DRESSING TRNSPAR W5XL4.5IN FLM SHT SEMIPERMEABLE WIND

## (undated) DEVICE — NEPTUNE E-SEP SMOKE EVACUATION PENCIL, COATED, 70MM BLADE, ROCKER SWITCH: Brand: NEPTUNE E-SEP

## (undated) DEVICE — AMBU AURA-I U SIZE 5, DISPOSABLE LARYNGEAL MASK: Brand: AURA-I

## (undated) DEVICE — ARM SLING II: Brand: DEROYAL

## (undated) DEVICE — BANDAGE COMPR W4INXL15FT BGE E SGL LAYERED CLP CLSR

## (undated) DEVICE — UNDERGLOVE SURG SZ 8 FNGR THK0.21MIL GRN LTX BEAD CUF

## (undated) DEVICE — ELECTRODE ELECSURG 2 PLATE AD 10 FT 33 LB PT RET MEGADYNE